# Patient Record
Sex: FEMALE | Race: WHITE | NOT HISPANIC OR LATINO | Employment: OTHER | URBAN - METROPOLITAN AREA
[De-identification: names, ages, dates, MRNs, and addresses within clinical notes are randomized per-mention and may not be internally consistent; named-entity substitution may affect disease eponyms.]

---

## 2017-03-22 ENCOUNTER — TELEPHONE (OUTPATIENT)
Dept: CARDIOLOGY | Facility: CLINIC | Age: 57
End: 2017-03-22

## 2017-03-22 DIAGNOSIS — E78.00 HYPERCHOLESTEREMIA: Primary | ICD-10-CM

## 2017-03-22 NOTE — TELEPHONE ENCOUNTER
PT would like to start Repatha or Praluent- she can not tolerate Crestor-   Left a msg for her to call me back- she will need lipid panel before we can proceed- order placed in EPIC

## 2017-03-29 ENCOUNTER — LAB (OUTPATIENT)
Dept: LAB | Facility: HOSPITAL | Age: 57
End: 2017-03-29

## 2017-03-29 DIAGNOSIS — E78.00 HYPERCHOLESTEREMIA: ICD-10-CM

## 2017-03-29 LAB
ALBUMIN SERPL-MCNC: 4.5 G/DL (ref 3.2–4.8)
ALP SERPL-CCNC: 62 U/L (ref 25–100)
ALT SERPL W P-5'-P-CCNC: 12 U/L (ref 7–40)
ARTICHOKE IGE QN: 211 MG/DL (ref 0–130)
AST SERPL-CCNC: 18 U/L (ref 0–33)
BILIRUB CONJ SERPL-MCNC: 0.1 MG/DL (ref 0–0.2)
BILIRUB INDIRECT SERPL-MCNC: 0.4 MG/DL (ref 0.1–1.1)
BILIRUB SERPL-MCNC: 0.5 MG/DL (ref 0.3–1.2)
CHOLEST SERPL-MCNC: 310 MG/DL (ref 0–200)
HDLC SERPL-MCNC: 74 MG/DL (ref 40–60)
PROT SERPL-MCNC: 7.6 G/DL (ref 5.7–8.2)
TRIGL SERPL-MCNC: 121 MG/DL (ref 0–150)

## 2017-03-29 PROCEDURE — 80061 LIPID PANEL: CPT | Performed by: INTERNAL MEDICINE

## 2017-03-29 PROCEDURE — 80076 HEPATIC FUNCTION PANEL: CPT | Performed by: INTERNAL MEDICINE

## 2017-06-16 ENCOUNTER — TRANSCRIBE ORDERS (OUTPATIENT)
Dept: MAMMOGRAPHY | Facility: HOSPITAL | Age: 57
End: 2017-06-16

## 2017-06-16 DIAGNOSIS — Z12.31 VISIT FOR SCREENING MAMMOGRAM: Primary | ICD-10-CM

## 2017-07-10 ENCOUNTER — HOSPITAL ENCOUNTER (OUTPATIENT)
Dept: MAMMOGRAPHY | Facility: HOSPITAL | Age: 57
Discharge: HOME | End: 2017-07-10
Attending: FAMILY MEDICINE | Admitting: FAMILY MEDICINE

## 2017-07-10 DIAGNOSIS — Z12.31 VISIT FOR SCREENING MAMMOGRAM: ICD-10-CM

## 2017-07-10 PROCEDURE — 77063 BREAST TOMOSYNTHESIS BI: CPT

## 2017-07-10 PROCEDURE — G0202 SCR MAMMO BI INCL CAD: HCPCS

## 2017-07-10 PROCEDURE — 77067 SCR MAMMO BI INCL CAD: CPT | Performed by: RADIOLOGY

## 2017-07-10 PROCEDURE — 77063 BREAST TOMOSYNTHESIS BI: CPT | Performed by: RADIOLOGY

## 2017-08-21 ENCOUNTER — TRANSCRIBE ORDERS (OUTPATIENT)
Dept: LAB | Facility: HOSPITAL | Age: 57
End: 2017-08-21

## 2017-08-21 ENCOUNTER — LAB (OUTPATIENT)
Dept: LAB | Facility: HOSPITAL | Age: 57
End: 2017-08-21

## 2017-08-21 DIAGNOSIS — G56.03 CARPAL TUNNEL SYNDROME, BILATERAL: ICD-10-CM

## 2017-08-21 DIAGNOSIS — R29.898 OTHER SYMPTOMS REFERABLE TO UPPER ARM JOINT: ICD-10-CM

## 2017-08-21 DIAGNOSIS — G56.03 CARPAL TUNNEL SYNDROME, BILATERAL: Primary | ICD-10-CM

## 2017-08-21 LAB
CK SERPL-CCNC: 76 U/L (ref 26–174)
CRP SERPL-MCNC: 0.77 MG/DL (ref 0–0.5)
D-LACTATE SERPL-SCNC: 0.9 MMOL/L (ref 0.5–2)
ERYTHROCYTE [SEDIMENTATION RATE] IN BLOOD: 17 MM/HR (ref 0–30)
LDH SERPL-CCNC: 165 U/L (ref 120–246)

## 2017-08-21 PROCEDURE — 86235 NUCLEAR ANTIGEN ANTIBODY: CPT | Performed by: PSYCHIATRY & NEUROLOGY

## 2017-08-21 PROCEDURE — 83615 LACTATE (LD) (LDH) ENZYME: CPT | Performed by: PSYCHIATRY & NEUROLOGY

## 2017-08-21 PROCEDURE — 83605 ASSAY OF LACTIC ACID: CPT | Performed by: PSYCHIATRY & NEUROLOGY

## 2017-08-21 PROCEDURE — 85652 RBC SED RATE AUTOMATED: CPT | Performed by: PSYCHIATRY & NEUROLOGY

## 2017-08-21 PROCEDURE — 86141 C-REACTIVE PROTEIN HS: CPT | Performed by: PSYCHIATRY & NEUROLOGY

## 2017-08-21 PROCEDURE — 86431 RHEUMATOID FACTOR QUANT: CPT | Performed by: PSYCHIATRY & NEUROLOGY

## 2017-08-21 PROCEDURE — 36415 COLL VENOUS BLD VENIPUNCTURE: CPT

## 2017-08-21 PROCEDURE — 82550 ASSAY OF CK (CPK): CPT | Performed by: PSYCHIATRY & NEUROLOGY

## 2017-08-21 PROCEDURE — 86225 DNA ANTIBODY NATIVE: CPT | Performed by: PSYCHIATRY & NEUROLOGY

## 2017-08-21 PROCEDURE — 82085 ASSAY OF ALDOLASE: CPT | Performed by: PSYCHIATRY & NEUROLOGY

## 2017-08-22 LAB
CENTROMERE B AB SER-ACNC: >8 AI (ref 0–0.9)
CHROMATIN AB SERPL-ACNC: <0.2 AI (ref 0–0.9)
DSDNA AB SER-ACNC: <1 IU/ML (ref 0–9)
ENA JO1 AB SER-ACNC: <0.2 AI (ref 0–0.9)
ENA RNP AB SER-ACNC: <0.2 AI (ref 0–0.9)
ENA SCL70 AB SER-ACNC: <0.2 AI (ref 0–0.9)
ENA SM AB SER-ACNC: <0.2 AI (ref 0–0.9)
ENA SS-A AB SER-ACNC: <0.2 AI (ref 0–0.9)
ENA SS-B AB SER-ACNC: <0.2 AI (ref 0–0.9)
Lab: ABNORMAL
RHEUMATOID FACT SERPL-ACNC: NEGATIVE [IU]/ML

## 2017-08-23 LAB — ALDOLASE SERPL-CCNC: 1.9 U/L (ref 3.3–10.3)

## 2017-10-18 ENCOUNTER — LAB (OUTPATIENT)
Dept: LAB | Facility: HOSPITAL | Age: 57
End: 2017-10-18

## 2017-10-18 DIAGNOSIS — R76.8 FALSE POSITIVE SEROLOGICAL TEST FOR SYPHILIS: ICD-10-CM

## 2017-10-18 DIAGNOSIS — G62.9 ACQUIRED POLYNEUROPATHY: Primary | ICD-10-CM

## 2017-10-18 LAB
ALBUMIN SERPL-MCNC: 4.3 G/DL (ref 3.2–4.8)
ALBUMIN/GLOB SERPL: 1.6 G/DL (ref 1.5–2.5)
ALP SERPL-CCNC: 83 U/L (ref 25–100)
ALT SERPL W P-5'-P-CCNC: 13 U/L (ref 7–40)
ANION GAP SERPL CALCULATED.3IONS-SCNC: 2 MMOL/L (ref 3–11)
AST SERPL-CCNC: 22 U/L (ref 0–33)
BASOPHILS # BLD MANUAL: 0 10*3/MM3 (ref 0–0.2)
BASOPHILS NFR BLD AUTO: 0 % (ref 0–1)
BILIRUB SERPL-MCNC: 0.4 MG/DL (ref 0.3–1.2)
BUN BLD-MCNC: 13 MG/DL (ref 9–23)
BUN/CREAT SERPL: 18.6 (ref 7–25)
CALCIUM SPEC-SCNC: 9.6 MG/DL (ref 8.7–10.4)
CHLORIDE SERPL-SCNC: 99 MMOL/L (ref 99–109)
CO2 SERPL-SCNC: 31 MMOL/L (ref 20–31)
CREAT BLD-MCNC: 0.7 MG/DL (ref 0.6–1.3)
CRP SERPL-MCNC: 0.3 MG/DL (ref 0–1)
DEPRECATED RDW RBC AUTO: 46.9 FL (ref 37–54)
EOSINOPHIL # BLD MANUAL: 0.2 10*3/MM3 (ref 0.1–0.3)
EOSINOPHIL NFR BLD MANUAL: 5 % (ref 0–3)
ERYTHROCYTE [DISTWIDTH] IN BLOOD BY AUTOMATED COUNT: 13.7 % (ref 11.3–14.5)
ERYTHROCYTE [SEDIMENTATION RATE] IN BLOOD: 19 MM/HR (ref 0–30)
GFR SERPL CREATININE-BSD FRML MDRD: 86 ML/MIN/1.73
GLOBULIN UR ELPH-MCNC: 2.7 GM/DL
GLUCOSE BLD-MCNC: 96 MG/DL (ref 70–100)
HBA1C MFR BLD: 5.7 % (ref 4.8–5.6)
HCT VFR BLD AUTO: 37.8 % (ref 34.5–44)
HGB BLD-MCNC: 12.4 G/DL (ref 11.5–15.5)
LYMPHOCYTES # BLD MANUAL: 1.1 10*3/MM3 (ref 0.6–4.8)
LYMPHOCYTES NFR BLD MANUAL: 27 % (ref 24–44)
LYMPHOCYTES NFR BLD MANUAL: 4 % (ref 0–12)
MCH RBC QN AUTO: 30.6 PG (ref 27–31)
MCHC RBC AUTO-ENTMCNC: 32.8 G/DL (ref 32–36)
MCV RBC AUTO: 93.3 FL (ref 80–99)
MONOCYTES # BLD AUTO: 0.16 10*3/MM3 (ref 0–1)
NEUTROPHILS # BLD AUTO: 2.6 10*3/MM3 (ref 1.5–8.3)
NEUTROPHILS NFR BLD MANUAL: 63 % (ref 41–71)
NEUTS BAND NFR BLD MANUAL: 1 % (ref 0–5)
PLAT MORPH BLD: NORMAL
PLATELET # BLD AUTO: 276 10*3/MM3 (ref 150–450)
PMV BLD AUTO: 10.6 FL (ref 6–12)
POTASSIUM BLD-SCNC: 4.1 MMOL/L (ref 3.5–5.5)
PROT SERPL-MCNC: 7 G/DL (ref 5.7–8.2)
RBC # BLD AUTO: 4.05 10*6/MM3 (ref 3.89–5.14)
RBC MORPH BLD: NORMAL
SODIUM BLD-SCNC: 132 MMOL/L (ref 132–146)
WBC MORPH BLD: NORMAL
WBC NRBC COR # BLD: 4.07 10*3/MM3 (ref 3.5–10.8)

## 2017-10-18 PROCEDURE — 36415 COLL VENOUS BLD VENIPUNCTURE: CPT

## 2017-10-18 PROCEDURE — 86140 C-REACTIVE PROTEIN: CPT | Performed by: INTERNAL MEDICINE

## 2017-10-18 PROCEDURE — 83036 HEMOGLOBIN GLYCOSYLATED A1C: CPT | Performed by: INTERNAL MEDICINE

## 2017-10-18 PROCEDURE — 80053 COMPREHEN METABOLIC PANEL: CPT | Performed by: INTERNAL MEDICINE

## 2017-10-18 PROCEDURE — 85007 BL SMEAR W/DIFF WBC COUNT: CPT | Performed by: INTERNAL MEDICINE

## 2017-10-18 PROCEDURE — 85027 COMPLETE CBC AUTOMATED: CPT | Performed by: INTERNAL MEDICINE

## 2017-10-18 PROCEDURE — 86038 ANTINUCLEAR ANTIBODIES: CPT | Performed by: INTERNAL MEDICINE

## 2017-10-19 LAB
ANA SER QL IA: POSITIVE
CENTROMERE AB TITR SER IF: ABNORMAL {TITER}
Lab: ABNORMAL

## 2017-10-31 RX ORDER — ESTRADIOL 0.5 MG/1
TABLET ORAL
Qty: 90 TABLET | Refills: 0 | Status: SHIPPED | OUTPATIENT
Start: 2017-10-31 | End: 2017-11-16 | Stop reason: SDUPTHER

## 2017-11-16 ENCOUNTER — OFFICE VISIT (OUTPATIENT)
Dept: OBSTETRICS AND GYNECOLOGY | Facility: CLINIC | Age: 57
End: 2017-11-16

## 2017-11-16 VITALS
SYSTOLIC BLOOD PRESSURE: 128 MMHG | HEIGHT: 63 IN | BODY MASS INDEX: 28.63 KG/M2 | WEIGHT: 161.6 LBS | DIASTOLIC BLOOD PRESSURE: 90 MMHG

## 2017-11-16 DIAGNOSIS — N60.12 FIBROCYSTIC BREAST CHANGES, BILATERAL: ICD-10-CM

## 2017-11-16 DIAGNOSIS — Z79.890 POST-MENOPAUSE ON HRT (HORMONE REPLACEMENT THERAPY): Primary | ICD-10-CM

## 2017-11-16 DIAGNOSIS — N60.11 FIBROCYSTIC BREAST CHANGES, BILATERAL: ICD-10-CM

## 2017-11-16 PROCEDURE — 99396 PREV VISIT EST AGE 40-64: CPT | Performed by: OBSTETRICS & GYNECOLOGY

## 2017-11-16 RX ORDER — ESTRADIOL 0.5 MG/1
0.5 TABLET ORAL DAILY
Qty: 90 TABLET | Refills: 3 | Status: SHIPPED | OUTPATIENT
Start: 2017-11-16 | End: 2018-11-16

## 2017-11-16 RX ORDER — HYDROCHLOROTHIAZIDE 25 MG/1
1 TABLET ORAL DAILY
COMMUNITY
Start: 2017-08-14 | End: 2019-06-13 | Stop reason: SDUPTHER

## 2017-11-16 RX ORDER — LEVOFLOXACIN 500 MG/1
1 TABLET, FILM COATED ORAL DAILY
COMMUNITY
Start: 2017-11-07 | End: 2018-07-20

## 2017-11-16 RX ORDER — ALBUTEROL SULFATE 90 UG/1
1 POWDER, METERED RESPIRATORY (INHALATION) 4 TIMES DAILY
COMMUNITY
Start: 2017-11-07 | End: 2018-07-20

## 2017-11-16 NOTE — PROGRESS NOTES
Chief Complaint   Patient presents with   • Gynecologic Exam     patient thinks she may be allergic to estradiol.  having itching/rash under arms and inside thighs X 6 weeks.       Rupa Finn is a 57 y.o. year old  presenting to be seen for her annual exam.This patient has previously had an LAVH/BSO/VCS and a laparoscopic Lee procedure.  She denies urinary incontinence.  She denies bowel symptoms.  She uses estradiol, 0.5 mg daily and has no menopausal symptoms.  She traveled to [facility] weeks ago and now has an erythematous, pruritic rash under her arms and in the groin area.  She questions whether the estradiol is causing that.  She has a cough with production of purulent sputum and is currently being treated with Levaquin, 500 mg daily.    SCREENING TESTS    Year 2012 2033   Age                         PAP                         HPV high risk                         Mammogram     benign benign                   TONE score                         Breast MRI                         Lipids                         Vitamin D                         Colonoscopy                         DEXA  Frax (hip/any)     normal                    Ovarian Screen                             She exercises regularly: yes.  She wears her seat belt: yes.  She has concerns about domestic violence: no.  She has noticed changes in height: no    GYN screening history:  · Last mammogram: was done on approximately 7/10/2017 and the result was: Birads I (Normal).  · Last DEXA: was done on approximately 2016 and the results were: normal.    No Additional Complaints Reported    The following portions of the patient's history were reviewed and updated as appropriate:vital signs and   She  does not have any pertinent problems on file.  She  has a past surgical history that includes Tonsillectomy; Cholecystectomy; Other  surgical history; laparoscopic assisted vaginal hysterectomy salpingo oophorectomy (Bilateral); Laparoscopic Lee procedure; and Oophorectomy.  Her family history includes Asthma in her brother; Glaucoma in her father; Other in her father and mother.  She  reports that she has never smoked. She has never used smokeless tobacco. She reports that she does not drink alcohol or use illicit drugs.  Current Outpatient Prescriptions   Medication Sig Dispense Refill   • Alirocumab 75 MG/ML solution pen-injector Inject 75 mg under the skin Every 14 (Fourteen) Days. 2 pen 11   • calcium carbonate (TUMS) 500 MG chewable tablet Chew 1 tablet As Needed for indigestion or heartburn.     • econazole nitrate (SPECTAZOLE) 1 % cream      • estradiol (ESTRACE) 0.5 MG tablet Take 1 tablet by mouth Daily. 90 tablet 3   • FIBER COMPLETE PO Take  by mouth Daily.     • hydrochlorothiazide (MICROZIDE) 12.5 MG capsule Take 2 capsules by mouth Daily.     • levoFLOXacin (LEVAQUIN) 500 MG tablet Take 1 tablet by mouth Daily.     • levothyroxine (SYNTHROID, LEVOTHROID) 88 MCG tablet Take 88 mcg by mouth Daily.     • methazolAMIDE (NEPTAZANE) 50 MG tablet Take 25 mg by mouth 2 (Two) Times a Day.     • PROAIR RESPICLICK 108 (90 Base) MCG/ACT inhaler Inhale 1 dose 4 (Four) Times a Day.     • timolol (TIMOPTIC) 0.5 % ophthalmic solution Administer 1 drop to both eyes 2 (Two) Times a Day.     • travoprost, BAK free, (TRAVATAN) 0.004 % solution ophthalmic solution 1 drop Every Evening. in affected eye(s)       No current facility-administered medications for this visit.      She is allergic to aleve [naproxen sodium]; alphagan [brimonidine]; atenolol; ceclor [cefaclor]; e-mycin [erythromycin]; and statins..    Review of Systems  A comprehensive review of systems was taken.  Constitutional: negative for fever, chills, activity change, appetite change, fatigue and unexpected weight change.  Respiratory: positive for cough, wheezing and purulent  "production  Cardiovascular: negative  Gastrointestinal: negative  Genitourinary:negative  Musculoskeletal:negative  Behavioral/Psych: negative       /90  Ht 63\" (160 cm)  Wt 161 lb 9.6 oz (73.3 kg)  LMP  (LMP Unknown)  BMI 28.63 kg/m2    Physical Exam    General:  alert; cooperative; well developed; well nourished   Skin:  erythema in axilla and in groin areas   Thyroid: normal to inspection and palpation   Lungs:  Raspy breath sounds and inspiratory wheezing in both lungs   Heart:  regular rate and rhythm, S1, S2 normal, no murmur, click, rub or gallop   Breasts:  Examined in supine position  Symmetric without masses or skin dimpling  Nipples normal without inversion, lesions or discharge  There are no palpable axillary nodes  Fibrocystic changes are present both breasts without a discrete mass   Abdomen: soft, non-tender; no masses  no umbilical or inginual hernias are present  no hepato-splenomegaly   Pelvis: Clinical staff was present for exam  External genitalia:  normal appearance of the external genitalia including Bartholin's and Paxtonia's glands.  Vaginal:  normal pink mucosa without prolapse or lesions. the urethro-vesical angle is good  Cervix:  absent.  Uterus:  absent.  Adnexa:  absent, bilateral.  Rectal:  anus visually normal appearing. recto-vaginal exam unremarkable and confirms findings;     Lab Review   No data reviewed    Imaging  Mammogram results         ASSESSMENT  Problems Addressed this Visit        Genitourinary    Post-menopause on HRT (hormone replacement therapy) - Primary    Relevant Medications    estradiol (ESTRACE) 0.5 MG tablet       Other    Fibrocystic breast changes, bilateral          PLAN    Medications prescribed this encounter:    New Medications Ordered This Visit   Medications   • PROAIR RESPICLICK 108 (90 Base) MCG/ACT inhaler     Sig: Inhale 1 dose 4 (Four) Times a Day.   • econazole nitrate (SPECTAZOLE) 1 % cream   • hydrochlorothiazide (MICROZIDE) 12.5 MG " capsule     Sig: Take 2 capsules by mouth Daily.   • levoFLOXacin (LEVAQUIN) 500 MG tablet     Sig: Take 1 tablet by mouth Daily.   • estradiol (ESTRACE) 0.5 MG tablet     Sig: Take 1 tablet by mouth Daily.     Dispense:  90 tablet     Refill:  3   · I have encouraged the patient to use Lotrimin cream in the axillary areas and groin areas twice a day.  If her rash does not clear she may need to discontinue estradiol.  · I have counseled her in monthly self breast assessment and annual breast imaging  · Calcium, 600 mg/ Vit. D, 400 IU daily; regular weight-bearing exercise  · Follow up: 12 month(s)  *Please note that portions of this documentation may have been completed with a voice recognition program.  Efforts were made to edit this dictation, but occasional words may have been mistranscribed.       This note was electronically signed.    YASEMIN Armenta MD  November 16, 2017  4:19 PM

## 2018-06-04 ENCOUNTER — TRANSCRIBE ORDERS (OUTPATIENT)
Dept: ADMINISTRATIVE | Facility: HOSPITAL | Age: 58
End: 2018-06-04

## 2018-06-04 DIAGNOSIS — Z12.31 VISIT FOR SCREENING MAMMOGRAM: Primary | ICD-10-CM

## 2018-07-11 ENCOUNTER — HOSPITAL ENCOUNTER (OUTPATIENT)
Dept: MAMMOGRAPHY | Facility: HOSPITAL | Age: 58
Discharge: HOME | End: 2018-07-11
Attending: OBSTETRICS & GYNECOLOGY | Admitting: OBSTETRICS & GYNECOLOGY

## 2018-07-11 DIAGNOSIS — Z12.31 VISIT FOR SCREENING MAMMOGRAM: ICD-10-CM

## 2018-07-11 PROCEDURE — 77067 SCR MAMMO BI INCL CAD: CPT | Performed by: RADIOLOGY

## 2018-07-11 PROCEDURE — 77063 BREAST TOMOSYNTHESIS BI: CPT | Performed by: RADIOLOGY

## 2018-07-11 PROCEDURE — 77063 BREAST TOMOSYNTHESIS BI: CPT

## 2018-07-11 PROCEDURE — 77067 SCR MAMMO BI INCL CAD: CPT

## 2018-07-20 ENCOUNTER — OFFICE VISIT (OUTPATIENT)
Dept: CARDIOLOGY | Facility: CLINIC | Age: 58
End: 2018-07-20

## 2018-07-20 VITALS
BODY MASS INDEX: 26.9 KG/M2 | DIASTOLIC BLOOD PRESSURE: 70 MMHG | SYSTOLIC BLOOD PRESSURE: 110 MMHG | WEIGHT: 151.8 LBS | HEART RATE: 72 BPM | HEIGHT: 63 IN

## 2018-07-20 DIAGNOSIS — E78.5 HYPERLIPIDEMIA, UNSPECIFIED HYPERLIPIDEMIA TYPE: ICD-10-CM

## 2018-07-20 DIAGNOSIS — R06.09 DOE (DYSPNEA ON EXERTION): Primary | ICD-10-CM

## 2018-07-20 DIAGNOSIS — R07.9 CHEST PAIN OF UNCERTAIN ETIOLOGY: ICD-10-CM

## 2018-07-20 DIAGNOSIS — I10 ESSENTIAL HYPERTENSION: ICD-10-CM

## 2018-07-20 PROCEDURE — 93000 ELECTROCARDIOGRAM COMPLETE: CPT | Performed by: INTERNAL MEDICINE

## 2018-07-20 PROCEDURE — 99214 OFFICE O/P EST MOD 30 MIN: CPT | Performed by: INTERNAL MEDICINE

## 2018-07-20 RX ORDER — NITROGLYCERIN 0.4 MG/1
TABLET SUBLINGUAL
Qty: 25 TABLET | Refills: 6 | Status: SHIPPED | OUTPATIENT
Start: 2018-07-20 | End: 2019-12-04

## 2018-07-20 NOTE — PROGRESS NOTES
Subjective:     Encounter Date:07/20/2018    Patient ID: Rupa Finn is a 58 y.o.  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    SELF-REFERRED  PHYSICIAN: Gena Tse MD  PREVIOUS CARDIOLOGIST: Glenna Melendez MD, [facility]    Chief Complaint:   Chief Complaint   Patient presents with   • Palpitations     Problem List:  1. Palpitations:  a. Remote cardiac stress testing, performed by [facility], Dr. Hartley; data deficit, date deficit; negative for ischemia.   b. Echocardiogram,  03/10/2014:  Normal, EF 60%, Dr. Hartley.  c. Holter monitor, 05/23/2014: Sinus katelyn to sinus rhythm with rates of 40 to 94, occasional PVCs, Dr. Hartley.   d. Cardiac event monitor, 11/10/16-12/10/2016: sinus rhythm, sinus tach, sinus arrhythmia with rare PACs and PVCs.   2. Dyspnea on exertion and chest pain syndrome, CCS class I-II chest discomfort/NYHA class II TERRAZAS, with normal EKG (July 2018).  3. Hypertension.   4. Hyperlipidemia; ASCVD 10 year risk is 4.5%, 1.9% if treated.   5. Lower extremity edema.   6. Glaucoma.  7. Autoimmune diseases:  a. Patient reports positive markers for scleroderma.   b. Rheumatoid arthritis, positive YONY.  8. Hypothyroidism.  9. Constipation.  10. Gestational diabetes.  11. GERD, not currently taking medication.  12. Surgical history:   a. Hysterectomy.  b. Tonsillectomy.  c. Cholecystectomy.  d. Lap for endometriosis.    Allergies   Allergen Reactions   • Aleve [Naproxen Sodium]      tongue swelling   • Alphagan [Brimonidine]    • Atenolol      fatigue   • Ceclor [Cefaclor]    • E-Mycin [Erythromycin]    • Statins Myalgia     Current Outpatient Prescriptions:   •  Alirocumab 75 MG/ML solution pen-injector, Inject 75 mg under the skin Every 14 (Fourteen) Days., Disp: 2 pen, Rfl: 11  •  calcium carbonate (TUMS) 500 MG chewable tablet, Chew 1 tablet As Needed for indigestion or heartburn., Disp: , Rfl:   •  estradiol (ESTRACE) 0.5 MG tablet,  Take 1 tablet by mouth Daily., Disp: 90 tablet, Rfl: 3  •  FIBER COMPLETE PO, Take  by mouth Daily., Disp: , Rfl:   •  hydrochlorothiazide (MICROZIDE) 12.5 MG capsule, Take 2 capsules by mouth Daily., Disp: , Rfl:   •  levothyroxine (SYNTHROID, LEVOTHROID) 88 MCG tablet, Take 88 mcg by mouth Daily., Disp: , Rfl:   •  methazolAMIDE (NEPTAZANE) 50 MG tablet, Take 25 mg by mouth 2 (Two) Times a Day., Disp: , Rfl:   •  timolol (TIMOPTIC) 0.5 % ophthalmic solution, Administer 1 drop to both eyes 2 (Two) Times a Day., Disp: , Rfl:   •  travoprost, KATLYN free, (TRAVATAN) 0.004 % solution ophthalmic solution, 1 drop Every Evening. in affected eye(s), Disp: , Rfl:     HISTORY OF PRESENT ILLNESS:  Patient is here after a 19 month hiatus, accompanied by her  to the office today. At that time, there was discussion about starting Praluent or Repatha, but the patient was going to be rechallenged with Crestor 10 mg, 3 times weekly. She is no longer taking any type of statin medication due to intolerances with all of them. She did now show up for her appointment with Dr. Melendez in January 2018. She reports experiencing having intermittent dull chest pain that occasionally radiates to her left shoulder blade and into her left arm, with associated shortness of breath. However, she denies any nausea, dizziness, palpitations, presyncope, or syncope. She mentions that she has noticed being unable to walk up a flight of stairs without becoming winded, or when she is singing.  She denies any edema, fever, chills, or sputum production.  She had recent laboratory studies and brings this to the office today and are reviewed with the patient and her ; see below.  She does not have nitroglycerin sublingual available at home currently. Patient otherwise denies prolonged chest pain, severe shortness of breath, PND, edema, palpitations, syncope or presyncope at this time.    Review of Systems   Cardiovascular: Positive for  "dyspnea on exertion.   Respiratory: Positive for shortness of breath. Sleep disturbances due to breathing: climbing or singing.    Neurological: Positive for dizziness (occasional).      Obtained and otherwise negative except as outlined in problem list and HPI.      ECG 12 Lead  Date/Time: 7/20/2018 3:49 PM  Performed by: PONCHO MAZARIEGOS  Authorized by: PONCHO MAZARIEGOS   Rhythm comments: Sinus bradycardia, otherwise normal ECG, 58 bpm,  ms, QRS 94 ms,  ms            Objective:     Vitals:    07/20/18 1509 07/20/18 1511   BP: 122/76 110/70   BP Location: Left arm Left arm   Patient Position: Sitting Standing   Pulse: 68 72   Weight: 68.9 kg (151 lb 12.8 oz)    Height: 160 cm (63\")      Body mass index is 26.89 kg/m².  Last weight December 2016 was 162 pounds.     Physical Exam   Constitutional: She is oriented to person, place, and time. She appears well-developed and well-nourished.   Neck: No JVD present. Carotid bruit is not present. No thyromegaly present.   Cardiovascular: Regular rhythm, S1 normal, S2 normal and normal heart sounds.  Exam reveals no gallop, no S3 and no friction rub.    No murmur heard.  Pulses:       Dorsalis pedis pulses are 2+ on the right side, and 2+ on the left side.        Posterior tibial pulses are 2+ on the right side, and 2+ on the left side.   Pulmonary/Chest: Effort normal and breath sounds normal. She has no wheezes. She has no rhonchi. She has no rales.   Abdominal: Soft. She exhibits no mass. There is no hepatosplenomegaly. There is no tenderness. There is no guarding.   Bowel sounds audible x4   Musculoskeletal: Normal range of motion. She exhibits no edema.   Lymphadenopathy:     She has no cervical adenopathy.   Neurological: She is alert and oriented to person, place, and time.   Skin: Skin is warm, dry and intact. No rash noted.   Vitals reviewed.    Lab Review:  Lab Results   Component Value Date    GLUCOSE 96 10/18/2017    BUN 13 10/18/2017    CREATININE " 0.70 10/18/2017    EGFRIFNONA 86 10/18/2017    BCR 18.6 10/18/2017    CO2 31.0 10/18/2017    CALCIUM 9.6 10/18/2017    ALBUMIN 4.30 10/18/2017    AST 22 10/18/2017    ALT 13 10/18/2017     Lab Results   Component Value Date    WBC 4.07 10/18/2017    HGB 12.4 10/18/2017    HCT 37.8 10/18/2017    MCV 93.3 10/18/2017     10/18/2017     Lab Results   Component Value Date    HGBA1C 5.70 (H) 10/18/2017     Lab Results   Component Value Date    CHOL 310 (H) 03/29/2017    CHOL 306 (H) 11/10/2016     Lab Results   Component Value Date    TRIG 121 03/29/2017    TRIG 156 (H) 11/10/2016     Lab Results   Component Value Date    HDL 74 (H) 03/29/2017    HDL 73 (H) 11/10/2016     Lab Results   Component Value Date     (H) 03/29/2017     (H) 11/10/2016     7/10/18:  · CMP: Sodium 132, potassium 3.8, chloride 96, carbon dioxide 26, glucose 97, BUN 14, creatinine 0.64, calcium 9.9, protein 7.2, albumin 4.6, globulin 2.6, alkaline phosphatase 46, ALT 8, AST 18, bilirubin 0.5, GFR greater than 60  · CBC: WBC 3.3, RBC 4.14, hemoglobin 13.2, hematocrit 39, MCV 94, MCH 31.9, MCHC 33.8, RDW 14.5, platelets 231, MPV 11.5 neutrophils 65.6, lymphocytes 23.5, mixed 10.9  · Lipid panel: Cholesterol 320, triglycerides 114, HDL 63, .6  · TSH - 0.21  · Free T4 - 1.68    Assessment:   Overall somewhat suboptimal course course with notable interim cardiopulmonary complaints with reduced functional status. We will order a stress echocardiogram as well as a CT cardiac calcium score to assess her shortness of breath and chest discomfort.  We'll provide her with nitroglycerin sublingual when necessary.  [facility] Criteria indicated possible familial hypercholesterolemia. We will begin Livalo 2 mg daily and consider up-titrating to 4 mg daily if the medication is tolerated +/- adding ezetimibe. The assessment and plans were discussed in detail with both the patient and  and all questions were answered.       Diagnosis Plan   1. TERRAZAS (dyspnea on exertion)  CT Cardiac Calcium Score Without Dye    Adult Stress Echo W/ Cont or Stress Agent if Necessary Per Protocol   2. Chest pain of uncertain etiology  CT Cardiac Calcium Score Without Dye    Adult Stress Echo W/ Cont or Stress Agent if Necessary Per Protocol   3. Hyperlipidemia, unspecified hyperlipidemia type  CT Cardiac Calcium Score Without Dye   4. Essential hypertension  CT Cardiac Calcium Score Without Dye    Adult Stress Echo W/ Cont or Stress Agent if Necessary Per Protocol     Plan:     1. Patient to continue current medications and close follow up with the above providers.  2. Tentative cardiology follow up in 4 weeks or patient may return sooner PRN.  3. Livalo 2 mg daily.   4. Stress echocardiographic study with continuous oximetry.   5. NTG SL PRN.   6. CT cardiac calcium score.   7. 1(203) card issued.     Scribed for Wing Olson MD by Aspen Plata, HOWARD. 7/20/2018  4:26 PM     IWing MD, [facility], personally performed the services described in this documentation as scribed by the above named individual in my presence, and it is both accurate and complete. At 4:29 PM on 07/20/2018

## 2018-08-13 ENCOUNTER — HOSPITAL ENCOUNTER (OUTPATIENT)
Dept: CT IMAGING | Facility: HOSPITAL | Age: 58
Discharge: HOME | End: 2018-08-13
Attending: INTERNAL MEDICINE | Admitting: INTERNAL MEDICINE

## 2018-08-13 ENCOUNTER — HOSPITAL ENCOUNTER (OUTPATIENT)
Dept: CARDIOLOGY | Facility: HOSPITAL | Age: 58
Discharge: HOME | End: 2018-08-13
Attending: INTERNAL MEDICINE

## 2018-08-13 DIAGNOSIS — I10 ESSENTIAL HYPERTENSION: ICD-10-CM

## 2018-08-13 DIAGNOSIS — R07.9 CHEST PAIN OF UNCERTAIN ETIOLOGY: ICD-10-CM

## 2018-08-13 DIAGNOSIS — R06.09 DOE (DYSPNEA ON EXERTION): ICD-10-CM

## 2018-08-13 DIAGNOSIS — E78.5 HYPERLIPIDEMIA, UNSPECIFIED HYPERLIPIDEMIA TYPE: ICD-10-CM

## 2018-08-13 LAB
BH CV ECHO MEAS - AO ROOT AREA (BSA CORRECTED): 1.4
BH CV ECHO MEAS - AO ROOT AREA: 4.5 CM^2
BH CV ECHO MEAS - AO ROOT DIAM: 2.4 CM
BH CV ECHO MEAS - BSA(HAYCOCK): 1.8 M^2
BH CV ECHO MEAS - BSA: 1.7 M^2
BH CV ECHO MEAS - BZI_BMI: 26.7 KILOGRAMS/M^2
BH CV ECHO MEAS - BZI_METRIC_HEIGHT: 160 CM
BH CV ECHO MEAS - BZI_METRIC_WEIGHT: 68.5 KG
BH CV ECHO MEAS - CONTRAST EF (2CH): 64.3 ML/M^2
BH CV ECHO MEAS - CONTRAST EF 4CH: 68.3 ML/M^2
BH CV ECHO MEAS - EDV(CUBED): 69 ML
BH CV ECHO MEAS - EDV(MOD-SP2): 56 ML
BH CV ECHO MEAS - EDV(MOD-SP4): 63 ML
BH CV ECHO MEAS - EDV(TEICH): 74.3 ML
BH CV ECHO MEAS - EF(CUBED): 68.2 %
BH CV ECHO MEAS - EF(MOD-BP): 68 %
BH CV ECHO MEAS - EF(MOD-SP2): 64.3 %
BH CV ECHO MEAS - EF(MOD-SP4): 68.3 %
BH CV ECHO MEAS - EF(TEICH): 60.3 %
BH CV ECHO MEAS - ESV(CUBED): 21.9 ML
BH CV ECHO MEAS - ESV(MOD-SP2): 20 ML
BH CV ECHO MEAS - ESV(MOD-SP4): 20 ML
BH CV ECHO MEAS - ESV(TEICH): 29.5 ML
BH CV ECHO MEAS - FS: 31.8 %
BH CV ECHO MEAS - IVS/LVPW: 1
BH CV ECHO MEAS - IVSD: 0.8 CM
BH CV ECHO MEAS - LA DIMENSION: 3.2 CM
BH CV ECHO MEAS - LA/AO: 1.3
BH CV ECHO MEAS - LV DIASTOLIC VOL/BSA (35-75): 36.7 ML/M^2
BH CV ECHO MEAS - LV MASS(C)D: 101.7 GRAMS
BH CV ECHO MEAS - LV MASS(C)DI: 59.2 GRAMS/M^2
BH CV ECHO MEAS - LV SYSTOLIC VOL/BSA (12-30): 11.7 ML/M^2
BH CV ECHO MEAS - LVIDD: 4.2 CM
BH CV ECHO MEAS - LVIDS: 2.8 CM
BH CV ECHO MEAS - LVLD AP2: 6.6 CM
BH CV ECHO MEAS - LVLD AP4: 6.8 CM
BH CV ECHO MEAS - LVLS AP2: 5.2 CM
BH CV ECHO MEAS - LVLS AP4: 6.2 CM
BH CV ECHO MEAS - LVOT AREA (M): 3.1 CM^2
BH CV ECHO MEAS - LVOT AREA: 3 CM^2
BH CV ECHO MEAS - LVOT DIAM: 2 CM
BH CV ECHO MEAS - LVPWD: 0.8 CM
BH CV ECHO MEAS - RAP SYSTOLE: 3 MMHG
BH CV ECHO MEAS - RVSP: 30 MMHG
BH CV ECHO MEAS - SI(CUBED): 27.4 ML/M^2
BH CV ECHO MEAS - SI(MOD-SP2): 21 ML/M^2
BH CV ECHO MEAS - SI(MOD-SP4): 25.1 ML/M^2
BH CV ECHO MEAS - SI(TEICH): 26.1 ML/M^2
BH CV ECHO MEAS - SV(CUBED): 47.1 ML
BH CV ECHO MEAS - SV(MOD-SP2): 36 ML
BH CV ECHO MEAS - SV(MOD-SP4): 43 ML
BH CV ECHO MEAS - SV(TEICH): 44.8 ML
BH CV ECHO MEAS - TR MAX PG: 27
BH CV ECHO MEAS - TR MAX VEL: 260 CM/SEC
BH CV STRESS BP STAGE 1: NORMAL
BH CV STRESS BP STAGE 2: NORMAL
BH CV STRESS BP STAGE 3: NORMAL
BH CV STRESS DURATION MIN STAGE 1: 3
BH CV STRESS DURATION MIN STAGE 2: 3
BH CV STRESS DURATION MIN STAGE 3: 3
BH CV STRESS DURATION SEC STAGE 1: 0
BH CV STRESS DURATION SEC STAGE 2: 0
BH CV STRESS DURATION SEC STAGE 3: 1
BH CV STRESS ECHO POST STRESS EJECTION FRACTION EF: 75 %
BH CV STRESS GRADE STAGE 1: 10
BH CV STRESS GRADE STAGE 2: 12
BH CV STRESS GRADE STAGE 3: 14
BH CV STRESS HR STAGE 1: 118
BH CV STRESS HR STAGE 2: 126
BH CV STRESS HR STAGE 3: 142
BH CV STRESS METS STAGE 1: 5
BH CV STRESS METS STAGE 2: 7.5
BH CV STRESS METS STAGE 3: 10
BH CV STRESS O2 STAGE 1: 98
BH CV STRESS O2 STAGE 2: 99
BH CV STRESS O2 STAGE 3: 98
BH CV STRESS PROTOCOL 1: NORMAL
BH CV STRESS RECOVERY BP: NORMAL MMHG
BH CV STRESS RECOVERY HR: 65 BPM
BH CV STRESS RECOVERY O2: 100 %
BH CV STRESS SPEED STAGE 1: 1.7
BH CV STRESS SPEED STAGE 2: 2.5
BH CV STRESS SPEED STAGE 3: 3.4
BH CV STRESS STAGE 1: 1
BH CV STRESS STAGE 2: 2
BH CV STRESS STAGE 3: 3
BH CV VAS BP LEFT ARM: NORMAL MMHG
LV EF 2D ECHO EST: 65 %
MAXIMAL PREDICTED HEART RATE: 162 BPM
PERCENT MAX PREDICTED HR: 90.12 %
STRESS BASELINE BP: NORMAL MMHG
STRESS BASELINE HR: 67 BPM
STRESS O2 SAT REST: 98 %
STRESS PERCENT HR: 106 %
STRESS POST ESTIMATED WORKLOAD: 10.1 METS
STRESS POST EXERCISE DUR MIN: 9 MIN
STRESS POST EXERCISE DUR SEC: 1 SEC
STRESS POST O2 SAT PEAK: 98 %
STRESS POST PEAK BP: NORMAL MMHG
STRESS POST PEAK HR: 146 BPM
STRESS TARGET HR: 138 BPM

## 2018-08-13 PROCEDURE — 25010000002 SULFUR HEXAFLUORIDE MICROSPH 60.7-25 MG RECONSTITUTED SUSPENSION: Performed by: INTERNAL MEDICINE

## 2018-08-13 PROCEDURE — 93350 STRESS TTE ONLY: CPT | Performed by: INTERNAL MEDICINE

## 2018-08-13 PROCEDURE — 75571 CT HRT W/O DYE W/CA TEST: CPT

## 2018-08-13 PROCEDURE — 93350 STRESS TTE ONLY: CPT

## 2018-08-13 PROCEDURE — 93018 CV STRESS TEST I&R ONLY: CPT | Performed by: INTERNAL MEDICINE

## 2018-08-13 PROCEDURE — 93320 DOPPLER ECHO COMPLETE: CPT

## 2018-08-13 PROCEDURE — 93325 DOPPLER ECHO COLOR FLOW MAPG: CPT

## 2018-08-13 PROCEDURE — 93017 CV STRESS TEST TRACING ONLY: CPT

## 2018-08-13 PROCEDURE — 93352 ADMIN ECG CONTRAST AGENT: CPT | Performed by: INTERNAL MEDICINE

## 2018-08-13 RX ADMIN — SULFUR HEXAFLUORIDE 5 ML: KIT at 08:40

## 2018-08-27 ENCOUNTER — OFFICE VISIT (OUTPATIENT)
Dept: CARDIOLOGY | Facility: CLINIC | Age: 58
End: 2018-08-27

## 2018-08-27 VITALS
SYSTOLIC BLOOD PRESSURE: 110 MMHG | BODY MASS INDEX: 25.51 KG/M2 | DIASTOLIC BLOOD PRESSURE: 62 MMHG | HEART RATE: 70 BPM | WEIGHT: 144 LBS

## 2018-08-27 DIAGNOSIS — E78.5 HYPERLIPIDEMIA, UNSPECIFIED HYPERLIPIDEMIA TYPE: ICD-10-CM

## 2018-08-27 DIAGNOSIS — I10 ESSENTIAL HYPERTENSION: ICD-10-CM

## 2018-08-27 DIAGNOSIS — R06.09 DOE (DYSPNEA ON EXERTION): ICD-10-CM

## 2018-08-27 DIAGNOSIS — R00.2 PALPITATIONS: Primary | ICD-10-CM

## 2018-08-27 PROCEDURE — 99214 OFFICE O/P EST MOD 30 MIN: CPT | Performed by: INTERNAL MEDICINE

## 2018-08-27 NOTE — PROGRESS NOTES
Subjective:     Encounter Date:08/27/2018    Patient ID: Rupa Finn is a 58 y.o.  white female, LCA , from [place], [place].     SELF-REFERRED  PHYSICIAN: Gena Tse MD  PREVIOUS CARDIOLOGIST: Glenna Melendez MD, [facility]    Chief Complaint:   Chief Complaint   Patient presents with   • Palpitations     Problem List:  1. Palpitations:  a. Remote cardiac stress testing, performed by [facility], Dr. Hartley; data deficit, date deficit; negative for ischemia.   b. Echocardiogram,  03/10/2014:  Normal, EF 60%, Dr. Hartley.  c. Holter monitor, 05/23/2014: Sinus katelyn to sinus rhythm with rates of 40 to 94, occasional PVCs, Dr. Hartley.   d. Cardiac event monitor, 11/10/16-12/10/2016: sinus rhythm, sinus tach, sinus arrhythmia with rare PACs and PVCs.   2. 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 CCS class I-II chest discomfort/NYHA class II TERRAZAS, with normal EKG (July 2018).  b. CT cardiac calcium score 5.6 August 2018  c. Stress echocardiogram 8/13/18: RVSP 30 mmHg, mild TR, no chest pain at baseline and during exercise with baseline ECG normal, acceptable negative echocardiographic exercise stress test suggestive of low probability for significant focal obstructive coronary artery disease with preserved LV function following exercise to 122% predicted exercise capacity and 90% predicted maximum heart rate  d. Residual class I symptoms  3. Hypertension.   4. Hyperlipidemia; ASCVD 10 year risk is 4.5%, 1.9% if treated, started on Livalo but she was intolerant July 2018, has had previous intolerances to statins as well.   5. Lower extremity edema.   6. Glaucoma.  7. Autoimmune diseases:  a. Patient reports positive markers for scleroderma.   b. Rheumatoid arthritis, positive YONY.  8. Hypothyroidism.  9. Constipation.  10. Gestational diabetes.  11. GERD, not currently taking  medication.  12. Surgical history:   a. Hysterectomy.  b. Tonsillectomy.  c. Cholecystectomy.  d. Lap for endometriosis.    Allergies   Allergen Reactions   • Aleve [Naproxen Sodium]      tongue swelling   • Alphagan [Brimonidine]    • Atenolol      fatigue   • Ceclor [Cefaclor]    • E-Mycin [Erythromycin]    • Statins Myalgia         Current Outpatient Prescriptions:   •  calcium carbonate (TUMS) 500 MG chewable tablet, Chew 1 tablet As Needed for indigestion or heartburn., Disp: , Rfl:   •  estradiol (ESTRACE) 0.5 MG tablet, Take 1 tablet by mouth Daily., Disp: 90 tablet, Rfl: 3  •  FIBER COMPLETE PO, Take  by mouth Daily., Disp: , Rfl:   •  hydrochlorothiazide (HYDRODIURIL) 25 MG tablet, Take 1 capsule by mouth Daily., Disp: , Rfl:   •  levothyroxine (SYNTHROID, LEVOTHROID) 88 MCG tablet, Take 88 mcg by mouth Daily., Disp: , Rfl:   •  methazolAMIDE (NEPTAZANE) 25 MG tablet, Take 25 mg by mouth 2 (Two) Times a Day., Disp: , Rfl:   •  nitroglycerin (NITROSTAT) 0.4 MG SL tablet, 1 under the tongue as needed for angina, may repeat q5mins for up three doses, Disp: 25 tablet, Rfl: 6  •  timolol (TIMOPTIC) 0.5 % ophthalmic solution, Administer 1 drop to both eyes 2 (Two) Times a Day., Disp: , Rfl:   •  travoprost, BAK free, (TRAVATAN) 0.004 % solution ophthalmic solution, 1 drop Every Evening. in affected eye(s), Disp: , Rfl:   •  Alirocumab 75 MG/ML solution pen-injector, Inject 75 mg under the skin Every 14 (Fourteen) Days., Disp: 2 pen, Rfl: 11    HISTORY OF PRESENT ILLNESS:  Patient is here for a 4 week follow-up. She had a negative acceptable stress echocardiogram and a CT cardiac calcium score of 5.6 in August 2018. She has familial hypercholesterolemia and was started on Livalo in July 2018 but was intolerant to it due to cramping in her legs at night, causing an inability to sleep. She also had myalgias.  She believes that she has tried some non-statin medications in the past, but she cannot remember their  names.  She denies any chest pain, shortness of breath, or presyncope.  She does have  occasional palpitations.  She would like to start Praluent or Repatha and has had high cholesterol for over 30 years.  Patient otherwise denies chest pain, shortness of breath, PND, edema, palpitations, syncope or presyncope at this time on current activity schedule.      Review of Systems   Cardiovascular: Positive for palpitations.   Endocrine: Positive for cold intolerance and polydipsia.   Musculoskeletal: Positive for back pain.   Gastrointestinal: Positive for heartburn.   Genitourinary: Positive for frequency.   Neurological: Positive for numbness.      Obtained and otherwise negative except as outlined in problem list and HPI.    Procedures       Objective:       Vitals:    08/27/18 1017 08/27/18 1025   BP: 104/58 110/62   BP Location: Left arm Left arm   Patient Position: Sitting Standing   Pulse: 65 70   Weight: 65.3 kg (144 lb)      Body mass index is 25.51 kg/m².  Last weight July 2018 was 151 pounds    Physical Exam   Constitutional: She is oriented to person, place, and time. She appears well-developed and well-nourished.   Neck: No JVD present. Carotid bruit is not present. No thyromegaly present.   Cardiovascular: Regular rhythm, S1 normal and S2 normal.  Exam reveals no gallop, no S3 and no friction rub.    Murmur heard.   Medium-pitched early systolic murmur is present with a grade of 1/6  at the lower left sternal border  Pulses:       Dorsalis pedis pulses are 2+ on the right side, and 2+ on the left side.        Posterior tibial pulses are 2+ on the right side, and 2+ on the left side.   Pulmonary/Chest: Effort normal and breath sounds normal. She has no wheezes. She has no rhonchi. She has no rales.   Abdominal: Soft. She exhibits no mass. There is no hepatosplenomegaly. There is no tenderness. There is no guarding.   Bowel sounds audible x4   Musculoskeletal: Normal range of motion. She exhibits no edema.    Lymphadenopathy:     She has no cervical adenopathy.   Neurological: She is alert and oriented to person, place, and time.   Skin: Skin is warm, dry and intact. No rash noted.   Vitals reviewed.        Lab Review:   Lab Results   Component Value Date    GLUCOSE 96 10/18/2017    BUN 13 10/18/2017    CREATININE 0.70 10/18/2017    EGFRIFNONA 86 10/18/2017    BCR 18.6 10/18/2017    CO2 31.0 10/18/2017    CALCIUM 9.6 10/18/2017    ALBUMIN 4.30 10/18/2017    AST 22 10/18/2017    ALT 13 10/18/2017       Lab Results   Component Value Date    WBC 4.07 10/18/2017    HGB 12.4 10/18/2017    HCT 37.8 10/18/2017    MCV 93.3 10/18/2017     10/18/2017       Lab Results   Component Value Date    HGBA1C 5.70 (H) 10/18/2017       Lab Results   Component Value Date    CHOL 310 (H) 03/29/2017    CHOL 306 (H) 11/10/2016     Lab Results   Component Value Date    TRIG 121 03/29/2017    TRIG 156 (H) 11/10/2016     Lab Results   Component Value Date    HDL 74 (H) 03/29/2017    HDL 73 (H) 11/10/2016     Lab Results   Component Value Date     (H) 03/29/2017     (H) 11/10/2016     7/10/18:  · CMP: Sodium 132, potassium 3.8, chloride 96, carbon dioxide 26, glucose 97, BUN 14, creatinine 0.64, calcium 9.9, protein 7.2, albumin 4.6, globulin 2.6, alkaline phosphatase 46, ALT 8, AST 18, bilirubin 0.5, GFR greater than 60  · CBC: WBC 3.3, RBC 4.14, hemoglobin 13.2, hematocrit 39, MCV 94, MCH 31.9, MCHC 33.8, RDW 14.5, platelets 231, MPV 11.5 neutrophils 65.6, lymphocytes 23.5, mixed 10.9  · Lipid panel: Cholesterol 320, triglycerides 114, HDL 63, .6  · TSH - 0.21  · Free T4 - 1.68    · CT cardiac calcium score 8/13/18:    IMPRESSION:  CT cardiac calcium score 5.6 with patient placed in minimal  identifiable calcification category. There is no significant interval  increase in major coronary event within the next 12 months based upon  this calcium scoring.    · Stress echocardiogram, 8/13/18: RVSP 30 mmHg, mild TR, no  chest pain at baseline and during exercise with baseline ECG normal, acceptable negative echocardiographic exercise stress test suggestive of low probability for significant focal obstructive coronary artery disease with preserved LV function following exercise to 122% predicted exercise capacity and 90% predicted maximum heart rate    Assessment:       Overall continued acceptable course with no interim cardiopulmonary complaints with acceptable functional status. We will defer additional diagnostic or therapeutic intervention from a cardiac perspective at this time. Ankur Mayers [place] Criteria indicates possible familial hypercholesterolemia, and she was started on Livalo 2 mg daily but was intolerant of it. She has had prior intolerances to statins.  She has a low CT cardiac calcium score of 5.6. Her stress echo was negative and acceptable in August 2018.  I do feel long term she needs risk modification and reduced LDL cholesterol levels and would recommend a trial of PCSK9 inhibitor drug therapy.     Diagnosis Plan   1. Palpitations  Stable   2. Essential hypertension  Controlled   3. Hyperlipidemia, unspecified hyperlipidemia type  PA for Repatha and Praluent   4. TERRAZAS (dyspnea on exertion)  Acceptable stress echo and CT cardiac calcium score          Plan:         1. Patient to continue current medications and close follow up with the above providers.  2. Tentative cardiology follow up in February/March 2019, or patient may return sooner PRN.  3. PA for Repatha or Praluent    Scribed for Wing Olson MD by Aspen Plata, HOWARD. 8/27/2018  11:35 AM    I, Wing Olson MD, [facility], personally performed the services described in this documentation as scribed by the above named individual in my presence, and it is both accurate and complete. At 11:14 AM on 08/27/2018

## 2018-11-26 ENCOUNTER — OFFICE VISIT (OUTPATIENT)
Dept: OBSTETRICS AND GYNECOLOGY | Facility: CLINIC | Age: 58
End: 2018-11-26

## 2018-11-26 VITALS
BODY MASS INDEX: 26.51 KG/M2 | HEIGHT: 63 IN | WEIGHT: 149.6 LBS | DIASTOLIC BLOOD PRESSURE: 76 MMHG | SYSTOLIC BLOOD PRESSURE: 122 MMHG

## 2018-11-26 DIAGNOSIS — Z01.419 ENCOUNTER FOR GYNECOLOGICAL EXAMINATION WITHOUT ABNORMAL FINDING: ICD-10-CM

## 2018-11-26 DIAGNOSIS — Z79.890 POST-MENOPAUSE ON HRT (HORMONE REPLACEMENT THERAPY): Primary | ICD-10-CM

## 2018-11-26 PROCEDURE — 99396 PREV VISIT EST AGE 40-64: CPT | Performed by: OBSTETRICS & GYNECOLOGY

## 2018-11-26 RX ORDER — ESTRADIOL 1 MG/1
1 TABLET ORAL DAILY
Qty: 30 TABLET | Refills: 1 | Status: SHIPPED | OUTPATIENT
Start: 2018-11-26 | End: 2019-01-17 | Stop reason: SDUPTHER

## 2018-11-26 RX ORDER — PANTOPRAZOLE SODIUM 40 MG/1
1 TABLET, DELAYED RELEASE ORAL DAILY
COMMUNITY
Start: 2018-11-13 | End: 2022-08-05

## 2018-11-26 RX ORDER — ESTRADIOL 0.5 MG/1
0.5 TABLET ORAL DAILY
COMMUNITY
End: 2018-11-26 | Stop reason: DRUGHIGH

## 2018-11-26 NOTE — PROGRESS NOTES
Chief Complaint   Patient presents with   • Gynecologic Exam   • Med Refill       Rupa Finn is a 58 y.o. year old  presenting to be seen for her annual exam.  This patient has previously had an LAVH/BSO/VCS and a laparoscopic Lee procedure for incontinence.  She denies urinary symptoms.  She has had both constipation and indigestion recently.  She is followed by Dr. Null.  She recently had esophageal and gastric biopsies as well as esophageal dilation.    SCREENING TESTS    Year 2012   Age                         PAP                         HPV high risk                         Mammogram      benign benign                  TONE score                         Breast MRI                         Lipids                         Vitamin D                         Colonoscopy                         DEXA  Frax (hip/any)     normal                    Ovarian Screen                             She exercises regularly: yes.  She wears her seat belt: yes.  She has concerns about domestic violence: no.  She has noticed changes in height: no    GYN screening history:  · Last mammogram: was done on approximately 2018 and the result was: Birads I (Normal).  · Last DEXA: was done on approximately 2016 and the results were: normal.    No Additional Complaints Reported    The following portions of the patient's history were reviewed and updated as appropriate:vital signs and   She  has a past medical history of Autoimmune disease (CMS/HCC), Fibrocystic breast changes, bilateral, GERD (gastroesophageal reflux disease), Gestational diabetes, Glaucoma, Hyperlipidemia, Hypertension, Hypothyroidism, Lower extremity edema, OAB (overactive bladder), Palpitations, and Post-menopause on HRT (hormone replacement therapy).  She does not have any pertinent problems on file.  She  has a past surgical history that  includes Tonsillectomy; Cholecystectomy; Other surgical history; laparoscopic assisted vaginal hysterectomy salpingo oophorectomy (Bilateral); Laparoscopic Lee procedure; Oophorectomy; and Esophagoscopy / EGD.  Her family history includes Asthma in her brother; Glaucoma in her father; Other in her father and mother.  She  reports that  has never smoked. she has never used smokeless tobacco. She reports that she does not drink alcohol or use drugs.  Current Outpatient Medications   Medication Sig Dispense Refill   • estradiol (ESTRACE) 1 MG tablet Take 1 tablet by mouth Daily for 60 days. 30 tablet 1   • FIBER COMPLETE PO Take  by mouth Daily.     • hydrochlorothiazide (HYDRODIURIL) 25 MG tablet Take 1 capsule by mouth Daily.     • levothyroxine (SYNTHROID, LEVOTHROID) 88 MCG tablet Take 88 mcg by mouth Daily.     • methazolAMIDE (NEPTAZANE) 25 MG tablet Take 12.5 mg by mouth 2 (Two) Times a Day.     • nitroglycerin (NITROSTAT) 0.4 MG SL tablet 1 under the tongue as needed for angina, may repeat q5mins for up three doses 25 tablet 6   • pantoprazole (PROTONIX) 40 MG EC tablet Take 1 tablet by mouth Daily.     • timolol (TIMOPTIC) 0.5 % ophthalmic solution Administer 1 drop to both eyes 2 (Two) Times a Day.     • travoprost, BAK free, (TRAVATAN) 0.004 % solution ophthalmic solution 1 drop Every Evening. in affected eye(s)       No current facility-administered medications for this visit.      She is allergic to aleve [naproxen sodium]; alphagan [brimonidine]; atenolol; ceclor [cefaclor]; e-mycin [erythromycin]; and statins..    Review of Systems  A comprehensive review of systems was taken.  Constitutional: negative for fever, chills, activity change, appetite change, fatigue and unexpected weight change.  Respiratory: negative  Cardiovascular: negative  Gastrointestinal: positive for constipation, diarrhea and dyspepsia  Genitourinary:negative  Musculoskeletal:negative  Behavioral/Psych: negative       /76    "Ht 160 cm (63\")   Wt 67.9 kg (149 lb 9.6 oz)   LMP  (LMP Unknown)   BMI 26.50 kg/m²     Physical Exam    General:  alert; cooperative; well developed; well nourished   Skin:  No suspicious lesions seen   Thyroid: normal to inspection and palpation   Lungs:  clear to auscultation bilaterally   Heart:  regular rate and rhythm, S1, S2 normal, no murmur, click, rub or gallop   Breasts:  Examined in supine position  Symmetric without masses or skin dimpling  Nipples normal without inversion, lesions or discharge  There are no palpable axillary nodes   Abdomen: soft, non-tender; no masses  no umbilical or inginual hernias are present  no hepato-splenomegaly   Pelvis: Clinical staff was present for exam  External genitalia:  normal appearance of the external genitalia including Bartholin's and [place]'s glands.  Vaginal:  normal pink mucosa without prolapse or lesions. the urethro-vesical angle is good  Cervix:  absent.  Uterus:  absent.  Adnexa:  absent, bilateral.  Rectal:  anus visually normal appearing. recto-vaginal exam unremarkable and confirms findings;     Lab Review   No data reviewed    Imaging  Mammogram results         ASSESSMENT  Problems Addressed this Visit        Genitourinary    Post-menopause on HRT (hormone replacement therapy) - Primary    Relevant Medications    estradiol (ESTRACE) 1 MG tablet      Other Visit Diagnoses     Encounter for gynecological examination without abnormal finding              PLAN    Medications prescribed this encounter:    New Medications Ordered This Visit   Medications   • estradiol (ESTRACE) 1 MG tablet     Sig: Take 1 tablet by mouth Daily for 60 days.     Dispense:  30 tablet     Refill:  1   · Monthly self breast assessment and annual breast imaging  · She desires to try a 1 mg dose of estradiol to see if it will help with insomnia.  · I have advised her to follow-up with gastroenterology.  · Calcium, 600 mg/ Vit. D, 400 IU daily; regular weight-bearing " exercise  · Follow up: 12 month(s)  *Please note that portions of this documentation may have been completed with a voice recognition program.  Efforts were made to edit this dictation, but occasional words may have been mistranscribed.       This note was electronically signed.    YASEMIN Armenta MD  November 26, 2018  5:23 PM

## 2019-01-17 DIAGNOSIS — Z79.890 POST-MENOPAUSE ON HRT (HORMONE REPLACEMENT THERAPY): ICD-10-CM

## 2019-01-17 RX ORDER — ESTRADIOL 1 MG/1
TABLET ORAL
Qty: 30 TABLET | Refills: 0 | Status: SHIPPED | OUTPATIENT
Start: 2019-01-17 | End: 2019-12-04 | Stop reason: SDUPTHER

## 2019-01-18 NOTE — TELEPHONE ENCOUNTER
I called the pharmacy to [place] 11 refills.  Patient's next appointment with Dr. Armenta is in December 2019.

## 2019-05-19 DIAGNOSIS — Z79.890 POST-MENOPAUSE ON HRT (HORMONE REPLACEMENT THERAPY): ICD-10-CM

## 2019-05-20 NOTE — TELEPHONE ENCOUNTER
I called Jeannette to clarify desired strength of estradiol.  She was given 1 mg daily in January 2019, and this request is for 0.5 mg.  No answer, but I left a message asking Jeannette to return my call.

## 2019-05-28 RX ORDER — ESTRADIOL 0.5 MG/1
TABLET ORAL
Qty: 90 TABLET | Refills: 2 | OUTPATIENT
Start: 2019-05-28

## 2019-05-28 NOTE — TELEPHONE ENCOUNTER
Patient's dose was increased to 1 mg daily in January 2019.  I attempted to contact patient regarding this refill request.  I left a voicemail asking Jeannette to return my call for clarification.  She never called back, so the prescription refill will be denied at this time.

## 2019-06-04 NOTE — PROGRESS NOTES
Subjective:     Encounter Date:06/13/2019      Patient ID: Rupa Finn is a 59 y.o.  white female, LCA , from [place], [place].     SELF-REFERRED  PHYSICIAN: Gena Tse MD  PREVIOUS CARDIOLOGIST: Glenna Melendez MD, [facility] .    Chief Complaint:   Chief Complaint   Patient presents with   • Palpitations   • TERRAZAS     Problem List:  1. Palpitations:  a. Remote cardiac stress testing, performed by [facility], Dr. Hartley; data deficit, date deficit; negative for ischemia.   b. Echocardiogram,  03/10/2014:  Normal, EF 60%, Dr. Hartley.  c. Holter monitor, 05/23/2014: Sinus katelyn to sinus rhythm with rates of 40 to 94, occasional PVCs, Dr. Hartley.   d. Cardiac event monitor, 11/10/16-12/10/2016: sinus rhythm, sinus tach, sinus arrhythmia with rare PACs and PVCs.   2. 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 CCS class I-II chest discomfort/NYHA class II TERRAZAS, with normal EKG (July 2018).  b. CT cardiac calcium score 5.6 August 2018  c. Stress echocardiogram 8/13/18: RVSP 30 mmHg, mild TR, no chest pain at baseline and during exercise with baseline ECG normal, acceptable negative echocardiographic exercise stress test suggestive of low probability for significant focal obstructive coronary artery disease with preserved LV function following exercise to 122% predicted exercise capacity and 90% predicted maximum heart rate  d. Residual class I symptoms  3. Hypertension.   4. Hyperlipidemia; ASCVD 10 year risk is 4.5%, 1.9% if treated, started on Livalo but she was intolerant July 2018, has had previous intolerances to statins as well.   5. Lower extremity edema.   6. Glaucoma.  7. Autoimmune diseases:  a. Patient reports positive markers for scleroderma.   b. Rheumatoid arthritis, positive YONY.  c. Reynaud's   8. Hypothyroidism.  9. Constipation.  10. Gestational  diabetes.  11. GERD, not currently taking medication.  12. Surgical history:   a. Hysterectomy.  b. Tonsillectomy.  c. Cholecystectomy.  d. Lap for endometriosis  Allergies   Allergen Reactions   • Aleve [Naproxen Sodium]      tongue swelling   • Alphagan [Brimonidine]    • Atenolol      fatigue   • Ceclor [Cefaclor]    • E-Mycin [Erythromycin]    • Livalo [Pitavastatin] Myalgia   • Statins Myalgia         Current Outpatient Medications:   •  estradiol (ESTRACE) 1 MG tablet, TAKE ONE TABLET BY MOUTH DAILY, Disp: 30 tablet, Rfl: 0  •  FIBER COMPLETE PO, Take  by mouth Daily., Disp: , Rfl:   •  hydrochlorothiazide (HYDRODIURIL) 25 MG tablet, Take 1 capsule by mouth Daily., Disp: , Rfl:   •  levothyroxine (SYNTHROID, LEVOTHROID) 88 MCG tablet, Take 88 mcg by mouth Daily., Disp: , Rfl:   •  methazolAMIDE (NEPTAZANE) 25 MG tablet, Take 12.5 mg by mouth 2 (Two) Times a Day., Disp: , Rfl:   •  pantoprazole (PROTONIX) 40 MG EC tablet, Take 1 tablet by mouth Daily., Disp: , Rfl:   •  timolol (TIMOPTIC) 0.5 % ophthalmic solution, Administer 1 drop to both eyes 2 (Two) Times a Day., Disp: , Rfl:   •  travoprost, BAK free, (TRAVATAN) 0.004 % solution ophthalmic solution, 1 drop Every Evening. in affected eye(s), Disp: , Rfl:   •  nitroglycerin (NITROSTAT) 0.4 MG SL tablet, 1 under the tongue as needed for angina, may repeat q5mins for up three doses, Disp: 25 tablet, Rfl: 6    HISTORY OF PRESENT ILLNESS:  Patient is here after a 10-month hiatus.  At her last appointment we recommended that she begin either Repatha or Praluent but she has not heard anything and wondered the status on getting the prior authorization to start this medication.  She had an acceptable stress echo and CT cardiac calcium score in August 2018.  She denies any chest pain, and occasionally she will have palpitations but these are very infrequent.  She denies any dizziness, presyncope, syncope, or edema.  She will have some shortness of breath if she is  "going up an incline or more than 1 flight of stairs.  She has had a pulmonary function test, a stress test, and CT cardiac calcium score and everything has come back acceptable.  She attributes this to her autoimmune diseases and was recently also diagnosed with Reynaud's.  She has not had any recent laboratory testing and has some fatigue.  She does some walking without much difficulty.  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  She has returned for follow-up lessons and not experienced this again.  She denied any chest pain, palpitations, or presyncope with this episode.        Review of Systems   Endocrine: Positive for cold intolerance.   Skin: Positive for dry skin and itching.   Gastrointestinal: Positive for abdominal pain and change in bowel habit.      All other systems reviewed and otherwise negative.    Procedures       Objective:       Vitals:    06/13/19 0855 06/13/19 0857   BP: 113/74 107/66   BP Location: Left arm Left arm   Patient Position: Sitting Standing   Pulse: 54 59   Weight: 68.1 kg (150 lb 3.2 oz)    Height: 154.9 cm (61\")      Body mass index is 28.38 kg/m².  Last weight August 2018 was 144 pounds  Physical Exam   Constitutional: She is oriented to person, place, and time. She appears well-developed and well-nourished.   Neck: No JVD present. Carotid bruit is not present. No thyromegaly present.   Cardiovascular: Regular rhythm, S1 normal and S2 normal. Exam reveals no gallop, no S3 and no friction rub.   Murmur heard.   Medium-pitched harsh early systolic murmur is present with a grade of 1/6 at the lower left sternal border.  Pulses:       Dorsalis pedis pulses are 2+ on the right side, and 2+ on the left side.        Posterior tibial pulses are 2+ on the right side, and 2+ on the left side.   Pulmonary/Chest: Effort normal and breath sounds normal. She has no wheezes. She has no rhonchi. She has " no rales.   Abdominal: Soft. She exhibits no mass. There is no hepatosplenomegaly. There is no tenderness. There is no guarding.   Bowel sounds audible x4   Musculoskeletal: Normal range of motion. She exhibits no edema.   Lymphadenopathy:     She has no cervical adenopathy.   Neurological: She is alert and oriented to person, place, and time.   Skin: Skin is warm, dry and intact. No rash noted.   Vitals reviewed.        Lab Review:   Lab Results   Component Value Date    GLUCOSE 96 10/18/2017    BUN 13 10/18/2017    CREATININE 0.70 10/18/2017    EGFRIFNONA 86 10/18/2017    BCR 18.6 10/18/2017    CO2 31.0 10/18/2017    CALCIUM 9.6 10/18/2017    ALBUMIN 4.30 10/18/2017    AST 22 10/18/2017    ALT 13 10/18/2017       Lab Results   Component Value Date    WBC 4.07 10/18/2017    HGB 12.4 10/18/2017    HCT 37.8 10/18/2017    MCV 93.3 10/18/2017     10/18/2017       Lab Results   Component Value Date    HGBA1C 5.70 (H) 10/18/2017       Lab Results   Component Value Date    CHOL 310 (H) 03/29/2017    CHOL 306 (H) 11/10/2016     Lab Results   Component Value Date    TRIG 121 03/29/2017    TRIG 156 (H) 11/10/2016     Lab Results   Component Value Date    HDL 74 (H) 03/29/2017    HDL 73 (H) 11/10/2016     Lab Results   Component Value Date     (H) 03/29/2017     (H) 11/10/2016   7/10/18:  · CMP: Sodium 132, potassium 3.8, chloride 96, carbon dioxide 26, glucose 97, BUN 14, creatinine 0.64, calcium 9.9, protein 7.2, albumin 4.6, globulin 2.6, alkaline phosphatase 46, ALT 8, AST 18, bilirubin 0.5, GFR greater than 60  · CBC: WBC 3.3, RBC 4.14, hemoglobin 13.2, hematocrit 39, MCV 94, MCH 31.9, MCHC 33.8, RDW 14.5, platelets 231, MPV 11.5 neutrophils 65.6, lymphocytes 23.5, mixed 10.9  · Lipid panel: Cholesterol 320, triglycerides 114, HDL 63, .6  · TSH - 0.21  · Free T4 - 1.68        Assessment:       Overall continued acceptable course with no interim cardiopulmonary complaints with acceptable  functional status.  She had a CT cardiac calcium score and stress test in August 2018 which were both acceptable.  She has not had any recent laboratory testing and has had some fatigue so I will order a CBC, CMP, FLP, TSH, hemoglobin A1c, and vitamin D level to rule out anemia, worsening hypothyroidism, or vitamin D deficiency.  This will also assess kidney function and her familial hypercholesterolemia.  I will have our nurse obtain a prior authorization for her to start Repatha or Praluent because she has been intolerant to statins, including Livalo.       Diagnosis Plan   1. Palpitations   Infrequent palpitations, stable, continue hydrochlorothiazide   2. Familial hypercholesterolemia   Prior authorization for Repatha or Praluent, FLP, patient has been intolerant to statins in the past, including Livalo   3. Essential hypertension   Controlled, continue hydrochlorothiazide   4. History of gestational diabetes   Hemoglobin A1c   5. Acquired hypothyroidism   TSH   6.  Chronic fatigue: CBC, CMP, TSH, vitamin D       Plan:         1. Patient to continue current medications and close follow up with the above providers.  2. Tentative cardiology follow up in 6 months or patient may return sooner PRN.  3. CBC, CMP, FLP, TSH, vitamin D, hemoglobin A1c  4. PA for Repatha or Praluent      Electronically signed by HOWARD Vasquez, 06/13/19, 9:39 AM.

## 2019-06-13 ENCOUNTER — LAB (OUTPATIENT)
Dept: LAB | Facility: HOSPITAL | Age: 59
End: 2019-06-13

## 2019-06-13 ENCOUNTER — TELEPHONE (OUTPATIENT)
Dept: CARDIOLOGY | Facility: CLINIC | Age: 59
End: 2019-06-13

## 2019-06-13 ENCOUNTER — OFFICE VISIT (OUTPATIENT)
Dept: CARDIOLOGY | Facility: CLINIC | Age: 59
End: 2019-06-13

## 2019-06-13 VITALS
HEART RATE: 59 BPM | SYSTOLIC BLOOD PRESSURE: 107 MMHG | DIASTOLIC BLOOD PRESSURE: 66 MMHG | HEIGHT: 61 IN | BODY MASS INDEX: 28.36 KG/M2 | WEIGHT: 150.2 LBS

## 2019-06-13 DIAGNOSIS — E03.9 ACQUIRED HYPOTHYROIDISM: ICD-10-CM

## 2019-06-13 DIAGNOSIS — R53.82 CHRONIC FATIGUE: ICD-10-CM

## 2019-06-13 DIAGNOSIS — Z86.32 HISTORY OF GESTATIONAL DIABETES: ICD-10-CM

## 2019-06-13 DIAGNOSIS — E78.01 FAMILIAL HYPERCHOLESTEROLEMIA: ICD-10-CM

## 2019-06-13 DIAGNOSIS — R53.83 FATIGUE, UNSPECIFIED TYPE: Primary | ICD-10-CM

## 2019-06-13 DIAGNOSIS — I10 ESSENTIAL HYPERTENSION: ICD-10-CM

## 2019-06-13 DIAGNOSIS — R53.83 FATIGUE, UNSPECIFIED TYPE: ICD-10-CM

## 2019-06-13 DIAGNOSIS — R00.2 PALPITATIONS: Primary | ICD-10-CM

## 2019-06-13 LAB
ALBUMIN SERPL-MCNC: 4.8 G/DL (ref 3.5–5.2)
ALBUMIN/GLOB SERPL: 1.4 G/DL
ALP SERPL-CCNC: 60 U/L (ref 39–117)
ALT SERPL W P-5'-P-CCNC: 16 U/L (ref 1–33)
ANION GAP SERPL CALCULATED.3IONS-SCNC: 14.2 MMOL/L
AST SERPL-CCNC: 26 U/L (ref 1–32)
BILIRUB SERPL-MCNC: 0.6 MG/DL (ref 0.2–1.2)
BUN BLD-MCNC: 14 MG/DL (ref 6–20)
BUN/CREAT SERPL: 20.6 (ref 7–25)
CALCIUM SPEC-SCNC: 10.2 MG/DL (ref 8.6–10.5)
CHLORIDE SERPL-SCNC: 93 MMOL/L (ref 98–107)
CHOLEST SERPL-MCNC: 352 MG/DL (ref 0–200)
CO2 SERPL-SCNC: 26.8 MMOL/L (ref 22–29)
CREAT BLD-MCNC: 0.68 MG/DL (ref 0.57–1)
DEPRECATED RDW RBC AUTO: 46.9 FL (ref 37–54)
ERYTHROCYTE [DISTWIDTH] IN BLOOD BY AUTOMATED COUNT: 13.8 % (ref 12.3–15.4)
GFR SERPL CREATININE-BSD FRML MDRD: 89 ML/MIN/1.73
GLOBULIN UR ELPH-MCNC: 3.5 GM/DL
GLUCOSE BLD-MCNC: 102 MG/DL (ref 65–99)
HCT VFR BLD AUTO: 39.1 % (ref 34–46.6)
HDLC SERPL-MCNC: 77 MG/DL (ref 40–60)
HGB BLD-MCNC: 12.9 G/DL (ref 12–15.9)
LDLC SERPL CALC-MCNC: 248 MG/DL (ref 0–100)
LDLC/HDLC SERPL: 3.22 {RATIO}
MCH RBC QN AUTO: 30.4 PG (ref 26.6–33)
MCHC RBC AUTO-ENTMCNC: 33 G/DL (ref 31.5–35.7)
MCV RBC AUTO: 92.2 FL (ref 79–97)
PLATELET # BLD AUTO: 288 10*3/MM3 (ref 140–450)
PMV BLD AUTO: 11.4 FL (ref 6–12)
POTASSIUM BLD-SCNC: 3.7 MMOL/L (ref 3.5–5.2)
PROT SERPL-MCNC: 8.3 G/DL (ref 6–8.5)
RBC # BLD AUTO: 4.24 10*6/MM3 (ref 3.77–5.28)
SODIUM BLD-SCNC: 134 MMOL/L (ref 136–145)
TRIGL SERPL-MCNC: 136 MG/DL (ref 0–150)
TSH SERPL DL<=0.05 MIU/L-ACNC: 0.23 MIU/ML (ref 0.27–4.2)
VLDLC SERPL-MCNC: 27.2 MG/DL (ref 5–40)
WBC NRBC COR # BLD: 3.65 10*3/MM3 (ref 3.4–10.8)

## 2019-06-13 PROCEDURE — 84443 ASSAY THYROID STIM HORMONE: CPT

## 2019-06-13 PROCEDURE — 80061 LIPID PANEL: CPT

## 2019-06-13 PROCEDURE — 82652 VIT D 1 25-DIHYDROXY: CPT

## 2019-06-13 PROCEDURE — 36415 COLL VENOUS BLD VENIPUNCTURE: CPT

## 2019-06-13 PROCEDURE — 99214 OFFICE O/P EST MOD 30 MIN: CPT | Performed by: NURSE PRACTITIONER

## 2019-06-13 PROCEDURE — 85027 COMPLETE CBC AUTOMATED: CPT

## 2019-06-13 PROCEDURE — 80053 COMPREHEN METABOLIC PANEL: CPT

## 2019-06-13 RX ORDER — HYDROCHLOROTHIAZIDE 25 MG/1
25 TABLET ORAL DAILY
Qty: 30 TABLET | Refills: 11 | Status: SHIPPED | OUTPATIENT
Start: 2019-06-13 | End: 2020-06-08

## 2019-06-14 ENCOUNTER — DOCUMENTATION (OUTPATIENT)
Dept: CARDIOLOGY | Facility: CLINIC | Age: 59
End: 2019-06-14

## 2019-06-14 RX ORDER — LEVOTHYROXINE SODIUM 0.07 MG/1
75 TABLET ORAL DAILY
Qty: 30 TABLET | Refills: 11 | Status: SHIPPED | OUTPATIENT
Start: 2019-06-14 | End: 2020-06-08

## 2019-06-14 NOTE — PROGRESS NOTES
Patient was called and I left a message on her cell phone regarding her laboratory results that were drawn yesterday.  Her lipid panel is vastly abnormal and our nurse (Aimee Renee RN) is getting a prior authorization for Praluent.  The patient was told that her CMP and CBC were acceptable.  Her TSH level was abnormal and I recommended for her to change her levothyroxine to 75 mcg daily and that she needed to have a repeat TSH in 8 weeks with her primary care physician.  The patient was encouraged to call for any further questions.    Electronically signed by HOWARD Vasquez, 06/14/19, 12:56 PM.

## 2019-06-17 LAB — 1,25(OH)2D3 SERPL-MCNC: 35 PG/ML (ref 19.9–79.3)

## 2019-06-18 ENCOUNTER — DOCUMENTATION (OUTPATIENT)
Dept: CARDIOLOGY | Facility: CLINIC | Age: 59
End: 2019-06-18

## 2019-06-18 NOTE — PROGRESS NOTES
Patient was called and informed that her vitamin D level was within normal limits.  My nurse is still trying to work on a prior authorization for Praluent or Repatha.  The patient verbalizes understanding that she will need to have a follow-up TSH level with her primary care physician in about 7 weeks for her recent dosage change on her levothyroxine.    Electronically signed by HOWARD Vasquez, 06/18/19, 2:48 PM.

## 2019-07-25 ENCOUNTER — TELEPHONE (OUTPATIENT)
Dept: CARDIOLOGY | Facility: CLINIC | Age: 59
End: 2019-07-25

## 2019-07-25 NOTE — TELEPHONE ENCOUNTER
Called [facility] to check on Praluent PA status. Everything has been sent to the insurance company, waiting to hear back. [facility] will call insurance tomorrow, 7/26/19 if they don't hear anything.

## 2019-08-01 ENCOUNTER — TELEPHONE (OUTPATIENT)
Dept: CARDIOLOGY | Facility: CLINIC | Age: 59
End: 2019-08-01

## 2019-08-01 NOTE — TELEPHONE ENCOUNTER
Pt called and left VM stating that she had received notification that Praluent was approved and wanted to know if she could start the medication or anything needed to be done.    Called pt back and left VM stating that she could start the medication, no labs or anything needed to be drawn first. Advised pt to call back if she had any further questions.

## 2019-08-05 ENCOUNTER — LAB (OUTPATIENT)
Dept: LAB | Facility: HOSPITAL | Age: 59
End: 2019-08-05

## 2019-08-05 DIAGNOSIS — R53.83 FATIGUE, UNSPECIFIED TYPE: ICD-10-CM

## 2019-08-05 PROCEDURE — 83036 HEMOGLOBIN GLYCOSYLATED A1C: CPT

## 2019-08-05 PROCEDURE — 36415 COLL VENOUS BLD VENIPUNCTURE: CPT

## 2019-08-06 ENCOUNTER — DOCUMENTATION (OUTPATIENT)
Dept: CARDIOLOGY | Facility: CLINIC | Age: 59
End: 2019-08-06

## 2019-08-06 LAB — HBA1C MFR BLD: 5.3 % (ref 4.8–5.6)

## 2019-08-06 NOTE — PROGRESS NOTES
I called the patient's cell phone and left message about her HgbA1C 5.3%. Also I told the patient I was glad that she was approved for Praluent and that she should get a lipid panel in 3 months after starting Praluent so we can see how much her numbers have improved.     Electronically signed by HOWARD Vasquez, 08/06/19, 12:34 PM.

## 2019-08-15 ENCOUNTER — TELEPHONE (OUTPATIENT)
Dept: CARDIOLOGY | Facility: CLINIC | Age: 59
End: 2019-08-15

## 2019-08-15 ENCOUNTER — TRANSCRIBE ORDERS (OUTPATIENT)
Dept: ADMINISTRATIVE | Facility: HOSPITAL | Age: 59
End: 2019-08-15

## 2019-08-15 DIAGNOSIS — Z12.31 VISIT FOR SCREENING MAMMOGRAM: Primary | ICD-10-CM

## 2019-08-15 NOTE — TELEPHONE ENCOUNTER
Pt called and left VM stating that she hasn't received Praluent yet.    Called [facility] to see what was going on. [facility] states that they have not sent out the medication, because they have been unable to reach the pt to set up new pt enrollment.      Called pt, gave her number to call to set up new pt enrollment with [facility] and told her to meliza them and update all information to start shipments off RX.    Pt verbalizes understanding and agreeable to plan.    Advised pt to call me back if there were any issues or they needed anything from me.   
Oriented - self; Oriented - place; Oriented - time

## 2019-08-22 ENCOUNTER — TELEPHONE (OUTPATIENT)
Dept: CARDIOLOGY | Facility: CLINIC | Age: 59
End: 2019-08-22

## 2019-08-22 NOTE — TELEPHONE ENCOUNTER
Pt called and stated that she has had palpitations in the past, but they have increased within the past 3 weeks. They are more frequent and start in early afternoon, lasting until she goes to bed. They have made her feel SOB a couple times, but did not last long.    Pt's dose of Synthroid was changed from 88 mcg to 75 mcg at LOV 6/13/19. That has been the only medication change, outside of starting Praluent 8/17/19, but palpitations started before then.    Pt taking all medications listed in Epic as prescribed.    Advised pt to come into the office tomorrow, when her palpitations start and have a walk-in EKG.    Pt verbalizes understanding and agreeable to plan.

## 2019-08-23 ENCOUNTER — CLINICAL SUPPORT (OUTPATIENT)
Dept: CARDIOLOGY | Facility: CLINIC | Age: 59
End: 2019-08-23

## 2019-08-23 DIAGNOSIS — R00.2 PALPITATIONS: Primary | ICD-10-CM

## 2019-08-23 PROCEDURE — 93000 ELECTROCARDIOGRAM COMPLETE: CPT | Performed by: INTERNAL MEDICINE

## 2019-08-23 NOTE — PROGRESS NOTES
Pt came into office today for walk-in EKG. For the past two weeks, pt reports that she has been having constant palpitations that start in the early afternoon (1-3p) and last until she goes to bed.  This has been consistent, every day for two weeks.  They are bothersome for patient. Patient agreeable to wearing E-patch.

## 2019-08-27 ENCOUNTER — OFFICE VISIT (OUTPATIENT)
Dept: OBSTETRICS AND GYNECOLOGY | Facility: CLINIC | Age: 59
End: 2019-08-27

## 2019-08-27 VITALS
WEIGHT: 158.8 LBS | DIASTOLIC BLOOD PRESSURE: 74 MMHG | BODY MASS INDEX: 29.98 KG/M2 | SYSTOLIC BLOOD PRESSURE: 128 MMHG | HEIGHT: 61 IN

## 2019-08-27 DIAGNOSIS — Z79.890 POST-MENOPAUSE ON HRT (HORMONE REPLACEMENT THERAPY): Primary | ICD-10-CM

## 2019-08-27 DIAGNOSIS — N60.11 FIBROCYSTIC BREAST CHANGES, BILATERAL: ICD-10-CM

## 2019-08-27 DIAGNOSIS — N64.4 MASTODYNIA: ICD-10-CM

## 2019-08-27 DIAGNOSIS — N60.12 FIBROCYSTIC BREAST CHANGES, BILATERAL: ICD-10-CM

## 2019-08-27 PROCEDURE — 99213 OFFICE O/P EST LOW 20 MIN: CPT | Performed by: OBSTETRICS & GYNECOLOGY

## 2019-08-27 RX ORDER — FAMOTIDINE 40 MG/1
1 TABLET, FILM COATED ORAL AS NEEDED
COMMUNITY
Start: 2019-07-11 | End: 2020-12-09

## 2019-08-27 RX ORDER — PANTOPRAZOLE SODIUM 20 MG/1
1 TABLET, DELAYED RELEASE ORAL DAILY
COMMUNITY
Start: 2019-06-09 | End: 2019-12-04

## 2019-08-27 NOTE — PROGRESS NOTES
Chief Complaint   Patient presents with   • Breast Problem     breast tenderness, persistent       Rupa L Aakash is a 59 y.o. year old  presenting to be seen for a breast evaluation.  This patient has previously had a LAVH/BSO/VCS and a laparoscopic Lee procedure.  She currently takes estradiol, 1 mg by mouth daily for relief of menopausal symptoms.  When she tried a 0.5 mg dose she had difficulty sleeping.  She has no side effects on estrogen replacement therapy.  In the past 6 weeks she has developed increased discomfort in her breast right greater than left.  She uses caffeine in form of drinking tea every day.  She does eat some chocolate.  She has no discharge from the nipple.  She has had no color change of the breast.        Last mammogram: was done on approximately 2018 and the result was: Birads I (Normal).    A comprehensive review of systems was done.  Constitutional: negative for fever, chills, activity change, appetite change, fatigue and unexpected weight change.  Respiratory: negative  Cardiovascular: negative  Gastrointestinal: negative  Genitourinary:negative  Musculoskeletal:negative  Behavioral/Psych: negative      symmetric fibrous changes in both upper outer quadrants, bilateral tenderness, greater on the right       No nipple retraction or dimpling, No nipple discharge or bleeding, No axillary or supraclavicular adenopathy, Taught monthly breast self examination       Repeat exam with physician in 3 months.  Repeat mammogram in  2 months.    IMPRESSION: 1) Surgical menopause on estrogen replacement therapy, 2) Fibrocystic changes of the breasts bilateral, 3) Mastodynia bilateral    PLAN  1. Follow up: 3 month(s)    *Please note that portions of this documentation may have been completed with a voice recognition program.  Efforts were made to edit this dictation, but occasional words may have been mistransribed.    This note was electronically signed.    YASEMIN Armenta  MD  August 27, 2019  4:03 PM

## 2019-09-09 ENCOUNTER — TELEPHONE (OUTPATIENT)
Dept: OBSTETRICS AND GYNECOLOGY | Facility: CLINIC | Age: 59
End: 2019-09-09

## 2019-09-09 NOTE — TELEPHONE ENCOUNTER
Patient has persistent breast tenderness and burning despite taking Vit E and decreasing caffeine.  She has scheduled a screening mammogram on Thursday, and since at the time of her recent breast exam there were no palpable masses, she was advised to keep that appointment.  She was told that if there was any sort of suspicious finding on the screening mammogram she would be called back for additional views or whatever the radiologist feels is needed.  She will keep her appointment on Thursday.  She was also assured that if her mammogram is normal but she continues to have breast tenderness and burning, she may call and come in any time for a breast exam.

## 2019-09-09 NOTE — TELEPHONE ENCOUNTER
PT CALLED TO SEE YOU ABOUT BREAST PAIN AND WAS TOLD TO CALL BACK IN 3 WEEKS IF NOT BETTER SHE IS STILL HAVING BURNING AND SORENESS - SHE CALLED HERSELF AND MAD A MAMMOGRAM SCREENING APT AND WANTS TO KNOW IF THIS IS OK OR SHOULD SHE HAVE A DIAGNOSTIC ONE ?

## 2019-09-12 ENCOUNTER — HOSPITAL ENCOUNTER (OUTPATIENT)
Dept: MAMMOGRAPHY | Facility: HOSPITAL | Age: 59
Discharge: HOME | End: 2019-09-12
Admitting: OBSTETRICS & GYNECOLOGY

## 2019-09-12 DIAGNOSIS — Z12.31 VISIT FOR SCREENING MAMMOGRAM: ICD-10-CM

## 2019-09-12 PROCEDURE — 77063 BREAST TOMOSYNTHESIS BI: CPT

## 2019-09-12 PROCEDURE — 77067 SCR MAMMO BI INCL CAD: CPT

## 2019-09-12 PROCEDURE — 77067 SCR MAMMO BI INCL CAD: CPT | Performed by: RADIOLOGY

## 2019-09-12 PROCEDURE — 77063 BREAST TOMOSYNTHESIS BI: CPT | Performed by: RADIOLOGY

## 2019-09-13 ENCOUNTER — TELEPHONE (OUTPATIENT)
Dept: CARDIOLOGY | Facility: CLINIC | Age: 59
End: 2019-09-13

## 2019-09-17 ENCOUNTER — APPOINTMENT (OUTPATIENT)
Dept: CT IMAGING | Facility: HOSPITAL | Age: 59
End: 2019-09-17

## 2019-09-17 ENCOUNTER — HOSPITAL ENCOUNTER (EMERGENCY)
Facility: HOSPITAL | Age: 59
Discharge: HOME | End: 2019-09-17
Attending: EMERGENCY MEDICINE | Admitting: EMERGENCY MEDICINE

## 2019-09-17 VITALS
RESPIRATION RATE: 18 BRPM | SYSTOLIC BLOOD PRESSURE: 138 MMHG | OXYGEN SATURATION: 98 % | TEMPERATURE: 98.6 F | HEIGHT: 62 IN | WEIGHT: 156 LBS | DIASTOLIC BLOOD PRESSURE: 68 MMHG | HEART RATE: 86 BPM | BODY MASS INDEX: 28.71 KG/M2

## 2019-09-17 DIAGNOSIS — S13.9XXA NECK SPRAIN, INITIAL ENCOUNTER: ICD-10-CM
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
## 2019-09-17 DIAGNOSIS — S09.90XA CLOSED HEAD INJURY, INITIAL ENCOUNTER: Primary | ICD-10-CM

## 2019-09-17 DIAGNOSIS — S06.0X0A CONCUSSION WITHOUT LOSS OF CONSCIOUSNESS, INITIAL ENCOUNTER: ICD-10-CM

## 2019-09-17 DIAGNOSIS — T07.XXXA MULTIPLE CONTUSIONS: ICD-10-CM

## 2019-09-17 PROCEDURE — 90471 IMMUNIZATION ADMIN: CPT | Performed by: EMERGENCY MEDICINE

## 2019-09-17 PROCEDURE — 25010000002 ONDANSETRON PER 1 MG: Performed by: EMERGENCY MEDICINE

## 2019-09-17 PROCEDURE — 99284 EMERGENCY DEPT VISIT MOD MDM: CPT

## 2019-09-17 PROCEDURE — 72125 CT NECK SPINE W/O DYE: CPT

## 2019-09-17 PROCEDURE — 25010000002 TDAP 5-2.5-18.5 LF-MCG/0.5 SUSPENSION: Performed by: EMERGENCY MEDICINE

## 2019-09-17 PROCEDURE — 90715 TDAP VACCINE 7 YRS/> IM: CPT | Performed by: EMERGENCY MEDICINE

## 2019-09-17 PROCEDURE — 96374 THER/PROPH/DIAG INJ IV PUSH: CPT

## 2019-09-17 PROCEDURE — 70450 CT HEAD/BRAIN W/O DYE: CPT

## 2019-09-17 RX ORDER — ACETAMINOPHEN 500 MG
1000 TABLET ORAL ONCE
Status: COMPLETED | OUTPATIENT
Start: 2019-09-17 | End: 2019-09-17

## 2019-09-17 RX ORDER — CYCLOBENZAPRINE HCL 10 MG
10 TABLET ORAL ONCE
Status: COMPLETED | OUTPATIENT
Start: 2019-09-17 | End: 2019-09-17

## 2019-09-17 RX ORDER — HYDROCODONE BITARTRATE AND ACETAMINOPHEN 5; 325 MG/1; MG/1
1 TABLET ORAL EVERY 4 HOURS PRN
Qty: 10 TABLET | Refills: 0 | Status: SHIPPED | OUTPATIENT
Start: 2019-09-17 | End: 2019-12-04

## 2019-09-17 RX ORDER — ONDANSETRON 2 MG/ML
4 INJECTION INTRAMUSCULAR; INTRAVENOUS ONCE
Status: COMPLETED | OUTPATIENT
Start: 2019-09-17 | End: 2019-09-17

## 2019-09-17 RX ORDER — HYDROMORPHONE HYDROCHLORIDE 1 MG/ML
0.5 INJECTION, SOLUTION INTRAMUSCULAR; INTRAVENOUS; SUBCUTANEOUS ONCE
Status: DISCONTINUED | OUTPATIENT
Start: 2019-09-17 | End: 2019-09-18 | Stop reason: HOSPADM

## 2019-09-17 RX ORDER — BACITRACIN, NEOMYCIN, POLYMYXIN B 400; 3.5; 5 [USP'U]/G; MG/G; [USP'U]/G
1 OINTMENT TOPICAL ONCE
Status: COMPLETED | OUTPATIENT
Start: 2019-09-17 | End: 2019-09-17

## 2019-09-17 RX ORDER — CYCLOBENZAPRINE HCL 10 MG
10 TABLET ORAL 3 TIMES DAILY PRN
Qty: 20 TABLET | Refills: 0 | Status: SHIPPED | OUTPATIENT
Start: 2019-09-17 | End: 2019-12-04

## 2019-09-17 RX ADMIN — ACETAMINOPHEN 1000 MG: 500 TABLET, FILM COATED ORAL at 20:01

## 2019-09-17 RX ADMIN — SODIUM CHLORIDE 1000 ML: 9 INJECTION, SOLUTION INTRAVENOUS at 20:50

## 2019-09-17 RX ADMIN — LIDOCAINE HYDROCHLORIDE 10 ML: 10; .005 INJECTION, SOLUTION EPIDURAL; INFILTRATION; INTRACAUDAL; PERINEURAL at 20:50

## 2019-09-17 RX ADMIN — TETANUS TOXOID, REDUCED DIPHTHERIA TOXOID AND ACELLULAR PERTUSSIS VACCINE, ADSORBED 0.5 ML: 5; 2.5; 8; 8; 2.5 SUSPENSION INTRAMUSCULAR at 19:19

## 2019-09-17 RX ADMIN — BACITRACIN, NEOMYCIN, POLYMYXIN B 1 APPLICATION: 400; 3.5; 5 OINTMENT TOPICAL at 22:29

## 2019-09-17 RX ADMIN — ONDANSETRON 4 MG: 2 INJECTION INTRAMUSCULAR; INTRAVENOUS at 19:19

## 2019-09-17 RX ADMIN — CYCLOBENZAPRINE HYDROCHLORIDE 10 MG: 10 TABLET, FILM COATED ORAL at 20:01

## 2019-09-17 NOTE — ED PROVIDER NOTES
Subjective   Rupa Finn is a 59 y.o female who presents to the ED with complaints of a fall. She reports she experienced a fall prior to arrival after tripping off a curb. She fell against her car and then fell backwards hitting the back of her head against the curb. She did not lose consciousness. She is complaining of headache, right elbow pain, left knee pain, and upper back pain. She denies blurred vision, speech difficulty, confusion, dizziness, light-headedness, and numbness in her arms and legs. She has a past medical history of gestational diabetes, hyperlipidemia, hypothyroidism, and Raynaud's disease. There are no other acute symptoms at this time.        History provided by:  Patient  Fall   Mechanism of injury: fall    Injury location:  Head/neck and face  Head/neck injury location:  Head  Incident location:  Outdoors  Arrived directly from scene: yes    Fall:     Fall occurred:  Tripped    Impact surface:  [place]    Point of impact:  Head    Entrapped after fall: no    Protective equipment: none    Tetanus status:  Unknown  Prior to arrival data:     Patient ambulatory at scene: yes      Responsiveness at scene:  Alert    Loss of consciousness: no      Amnesic to event: no      Immobilization:  C-collar  Associated symptoms: back pain and headaches    Associated symptoms: no loss of consciousness    Headaches:     Severity:  Moderate (6/10)    Onset quality:  Sudden    Timing:  Constant    Progression:  Unchanged    Chronicity:  New      Review of Systems   Eyes: Negative for visual disturbance.   Musculoskeletal: Positive for back pain.        Right elbow pain, left knee pain   Neurological: Positive for headaches. Negative for dizziness, loss of consciousness, speech difficulty, light-headedness and numbness.   Psychiatric/Behavioral: Negative for confusion.   All other systems reviewed and are negative.      Past Medical History:   Diagnosis Date   • Autoimmune disease (CMS/[facility])    •  Fibrocystic breast changes, bilateral    • GERD (gastroesophageal reflux disease)     not currently taking medication   • Gestational diabetes    • Glaucoma    • Hyperlipidemia    • Hypertension    • Hypothyroidism    • Lower extremity edema    • OAB (overactive bladder)    • Palpitations    • Post-menopause on HRT (hormone replacement therapy)    • Raynaud's disease        Allergies   Allergen Reactions   • Aleve [Naproxen Sodium]      tongue swelling   • Alphagan [Brimonidine]    • Atenolol      fatigue   • Ceclor [Cefaclor]    • E-Mycin [Erythromycin]    • Livalo [Pitavastatin] Myalgia   • Statins Myalgia       Past Surgical History:   Procedure Laterality Date   • CHOLECYSTECTOMY     • ESOPHAGOSCOPY / EGD      esophageal stretching/dilation and gastric polypectomy, esophageal biopsies   • LAPAROSCOPIC ASSISTED VAGINAL HYSTERECTOMY SALPINGO OOPHORECTOMY Bilateral    • LAPAROSCOPIC DIAZ PROCEDURE     • OOPHORECTOMY     • OTHER SURGICAL HISTORY      Lap for endometriosis   • TONSILLECTOMY         Family History   Problem Relation Age of Onset   • Other Mother         has a pacemaker and is followed by Dr. Brooks   • Glaucoma Father    • Other Father          likely a cancer-related death;   • Asthma Brother    • Breast cancer Neg Hx    • Ovarian cancer Neg Hx        Social History     Socioeconomic History   • Marital status:      Spouse name: Not on file   • Number of children: Not on file   • Years of education: Not on file   • Highest education level: Not on file   Tobacco Use   • Smoking status: Never Smoker   • Smokeless tobacco: Never Used   Substance and Sexual Activity   • Alcohol use: No   • Drug use: No   • Sexual activity: Defer         Objective   Physical Exam   Constitutional: She is oriented to person, place, and time. She appears well-developed and well-nourished. No distress.   HENT:   Head: Normocephalic and atraumatic.   Nose: Nose normal.   Mouth/Throat: Oropharynx is clear and moist.    Eyes: Conjunctivae and EOM are normal. Pupils are equal, round, and reactive to light.   Neck: Normal range of motion. Neck supple. No JVD present. No thyroid mass present.   Cardiovascular: Normal rate, regular rhythm and normal heart sounds. Exam reveals no gallop and no friction rub.   No murmur heard.  Pulmonary/Chest: Effort normal and breath sounds normal. No respiratory distress. She exhibits tenderness (mild).   Abdominal: Soft. Bowel sounds are normal. There is no tenderness.   Musculoskeletal: Normal range of motion. She exhibits no edema.   No stigmata. Pelvis stable with full range of motion. Swelling to inferior posterior patella. No discrete bony tenderness. No erythema, streaking, or rash.   Lymphadenopathy:     She has no cervical adenopathy.   Neurological: She is alert and oriented to person, place, and time. No cranial nerve deficit or sensory deficit.   Skin: Skin is warm and dry. No rash noted.   Psychiatric: She has a normal mood and affect. Her behavior is normal.   Nursing note and vitals reviewed.      Laceration Repair  Date/Time: 9/17/2019 9:28 PM  Performed by: Joseph Georges MD  Authorized by: Joseph Georges MD     Consent:     Consent obtained:  Verbal and written    Consent given by:  Patient    Risks discussed:  Pain  Anesthesia (see MAR for exact dosages):     Anesthesia method:  Local infiltration    Local anesthetic:  Lidocaine 1% WITH epi  Laceration details:     Location:  Scalp    Scalp location:  Occipital    Length (cm):  2.5  Repair type:     Repair type:  Simple  Pre-procedure details:     Preparation:  Patient was prepped and draped in usual sterile fashion  Treatment:     Area cleansed with:  Betadine    Amount of cleaning:  Extensive    Irrigation method:  Syringe  Skin repair:     Repair method:  Staples    Number of staples:  6  Approximation:     Approximation:  Close    [place] border: well-aligned    Post-procedure details:     Patient tolerance of procedure:   Tolerated well, no immediate complications                   ED Course  ED Course as of Sep 17 2252   Tue Sep 17, 2019   2021 Patient is serially reevaluated throughout the ED course with last reevaluation now.  She had some mild midline bony tenderness on examination prompting CT examination cervical spine but a normal G CS and no loss of consciousness on initial evaluation.The patient however now currently has persistent disequilibrium meeting criteria for CT scanning.  Will treated with IV hydration as well.  She has a 2.5 cm C-shaped vertical laceration at the occiput of her scalp.  We will plan on wound closure as well  []   2025 Patient reports she is tolerated lidocaine without difficulty or allergic reaction in the past  []   2216 Patient serially reevaluated throughout the ED course with last reevaluation now.  Of discussed the findings at length with patient.  Wound closures with staples.  CT of the head is unremarkable done again for persistent disequilibrium with head position change.I discussed head injury precautions at length with patient and spouse.  I discussed cautious use of Flexeril and hydrocodone in view of sedating effects with no driving or dangerous activity and no workDiscussed indication for immediate return.  All agree.  []   2218 [facility] request number [id number]. [facility] query complete. Treatment plan to include limited course of prescribed controlled substance. Risks including addiction, benefits, and alternatives presented to patient.    [PK]      ED Course User Index  [] Joseph Georges MD  [PK] Ronald Oliver     No results found for this or any previous visit (from the past 24 hour(s)).  Note: In addition to lab results from this visit, the labs listed above may include labs taken at another facility or during a different encounter within the last 24 hours. Please correlate lab times with ED admission and discharge times for further clarification of the services performed  during this visit.    CT Head Without Contrast   Final Result   Normal, negative unenhanced head CT.               Signer Name: Igor Rosado MD    Signed: 9/17/2019 9:28 PM    Workstation Name: [facility]     [facility]      CT Cervical Spine Without Contrast   Final Result   Generally mild degenerative changes in the discs and facets as described above. No evidence of fracture.      Signer Name: Igor Rosado MD    Signed: 9/17/2019 8:03 PM    Workstation Name: [facility]     [facility]        Vitals:    09/17/19 1815 09/17/19 1830 09/17/19 1900 09/17/19 2230   BP:  154/87 141/85 138/68   BP Location:    Right arm   Patient Position:    Lying   Pulse:    86   Resp:    18   Temp:    98.6 °F (37 °C)   TempSrc:    Oral   SpO2: 97% 98% 98% 98%   Weight:       Height:         Medications   HYDROmorphone (DILAUDID) injection 0.5 mg (0.5 mg Intravenous Not Given 9/17/19 1919)   ondansetron (ZOFRAN) injection 4 mg (4 mg Intravenous Given 9/17/19 1919)   Tdap (BOOSTRIX) injection 0.5 mL (0.5 mL Intramuscular Given 9/17/19 1919)   acetaminophen (TYLENOL) tablet 1,000 mg (1,000 mg Oral Given 9/17/19 2001)   cyclobenzaprine (FLEXERIL) tablet 10 mg (10 mg Oral Given 9/17/19 2001)   sodium chloride 0.9 % bolus 1,000 mL (0 mL Intravenous Stopped 9/17/19 2125)   lidocaine-EPINEPHrine (XYLOCAINE W/EPI) 1 %-1:[id number] injection 10 mL (10 mL Injection Given 9/17/19 2050)   neomycin-bacitracin-polymyxin (NEOSPORIN) ointment 1 application (1 application Topical Given 9/17/19 2229)     ECG/EMG Results (last 24 hours)     ** No results found for the last 24 hours. **        No orders to display                     MDM    Final diagnoses:   Closed head injury, initial encounter   Concussion without loss of consciousness, initial encounter   Neck sprain, initial encounter   Fall, initial encounter   Multiple contusions       Documentation assistance provided by selena Oliver.   Information recorded by the scribe was done at my direction and has been verified and validated by me.     Ronald Oliver  09/17/19 1940       Ronald Oliver  09/17/19 2226       Joseph Georges MD  09/17/19 [id number]

## 2019-09-18 NOTE — DISCHARGE INSTRUCTIONS
Return to the emergency department in 7 to 10 days to have staples removed.     Head precautions as discussed. Do not scrub scalp. Do not submerge head underwater. Do not directly spray anything onto stapled area. Utilize scalp precaution sheet.    Immediately return to the emergency department for any new or worsening symptoms including nausea, vomiting, or cognitive decline.  Return if you have any other acute, urgent, emergent, or progressive symptoms as discussed    Reduce physical activity for one month.     No driving work or dangerous activity while taking the medication within 12 hours of taking hydrocodone or Flexeril    Off work for the next 2 days    Follow-up with Dr. Tse in two weeks for follow-up care, headache and concussion evaluation.

## 2019-09-27 ENCOUNTER — HOSPITAL ENCOUNTER (EMERGENCY)
Facility: HOSPITAL | Age: 59
Discharge: HOME | End: 2019-09-27

## 2019-09-27 VITALS
DIASTOLIC BLOOD PRESSURE: 74 MMHG | BODY MASS INDEX: 28.71 KG/M2 | TEMPERATURE: 98.6 F | HEART RATE: 66 BPM | OXYGEN SATURATION: 99 % | SYSTOLIC BLOOD PRESSURE: 132 MMHG | WEIGHT: 156 LBS | RESPIRATION RATE: 16 BRPM | HEIGHT: 62 IN

## 2019-09-27 PROCEDURE — 99201: CPT

## 2019-10-10 ENCOUNTER — HOSPITAL ENCOUNTER (OUTPATIENT)
Dept: ULTRASOUND IMAGING | Facility: HOSPITAL | Age: 59
Discharge: HOME | End: 2019-10-10

## 2019-10-10 ENCOUNTER — HOSPITAL ENCOUNTER (OUTPATIENT)
Dept: MAMMOGRAPHY | Facility: HOSPITAL | Age: 59
Discharge: HOME | End: 2019-10-10
Admitting: RADIOLOGY

## 2019-10-10 DIAGNOSIS — R92.8 ABNORMAL MAMMOGRAM: ICD-10-CM

## 2019-10-10 PROCEDURE — 77061 BREAST TOMOSYNTHESIS UNI: CPT | Performed by: RADIOLOGY

## 2019-10-10 PROCEDURE — 77065 DX MAMMO INCL CAD UNI: CPT | Performed by: RADIOLOGY

## 2019-10-10 PROCEDURE — 77065 DX MAMMO INCL CAD UNI: CPT

## 2019-10-10 PROCEDURE — 76642 ULTRASOUND BREAST LIMITED: CPT | Performed by: RADIOLOGY

## 2019-10-10 PROCEDURE — G0279 TOMOSYNTHESIS, MAMMO: HCPCS

## 2019-10-10 PROCEDURE — 76642 ULTRASOUND BREAST LIMITED: CPT

## 2019-10-14 ENCOUNTER — APPOINTMENT (OUTPATIENT)
Dept: MAMMOGRAPHY | Facility: HOSPITAL | Age: 59
End: 2019-10-14

## 2019-11-25 NOTE — TELEPHONE ENCOUNTER
Pt called and requested that RX be sent to [facility] in [facility] due to [facility] no longer carrying medication.

## 2019-12-04 ENCOUNTER — OFFICE VISIT (OUTPATIENT)
Dept: OBSTETRICS AND GYNECOLOGY | Facility: CLINIC | Age: 59
End: 2019-12-04

## 2019-12-04 VITALS
DIASTOLIC BLOOD PRESSURE: 68 MMHG | BODY MASS INDEX: 30.11 KG/M2 | HEIGHT: 62 IN | WEIGHT: 163.6 LBS | SYSTOLIC BLOOD PRESSURE: 110 MMHG

## 2019-12-04 DIAGNOSIS — N60.11 FIBROCYSTIC BREAST CHANGES, BILATERAL: ICD-10-CM

## 2019-12-04 DIAGNOSIS — N64.4 MASTODYNIA: ICD-10-CM

## 2019-12-04 DIAGNOSIS — Z79.890 POST-MENOPAUSE ON HRT (HORMONE REPLACEMENT THERAPY): Primary | ICD-10-CM

## 2019-12-04 DIAGNOSIS — N60.12 FIBROCYSTIC BREAST CHANGES, BILATERAL: ICD-10-CM

## 2019-12-04 DIAGNOSIS — Z01.419 ENCOUNTER FOR GYNECOLOGICAL EXAMINATION WITHOUT ABNORMAL FINDING: ICD-10-CM

## 2019-12-04 PROCEDURE — 99396 PREV VISIT EST AGE 40-64: CPT | Performed by: OBSTETRICS & GYNECOLOGY

## 2019-12-04 RX ORDER — ESTRADIOL 1 MG/1
1 TABLET ORAL DAILY
Qty: 90 TABLET | Refills: 3 | Status: SHIPPED | OUTPATIENT
Start: 2019-12-04 | End: 2020-12-07

## 2019-12-04 RX ORDER — HEPATITIS A VACCINE, INACTIVATED 50 [IU]/ML
INJECTION, SUSPENSION INTRAMUSCULAR
COMMUNITY
Start: 2019-10-18 | End: 2021-03-19

## 2019-12-04 RX ORDER — POLYETHYLENE GLYCOL 3350 17 G/17G
POWDER, FOR SOLUTION ORAL AS NEEDED
COMMUNITY
Start: 2019-12-01

## 2019-12-04 NOTE — PROGRESS NOTES
Chief Complaint   Patient presents with   • Gynecologic Exam   • Med Refill   • Breast Problem     persistent breast discomfort and burning       Rupa Finn is a 59 y.o. year old  presenting to be seen for her annual exam.  This patient has previously had a LAVH/BSO/VCS and a laparoscopic Lee procedure.  She has had a cholecystectomy.  She is treated for gastroesophageal reflux disease and hypothyroidism.  She presented to the office in 2019 with pain in the breast particularly in the nipple areas worse on the right than the left.  Her exam was normal.  She underwent breast imaging and this was benign.  She had an ultrasound of the breast that did not confirm any masses.  She continues to have tenderness.  She did not respond to oral vitamin D therapy.  She has not responded to discontinuation of caffeine and chocolate.  She currently takes estradiol, 1 mg daily.  When I attempted to decrease her dose to 0.5 mg daily she had severe menopausal symptoms.  She does not desire to discontinue estrogen at present.  She denies bowel or urinary symptoms.    SCREENING TESTS    Year 2012 2016 2017   Age                         PAP                         HPV high risk                         Mammogram       benign benign                 TONE score                         Breast MRI                         Lipids                         Vitamin D                         Colonoscopy                         DEXA  Frax (hip/any)     normal                    Ovarian Screen                             She exercises regularly: yes.  She wears her seat belt: yes.  She has concerns about domestic violence: no.  She has noticed changes in height: no    GYN screening history:  · Last mammogram: was done on approximately 10/10/2019 and the result was: Birads I (Normal).  · Last DEXA: was done on approximately  12/19/2016 and the results were: normal.    No Additional Complaints Reported    The following portions of the patient's history were reviewed and updated as appropriate:vital signs and   She  has a past medical history of Autoimmune disease (CMS/HCC), Fibrocystic breast changes, bilateral, GERD (gastroesophageal reflux disease), Gestational diabetes, Glaucoma, Hyperlipidemia, Hypertension, Hypothyroidism, Lower extremity edema, OAB (overactive bladder), Palpitations, Post-menopause on HRT (hormone replacement therapy), and Raynaud's disease.  She does not have any pertinent problems on file.  She  has a past surgical history that includes Tonsillectomy; Cholecystectomy; Other surgical history; laparoscopic assisted vaginal hysterectomy salpingo oophorectomy (Bilateral); Laparoscopic Lee procedure; Oophorectomy; and Esophagoscopy / EGD.  Her family history includes Asthma in her brother; Glaucoma in her father; Other in her father and mother.  She  reports that she has never smoked. She has never used smokeless tobacco. She reports that she does not drink alcohol or use drugs.  Current Outpatient Medications   Medication Sig Dispense Refill   • Alirocumab 75 MG/ML solution auto-injector Inject 75 mg under the skin into the appropriate area as directed Every 14 (Fourteen) Days. 2 mL 11   • estradiol (ESTRACE) 1 MG tablet Take 1 tablet by mouth Daily. 90 tablet 3   • famotidine (PEPCID) 40 MG tablet Take 1 tablet by mouth As Needed.     • FIBER COMPLETE PO Take  by mouth Daily.     • hydrochlorothiazide (HYDRODIURIL) 25 MG tablet Take 1 tablet by mouth Daily. 30 tablet 11   • levothyroxine (SYNTHROID) 75 MCG tablet Take 1 tablet by mouth Daily. 30 tablet 11   • methazolAMIDE (NEPTAZANE) 25 MG tablet Take 12.5 mg by mouth 2 (Two) Times a Day.     • pantoprazole (PROTONIX) 40 MG EC tablet Take 1 tablet by mouth Daily.     • polyethylene glycol (MIRALAX) powder      • timolol (TIMOPTIC) 0.5 % ophthalmic solution  "Administer 1 drop to both eyes 2 (Two) Times a Day.     • travoprost, KATLYN free, (TRAVATAN) 0.004 % solution ophthalmic solution 1 drop Every Evening. in affected eye(s)     • VAQTA 50 UNIT/ML injection        No current facility-administered medications for this visit.      She is allergic to ceclor [cefaclor]; aleve [naproxen sodium]; alphagan [brimonidine]; atenolol; e-mycin [erythromycin]; livalo [pitavastatin]; and statins..    Review of Systems  A comprehensive review of systems was taken.  Constitutional: negative for fever, chills, activity change, appetite change, fatigue and unexpected weight change.  Respiratory: negative  Cardiovascular: negative  Gastrointestinal: positive for reflux symptoms  Genitourinary:negative  Musculoskeletal:negative  Behavioral/Psych: negative       /68   Ht 157.5 cm (62\")   Wt 74.2 kg (163 lb 9.6 oz)   LMP  (LMP Unknown)   Breastfeeding? No   BMI 29.92 kg/m²     Physical Exam    General:  alert; cooperative; well developed; well nourished   Skin:  No suspicious lesions seen   Thyroid: normal to inspection and palpation   Lungs:  clear to auscultation bilaterally   Heart:  regular rate and rhythm, S1, S2 normal, no murmur, click, rub or gallop   Breasts:  Examined in supine position  Symmetric without masses or skin dimpling  Nipples normal without inversion, lesions or discharge  There are no palpable axillary nodes  Fibrocystic changes are present both breasts without a discrete mass  No masses and no periaerolar or nipple erythema or discharge   Abdomen: soft, non-tender; no masses  no umbilical or inguinal hernias are present  no hepato-splenomegaly   Pelvis: Clinical staff was present for exam  External genitalia:  normal appearance of the external genitalia including Bartholin's and [place]'s glands.  Vaginal:  normal pink mucosa without prolapse or lesions.  Cervix:  absent.  Uterus:  absent.  Adnexa:  absent, bilateral.  Rectal:  anus visually normal appearing. " recto-vaginal exam unremarkable and confirms findings;     Lab Review   CBC results, CMP results and TSH results    Imaging  Mammogram results- Birads I         ASSESSMENT  Problems Addressed this Visit        Genitourinary    Post-menopause on HRT (hormone replacement therapy) - Primary    Relevant Medications    estradiol (ESTRACE) 1 MG tablet       Other    Fibrocystic breast changes, bilateral      Other Visit Diagnoses     Encounter for gynecological examination without abnormal finding        Relevant Orders    Liquid-based Pap Smear, Screening    Mastodynia                  Substance History:   reports that she has never smoked. She has never used smokeless tobacco.   reports that she does not drink alcohol.   reports that she does not use drugs.    Substance use counseling is not indicated based on patient history.      PLAN    Medications prescribed this encounter:    New Medications Ordered This Visit   Medications   • estradiol (ESTRACE) 1 MG tablet     Sig: Take 1 tablet by mouth Daily.     Dispense:  90 tablet     Refill:  3   · Monthly self breast assessment and continued annual breast imaging  · I have advised the patient to use vitamin E lotion on the periareolar areas to attempt to decrease tenderness  · I have reminded her to refrain from caffeine and chocolate intake.  · General surgery evaluation for a second opinion.  · Calcium, 600 mg/ Vit. D, 400 IU daily; regular weight-bearing exercise  · Follow up: 12 month(s)  *Please note that portions of this documentation may have been completed with a voice recognition program.  Efforts were made to edit this dictation, but occasional words may have been mistranscribed.       This note was electronically signed.    YASEMIN Armenta MD  December 4, 2019  4:23 PM

## 2020-01-02 ENCOUNTER — HOSPITAL ENCOUNTER (OUTPATIENT)
Dept: MRI IMAGING | Facility: HOSPITAL | Age: 60
Discharge: HOME | End: 2020-01-02

## 2020-01-02 DIAGNOSIS — N64.4 NIPPLE PAIN: ICD-10-CM

## 2020-01-02 LAB — CREAT BLDA-MCNC: 0.7 MG/DL (ref 0.6–1.3)

## 2020-01-02 PROCEDURE — 82565 ASSAY OF CREATININE: CPT

## 2020-01-03 ENCOUNTER — OFFICE VISIT (OUTPATIENT)
Dept: CARDIOLOGY | Facility: CLINIC | Age: 60
End: 2020-01-03

## 2020-01-03 VITALS
HEART RATE: 63 BPM | HEIGHT: 62 IN | BODY MASS INDEX: 30.84 KG/M2 | DIASTOLIC BLOOD PRESSURE: 84 MMHG | WEIGHT: 167.6 LBS | SYSTOLIC BLOOD PRESSURE: 140 MMHG

## 2020-01-03 DIAGNOSIS — E78.5 HYPERLIPIDEMIA, UNSPECIFIED HYPERLIPIDEMIA TYPE: Primary | ICD-10-CM

## 2020-01-03 DIAGNOSIS — I73.00 RAYNAUD'S DISEASE WITHOUT GANGRENE: ICD-10-CM

## 2020-01-03 DIAGNOSIS — R06.09 DOE (DYSPNEA ON EXERTION): ICD-10-CM

## 2020-01-03 DIAGNOSIS — R00.2 PALPITATIONS: ICD-10-CM

## 2020-01-03 DIAGNOSIS — I10 ESSENTIAL HYPERTENSION: ICD-10-CM

## 2020-01-03 PROCEDURE — 99214 OFFICE O/P EST MOD 30 MIN: CPT | Performed by: INTERNAL MEDICINE

## 2020-01-03 NOTE — PROGRESS NOTES
Subjective:     Encounter Date:01/03/2020    Patient ID: Rupa Finn is a 59 y.o.  white female, part-time [employer] , from [place], [place].     SELF-REFERRED  PHYSICIAN: Gena Tse MD  PREVIOUS CARDIOLOGIST: Glenna Melendez MD, [facility]  RHEUMATOLOGIST:  Kim Douglas MD    Chief Complaint:   Chief Complaint   Patient presents with   • Palpitations     Problem List:  1. Palpitations:  a. Remote cardiac stress testing, performed by [facility], Dr. Hartley; data deficit, date deficit; negative for ischemia.   b. Echocardiogram,  03/10/2014:  Normal, EF 60%, Dr. Hartley.  c. Holter monitor, 05/23/2014: Sinus katelyn to sinus rhythm with rates of 40 to 94, occasional PVCs, Dr. Hartley.   d. Cardiac event monitor, 11/10/2016-12/10/2016: sinus rhythm, sinus tach, sinus arrhythmia with rare PACs and PVCs.   2. 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 CCS class I-II chest discomfort/NYHA class II TERRAZAS, with normal EKG (July 2018).  b. CT cardiac calcium score 5.6 August 2018  c. Stress echocardiogram, 8/13/2018: RVSP 30 mmHg, mild TR, no chest pain at baseline and during exercise with baseline ECG normal, acceptable negative echocardiographic exercise stress test suggestive of low probability for significant focal obstructive coronary artery disease with preserved LV function following exercise to 122% predicted exercise capacity and 90% predicted maximum heart rate  d. Residual class I symptoms  3. Hypertension.   4. Hyperlipidemia; ASCVD 10-year risk is 4.5%, 1.9% if treated, started on Livalo but she was intolerant July 2018, has had previous intolerances to statins as well.   5. Lower extremity edema.   6. Glaucoma.  7. Autoimmune diseases:  a. Patient reports positive markers for scleroderma.   b. Rheumatoid arthritis, positive YONY.  c. Raynaud's   8. Hypothyroidism,  treated.  9. Constipation.  10. Gestational diabetes.  11. GERD, not currently taking medication.  12. Surgical history:   a. Hysterectomy.  b. Tonsillectomy.  c. Cholecystectomy.  d. Lap for endometriosis    Allergies   Allergen Reactions   • Ceclor [Cefaclor] Shortness Of Breath     hives   • Aleve [Naproxen Sodium] Other (See Comments)     tongue swelling   • Alphagan [Brimonidine] Other (See Comments)     Eyes itching, red, swollen   • Atenolol Other (See Comments)     fatigue   • E-Mycin [Erythromycin] Nausea And Vomiting   • Livalo [Pitavastatin] Myalgia   • Statins Myalgia         Current Outpatient Medications:   •  Alirocumab 75 MG/ML solution auto-injector, Inject 75 mg under the skin into the appropriate area as directed Every 14 (Fourteen) Days., Disp: 2 mL, Rfl: 11  •  estradiol (ESTRACE) 1 MG tablet, Take 1 tablet by mouth Daily., Disp: 90 tablet, Rfl: 3  •  FIBER COMPLETE PO, Take  by mouth Daily., Disp: , Rfl:   •  hydrochlorothiazide (HYDRODIURIL) 25 MG tablet, Take 1 tablet by mouth Daily., Disp: 30 tablet, Rfl: 11  •  levothyroxine (SYNTHROID) 75 MCG tablet, Take 1 tablet by mouth Daily., Disp: 30 tablet, Rfl: 11  •  methazolAMIDE (NEPTAZANE) 25 MG tablet, Take 12.5 mg by mouth 2 (Two) Times a Day., Disp: , Rfl:   •  pantoprazole (PROTONIX) 40 MG EC tablet, Take 1 tablet by mouth Daily., Disp: , Rfl:   •  polyethylene glycol (MIRALAX) powder, , Disp: , Rfl:   •  timolol (TIMOPTIC) 0.5 % ophthalmic solution, Administer 1 drop to both eyes 2 (Two) Times a Day., Disp: , Rfl:   •  travoprost, BAK free, (TRAVATAN) 0.004 % solution ophthalmic solution, 1 drop Every Evening. in affected eye(s), Disp: , Rfl:   •  famotidine (PEPCID) 40 MG tablet, Take 1 tablet by mouth As Needed., Disp: , Rfl:   •  VAQTA 50 UNIT/ML injection, , Disp: , Rfl:     HISTORY OF PRESENT ILLNESS: Patient returns for scheduled 6-1/2 month followup; she was last seen by HOWARD Malone on 06/13/2019. She was seen in the [facility] ED  "on 09/17/2019 after a fall with trip over a curb, with repair of a scalp laceration.  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  She still gets short of breath with stairs, which started approximately 5 years ago.  She sees a rheumatologist for Raynaud's phenomenon every 6 months and at one point showed positive for rheumatoid arthritis.  After she got her last Praluent shot, the next morning, she woke up with uvula swelling and a sore throat, but there is a virus going around their school.  She realizes her weight is up, and she knows that this is because she has not been careful about what she eats.  Patient otherwise denies chest pain, severe shortness of breath, PND, edema, palpitations, syncope or presyncope at this time.      Review of Systems   Constitution: Positive for malaise/fatigue and weight gain.   HENT: Positive for sore throat.    Endocrine: Positive for cold intolerance.   Skin: Positive for dry skin.   Musculoskeletal: Positive for back pain.   Gastrointestinal: Positive for heartburn.   Allergic/Immunologic:        Drug allergies      All other systems reviewed and otherwise negative.    Procedures       Objective:       Vitals:    01/03/20 1555 01/03/20 1556 01/03/20 1607   BP: 148/89 141/79 140/84   BP Location: Right arm Right arm Right arm   Patient Position: Sitting Standing Sitting   Pulse: 65 63    Weight: 76 kg (167 lb 9.6 oz)     Height: 157.5 cm (62\")       Body mass index is 30.65 kg/m².   Last weight:  150 lbs.    Physical Exam   Constitutional: She is oriented to person, place, and time. She appears well-developed and well-nourished.   Neck: No JVD present. Carotid bruit is not present. No thyromegaly present.   Cardiovascular: Regular rhythm, S1 normal and S2 normal. Exam reveals no gallop, no S3 and no friction rub.   Murmur heard.   Medium-pitched early systolic murmur is present with a grade of 1/6 at the lower " left sternal border.  Pulses:       Dorsalis pedis pulses are 2+ on the right side, and 2+ on the left side.        Posterior tibial pulses are 2+ on the right side, and 2+ on the left side.   Pulmonary/Chest: Effort normal. She has decreased breath sounds. She has no wheezes. She has no rhonchi. She has no rales.   Abdominal: Soft. She exhibits no mass. There is no hepatosplenomegaly. There is no tenderness. There is no guarding.   Bowel sounds audible x4   Musculoskeletal: Normal range of motion. She exhibits no edema.   Lymphadenopathy:     She has no cervical adenopathy.   Neurological: She is alert and oriented to person, place, and time.   Skin: Skin is warm, dry and intact. No rash noted.   Vitals reviewed.        Lab Review:   Lab Results   Component Value Date    GLUCOSE 102 (H) 06/13/2019    BUN 14 06/13/2019    CREATININE 0.70 01/02/2020    EGFRIFNONA 89 06/13/2019    BCR 20.6 06/13/2019    CO2 26.8 06/13/2019    CALCIUM 10.2 06/13/2019    ALBUMIN 4.80 06/13/2019    AST 26 06/13/2019    ALT 16 06/13/2019       Lab Results   Component Value Date    WBC 3.65 06/13/2019    HGB 12.9 06/13/2019    HCT 39.1 06/13/2019    MCV 92.2 06/13/2019     06/13/2019       Lab Results   Component Value Date    HGBA1C 5.30 08/05/2019       Lab Results   Component Value Date    TSH 0.230 (L) 06/13/2019       Lab Results   Component Value Date    CHOL 352 (H) 06/13/2019    CHOL 310 (H) 03/29/2017     Lab Results   Component Value Date    TRIG 136 06/13/2019    TRIG 121 03/29/2017     Lab Results   Component Value Date    HDL 77 (H) 06/13/2019    HDL 74 (H) 03/29/2017     Lab Results   Component Value Date     (H) 06/13/2019     (H) 03/29/2017 09/17/2019:  · Head CT:  FINDINGS:   No intracranial hemorrhage, mass, or infarct. No hydrocephalus or extra-axial fluid collection. Brain parenchymal density is normal. The skull base, calvarium, and extracranial soft tissues are normal.     IMPRESSION: Normal,  negative unenhanced head CT.  · Cervical spine CT:  FINDINGS:  Alignment is satisfactory. Disc space heights are preserved. Mild degenerative changes are seen in the mid cervical facets. There is no evidence of facet injury. There is no evidence of fracture. The spinal canal and foramina are patent at all levels. Mild foraminal narrowing is seen on the right at C3-4 from an uncovertebral joint osteophyte.     IMPRESSION: Generally mild degenerative changes in the discs and facets as described above. No evidence of fracture.    10/10/2019, right breast ultrasound:  IMPRESSION: Area of questioned asymmetry is attributed to normal overlapping fibroglandular tissue.        Assessment:   Overall continued acceptable course with no new interim cardiopulmonary complaints with acceptable functional status. We will defer additional diagnostic or therapeutic intervention from a cardiac perspective at this time.  Hopefully, she can have an FLP drawn the next time she has labs drawn in Dr. Douglas's office, and we can be provided all of her laboratory results for our review.       Diagnosis Plan   1. Hyperlipidemia, unspecified hyperlipidemia type  Lipid Panel   2. Palpitations  No current symptoms; Continue current treatment.    3. Essential hypertension  Labile readings in part related to somewhat inactive lifestyle and weight gain; caloric restriction and activity strongly encouraged   4. Raynaud's disease without gangrene  No current severe restrictive symptoms   5. TERRAZAS (dyspnea on exertion)  Defer additional cardiac workup at this time including cardiac MRI          Plan:         1. Patient to continue current medications and close follow up with the above providers.  2. Tentative cardiology follow up in July 2020, or patient may return sooner PRN.    Transcribed by Tia Chirinos for Dr. Wing Olson at 4:05 PM on 01/03/2020    IWing MD, [facility], personally performed the services described in this  documentation as scribed by the above named individual in my presence, and it is both accurate and complete. At 4:09 PM on 01/03/2020

## 2020-01-06 ENCOUNTER — HOSPITAL ENCOUNTER (OUTPATIENT)
Dept: MRI IMAGING | Facility: HOSPITAL | Age: 60
Discharge: HOME | End: 2020-01-06
Admitting: SURGERY

## 2020-01-06 PROCEDURE — C8937 CAD BREAST MRI: HCPCS

## 2020-01-06 PROCEDURE — 0 GADOBENATE DIMEGLUMINE 529 MG/ML SOLUTION: Performed by: SURGERY

## 2020-01-06 PROCEDURE — 77049 MRI BREAST C-+ W/CAD BI: CPT | Performed by: RADIOLOGY

## 2020-01-06 PROCEDURE — C8908 MRI W/O FOL W/CONT, BREAST,: HCPCS

## 2020-01-06 PROCEDURE — 77049 MRI BREAST C-+ W/CAD BI: CPT

## 2020-01-06 PROCEDURE — A9577 INJ MULTIHANCE: HCPCS | Performed by: SURGERY

## 2020-01-06 RX ADMIN — GADOBENATE DIMEGLUMINE 15 ML: 529 INJECTION, SOLUTION INTRAVENOUS at 09:25

## 2020-01-09 ENCOUNTER — TELEPHONE (OUTPATIENT)
Dept: MRI IMAGING | Facility: HOSPITAL | Age: 60
End: 2020-01-09

## 2020-01-09 NOTE — TELEPHONE ENCOUNTER
Called pt with Breast MRI results. Pt expressed understanding and was encouraged to call with any further questions or concerns.

## 2020-01-28 LAB
AMBIG ABBREV LP DEFAULT: NORMAL
CHOLEST SERPL-MCNC: 249 MG/DL (ref 100–199)
HDLC SERPL-MCNC: 77 MG/DL
LDLC SERPL CALC-MCNC: 146 MG/DL (ref 0–99)
TRIGL SERPL-MCNC: 132 MG/DL (ref 0–149)
VLDLC SERPL CALC-MCNC: 26 MG/DL (ref 5–40)

## 2020-01-29 DIAGNOSIS — E78.5 HYPERLIPIDEMIA, UNSPECIFIED HYPERLIPIDEMIA TYPE: Primary | ICD-10-CM

## 2020-01-29 RX ORDER — EZETIMIBE 10 MG/1
10 TABLET ORAL DAILY
Qty: 90 TABLET | Refills: 3 | Status: SHIPPED | OUTPATIENT
Start: 2020-01-29 | End: 2020-07-15

## 2020-06-08 RX ORDER — LEVOTHYROXINE SODIUM 0.07 MG/1
75 TABLET ORAL DAILY
Qty: 30 TABLET | Refills: 2 | Status: SHIPPED | OUTPATIENT
Start: 2020-06-08 | End: 2020-08-10

## 2020-06-08 RX ORDER — HYDROCHLOROTHIAZIDE 25 MG/1
25 TABLET ORAL DAILY
Qty: 30 TABLET | Refills: 2 | Status: SHIPPED | OUTPATIENT
Start: 2020-06-08 | End: 2020-07-17

## 2020-06-11 ENCOUNTER — TELEPHONE (OUTPATIENT)
Dept: CARDIOLOGY | Facility: CLINIC | Age: 60
End: 2020-06-11

## 2020-07-15 ENCOUNTER — LAB (OUTPATIENT)
Dept: LAB | Facility: HOSPITAL | Age: 60
End: 2020-07-15

## 2020-07-15 ENCOUNTER — OFFICE VISIT (OUTPATIENT)
Dept: CARDIOLOGY | Facility: CLINIC | Age: 60
End: 2020-07-15

## 2020-07-15 VITALS
HEIGHT: 62 IN | WEIGHT: 148.6 LBS | SYSTOLIC BLOOD PRESSURE: 121 MMHG | BODY MASS INDEX: 27.34 KG/M2 | HEART RATE: 56 BPM | DIASTOLIC BLOOD PRESSURE: 75 MMHG

## 2020-07-15 DIAGNOSIS — I10 ESSENTIAL HYPERTENSION: ICD-10-CM

## 2020-07-15 DIAGNOSIS — E78.5 HYPERLIPIDEMIA, UNSPECIFIED HYPERLIPIDEMIA TYPE: ICD-10-CM

## 2020-07-15 DIAGNOSIS — E78.5 DYSLIPIDEMIA: ICD-10-CM

## 2020-07-15 DIAGNOSIS — E78.5 HYPERLIPIDEMIA, UNSPECIFIED HYPERLIPIDEMIA TYPE: Primary | ICD-10-CM

## 2020-07-15 DIAGNOSIS — E03.9 ACQUIRED HYPOTHYROIDISM: ICD-10-CM

## 2020-07-15 DIAGNOSIS — R00.2 PALPITATIONS: ICD-10-CM

## 2020-07-15 LAB
ALBUMIN SERPL-MCNC: 4.2 G/DL (ref 3.5–5.2)
ALBUMIN/GLOB SERPL: 1.4 G/DL
ALP SERPL-CCNC: 60 U/L (ref 39–117)
ALT SERPL W P-5'-P-CCNC: 10 U/L (ref 1–33)
ANION GAP SERPL CALCULATED.3IONS-SCNC: 10.2 MMOL/L (ref 5–15)
AST SERPL-CCNC: 26 U/L (ref 1–32)
BILIRUB SERPL-MCNC: 0.4 MG/DL (ref 0–1.2)
BUN SERPL-MCNC: 9 MG/DL (ref 8–23)
BUN/CREAT SERPL: 11.7 (ref 7–25)
CALCIUM SPEC-SCNC: 8.9 MG/DL (ref 8.6–10.5)
CHLORIDE SERPL-SCNC: 91 MMOL/L (ref 98–107)
CHOLEST SERPL-MCNC: 217 MG/DL (ref 0–200)
CO2 SERPL-SCNC: 24.8 MMOL/L (ref 22–29)
CREAT SERPL-MCNC: 0.77 MG/DL (ref 0.57–1)
GFR SERPL CREATININE-BSD FRML MDRD: 76 ML/MIN/1.73
GLOBULIN UR ELPH-MCNC: 3 GM/DL
GLUCOSE SERPL-MCNC: 95 MG/DL (ref 65–99)
HDLC SERPL-MCNC: 79 MG/DL (ref 40–60)
LDLC SERPL CALC-MCNC: 120 MG/DL (ref 0–100)
LDLC/HDLC SERPL: 1.52 {RATIO}
POTASSIUM SERPL-SCNC: 4.2 MMOL/L (ref 3.5–5.2)
PROT SERPL-MCNC: 7.2 G/DL (ref 6–8.5)
SODIUM SERPL-SCNC: 126 MMOL/L (ref 136–145)
TRIGL SERPL-MCNC: 88 MG/DL (ref 0–150)
TSH SERPL DL<=0.05 MIU/L-ACNC: 0.5 UIU/ML (ref 0.27–4.2)
VLDLC SERPL-MCNC: 17.6 MG/DL (ref 5–40)

## 2020-07-15 PROCEDURE — 84443 ASSAY THYROID STIM HORMONE: CPT

## 2020-07-15 PROCEDURE — 99214 OFFICE O/P EST MOD 30 MIN: CPT | Performed by: INTERNAL MEDICINE

## 2020-07-15 PROCEDURE — 80061 LIPID PANEL: CPT

## 2020-07-15 PROCEDURE — 80053 COMPREHEN METABOLIC PANEL: CPT

## 2020-07-15 PROCEDURE — 36415 COLL VENOUS BLD VENIPUNCTURE: CPT

## 2020-07-15 NOTE — PROGRESS NOTES
Subjective:     Encounter Date:07/15/2020    Patient ID: Rupa Finn is a 60 y.o.  white female, retired [employer] , from [place], [place].     SELF-REFERRED  PHYSICIAN: Gena Tse MD  PREVIOUS CARDIOLOGIST: Glenna Melendez MD, [facility]  RHEUMATOLOGIST:  Kim Douglas MD    Chief Complaint:   Chief Complaint   Patient presents with   • Hyperlipidemia     F/U       Problem List:  1. Palpitations:  a. Remote cardiac stress testing, performed by [facility], Dr. Hartley; data deficit, date deficit; negative for ischemia.   b. Echocardiogram,  03/10/2014:  Normal, EF 60%, Dr. Hartley.  c. Holter monitor, 05/23/2014: Sinus katelyn to sinus rhythm with rates of 40 to 94, occasional PVCs, Dr. Hartley.   d. Cardiac event monitor, 11/10/2016-12/10/2016: sinus rhythm, sinus tach, sinus arrhythmia with rare PACs and PVCs.   2. 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 CCS class I-II chest discomfort/NYHA class II TERRAZAS, with normal EKG (July 2018).  b. CT cardiac calcium score 5.6 August 2018  c. Stress echocardiogram, 8/13/2018: RVSP 30 mmHg, mild TR, no chest pain at baseline and during exercise with baseline ECG normal, acceptable negative echocardiographic exercise stress test suggestive of low probability for significant focal obstructive coronary artery disease with preserved LV function following exercise to 122% predicted exercise capacity and 90% predicted maximum heart rate  d. Residual class I symptoms  3. Hypertension.   4. Hyperlipidemia; ASCVD 10-year risk is 4.5%, 1.9% if treated, started on Livalo but she was intolerant July 2018, has had previous intolerances to statins as well.   5. Lower extremity edema.   6. Glaucoma.  7. Autoimmune diseases:  a. Patient reports positive markers for scleroderma.   b. Rheumatoid arthritis, positive YONY.  c. Raynaud's   8. Hypothyroidism,  treated.  9. Constipation.  10. Gestational diabetes.  11. GERD, not currently taking medication.  12. Surgical history:   a. Hysterectomy.  b. Tonsillectomy.  c. Cholecystectomy.  d. Lap for endometriosis    Allergies   Allergen Reactions   • Ceclor [Cefaclor] Shortness Of Breath     hives   • Aleve [Naproxen Sodium] Other (See Comments)     tongue swelling   • Alphagan [Brimonidine] Other (See Comments)     Eyes itching, red, swollen   • Atenolol Other (See Comments)     fatigue   • E-Mycin [Erythromycin] Nausea And Vomiting   • Livalo [Pitavastatin] Myalgia   • Statins Myalgia       Current Outpatient Medications:   •  Alirocumab 75 MG/ML solution auto-injector, Inject 75 mg under the skin into the appropriate area as directed Every 14 (Fourteen) Days., Disp: 2 mL, Rfl: 11  •  estradiol (ESTRACE) 1 MG tablet, Take 1 tablet by mouth Daily., Disp: 90 tablet, Rfl: 3  •  famotidine (PEPCID) 40 MG tablet, Take 1 tablet by mouth As Needed., Disp: , Rfl:   •  FIBER COMPLETE PO, Take  by mouth Daily., Disp: , Rfl:   •  hydroCHLOROthiazide (HYDRODIURIL) 25 MG tablet, Take 1 tablet by mouth Daily. NEED LABS FOR FURTHER REFILLS, Disp: 30 tablet, Rfl: 2  •  levothyroxine (Synthroid) 75 MCG tablet, Take 1 tablet by mouth Daily. NEED LABS FOR FURTHER REFILLS, Disp: 30 tablet, Rfl: 2  •  methazolAMIDE (NEPTAZANE) 25 MG tablet, Take 12.5 mg by mouth 2 (Two) Times a Day., Disp: , Rfl:   •  pantoprazole (PROTONIX) 40 MG EC tablet, Take 1 tablet by mouth Daily., Disp: , Rfl:   •  polyethylene glycol (MIRALAX) powder, , Disp: , Rfl:   •  timolol (TIMOPTIC) 0.5 % ophthalmic solution, Administer 1 drop to both eyes 2 (Two) Times a Day., Disp: , Rfl:   •  travoprost, BAK free, (TRAVATAN) 0.004 % solution ophthalmic solution, 1 drop Every Evening. in affected eye(s), Disp: , Rfl:   •  VAQTA 50 UNIT/ML injection, , Disp: , Rfl:     History of Present Illness: Patient returns for scheduled 6-month follow up. Since last visit, patient has  "been doing well. 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 She recently traveled to [place], [place] but continued to practice social distancing and wore a mask while on the flights. She relates her weight loss to an increase in exercise and intermittent fasting/ carb cycling. She states she was down to 140 lbs before the vacation to [place]. She has had no interim ER visits, hospitalizations, serious illnesses, or surgeries. She reports she does not take ezetimibe as she read this can be harmful for her thyroid. She is encouraged this is okay to take. She notes she only sees Dr. Tse as needed and has no labs planned for the near future. She is advised we will order laboratory studies that can be drawn after her appointment today. Patient otherwise denies chest pain, shortness of breath, PND, edema, palpitations, syncope, or presyncope at this time.        ROS   Obtained and negative except as outlined in problem list and HPI.    Procedures       Objective:       Vitals:    07/15/20 1047 07/15/20 1049   BP: 121/75 121/75   BP Location: Left arm Left arm   Patient Position: Sitting Standing   Pulse: 52 56   Weight: 67.4 kg (148 lb 9.6 oz)    Height: 157.5 cm (62\")      Body mass index is 27.18 kg/m².  Last weight: 167 lbs    Physical Exam   Constitutional: She is oriented to person, place, and time. She appears well-developed and well-nourished.   Neck: No JVD present. Carotid bruit is not present. No thyromegaly present.   Cardiovascular: Regular rhythm, S1 normal, S2 normal and normal heart sounds. Exam reveals no gallop, no S3 and no friction rub.   No murmur heard.  Pulses:       Dorsalis pedis pulses are 2+ on the right side, and 2+ on the left side.        Posterior tibial pulses are 2+ on the right side, and 2+ on the left side.   Pulmonary/Chest: Effort normal and breath sounds normal. She has no wheezes. She has no rhonchi. She has no rales.   Abdominal: Soft. She exhibits " no mass. There is no hepatosplenomegaly. There is no tenderness. There is no guarding.   Musculoskeletal: Normal range of motion. She exhibits no edema.   Lymphadenopathy:     She has no cervical adenopathy.   Neurological: She is alert and oriented to person, place, and time.   Skin: Skin is warm, dry and intact. No rash noted.   Vitals reviewed.        Lab Review:     01/28/2020 (Reviewed with patient by letter):  · Total cholesterol 249, triglycerides 132, HDL-C 77, LDL-C 146  · No changes recommended at that time    Lab Results   Component Value Date    GLUCOSE 102 (H) 06/13/2019    BUN 14 06/13/2019    CREATININE 0.70 01/02/2020    EGFRIFNONA 89 06/13/2019    BCR 20.6 06/13/2019    CO2 26.8 06/13/2019    CALCIUM 10.2 06/13/2019    ALBUMIN 4.80 06/13/2019    AST 26 06/13/2019    ALT 16 06/13/2019       Lab Results   Component Value Date    WBC 3.65 06/13/2019    HGB 12.9 06/13/2019    HCT 39.1 06/13/2019    MCV 92.2 06/13/2019     06/13/2019       Lab Results   Component Value Date    HGBA1C 5.30 08/05/2019       Lab Results   Component Value Date    TSH 0.230 (L) 06/13/2019       Lab Results   Component Value Date    CHOL 352 (H) 06/13/2019    CHOL 310 (H) 03/29/2017    CHOL 306 (H) 11/10/2016     Lab Results   Component Value Date    TRIG 132 01/27/2020    TRIG 136 06/13/2019    TRIG 121 03/29/2017     Lab Results   Component Value Date    HDL 77 01/27/2020    HDL 77 (H) 06/13/2019    HDL 74 (H) 03/29/2017     Lab Results   Component Value Date     (H) 01/27/2020     (H) 06/13/2019     (H) 03/29/2017     MRI Breast Bilateral, 01/06/2020:  IMPRESSION:  Negative bilateral breast MRI performed for further  evaluation of right nipple pain.        Assessment:       Overall continued acceptable course with no new interim cardiopulmonary complaints with good functional status. We will defer additional diagnostic or therapeutic intervention from a cardiac perspective at this time.      Diagnosis Plan   1. Hyperlipidemia, unspecified hyperlipidemia type  Lipid Panel    Comprehensive Metabolic Panel   2. Acquired hypothyroidism  TSH   3. Palpitations  No documented recurrence; Continue current treatment.    4. Dyslipidemia  Heart healthy diet and reassess FLP   5. Essential hypertension  Controlled; Continue current treatment.           Plan:         1. Patient to continue current medications and close follow up with the above providers with the following labs ordered  A. TSH  B. CMP  C. FLP  2. Tentative cardiology follow up in March 2021, or patient may return sooner PRN.    Scribed for Wing Olson MD by Francine Law. 7/15/2020  11:42    I, Wing Olson MD, [facility], personally performed the services described in this documentation as scribed by the above named individual in my presence, and it is both accurate and complete. At 11:13 AM on 07/15/2020

## 2020-07-17 DIAGNOSIS — I10 ESSENTIAL HYPERTENSION: Primary | ICD-10-CM

## 2020-07-17 RX ORDER — TELMISARTAN 40 MG/1
40 TABLET ORAL DAILY
Qty: 90 TABLET | Refills: 3 | Status: SHIPPED | OUTPATIENT
Start: 2020-07-17 | End: 2020-12-09

## 2020-07-17 RX ORDER — EZETIMIBE 10 MG/1
10 TABLET ORAL DAILY
Qty: 90 TABLET | Refills: 3 | Status: SHIPPED | OUTPATIENT
Start: 2020-07-17 | End: 2020-12-09

## 2020-07-30 ENCOUNTER — TELEPHONE (OUTPATIENT)
Dept: CARDIOLOGY | Facility: CLINIC | Age: 60
End: 2020-07-30

## 2020-07-30 NOTE — TELEPHONE ENCOUNTER
Noted; would continue current dose of telmisartan and restart HCTZ 12.5 mg daily and update us on symptoms, blood pressure, and heart rate as well as weight in 1 week.  Sooner PRN.    Thanks!

## 2020-07-30 NOTE — TELEPHONE ENCOUNTER
Patient called to report BP readings after recent change from HCTZ 25mg to Telmisartan 40mg daily.  She has been on Telmisartan for 6 days now.  BP today was 161/92.  This was taken right before her medication.  She reports readings of 146/84 on two other occasions after medications.      She has no HR readings to report.    Since making the change the patient states she is voiding more frequently and has a feeling of urgency.  This did not happen on the HCTZ.  She also has mild swelling in her hands after walking that resloves with time, and an approx 3-4lb weight gain since changing the medications.  No noticeable edema anywhere else.    Denies CP,SOA.    I advised her to continue monitoring BP/HR around two hours after Telmisartan and I would talk to Dr. Olson for any other recs.  Please advise.

## 2020-07-31 RX ORDER — HYDROCHLOROTHIAZIDE 25 MG/1
12.5 TABLET ORAL DAILY
Qty: 15 TABLET | Refills: 3 | Status: SHIPPED | OUTPATIENT
Start: 2020-07-31 | End: 2020-10-25

## 2020-08-10 RX ORDER — LEVOTHYROXINE SODIUM 0.07 MG/1
75 TABLET ORAL DAILY
Qty: 90 TABLET | Refills: 1 | Status: SHIPPED | OUTPATIENT
Start: 2020-08-10 | End: 2022-08-19 | Stop reason: SDUPTHER

## 2020-10-21 ENCOUNTER — TRANSCRIBE ORDERS (OUTPATIENT)
Dept: OBSTETRICS AND GYNECOLOGY | Facility: CLINIC | Age: 60
End: 2020-10-21

## 2020-10-21 DIAGNOSIS — Z12.31 ENCOUNTER FOR SCREENING MAMMOGRAM FOR MALIGNANT NEOPLASM OF BREAST: Primary | ICD-10-CM

## 2020-10-25 RX ORDER — HYDROCHLOROTHIAZIDE 25 MG/1
TABLET ORAL
Qty: 30 TABLET | Refills: 1 | Status: SHIPPED | OUTPATIENT
Start: 2020-10-25 | End: 2020-12-09

## 2020-10-27 ENCOUNTER — APPOINTMENT (OUTPATIENT)
Dept: MAMMOGRAPHY | Facility: HOSPITAL | Age: 60
End: 2020-10-27

## 2020-10-28 PROCEDURE — U0003 INFECTIOUS AGENT DETECTION BY NUCLEIC ACID (DNA OR RNA); SEVERE ACUTE RESPIRATORY SYNDROME CORONAVIRUS 2 (SARS-COV-2) (CORONAVIRUS DISEASE [COVID-19]), AMPLIFIED PROBE TECHNIQUE, MAKING USE OF HIGH THROUGHPUT TECHNOLOGIES AS DESCRIBED BY CMS-2020-01-R: HCPCS | Performed by: PHYSICIAN ASSISTANT

## 2020-11-16 ENCOUNTER — TELEPHONE (OUTPATIENT)
Dept: CARDIOLOGY | Facility: CLINIC | Age: 60
End: 2020-11-16

## 2020-12-07 DIAGNOSIS — Z79.890 POST-MENOPAUSE ON HRT (HORMONE REPLACEMENT THERAPY): ICD-10-CM

## 2020-12-07 RX ORDER — ALIROCUMAB 75 MG/ML
INJECTION, SOLUTION SUBCUTANEOUS
Qty: 2 PEN | Refills: 11 | Status: SHIPPED | OUTPATIENT
Start: 2020-12-07 | End: 2021-11-22 | Stop reason: SDUPTHER

## 2020-12-07 RX ORDER — ESTRADIOL 1 MG/1
TABLET ORAL
Qty: 30 TABLET | Refills: 0 | Status: SHIPPED | OUTPATIENT
Start: 2020-12-07 | End: 2020-12-09 | Stop reason: SDUPTHER

## 2020-12-09 ENCOUNTER — OFFICE VISIT (OUTPATIENT)
Dept: OBSTETRICS AND GYNECOLOGY | Facility: CLINIC | Age: 60
End: 2020-12-09

## 2020-12-09 VITALS
DIASTOLIC BLOOD PRESSURE: 80 MMHG | SYSTOLIC BLOOD PRESSURE: 124 MMHG | BODY MASS INDEX: 27.8 KG/M2 | WEIGHT: 152 LBS | RESPIRATION RATE: 16 BRPM

## 2020-12-09 DIAGNOSIS — Z79.890 POST-MENOPAUSE ON HRT (HORMONE REPLACEMENT THERAPY): Primary | ICD-10-CM

## 2020-12-09 DIAGNOSIS — N60.12 FIBROCYSTIC BREAST CHANGES, BILATERAL: ICD-10-CM

## 2020-12-09 DIAGNOSIS — N60.11 FIBROCYSTIC BREAST CHANGES, BILATERAL: ICD-10-CM

## 2020-12-09 DIAGNOSIS — Z01.419 ENCOUNTER FOR GYNECOLOGICAL EXAMINATION WITHOUT ABNORMAL FINDING: ICD-10-CM

## 2020-12-09 PROCEDURE — 99396 PREV VISIT EST AGE 40-64: CPT | Performed by: OBSTETRICS & GYNECOLOGY

## 2020-12-09 RX ORDER — METHAZOLAMIDE 50 MG/1
TABLET ORAL
COMMUNITY
Start: 2020-11-22 | End: 2022-03-03

## 2020-12-09 RX ORDER — ESTRADIOL 1 MG/1
1 TABLET ORAL DAILY
Qty: 30 TABLET | Refills: 11 | Status: SHIPPED | OUTPATIENT
Start: 2020-12-09 | End: 2021-12-17 | Stop reason: SDUPTHER

## 2020-12-09 RX ORDER — TELMISARTAN 80 MG/1
80 TABLET ORAL DAILY
COMMUNITY
Start: 2020-12-07 | End: 2022-11-29 | Stop reason: SDUPTHER

## 2020-12-09 NOTE — PROGRESS NOTES
Chief Complaint   Patient presents with   • Annual Exam       Rupa Finn is a 60 y.o. year old  presenting to be seen for her annual exam.  I have previously done a laparoscopically assisted vaginal hysterectomy/bilateral salpingo-oophorectomy/vaginal vault suspension to uterosacral ligaments/laparoscopic Lee procedure on this patient for stress urinary incontinence.  She has no urinary leakage.  She denies bowel symptoms.  She currently takes estradiol, 1 mg by mouth daily and has relief of menopausal symptoms.  When she is attempted to lower her dose she developed symptoms.  She has no side effects on estrogen replacement therapy.  She was Covid-19+ in October and is tested negative since that time.  SCREENING TESTS    Year 2012   Age                         PAP        Neg.                 HPV high risk                         Mammogram        benign                 TONE score                         Breast MRI                         Lipids                         Vitamin D                         Colonoscopy                         DEXA  Frax (hip/any)                         Ovarian Screen                             She exercises regularly: yes.  She wears her seat belt: yes.  She has concerns about domestic violence: no.  She has noticed changes in height: no    GYN screening history:  · Last pap: was done on approximately 2019 and the result was: normal PAP.  · Last mammogram: was done on approximately 10/10/2019 and the result was: Birads II (Benign findings)..    No Additional Complaints Reported    The following portions of the patient's history were reviewed and updated as appropriate:vital signs and   She  has a past medical history of Autoimmune disease (CMS/HCC), Fibrocystic breast changes, bilateral, GERD (gastroesophageal reflux disease), Gestational diabetes, Glaucoma,  Hyperlipidemia, Hypertension, Hypothyroidism, Lower extremity edema, OAB (overactive bladder), Palpitations, Post-menopause on HRT (hormone replacement therapy), and Raynaud's disease.  She does not have any pertinent problems on file.  She  has a past surgical history that includes Tonsillectomy; Cholecystectomy; Other surgical history; laparoscopic assisted vaginal hysterectomy salpingo oophorectomy (Bilateral); Laparoscopic Lee procedure; Oophorectomy; and Esophagoscopy / EGD.  Her family history includes Asthma in her brother; Glaucoma in her father; Other in her father and mother.  She  reports that she has never smoked. She has never used smokeless tobacco. She reports that she does not drink alcohol or use drugs.  Current Outpatient Medications   Medication Sig Dispense Refill   • estradiol (ESTRACE) 1 MG tablet Take 1 tablet by mouth Daily. 30 tablet 11   • FIBER COMPLETE PO Take  by mouth Daily.     • levothyroxine (Synthroid) 75 MCG tablet Take 1 tablet by mouth Daily. 90 tablet 1   • methazolAMIDE (NEPTAZANE) 50 MG tablet      • pantoprazole (PROTONIX) 40 MG EC tablet Take 1 tablet by mouth Daily.     • polyethylene glycol (MIRALAX) powder      • Praluent 75 MG/ML solution auto-injector INJECT THE CONTENTS OF ONE PEN SUBCUTANEOUSLY INTO THE APPROPRIATE AREA AS DIRECTED EVERY 14 DAYS 2 pen 11   • telmisartan (MICARDIS) 80 MG tablet      • timolol (TIMOPTIC) 0.5 % ophthalmic solution Administer 1 drop to both eyes 2 (Two) Times a Day.     • travoprost, KATLYN free, (TRAVATAN) 0.004 % solution ophthalmic solution 1 drop Every Evening. in affected eye(s)     • VAQTA 50 UNIT/ML injection        No current facility-administered medications for this visit.      She is allergic to aleve [naproxen sodium]; ceclor [cefaclor]; alphagan [brimonidine]; atenolol; livalo [pitavastatin]; statins; and e-mycin [erythromycin]..    Review of Systems  A review of systems was taken.  She denies fever, chills, shortness of  breath, and loss of her sense of taste or smell.  She still has a mild cough  Constitutional: negative for fever, chills, activity change, appetite change, fatigue and unexpected weight change.  Respiratory: negative  Cardiovascular: negative  Gastrointestinal: negative  Genitourinary:negative  Musculoskeletal:negative  Behavioral/Psych: negative   Counseling/Anticipatory Guidance Discussed: nutrition, physical activity, healthy weight, injury prevention, screenings and self-breast exam    /80   Resp 16   Wt 68.9 kg (152 lb)   LMP  (LMP Unknown)   Breastfeeding No   BMI 27.80 kg/m²     MEDICALLY INDICATED   Physical Exam    General:  alert; cooperative; well developed; well nourished   Skin:  No suspicious lesions seen   Thyroid: normal to inspection and palpation   Lungs:  breathing is unlabored  clear to auscultation bilaterally   Heart:  regular rate and rhythm, S1, S2 normal, no murmur, click, rub or gallop  normal apical impulse   Breasts:  Examined in supine position  Symmetric without masses or skin dimpling  Nipples normal without inversion, lesions or discharge  There are no palpable axillary nodes  Fibrocystic changes are present both breasts without a discrete mass   Abdomen: soft, non-tender; no masses  no umbilical or inguinal hernias are present  no hepato-splenomegaly   Pelvis: Clinical staff was present for exam  External genitalia:  normal appearance of the external genitalia including Bartholin's and Princess Anne's glands.  Vaginal:  normal pink mucosa without prolapse or lesions. the urethro-vesical angle is good  Cervix:  absent.  Uterus:  absent.  Adnexa:  absent, bilateral.  Rectal:  anus visually normal appearing. recto-vaginal exam unremarkable and confirms findings;     Lab Review   No data reviewed    Imaging  Mammogram results        Advance directives- YES      ASSESSMENT  Problems Addressed this Visit        Genitourinary    Post-menopause on HRT (hormone replacement therapy) -  Primary    Relevant Medications    estradiol (ESTRACE) 1 MG tablet       Other    Fibrocystic breast changes, bilateral      Other Visit Diagnoses     Encounter for gynecological examination without abnormal finding          Diagnoses       Codes Comments    Post-menopause on HRT (hormone replacement therapy)    -  Primary ICD-10-CM: Z79.890  ICD-9-CM: V07.4     Fibrocystic breast changes, bilateral     ICD-10-CM: N60.11, N60.12  ICD-9-CM: 610.1     Encounter for gynecological examination without abnormal finding     ICD-10-CM: Z01.419  ICD-9-CM: V72.31               Substance History:   reports that she has never smoked. She has never used smokeless tobacco.   reports no history of alcohol use.   reports no history of drug use.    Substance use counseling is not indicated based on patient history.      PLAN    Medications prescribed this encounter:    New Medications Ordered This Visit   Medications   • estradiol (ESTRACE) 1 MG tablet     Sig: Take 1 tablet by mouth Daily.     Dispense:  30 tablet     Refill:  11   · Monthly self breast assessment and annual breast imaging  · The patient has been made aware of the potential side effects of estrogen replacement therapy  · Calcium, 600 mg/ Vit. D, 400 IU daily; regular weight-bearing exercise  · Follow up: 12 month(s)  *Please note that portions of this documentation may have been completed with a voice recognition program.  Efforts were made to edit this dictation, but occasional words may have been mistranscribed.       This note was electronically signed.    YASEMIN Armenta MD  December 9, 2020  16:28 EST

## 2020-12-14 ENCOUNTER — HOSPITAL ENCOUNTER (OUTPATIENT)
Dept: MAMMOGRAPHY | Facility: HOSPITAL | Age: 60
Discharge: HOME | End: 2020-12-14
Admitting: OBSTETRICS & GYNECOLOGY

## 2020-12-14 DIAGNOSIS — Z12.31 ENCOUNTER FOR SCREENING MAMMOGRAM FOR MALIGNANT NEOPLASM OF BREAST: ICD-10-CM

## 2020-12-14 PROCEDURE — 77063 BREAST TOMOSYNTHESIS BI: CPT

## 2020-12-14 PROCEDURE — 77067 SCR MAMMO BI INCL CAD: CPT

## 2020-12-14 PROCEDURE — 77067 SCR MAMMO BI INCL CAD: CPT | Performed by: RADIOLOGY

## 2020-12-14 PROCEDURE — 77063 BREAST TOMOSYNTHESIS BI: CPT | Performed by: RADIOLOGY

## 2021-03-18 NOTE — PROGRESS NOTES
Subjective:     Encounter Date:03/19/2021    Patient ID: Rupa Finn is a 60 y.o.  white female, retired [employer] , from [place], [place].     SELF-REFERRED  FORMER PHYSICIAN: Gena Tse MD  CURRENT PHYSICIAN: Frantz Lin MD  PREVIOUS CARDIOLOGIST: Glenna Melendez MD, [facility]  RHEUMATOLOGIST:  Kim Douglas MD  GYNECOLOGIST: Fabio Armenta MD    Chief Complaint:   Chief Complaint   Patient presents with   • Hyperlipidemia     f/u       Problem List:  1. Palpitations:  a. Remote cardiac stress testing, performed by [facility], Dr. Hartley; data deficit, date deficit; negative for ischemia.   b. Echocardiogram,  03/10/2014:  Normal, EF 60%, Dr. Hartley.  c. Holter monitor, 05/23/2014: Sinus katelyn to sinus rhythm with rates of 40 to 94, occasional PVCs, Dr. Hartley.   d. Cardiac event monitor, 11/10/2016-12/10/2016: sinus rhythm, sinus tach, sinus arrhythmia with rare PACs and PVCs.   2. 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 CCS class I-II chest discomfort/NYHA class II TERRAZAS, with normal EKG (July 2018).  b. CT cardiac calcium score 5.6 August 2018  c. Stress echocardiogram, 8/13/2018: RVSP 30 mmHg, mild TR, no chest pain at baseline and during exercise with baseline ECG normal, acceptable negative echocardiographic exercise stress test suggestive of low probability for significant focal obstructive coronary artery disease with preserved LV function following exercise to 122% predicted exercise capacity and 90% predicted maximum heart rate  d. CCS class I chest discomfort/NYHA class II dyspnea on exertion symptoms  3. Hypertension.   4. Hyperlipidemia; ASCVD 10-year risk is 4.5%, 1.9% if treated, started on Livalo but she was intolerant July 2018, has had previous intolerances to statins as well, now on Repatha.   5. Lower extremity edema.    6. Glaucoma.  7. Autoimmune diseases:  a. Patient reports positive markers for scleroderma.   b. Rheumatoid arthritis, positive YONY.  c. Raynaud's   8. Hypothyroidism, treated.  9. Constipation.  10. Gestational diabetes.  11. GERD, not currently taking medication.  12. COVID positive, October 2020, with nausea, myalgias, sore throat, and nasal congestion with residual shortness of breath and fatigue March 2021.  13. Surgical history:    a. Hysterectomy.  b. Tonsillectomy.  c. Cholecystectomy.  d. Lap for endometriosis    Allergies   Allergen Reactions   • Aleve [Naproxen Sodium] Other (See Comments)     tongue swelling   • Ceclor [Cefaclor] Shortness Of Breath     hives   • Alphagan [Brimonidine] Other (See Comments)     Eyes itching, red, swollen   • Atenolol Other (See Comments)     fatigue   • Livalo [Pitavastatin] Myalgia   • Statins Myalgia   • E-Mycin [Erythromycin] Nausea And Vomiting       Current Outpatient Medications:   •  estradiol (ESTRACE) 1 MG tablet, Take 1 tablet by mouth Daily., Disp: 30 tablet, Rfl: 11  •  FIBER COMPLETE PO, Take  by mouth Daily., Disp: , Rfl:   •  levothyroxine (Synthroid) 75 MCG tablet, Take 1 tablet by mouth Daily., Disp: 90 tablet, Rfl: 1  •  methazolAMIDE (NEPTAZANE) 50 MG tablet, , Disp: , Rfl:   •  pantoprazole (PROTONIX) 40 MG EC tablet, Take 1 tablet by mouth Daily., Disp: , Rfl:   •  polyethylene glycol (MIRALAX) powder, , Disp: , Rfl:   •  Praluent 75 MG/ML solution auto-injector, INJECT THE CONTENTS OF ONE PEN SUBCUTANEOUSLY INTO THE APPROPRIATE AREA AS DIRECTED EVERY 14 DAYS, Disp: 2 pen, Rfl: 11  •  telmisartan (MICARDIS) 80 MG tablet, , Disp: , Rfl:   •  timolol (TIMOPTIC) 0.5 % ophthalmic solution, Administer 1 drop to both eyes 2 (Two) Times a Day., Disp: , Rfl:   •  travoprost, KATLYN free, (TRAVATAN) 0.004 % solution ophthalmic solution, 1 drop Every Evening. in affected eye(s), Disp: , Rfl:     History of Present Illness: Patient returns for scheduled 8-month  "follow up. In October 2020 she presented to [facility] [place] [facility] because her  had tested positive for COVID. She was experiencing nausea, diarrhea, fever, loss of taste and smell, sinus issues, sore throat, and nasal congestion. Her COVID swab was positive at that time.  The patient had severe fatigue and myalgias when she had Covid.  She feels that ever since she has had this, that any shortness of breath that she had before has worsened.  Because of this, she has not been doing as much activity and can walk half a block and becomes short of breath.  She has not noticed any excessive lower extremity edema.  The patient brought her blood pressure log in with her today for us to review and her blood pressures have been in the 120s to 130s over 60s-70s with heart rates in the 60s-70s.  Her palpitations are still intermittent and she has not had any presyncope or syncope.  She had questions regarding whether she had pulmonary hypertension because her rheumatologist had mentioned this to her when she had blood work last week.  Apparently the patient's laboratory studies were acceptable at that time-data deficit.  She has had some nasal congestion, sputum production, and feeling of fullness in her ears lately.  She is going to talk to her neurologist regarding getting Covid vaccinations because when she had her influenza vaccination she had severe neurologic symptoms and had to be hospitalized and diagnoses like MS and Romulus Barré were ruled out.      Review of Systems   All other systems reviewed and are negative.     Obtained and negative except as outlined in problem list and HPI.    Procedures       Objective:       Vitals:    03/19/21 1057 03/19/21 1102   BP: 163/92 131/84   BP Location: Left arm Left arm   Patient Position: Sitting Standing   Pulse: 68 69   Weight: 71.8 kg (158 lb 6.4 oz)    Height: 157.5 cm (62\")    Recheck blood pressure left arm sitting was 130/78  Body mass index is 28.97 kg/m².  Last weight: " 148 lbs    Vitals reviewed.   Constitutional:       Appearance: Well-developed.   HENT:      Right Ear: There is impacted cerumen.      Left Ear: There is impacted cerumen.   Neck:      Thyroid: No thyromegaly.      Vascular: No carotid bruit or JVD.      Lymphadenopathy: No cervical adenopathy.   Pulmonary:      Effort: Pulmonary effort is normal.      Breath sounds: Normal breath sounds. No wheezing. No rhonchi. No rales.   Cardiovascular:      Regular rhythm.      No gallop. No S3 gallop.   Pulses:     Dorsalis pedis: 2+ bilaterally.     Posterior tibial: 2+ bilaterally.  Edema:     Peripheral edema absent.   Abdominal:      Palpations: Abdomen is soft. There is no abdominal mass.      Tenderness: There is no abdominal tenderness. There is no guarding.   Musculoskeletal: Normal range of motion. Skin:     General: Skin is warm and dry.      Findings: No rash.   Neurological:      Mental Status: Alert and oriented to person, place, and time.           Lab Review:     Lab Results   Component Value Date    TSH 0.502 07/15/2020       Lab Results   Component Value Date    CHOL 217 (H) 07/15/2020    CHOL 352 (H) 06/13/2019    CHOL 310 (H) 03/29/2017     Lab Results   Component Value Date    TRIG 88 07/15/2020    TRIG 132 01/27/2020    TRIG 136 06/13/2019     Lab Results   Component Value Date    HDL 79 (H) 07/15/2020    HDL 77 01/27/2020    HDL 77 (H) 06/13/2019     Lab Results   Component Value Date     (H) 07/15/2020     (H) 01/27/2020     (H) 06/13/2019     · Mammogram, 12/14/2020: ACR BI-RADS CATEGORY: 1, NEGATIVE        Assessment:       Overall continued acceptable course with no new interim cardiopulmonary complaints with fair functional status on limited activity.  She has noticed shortness of breath on exertion and fatigue since having COVID-19 in October 2020.  We will order an echocardiogram to assess heart structure/function and also proBNP, D-dimer, and CBC to rule out heart failure,  clot formation, or anemia.  We will also order chest x-ray to rule out pneumonia.  We will defer additional diagnostic or therapeutic intervention from a cardiac perspective at this time.     Diagnosis Plan   1. Palpitations  No persistent palpitations   2. Essential hypertension  Controlled, continue current cardiac medications   3. Dyslipidemia  No new labs to review, continue Praluent   4. Acquired hypothyroidism  No new labs reviewed   5. Dyspnea on exertion Echocardiogram, proBNP, D-dimer, CBC, chest x-ray          Plan:         1. Patient to continue current medications and close follow up with the above providers.  2. The following studies are ordered:  A. proBNP  B. D-dimer  C. CBC  D. Echocardiogram  E. Chest x-ray  3. Tentative cardiology follow up in September 2021 or patient may return sooner PRN.    Scribed for Wing Olson MD by Aspen Plata, HOWARD. 3/19/2021  11:30 EDT    I, Wing Olson MD, [facility], personally performed the services described in this documentation as scribed by the above named individual in my presence, and it is both accurate and complete. At 12:18 EDT on 03/19/2021

## 2021-03-19 ENCOUNTER — HOSPITAL ENCOUNTER (OUTPATIENT)
Dept: GENERAL RADIOLOGY | Facility: HOSPITAL | Age: 61
Discharge: HOME | End: 2021-03-19

## 2021-03-19 ENCOUNTER — OFFICE VISIT (OUTPATIENT)
Dept: CARDIOLOGY | Facility: CLINIC | Age: 61
End: 2021-03-19

## 2021-03-19 ENCOUNTER — LAB (OUTPATIENT)
Dept: LAB | Facility: HOSPITAL | Age: 61
End: 2021-03-19

## 2021-03-19 VITALS
HEIGHT: 62 IN | BODY MASS INDEX: 29.15 KG/M2 | HEART RATE: 69 BPM | SYSTOLIC BLOOD PRESSURE: 131 MMHG | DIASTOLIC BLOOD PRESSURE: 84 MMHG | WEIGHT: 158.4 LBS

## 2021-03-19 DIAGNOSIS — R00.2 PALPITATIONS: Primary | ICD-10-CM

## 2021-03-19 DIAGNOSIS — R06.09 DOE (DYSPNEA ON EXERTION): ICD-10-CM

## 2021-03-19 DIAGNOSIS — I10 ESSENTIAL HYPERTENSION: ICD-10-CM

## 2021-03-19 DIAGNOSIS — E03.9 ACQUIRED HYPOTHYROIDISM: ICD-10-CM

## 2021-03-19 DIAGNOSIS — E78.5 DYSLIPIDEMIA: ICD-10-CM

## 2021-03-19 LAB
ANION GAP SERPL CALCULATED.3IONS-SCNC: 10.8 MMOL/L (ref 5–15)
BASOPHILS # BLD AUTO: 0.06 10*3/MM3 (ref 0–0.2)
BASOPHILS NFR BLD AUTO: 1.3 % (ref 0–1.5)
BUN SERPL-MCNC: 11 MG/DL (ref 8–23)
BUN/CREAT SERPL: 13.8 (ref 7–25)
CALCIUM SPEC-SCNC: 9.1 MG/DL (ref 8.6–10.5)
CHLORIDE SERPL-SCNC: 101 MMOL/L (ref 98–107)
CO2 SERPL-SCNC: 23.2 MMOL/L (ref 22–29)
CREAT SERPL-MCNC: 0.8 MG/DL (ref 0.57–1)
D DIMER PPP FEU-MCNC: 0.31 MCGFEU/ML (ref 0–0.56)
DEPRECATED RDW RBC AUTO: 41.6 FL (ref 37–54)
EOSINOPHIL # BLD AUTO: 0.1 10*3/MM3 (ref 0–0.4)
EOSINOPHIL NFR BLD AUTO: 2.2 % (ref 0.3–6.2)
ERYTHROCYTE [DISTWIDTH] IN BLOOD BY AUTOMATED COUNT: 12.3 % (ref 12.3–15.4)
GFR SERPL CREATININE-BSD FRML MDRD: 73 ML/MIN/1.73
GLUCOSE SERPL-MCNC: 85 MG/DL (ref 65–99)
HCT VFR BLD AUTO: 36.6 % (ref 34–46.6)
HGB BLD-MCNC: 12.3 G/DL (ref 12–15.9)
IMM GRANULOCYTES # BLD AUTO: 0.01 10*3/MM3 (ref 0–0.05)
IMM GRANULOCYTES NFR BLD AUTO: 0.2 % (ref 0–0.5)
LYMPHOCYTES # BLD AUTO: 0.96 10*3/MM3 (ref 0.7–3.1)
LYMPHOCYTES NFR BLD AUTO: 21.4 % (ref 19.6–45.3)
MCH RBC QN AUTO: 31.3 PG (ref 26.6–33)
MCHC RBC AUTO-ENTMCNC: 33.6 G/DL (ref 31.5–35.7)
MCV RBC AUTO: 93.1 FL (ref 79–97)
MONOCYTES # BLD AUTO: 0.59 10*3/MM3 (ref 0.1–0.9)
MONOCYTES NFR BLD AUTO: 13.1 % (ref 5–12)
NEUTROPHILS NFR BLD AUTO: 2.77 10*3/MM3 (ref 1.7–7)
NEUTROPHILS NFR BLD AUTO: 61.8 % (ref 42.7–76)
NRBC BLD AUTO-RTO: 0 /100 WBC (ref 0–0.2)
NT-PROBNP SERPL-MCNC: 91.4 PG/ML (ref 0–900)
PLATELET # BLD AUTO: 243 10*3/MM3 (ref 140–450)
PMV BLD AUTO: 11.9 FL (ref 6–12)
POTASSIUM SERPL-SCNC: 4.4 MMOL/L (ref 3.5–5.2)
RBC # BLD AUTO: 3.93 10*6/MM3 (ref 3.77–5.28)
SODIUM SERPL-SCNC: 135 MMOL/L (ref 136–145)
WBC # BLD AUTO: 4.49 10*3/MM3 (ref 3.4–10.8)

## 2021-03-19 PROCEDURE — 36415 COLL VENOUS BLD VENIPUNCTURE: CPT

## 2021-03-19 PROCEDURE — 99214 OFFICE O/P EST MOD 30 MIN: CPT | Performed by: INTERNAL MEDICINE

## 2021-03-19 PROCEDURE — 80048 BASIC METABOLIC PNL TOTAL CA: CPT

## 2021-03-19 PROCEDURE — 83880 ASSAY OF NATRIURETIC PEPTIDE: CPT

## 2021-03-19 PROCEDURE — 85025 COMPLETE CBC W/AUTO DIFF WBC: CPT

## 2021-03-19 PROCEDURE — 85379 FIBRIN DEGRADATION QUANT: CPT

## 2021-03-19 PROCEDURE — 71046 X-RAY EXAM CHEST 2 VIEWS: CPT

## 2021-03-23 ENCOUNTER — DOCUMENTATION (OUTPATIENT)
Dept: CARDIOLOGY | Facility: CLINIC | Age: 61
End: 2021-03-23

## 2021-03-23 NOTE — PROGRESS NOTES
I called and left a message on the patient's personal cell phone regarding her recent laboratory studies and chest x-ray results which were all acceptable with no signs of anemia, clot formation, heart failure, or pneumonia.      03/19/2021:  · CBC: WBC 4.49, RBC 3.93, hemoglobin 12.3, hematocrit 36.6, MCV 93.1, MCH 31.3, MCHC 33.6, RDW 12.3, MPV 11.9, platelets 243, neutrophils 61.8, lymphocytes 21.4, monocytes 13.1, eos 2.2, basos 1.3  · D-dimer 0.31  · proBNP 91.4  · BMP: Glucose 85, BUN 11, creatinine 0.8, sodium 135, potassium 4.4, chloride 101, carbon dioxide 23.2, calcium 9.1, GFR 73  · Chest x-ray 3/19/2021: No acute cardiopulmonary disease    Electronically signed by HOWARD Vasquez, 03/23/21, 12:00 PM EDT.

## 2021-04-21 ENCOUNTER — HOSPITAL ENCOUNTER (OUTPATIENT)
Dept: CARDIOLOGY | Facility: HOSPITAL | Age: 61
Discharge: HOME | End: 2021-04-21
Admitting: NURSE PRACTITIONER

## 2021-04-21 VITALS
HEIGHT: 62 IN | SYSTOLIC BLOOD PRESSURE: 152 MMHG | DIASTOLIC BLOOD PRESSURE: 85 MMHG | BODY MASS INDEX: 29.08 KG/M2 | WEIGHT: 158 LBS

## 2021-04-21 LAB
ASCENDING AORTA: 2.9 CM
BH CV ECHO MEAS - AI DEC SLOPE: 99.3 CM/SEC^2
BH CV ECHO MEAS - AI MAX PG: 45.3 MMHG
BH CV ECHO MEAS - AI MAX VEL: 334.5 CM/SEC
BH CV ECHO MEAS - AI P1/2T: 987.1 MSEC
BH CV ECHO MEAS - AO MAX PG (FULL): 4.6 MMHG
BH CV ECHO MEAS - AO MAX PG: 7.6 MMHG
BH CV ECHO MEAS - AO MEAN PG (FULL): 3 MMHG
BH CV ECHO MEAS - AO MEAN PG: 4 MMHG
BH CV ECHO MEAS - AO ROOT AREA (BSA CORRECTED): 1.8
BH CV ECHO MEAS - AO ROOT AREA: 7.5 CM^2
BH CV ECHO MEAS - AO ROOT DIAM: 3.1 CM
BH CV ECHO MEAS - AO V2 MAX: 138 CM/SEC
BH CV ECHO MEAS - AO V2 MEAN: 92.6 CM/SEC
BH CV ECHO MEAS - AO V2 VTI: 37.4 CM
BH CV ECHO MEAS - ASC AORTA: 2.9 CM
BH CV ECHO MEAS - AVA(I,A): 1.8 CM^2
BH CV ECHO MEAS - AVA(I,D): 1.8 CM^2
BH CV ECHO MEAS - AVA(V,A): 2 CM^2
BH CV ECHO MEAS - AVA(V,D): 2 CM^2
BH CV ECHO MEAS - BSA(HAYCOCK): 1.8 M^2
BH CV ECHO MEAS - BSA: 1.7 M^2
BH CV ECHO MEAS - BZI_BMI: 28.9 KILOGRAMS/M^2
BH CV ECHO MEAS - BZI_METRIC_HEIGHT: 157.5 CM
BH CV ECHO MEAS - BZI_METRIC_WEIGHT: 71.7 KG
BH CV ECHO MEAS - EDV(CUBED): 59.8 ML
BH CV ECHO MEAS - EDV(MOD-SP2): 91 ML
BH CV ECHO MEAS - EDV(MOD-SP4): 88 ML
BH CV ECHO MEAS - EDV(TEICH): 66.3 ML
BH CV ECHO MEAS - EF(CUBED): 65.2 %
BH CV ECHO MEAS - EF(MOD-BP): 56 %
BH CV ECHO MEAS - EF(MOD-SP2): 53.8 %
BH CV ECHO MEAS - EF(MOD-SP4): 55.7 %
BH CV ECHO MEAS - EF(TEICH): 57.4 %
BH CV ECHO MEAS - ESV(CUBED): 20.8 ML
BH CV ECHO MEAS - ESV(MOD-SP2): 42 ML
BH CV ECHO MEAS - ESV(MOD-SP4): 39 ML
BH CV ECHO MEAS - ESV(TEICH): 28.3 ML
BH CV ECHO MEAS - FS: 29.7 %
BH CV ECHO MEAS - IVS/LVPW: 0.9
BH CV ECHO MEAS - IVSD: 0.9 CM
BH CV ECHO MEAS - LA DIMENSION: 3.4 CM
BH CV ECHO MEAS - LA/AO: 1.1
BH CV ECHO MEAS - LAD MAJOR: 5.3 CM
BH CV ECHO MEAS - LAT PEAK E' VEL: 11 CM/SEC
BH CV ECHO MEAS - LATERAL E/E' RATIO: 7.5
BH CV ECHO MEAS - LV DIASTOLIC VOL/BSA (35-75): 50.9 ML/M^2
BH CV ECHO MEAS - LV IVRT: 0.09 SEC
BH CV ECHO MEAS - LV MASS(C)D: 118.5 GRAMS
BH CV ECHO MEAS - LV MASS(C)DI: 68.6 GRAMS/M^2
BH CV ECHO MEAS - LV MAX PG: 3 MMHG
BH CV ECHO MEAS - LV MEAN PG: 1 MMHG
BH CV ECHO MEAS - LV SYSTOLIC VOL/BSA (12-30): 22.6 ML/M^2
BH CV ECHO MEAS - LV V1 MAX: 87.1 CM/SEC
BH CV ECHO MEAS - LV V1 MEAN: 55.4 CM/SEC
BH CV ECHO MEAS - LV V1 VTI: 21.4 CM
BH CV ECHO MEAS - LVIDD: 3.9 CM
BH CV ECHO MEAS - LVIDS: 2.8 CM
BH CV ECHO MEAS - LVLD AP2: 8.1 CM
BH CV ECHO MEAS - LVLD AP4: 7.6 CM
BH CV ECHO MEAS - LVLS AP2: 6.6 CM
BH CV ECHO MEAS - LVLS AP4: 6.5 CM
BH CV ECHO MEAS - LVOT AREA (M): 3.1 CM^2
BH CV ECHO MEAS - LVOT AREA: 3.1 CM^2
BH CV ECHO MEAS - LVOT DIAM: 2 CM
BH CV ECHO MEAS - LVPWD: 1 CM
BH CV ECHO MEAS - MED PEAK E' VEL: 7.6 CM/SEC
BH CV ECHO MEAS - MEDIAL E/E' RATIO: 10.8
BH CV ECHO MEAS - MV A MAX VEL: 31.2 CM/SEC
BH CV ECHO MEAS - MV DEC SLOPE: 349 CM/SEC^2
BH CV ECHO MEAS - MV DEC TIME: 0.18 SEC
BH CV ECHO MEAS - MV E MAX VEL: 82.3 CM/SEC
BH CV ECHO MEAS - MV E/A: 2.6
BH CV ECHO MEAS - MV MAX PG: 4.2 MMHG
BH CV ECHO MEAS - MV MEAN PG: 1 MMHG
BH CV ECHO MEAS - MV P1/2T MAX VEL: 98.3 CM/SEC
BH CV ECHO MEAS - MV P1/2T: 82.5 MSEC
BH CV ECHO MEAS - MV V2 MAX: 102 CM/SEC
BH CV ECHO MEAS - MV V2 MEAN: 46.7 CM/SEC
BH CV ECHO MEAS - MV V2 VTI: 33.5 CM
BH CV ECHO MEAS - MVA P1/2T LCG: 2.2 CM^2
BH CV ECHO MEAS - MVA(P1/2T): 2.7 CM^2
BH CV ECHO MEAS - MVA(VTI): 2 CM^2
BH CV ECHO MEAS - PA ACC SLOPE: 249 CM/SEC^2
BH CV ECHO MEAS - PA ACC TIME: 0.22 SEC
BH CV ECHO MEAS - PA MAX PG: 2.3 MMHG
BH CV ECHO MEAS - PA PR(ACCEL): -17.8 MMHG
BH CV ECHO MEAS - PA V2 MAX: 76 CM/SEC
BH CV ECHO MEAS - PI END-D VEL: 92.3 CM/SEC
BH CV ECHO MEAS - RAP SYSTOLE: 8 MMHG
BH CV ECHO MEAS - RVSP: 36 MMHG
BH CV ECHO MEAS - SI(AO): 163.2 ML/M^2
BH CV ECHO MEAS - SI(CUBED): 22.5 ML/M^2
BH CV ECHO MEAS - SI(LVOT): 38.9 ML/M^2
BH CV ECHO MEAS - SI(MOD-SP2): 28.3 ML/M^2
BH CV ECHO MEAS - SI(MOD-SP4): 28.3 ML/M^2
BH CV ECHO MEAS - SI(TEICH): 22 ML/M^2
BH CV ECHO MEAS - SV(AO): 282.3 ML
BH CV ECHO MEAS - SV(CUBED): 39 ML
BH CV ECHO MEAS - SV(LVOT): 67.2 ML
BH CV ECHO MEAS - SV(MOD-SP2): 49 ML
BH CV ECHO MEAS - SV(MOD-SP4): 49 ML
BH CV ECHO MEAS - SV(TEICH): 38 ML
BH CV ECHO MEAS - TAPSE (>1.6): 2.3 CM
BH CV ECHO MEAS - TR MAX PG: 28 MMHG
BH CV ECHO MEAS - TR MAX VEL: 264 CM/SEC
BH CV ECHO MEASUREMENTS AVERAGE E/E' RATIO: 8.85
BH CV VAS BP LEFT ARM: NORMAL MMHG
BH CV XLRA - RV BASE: 3.8 CM
BH CV XLRA - RV LENGTH: 5.7 CM
BH CV XLRA - RV MID: 2.3 CM
BH CV XLRA - TDI S': 9.02 CM/SEC
LEFT ATRIUM VOLUME INDEX: 29.5 ML/M^2
LEFT ATRIUM VOLUME: 51 ML
LV EF 2D ECHO EST: 60 %
MAXIMAL PREDICTED HEART RATE: 160 BPM
STRESS TARGET HR: 136 BPM

## 2021-04-21 PROCEDURE — 93306 TTE W/DOPPLER COMPLETE: CPT | Performed by: INTERNAL MEDICINE

## 2021-04-21 PROCEDURE — 93306 TTE W/DOPPLER COMPLETE: CPT

## 2021-09-03 ENCOUNTER — OFFICE VISIT (OUTPATIENT)
Dept: CARDIOLOGY | Facility: CLINIC | Age: 61
End: 2021-09-03

## 2021-09-03 VITALS
SYSTOLIC BLOOD PRESSURE: 134 MMHG | WEIGHT: 159 LBS | BODY MASS INDEX: 29.26 KG/M2 | HEART RATE: 69 BPM | OXYGEN SATURATION: 94 % | HEIGHT: 62 IN | DIASTOLIC BLOOD PRESSURE: 80 MMHG

## 2021-09-03 DIAGNOSIS — E78.5 DYSLIPIDEMIA: ICD-10-CM

## 2021-09-03 DIAGNOSIS — E03.9 ACQUIRED HYPOTHYROIDISM: ICD-10-CM

## 2021-09-03 DIAGNOSIS — R06.09 DOE (DYSPNEA ON EXERTION): ICD-10-CM

## 2021-09-03 DIAGNOSIS — I10 ESSENTIAL HYPERTENSION: ICD-10-CM

## 2021-09-03 DIAGNOSIS — R00.2 PALPITATIONS: Primary | ICD-10-CM

## 2021-09-03 PROCEDURE — 99214 OFFICE O/P EST MOD 30 MIN: CPT | Performed by: INTERNAL MEDICINE

## 2021-09-03 PROCEDURE — 93000 ELECTROCARDIOGRAM COMPLETE: CPT | Performed by: INTERNAL MEDICINE

## 2021-09-03 RX ORDER — ACETAZOLAMIDE 250 MG/1
125 TABLET ORAL 2 TIMES DAILY
COMMUNITY
Start: 2021-07-27

## 2021-09-03 RX ORDER — CHOLECALCIFEROL (VITAMIN D3) 125 MCG
CAPSULE ORAL DAILY
COMMUNITY

## 2021-09-03 NOTE — PROGRESS NOTES
Subjective:     Encounter Date:09/03/2021    Patient ID: Rupa Finn is a 61 y.o.  white female, retired [employer] , from [place], [place].     SELF-REFERRED  FORMER PHYSICIAN: Gena Tse MD  CURRENT PHYSICIAN: Frantz Lin MD  PREVIOUS CARDIOLOGIST: Glenna Melendez MD, [facility]  RHEUMATOLOGIST:  Kim Douglas MD  GYNECOLOGIST: Fabio Armenta MD  NEUROLOGIST: Naga Jarquin MD    Chief Complaint:   Chief Complaint   Patient presents with   • Palpitations       Problem List:  1. Palpitations:  a. Remote cardiac stress testing, performed by [facility], Dr. Hartley; data deficit, date deficit; negative for ischemia.   b. Echocardiogram,  03/10/2014:  Normal, EF 60%, Dr. Hartley.  c. Holter monitor, 05/23/2014: Sinus katelyn to sinus rhythm with rates of 40 to 94, occasional PVCs, Dr. Hartley.   d. Cardiac event monitor, 11/10/2016-12/10/2016: sinus rhythm, sinus tach, sinus arrhythmia with rare PACs and PVCs.   2. 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 CCS class I-II chest discomfort/NYHA class II TERRAZAS, with normal EKG (July 2018).  b. CT cardiac calcium score 5.6 August 2018  c. Stress echocardiogram, 8/13/2018: RVSP 30 mmHg, mild TR, no chest pain at baseline and during exercise with baseline ECG normal, acceptable negative echocardiographic exercise stress test suggestive of low probability for significant focal obstructive coronary artery disease with preserved LV function following exercise to 122% predicted exercise capacity and 90% predicted maximum heart rate  d. CCS class I chest discomfort/NYHA class II dyspnea on exertion symptoms  e. Echocardiogram, April 2021: LVEF 0.60. Borderline pulmonary arterial hypertension. Mild aortic valve sclerosis. RVSP 36 mmHg.  f. Intermittent atypical nonexertional chest pain syndrome with normal electrocardiogram, September  2021.  3. Hypertension.   4. Hyperlipidemia; ASCVD 10-year risk is 4.5%, 1.9% if treated, started on Livalo but she was intolerant July 2018, has had previous intolerances to statins as well, now on Repatha.   5. Lower extremity edema.   6. Glaucoma.  7. Autoimmune diseases:  a. Patient reports positive markers for scleroderma.   b. Rheumatoid arthritis, positive YONY.  c. Raynaud's   8. Hypothyroidism, treated.  9. Constipation.  10. Gestational diabetes.  11. GERD, not currently taking medication.  12. COVID positive, October 2020, with nausea, myalgias, sore throat, and nasal congestion with residual shortness of breath and fatigue March 2021.  13. Surgical history:    a. Hysterectomy.  b. Tonsillectomy.  c. Cholecystectomy.  d. Lap for endometriosis    Allergies   Allergen Reactions   • Aleve [Naproxen Sodium] Other (See Comments)     tongue swelling   • Ceclor [Cefaclor] Shortness Of Breath     hives   • Alphagan [Brimonidine] Other (See Comments)     Eyes itching, red, swollen   • Atenolol Other (See Comments)     fatigue   • Livalo [Pitavastatin] Myalgia   • Statins Myalgia   • E-Mycin [Erythromycin] Nausea And Vomiting       Current Outpatient Medications:   •  acetaZOLAMIDE (DIAMOX) 250 MG tablet, Take 125 mg by mouth 2 (two) times a day., Disp: , Rfl:   •  Cholecalciferol (Vitamin D3) 50 MCG (2000 UT) tablet, Take  by mouth Daily., Disp: , Rfl:   •  estradiol (ESTRACE) 1 MG tablet, Take 1 tablet by mouth Daily., Disp: 30 tablet, Rfl: 11  •  FIBER COMPLETE PO, Take  by mouth Daily., Disp: , Rfl:   •  levothyroxine (Synthroid) 75 MCG tablet, Take 1 tablet by mouth Daily., Disp: 90 tablet, Rfl: 1  •  pantoprazole (PROTONIX) 40 MG EC tablet, Take 1 tablet by mouth Daily., Disp: , Rfl:   •  polyethylene glycol (MIRALAX) powder, , Disp: , Rfl:   •  Praluent 75 MG/ML solution auto-injector, INJECT THE CONTENTS OF ONE PEN SUBCUTANEOUSLY INTO THE APPROPRIATE AREA AS DIRECTED EVERY 14 DAYS, Disp: 2 pen, Rfl: 11  •   telmisartan (MICARDIS) 80 MG tablet, Take 80 mg by mouth Daily., Disp: , Rfl:   •  timolol (TIMOPTIC) 0.5 % ophthalmic solution, Administer 1 drop to both eyes 2 (Two) Times a Day., Disp: , Rfl:   •  travoprost, KATLYN free, (TRAVATAN) 0.004 % solution ophthalmic solution, 1 drop Every Evening. in affected eye(s), Disp: , Rfl:   •  methazolAMIDE (NEPTAZANE) 50 MG tablet, , Disp: , Rfl:     History of Present Illness: Patient returns for scheduled 6-month follow up. She has continued to experience lower extremity weakness but has been walking up to 2 miles daily. She has been seen by Dr. Jarquin, neurology, and was referred to [place] to see a neuromuscular specialist. She has otherwise felt well overall from a cardiovascular standpoint. She reports 4 days ago she developed left chest pain which radiated to her left upper back. She describes this as a tightness and episodes last from 1-2 minutes. These episodes are not always initiated while she is active. Patient denies shortness of breath, palpitations, edema, dizziness, and syncope. She reports she has been choking on food more frequently and she has been taking small bites and chewing them slowly. She states lettuce and melted cheese bother her most. She has had no additional interim ER visits, hospitalizations, serious illnesses, or surgeries. She has not received COVID immunization due to concern with a previous severe reaction to influenza immunization. She has been instructed not to take COVID immunization by majority of her physicians.      ROS   Obtained and negative except as outlined in problem list and HPI.      ECG 12 Lead    Date/Time: 9/3/2021 3:09 PM  Performed by: Wing Olson MD  Authorized by: Wing Olson MD   Comparison: compared with previous ECG from 8/23/2019  Similar to previous ECG  Rhythm: sinus bradycardia  Other findings: non-specific ST-T wave changes  Clinical impression comment: Borderline ECG  Comments: QRS; 82 ms  QTc: 389  "ms  MS: 146 ms               Objective:       Vitals:    09/03/21 1440 09/03/21 1442 09/03/21 1454   BP: 154/79 (!) 149/105 134/80   BP Location: Left arm Left arm Left arm   Patient Position: Sitting Standing Sitting   Cuff Size: Adult Adult    Pulse: 64 69    SpO2: 94%     Weight: 72.1 kg (159 lb)     Height: 157.5 cm (62\")       Body mass index is 29.08 kg/m².  Last weight: 158 lbs    Vitals reviewed.   Constitutional:       Appearance: Well-developed.   Neck:      Thyroid: No thyromegaly.      Vascular: No carotid bruit or JVD.      Lymphadenopathy: No cervical adenopathy.   Pulmonary:      Effort: Pulmonary effort is normal.      Breath sounds: Normal breath sounds. No wheezing. No rhonchi. No rales.   Cardiovascular:      Regular rhythm.      No gallop. No S3 gallop.   Pulses:     Dorsalis pedis: 2+ bilaterally.     Posterior tibial: 2+ bilaterally.  Edema:     Peripheral edema absent.   Abdominal:      Palpations: Abdomen is soft. There is no abdominal mass.      Tenderness: There is no abdominal tenderness.   Musculoskeletal: Normal range of motion. Skin:     General: Skin is warm and dry.      Findings: No rash.   Neurological:      Mental Status: Alert and oriented to person, place, and time.           Lab Review:   Lab Results   Component Value Date    GLUCOSE 85 03/19/2021    BUN 11 03/19/2021    CREATININE 0.80 03/19/2021    EGFRIFNONA 73 03/19/2021    BCR 13.8 03/19/2021     (L) 03/19/2021    K 4.4 03/19/2021     03/19/2021    CO2 23.2 03/19/2021    CALCIUM 9.1 03/19/2021    ALBUMIN 4.20 07/15/2020    AST 26 07/15/2020    ALT 10 07/15/2020       Lab Results   Component Value Date    WBC 4.49 03/19/2021    HGB 12.3 03/19/2021    HCT 36.6 03/19/2021    MCV 93.1 03/19/2021     03/19/2021     Echocardiogram, 4/21/2021 (reviewed with patient by letter - continue current treatment and exercise):  · Estimated right ventricular systolic pressure from tricuspid regurgitation is mildly " elevated (35-45 mmHg). Calculated right ventricular systolic pressure from tricuspid regurgitation is 36 mmHg.  · Estimated left ventricular EF = 60% Estimated left ventricular EF was in agreement with the calculated left ventricular EF. Left ventricular ejection fraction appears to be 56 - 60%. Left ventricular systolic function is normal.  · Left ventricular diastolic function was normal.  · Borderline pulmonary arterial hypertension is present.  · Normal right ventricular cavity size, wall thickness, systolic function and septal motion noted.  · The aortic valve exhibits mild sclerosis.  · There is no evidence of pericardial effusion.        Assessment:       Overall continued acceptable course with no new interim cardiopulmonary complaints with acceptable functional status. We will defer additional diagnostic or therapeutic intervention from a cardiac perspective at this time. Her chest pain does not suggest angina pectoris at this time and would defer MPS/LHC at this time unless progressive symptoms develop. She is to update us if recurrent progressive symptoms develop. Hopefully we will be able to obtain her recent laboratory studies for review.     Diagnosis Plan   1. Palpitations  Stable and largely asymptomatic. Sinus bradycardia Continue current treatment.   2. Essential hypertension  Well controlled. Continue current treatment.   3. Dyslipidemia  No data to review. Continue Praluent.   4. Acquired hypothyroidism  Stable.   5. TERRAZAS (dyspnea on exertion)  Stable.           Plan:         1. Patient to continue current medications and close follow up with the above providers.  2. Tentative cardiology follow up in March 2022 or patient may return sooner PRN.    IuSshil, attest that this documentation has been prepared under the direction and in the presence of Wing Olson MD 09/03/2021    IWing MD, [facility], personally performed the services described in this documentation as scribed by  the above named individual in my presence, and it is both accurate and complete. At 15:16 EDT on 09/03/2021

## 2021-10-05 ENCOUNTER — HOSPITAL ENCOUNTER (OUTPATIENT)
Dept: GENERAL RADIOLOGY | Facility: HOSPITAL | Age: 61
Discharge: HOME | End: 2021-10-05
Admitting: FAMILY MEDICINE

## 2021-10-05 ENCOUNTER — TRANSCRIBE ORDERS (OUTPATIENT)
Dept: ADMINISTRATIVE | Facility: HOSPITAL | Age: 61
End: 2021-10-05

## 2021-10-05 DIAGNOSIS — M25.562 LEFT KNEE PAIN, UNSPECIFIED CHRONICITY: Primary | ICD-10-CM

## 2021-10-05 PROCEDURE — 73564 X-RAY EXAM KNEE 4 OR MORE: CPT

## 2021-11-22 RX ORDER — ALIROCUMAB 75 MG/ML
75 INJECTION, SOLUTION SUBCUTANEOUS
Qty: 6 PEN | Refills: 3 | Status: SHIPPED | OUTPATIENT
Start: 2021-11-22 | End: 2022-11-16 | Stop reason: SDUPTHER

## 2021-12-17 ENCOUNTER — TRANSCRIBE ORDERS (OUTPATIENT)
Dept: ADMINISTRATIVE | Facility: HOSPITAL | Age: 61
End: 2021-12-17

## 2021-12-17 ENCOUNTER — TELEPHONE (OUTPATIENT)
Dept: OBSTETRICS AND GYNECOLOGY | Facility: CLINIC | Age: 61
End: 2021-12-17

## 2021-12-17 DIAGNOSIS — Z12.31 VISIT FOR SCREENING MAMMOGRAM: Primary | ICD-10-CM

## 2021-12-17 RX ORDER — ESTRADIOL 1 MG/1
1 TABLET ORAL DAILY
Qty: 30 TABLET | Refills: 1 | Status: SHIPPED | OUTPATIENT
Start: 2021-12-17 | End: 2022-03-03 | Stop reason: SDUPTHER

## 2021-12-17 NOTE — TELEPHONE ENCOUNTER
Rx Refill Note  Estradiol 1 mg.  Appointment with HOWARD Rich, 1/20/2022.  Requested Prescriptions      No prescriptions requested or ordered in this encounter      Last office visit with prescribing clinician: Visit date not found      Next office visit with prescribing clinician: 1/20/2022            Harleen Fiore MA  12/17/21, 10:43 EST

## 2021-12-20 ENCOUNTER — APPOINTMENT (OUTPATIENT)
Dept: MAMMOGRAPHY | Facility: HOSPITAL | Age: 61
End: 2021-12-20

## 2022-01-21 ENCOUNTER — TRANSCRIBE ORDERS (OUTPATIENT)
Dept: ADMINISTRATIVE | Facility: HOSPITAL | Age: 62
End: 2022-01-21

## 2022-01-21 DIAGNOSIS — G72.9 MYOPATHY: ICD-10-CM

## 2022-01-21 DIAGNOSIS — R13.10 DYSPHAGIA, UNSPECIFIED TYPE: Primary | ICD-10-CM

## 2022-01-21 DIAGNOSIS — R06.02 SHORTNESS OF BREATH: ICD-10-CM

## 2022-02-13 ENCOUNTER — APPOINTMENT (OUTPATIENT)
Dept: PREADMISSION TESTING | Facility: HOSPITAL | Age: 62
End: 2022-02-13

## 2022-02-13 LAB — SARS-COV-2 RNA PNL SPEC NAA+PROBE: NOT DETECTED

## 2022-02-13 PROCEDURE — U0004 COV-19 TEST NON-CDC HGH THRU: HCPCS | Performed by: INTERNAL MEDICINE

## 2022-02-15 ENCOUNTER — HOSPITAL ENCOUNTER (OUTPATIENT)
Dept: GENERAL RADIOLOGY | Facility: HOSPITAL | Age: 62
Discharge: HOME | End: 2022-02-15

## 2022-02-15 ENCOUNTER — HOSPITAL ENCOUNTER (OUTPATIENT)
Dept: PULMONOLOGY | Facility: HOSPITAL | Age: 62
Discharge: HOME | End: 2022-02-15

## 2022-02-15 DIAGNOSIS — R13.10 DYSPHAGIA, UNSPECIFIED TYPE: ICD-10-CM

## 2022-02-15 DIAGNOSIS — R06.02 SHORTNESS OF BREATH: ICD-10-CM

## 2022-02-15 DIAGNOSIS — G72.9 MYOPATHY: ICD-10-CM

## 2022-02-15 PROCEDURE — 74230 X-RAY XM SWLNG FUNCJ C+: CPT

## 2022-02-15 PROCEDURE — 94727 GAS DIL/WSHOT DETER LNG VOL: CPT

## 2022-02-15 PROCEDURE — 94729 DIFFUSING CAPACITY: CPT

## 2022-02-15 PROCEDURE — 94010 BREATHING CAPACITY TEST: CPT | Performed by: INTERNAL MEDICINE

## 2022-02-15 PROCEDURE — 92611 MOTION FLUOROSCOPY/SWALLOW: CPT

## 2022-02-15 PROCEDURE — 94010 BREATHING CAPACITY TEST: CPT

## 2022-02-15 PROCEDURE — 94729 DIFFUSING CAPACITY: CPT | Performed by: INTERNAL MEDICINE

## 2022-02-15 PROCEDURE — 94727 GAS DIL/WSHOT DETER LNG VOL: CPT | Performed by: INTERNAL MEDICINE

## 2022-02-15 RX ADMIN — BARIUM SULFATE 50 ML: 0.81 POWDER, FOR SUSPENSION ORAL at 10:57

## 2022-02-15 RX ADMIN — BARIUM SULFATE 20 ML: 400 PASTE ORAL at 10:57

## 2022-02-15 NOTE — MBS/VFSS/FEES
Outpatient Speech Language Pathology   Adult Swallow Initial Evaluation   [place]   Modified Barium Swallow Study (MBS)     Patient Name: Rupa Finn  : 1960  MRN: [id number]  Today's Date: 2/15/2022         Visit Date: 02/15/2022   Patient Active Problem List   Diagnosis   • Palpitations   • Hypertension   • Lower extremity edema   • Glaucoma   • Autoimmune disease (HCC)   • Hypothyroidism   • GERD (gastroesophageal reflux disease)   • Post-menopause on HRT (hormone replacement therapy)   • OAB (overactive bladder)   • History of gestational diabetes   • Fibrocystic breast changes, bilateral   • Chest pain of uncertain etiology   • TERRAZAS (dyspnea on exertion)   • Chronic fatigue   • Raynaud's disease   • Hyperlipidemia   • Gestational diabetes   • Dyslipidemia        Past Medical History:   Diagnosis Date   • Autoimmune disease (CMS/HCC)    • COVID-19    • Fibrocystic breast changes, bilateral    • GERD (gastroesophageal reflux disease)     not currently taking medication   • Gestational diabetes    • Glaucoma    • Hyperlipidemia    • Hypertension    • Hypothyroidism    • Lower extremity edema    • OAB (overactive bladder)    • Palpitations    • Post-menopause on HRT (hormone replacement therapy)    • Raynaud's disease         Past Surgical History:   Procedure Laterality Date   • CHOLECYSTECTOMY     • ESOPHAGOSCOPY / EGD      esophageal stretching/dilation and gastric polypectomy, esophageal biopsies   • LAPAROSCOPIC ASSISTED VAGINAL HYSTERECTOMY SALPINGO OOPHORECTOMY Bilateral    • LAPAROSCOPIC DIAZ PROCEDURE     • OOPHORECTOMY     • OTHER SURGICAL HISTORY      Lap for endometriosis   • TONSILLECTOMY           Visit Dx:     ICD-10-CM ICD-9-CM   1. Dysphagia, unspecified type  R13.10 787.20   2. Myopathy  G72.9 359.9            OP SLP Assessment/Plan - 02/15/22 1121        SLP Assessment    Functional Problems Swallowing  -MP    Clinical Impression: Swallowing WNL  -MP          User Key  (r) =  Recorded By, (t) = Taken By, (c) = Cosigned By    Initials Name Provider Type    MP Kiran Obrien MS CCC-SLP Speech and Language Pathologist                 SLP Adult Swallow Evaluation     Row Name 02/15/22 1010       Rehab Evaluation    Document Type evaluation  -MP    Subjective Information no complaints  -MP    Patient Observations alert; cooperative  -MP    Patient/Family/Caregiver Comments/Observations No family present  -MP    Patient Effort good  -MP       General Information    Patient Profile Reviewed yes  -MP    Pertinent History Of Current Problem 62 y/o F who presents w/ reports of intermittent difficulty swallowing. Pt reports when takes pills, feels like the water won't go down, but does not happen all of the time. Also reports occasionally feeling things sticking in throat. Hx HTN, HLD, RA, Raynaud's, GERD, COVID in Oct 2020, hypothyroid, neurological reaction to flu shot.  -MP    Current Method of Nutrition regular textures; thin liquids  -MP    Precautions/Limitations, Vision WFL  -MP    Precautions/Limitations, Hearing WFL  -MP    Prior Level of Function-Communication WFL  -MP    Prior Level of Function-Swallowing no diet consistency restrictions  -MP    Plans/Goals Discussed with patient; agreed upon  -MP    Barriers to Rehab none identified  -MP    Patient's Goals for Discharge patient did not state  -MP       Pain    Additional Documentation Pain Scale: FACES Pre/Post-Treatment (Group)  -MP       Pain Scale: FACES Pre/Post-Treatment    Pain: FACES Scale, Pretreatment 0-->no hurt  -MP    Posttreatment Pain Rating 0-->no hurt  -MP       Oral Motor Structure and Function    Dentition Assessment natural, present and adequate  -MP    Secretion Management WNL/WFL  -MP    Mucosal Quality moist, healthy  -MP       Oral Musculature and Cranial Nerve Assessment    Oral Motor General Assessment WFL  -MP       General Eating/Swallowing Observations    Respiratory Support Currently in Use room air   -MP    Eating/Swallowing Skills self-fed; appropriate self-feeding skills observed  -MP    Positioning During Eating upright in chair  -MP       MBS/VFSS    Utensils Used spoon; cup; straw  -MP    Consistencies Trialed thin liquids; pudding thick; regular textures  -MP       MBS/VFSS Interpretation    VFSS Summary Functional oropharyngeal swallow. Oral phase WNL. No penetration/aspiration or significant pharyngeal residue w/ any consistency tested. From SLP/pharyngeal standpoint, pt safe to continue regular diet, thin liquids. Standard aspiration precautions. Given complaints, pt may benefit from GI w/u.  -MP       SLP Evaluation Clinical Impression    SLP Swallowing Diagnosis functional oral phase; functional pharyngeal phase  -MP    Swallow Criteria for Skilled Therapeutic Interventions Met no problems identified which require skilled intervention  -MP       Recommendations    Therapy Frequency (Swallow) evaluation only  -MP    SLP Diet Recommendation regular textures; thin liquids  -MP    Recommended Precautions and Strategies upright posture during/after eating; general aspiration precautions  -MP    Oral Care Recommendations Oral Care BID/PRN  -MP    SLP Rec. for Method of Medication Administration meds whole; with thin liquids; as tolerated  -MP    Anticipated Discharge Disposition (SLP) home  -MP    Demonstrates Need for Referral to Another Service gastroenterology; dedicated esophageal assessment  -MP          User Key  (r) = Recorded By, (t) = Taken By, (c) = Cosigned By    Initials Name Provider Type    MP Kiran Obrien MS CCC-SLP Speech and Language Pathologist                               OP SLP Education     Row Name 02/15/22 1121       Education    Barriers to Learning No barriers identified  -MP    Education Provided Described results of evaluation; Patient expressed understanding of evaluation  -MP    Learning Method Explanation  -MP    Teaching Response Verbalized understanding  -MP           User Key  (r) = Recorded By, (t) = Taken By, (c) = Cosigned By    Initials Name Effective Dates    MP Kiran Obrien MS CCC-SLP 12/28/21 -                          Time Calculation:   SLP Start Time: 1010  Untimed Charges  68977-SR Motion Fluoro Eval Swallow Minutes: 60  Total Minutes  Untimed Charges Total Minutes: 60   Total Minutes: 60    Therapy Charges for Today     Code Description Service Date Service Provider Modifiers Qty    35311008647 HC ST MOTION FLUORO EVAL SWALLOW 4 2/15/2022 Kiran Obrien MS CCC-SLP GN 1             Patient was not wearing a face mask and did not exhibit coughing during this therapy encounter.  Procedure performed was aerosolizing, involved close contact (within 6 feet for at least 15 minutes or longer), and did not involve contact with infectious secretions or specimens.  Therapist used appropriate personal protective equipment including gloves, standard procedure mask and eye protection.  Appropriate PPE was worn during the entire therapy session.  Hand hygiene was completed before and after therapy session.       Kiran Martinez MS CCC-SLP  2/15/2022

## 2022-02-24 ENCOUNTER — HOSPITAL ENCOUNTER (OUTPATIENT)
Dept: MAMMOGRAPHY | Facility: HOSPITAL | Age: 62
Discharge: HOME | End: 2022-02-24
Admitting: FAMILY MEDICINE

## 2022-02-24 DIAGNOSIS — Z12.31 VISIT FOR SCREENING MAMMOGRAM: ICD-10-CM

## 2022-02-24 PROCEDURE — 77063 BREAST TOMOSYNTHESIS BI: CPT

## 2022-02-24 PROCEDURE — 77067 SCR MAMMO BI INCL CAD: CPT

## 2022-02-24 PROCEDURE — 77067 SCR MAMMO BI INCL CAD: CPT | Performed by: RADIOLOGY

## 2022-02-24 PROCEDURE — 77063 BREAST TOMOSYNTHESIS BI: CPT | Performed by: RADIOLOGY

## 2022-03-03 ENCOUNTER — OFFICE VISIT (OUTPATIENT)
Dept: OBSTETRICS AND GYNECOLOGY | Facility: CLINIC | Age: 62
End: 2022-03-03

## 2022-03-03 VITALS
SYSTOLIC BLOOD PRESSURE: 140 MMHG | DIASTOLIC BLOOD PRESSURE: 82 MMHG | WEIGHT: 152.8 LBS | BODY MASS INDEX: 28.12 KG/M2 | HEIGHT: 62 IN

## 2022-03-03 DIAGNOSIS — Z01.411 ENCOUNTER FOR GYNECOLOGICAL EXAMINATION WITH ABNORMAL FINDING: Primary | ICD-10-CM

## 2022-03-03 DIAGNOSIS — Z79.890 HORMONE REPLACEMENT THERAPY: ICD-10-CM

## 2022-03-03 DIAGNOSIS — D89.89 AUTOIMMUNE DISORDER: ICD-10-CM

## 2022-03-03 PROCEDURE — 99396 PREV VISIT EST AGE 40-64: CPT | Performed by: NURSE PRACTITIONER

## 2022-03-03 RX ORDER — ESTRADIOL 1 MG/1
1 TABLET ORAL DAILY
Qty: 30 TABLET | Refills: 1 | Status: SHIPPED | OUTPATIENT
Start: 2022-03-03 | End: 2023-03-06 | Stop reason: DRUGHIGH

## 2022-03-03 RX ORDER — ZINC GLUCONATE 50 MG
50 TABLET ORAL DAILY
COMMUNITY

## 2022-03-03 NOTE — PROGRESS NOTES
"Chief Complaint  Rupa Finn is a 61 y.o.  female presenting for Annual Exam (recently diagnosed with Sjogren's disease and SINE scleroderma.) and Med Refill (estradiol 1 mg (#90 with refills) [facility] pharmacy.)    History of Present Illness  Pleasant 60yo woman, here for annual gyn exam.  See CC re: new dx of Sjogren's, and she c/o dryness \"everywhere\".  Doing well with the current dose of ERT, and she would like to keep the dosage the same until after Spring vacation.  We had discussion re: risks of ERT (not limited to, but including blood clots/ DVTs, heart attacks, pulmonary emboli, strokes, sudden death, and maybe an increased risk for breast cancer).  She verbalized understanding of the risks.  Will call after vacation, and will then cut dose to 0.5 (half of what she is taking now).  If she does well, will try plan to switch to transdermal by next year.  Preventive health procedures are up to date.  (I can't find colonoscopy report, but states last colonoscopy was 2020.  And that 5 year follow up was recommended.)      The following portions of the patient's history were reviewed and updated as appropriate: allergies, current medications, past family history, past medical history, past social history, past surgical history and problem list.    Allergies   Allergen Reactions   • Aleve [Naproxen Sodium] Other (See Comments)     tongue swelling   • Ceclor [Cefaclor] Shortness Of Breath     hives   • Alphagan [Brimonidine] Other (See Comments)     Eyes itching, red, swollen   • Atenolol Other (See Comments)     fatigue   • Livalo [Pitavastatin] Myalgia   • Statins Myalgia   • Ibuprofen Swelling   • Adhesive Tape Rash     \"burns, eats through my skin\"   • E-Mycin [Erythromycin] Nausea And Vomiting         Current Outpatient Medications:   •  acetaZOLAMIDE (DIAMOX) 250 MG tablet, Take 125 mg by mouth 2 (two) times a day., Disp: , Rfl:   •  Alirocumab (Praluent) 75 MG/ML solution auto-injector, " Inject 75 mg under the skin into the appropriate area as directed Every 14 (Fourteen) Days., Disp: 6 pen, Rfl: 3  •  Cholecalciferol (Vitamin D3) 50 MCG (2000 UT) tablet, Take  by mouth Daily., Disp: , Rfl:   •  estradiol (Estrace) 1 MG tablet, Take 1 tablet by mouth Daily., Disp: 30 tablet, Rfl: 1  •  FIBER COMPLETE PO, Take  by mouth Daily., Disp: , Rfl:   •  levothyroxine (Synthroid) 75 MCG tablet, Take 1 tablet by mouth Daily., Disp: 90 tablet, Rfl: 1  •  pantoprazole (PROTONIX) 40 MG EC tablet, Take 1 tablet by mouth Daily., Disp: , Rfl:   •  polyethylene glycol (MIRALAX) powder, , Disp: , Rfl:   •  telmisartan (MICARDIS) 80 MG tablet, Take 80 mg by mouth Daily., Disp: , Rfl:   •  timolol (TIMOPTIC) 0.5 % ophthalmic solution, Administer 1 drop to both eyes 2 (Two) Times a Day., Disp: , Rfl:   •  travoprost, KATLYN free, (TRAVATAN) 0.004 % solution ophthalmic solution, 1 drop Every Evening. in affected eye(s), Disp: , Rfl:   •  Zinc 50 MG tablet, Take 50 mcg by mouth Daily., Disp: , Rfl:     Past Medical History:   Diagnosis Date   • Autoimmune disease (HCC)    • COVID-19    • Fibrocystic breast changes, bilateral    • GERD (gastroesophageal reflux disease)     not currently taking medication   • Gestational diabetes    • Glaucoma    • Hyperlipidemia    • Hypertension    • Hypothyroidism    • Lower extremity edema    • OAB (overactive bladder)    • Osteoarthritis    • Palpitations    • Post-menopause on HRT (hormone replacement therapy)    • Raynaud's disease    • Scleroderma (HCC)     SINE scleroderma   • Sjogren's disease (HCC)         Past Surgical History:   Procedure Laterality Date   • CHOLECYSTECTOMY     • ESOPHAGOSCOPY / EGD      esophageal stretching/dilation and gastric polypectomy, esophageal biopsies   • LAPAROSCOPIC ASSISTED VAGINAL HYSTERECTOMY SALPINGO OOPHORECTOMY Bilateral    • LAPAROSCOPIC DIAZ PROCEDURE     • OOPHORECTOMY     • OTHER SURGICAL HISTORY      Lap for endometriosis   • TONSILLECTOMY   "       Objective  /82   Ht 157.5 cm (62\")   Wt 69.3 kg (152 lb 12.8 oz)   LMP  (LMP Unknown)   Breastfeeding No   BMI 27.95 kg/m²     Physical Exam  Exam conducted with a chaperone present.   Constitutional:       Appearance: Normal appearance.   HENT:      Head: Normocephalic.   Neck:      Thyroid: No thyroid mass or thyromegaly.   Cardiovascular:      Rate and Rhythm: Normal rate and regular rhythm.      Heart sounds: Normal heart sounds.   Pulmonary:      Effort: Pulmonary effort is normal.      Breath sounds: Normal breath sounds.   Chest:   Breasts:      Right: No inverted nipple, mass, nipple discharge, axillary adenopathy or supraclavicular adenopathy.      Left: No inverted nipple, mass, nipple discharge, axillary adenopathy or supraclavicular adenopathy.       Abdominal:      Palpations: Abdomen is soft. There is no mass.      Tenderness: There is no abdominal tenderness.   Genitourinary:     General: Normal vulva.      Labia:         Right: No lesion.         Left: No lesion.       Vagina: No erythema.      Uterus: Absent.       Adnexa:         Right: No mass or tenderness.          Left: No mass or tenderness.        Comments: Anus appears wnl.  No rectal exam performed.  Lymphadenopathy:      Upper Body:      Right upper body: No supraclavicular or axillary adenopathy.      Left upper body: No supraclavicular or axillary adenopathy.   Neurological:      Mental Status: She is alert.   Psychiatric:         Mood and Affect: Mood normal.         Behavior: Behavior normal.         Assessment/Plan   Diagnoses and all orders for this visit:    1. Encounter for gynecological examination with abnormal finding (Primary)    2. Hormone replacement therapy    3. Autoimmune disorder (HCC)    Other orders  -     estradiol (Estrace) 1 MG tablet; Take 1 tablet by mouth Daily.  Dispense: 30 tablet; Refill: 1        Procedures    40 to 64: Counseling/Anticipatory Guidance Discussed: screenings and self-breast " exam        Return in about 1 year (around 3/3/2023) for Annual physical.    Mariana Martinez, APRN  03/03/2022

## 2022-04-06 ENCOUNTER — OFFICE VISIT (OUTPATIENT)
Dept: CARDIOLOGY | Facility: CLINIC | Age: 62
End: 2022-04-06

## 2022-04-06 VITALS
WEIGHT: 154 LBS | SYSTOLIC BLOOD PRESSURE: 154 MMHG | DIASTOLIC BLOOD PRESSURE: 84 MMHG | HEIGHT: 62 IN | OXYGEN SATURATION: 96 % | BODY MASS INDEX: 28.34 KG/M2 | HEART RATE: 57 BPM

## 2022-04-06 DIAGNOSIS — E78.5 DYSLIPIDEMIA: ICD-10-CM

## 2022-04-06 DIAGNOSIS — K21.9 GASTROESOPHAGEAL REFLUX DISEASE, UNSPECIFIED WHETHER ESOPHAGITIS PRESENT: ICD-10-CM

## 2022-04-06 DIAGNOSIS — R00.2 PALPITATIONS: Primary | ICD-10-CM

## 2022-04-06 DIAGNOSIS — E03.9 ACQUIRED HYPOTHYROIDISM: ICD-10-CM

## 2022-04-06 DIAGNOSIS — T14.8XXA BRUISING: ICD-10-CM

## 2022-04-06 DIAGNOSIS — R06.09 DOE (DYSPNEA ON EXERTION): ICD-10-CM

## 2022-04-06 DIAGNOSIS — I10 ESSENTIAL HYPERTENSION: ICD-10-CM

## 2022-04-06 PROCEDURE — 99214 OFFICE O/P EST MOD 30 MIN: CPT | Performed by: INTERNAL MEDICINE

## 2022-04-06 RX ORDER — AMLODIPINE BESYLATE 2.5 MG/1
2.5 TABLET ORAL DAILY
Qty: 90 TABLET | Refills: 3 | Status: SHIPPED | OUTPATIENT
Start: 2022-04-06 | End: 2022-08-05

## 2022-04-06 NOTE — PROGRESS NOTES
Subjective:     Encounter Date:04/06/2022    Patient ID: Rupa Finn is a 61 y.o.  white female, retired [employer] , from [place], [place].     SELF-REFERRED  FORMER PHYSICIAN: Gena Tse MD  CURRENT PHYSICIAN: Frantz Lin MD  PREVIOUS CARDIOLOGIST: Glenna Melendez MD, [facility]  RHEUMATOLOGISTS:  Kim Douglas MD, Meghana Orta MD (), Marco Jasso MD ()  GYNECOLOGIST: Fabio Armenta MD  NEUROLOGIST: Naga Jarquin MD    Chief Complaint:   Chief Complaint   Patient presents with   • Palpitations       Problem List:  1. Palpitations:  a. Remote cardiac stress testing, performed by [facility], Dr. Hartley; data deficit, date deficit; negative for ischemia.   b. Echocardiogram,  03/10/2014:  Normal, EF 60%, Dr. Hartley.  c. Holter monitor, 05/23/2014: Sinus katelyn to sinus rhythm with rates of 40 to 94, occasional PVCs, Dr. Hartley.   d. Cardiac event monitor, 11/10/2016-12/10/2016: sinus rhythm, sinus tach, sinus arrhythmia with rare PACs and PVCs.   e. Echocardiogram 2D Complete, February 2022: Left ventricular cavity and wall thickness normal. LVEF 55%-65%. Wall motion normal, with normal LV diastolic function. Mild AR and MR. Right ventricular systolic function normal by visual assessment, TAPSE: 1.8 cm  2. 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 CCS class I-II chest discomfort/NYHA class II TERRAZAS, with normal EKG (July 2018).  b. CT cardiac calcium score 5.6 August 2018  c. Stress echocardiogram, 8/13/2018: RVSP 30 mmHg, mild TR, no chest pain at baseline and during exercise with baseline ECG normal, acceptable negative echocardiographic exercise stress test suggestive of low probability for significant focal obstructive coronary artery disease with preserved LV function following exercise to 122% predicted exercise capacity and 90% predicted maximum  "heart rate  d. CCS class I chest discomfort/NYHA class II dyspnea on exertion symptoms  e. Echocardiogram, April 2021: LVEF 0.60. Borderline pulmonary arterial hypertension. Mild aortic valve sclerosis. RVSP 36 mmHg.  f. Intermittent atypical nonexertional chest pain syndrome with normal electrocardiogram, September 2021.  g. Acceptable CT Chest without contrast, 3/3/2022: A few nonspecific sub-4 mm pulmonary nodules, likely benign were present.  h. Echocardiogram 2D Complete, February 2022: Left ventricular cavity and wall thickness normal. LVEF 55%-65%. Wall motion normal, with normal LV diastolic function. Mild AR and MR. Right ventricular systolic function normal by visual assessment, TAPSE: 1.8 cm  3. Hypertension.   4. Hyperlipidemia; ASCVD 10-year risk is 4.5%, 1.9% if treated, started on Livalo but she was intolerant July 2018, has had previous intolerances to statins as well, now on Repatha.   5. Lower extremity edema.   6. Glaucoma.  7. Autoimmune diseases:  a. Patient reports positive genetic markers for scleroderma.   b. Rheumatoid arthritis, positive YONY, RF factor.  c. Raynaud's   d. Sjogren's  8. Hypothyroidism, treated.  9. Constipation.  10. Gestational diabetes.  11. GERD, not currently taking medication.  12. COVID positive, October 2020, with nausea, myalgias, sore throat, and nasal congestion with residual shortness of breath and fatigue March 2021.  13. Pulmonary nodules on CT chest March 2022  14. Surgical history:    a. Hysterectomy.  b. Tonsillectomy.  c. Cholecystectomy.  d. Lap for endometriosis    Allergies   Allergen Reactions   • Aleve [Naproxen Sodium] Other (See Comments)     tongue swelling   • Ceclor [Cefaclor] Shortness Of Breath     hives   • Alphagan [Brimonidine] Other (See Comments)     Eyes itching, red, swollen   • Atenolol Other (See Comments)     fatigue   • Livalo [Pitavastatin] Myalgia   • Statins Myalgia   • Ibuprofen Swelling   • Adhesive Tape Rash     \"burns, eats " "through my skin\"   • E-Mycin [Erythromycin] Nausea And Vomiting       Current Outpatient Medications:   •  acetaZOLAMIDE (DIAMOX) 250 MG tablet, Take 125 mg by mouth 2 (two) times a day., Disp: , Rfl:   •  Alirocumab (Praluent) 75 MG/ML solution auto-injector, Inject 75 mg under the skin into the appropriate area as directed Every 14 (Fourteen) Days., Disp: 6 pen, Rfl: 3  •  Cholecalciferol (Vitamin D3) 50 MCG (2000 UT) tablet, Take  by mouth Daily., Disp: , Rfl:   •  estradiol (Estrace) 1 MG tablet, Take 1 tablet by mouth Daily., Disp: 30 tablet, Rfl: 1  •  FIBER COMPLETE PO, Take  by mouth Daily., Disp: , Rfl:   •  levothyroxine (Synthroid) 75 MCG tablet, Take 1 tablet by mouth Daily., Disp: 90 tablet, Rfl: 1  •  pantoprazole (PROTONIX) 40 MG EC tablet, Take 1 tablet by mouth Daily., Disp: , Rfl:   •  polyethylene glycol (MIRALAX) powder, As Needed., Disp: , Rfl:   •  telmisartan (MICARDIS) 80 MG tablet, Take 80 mg by mouth Daily., Disp: , Rfl:   •  timolol (TIMOPTIC) 0.5 % ophthalmic solution, Administer 1 drop to both eyes 2 (Two) Times a Day., Disp: , Rfl:   •  travoprost, BAK free, (TRAVATAN) 0.004 % solution ophthalmic solution, 1 drop Every Evening. in affected eye(s), Disp: , Rfl:   •  Zinc 50 MG tablet, Take 50 mcg by mouth Daily., Disp: , Rfl:     History of Present Illness: Patient returns for scheduled 6-month follow up. She had an acceptable CT Chest without contrast, 3/3/2022: A few nonspecific sub-4 mm pulmonary nodules, likely benign were present. Her echocardiogram February 2022 demonstrated left ventricular cavity and wall thickness normal. LVEF 55%-65%. Wall motion normal, with normal LV diastolic function. Mild AR and MR. Right ventricular systolic function normal by visual assessment, TAPSE: 1.8 cm.  She was seen by her rheumatologist for Sjogren's 4/5/2022 with RF factor 59.3. Her physician was concerned about scleroderma and CREST.  Occasionally she will have some dull chest pressure but it " "only lasts for a minute or 2 and then it resolves.  It is not associated with any other symptoms.  She has GERD and has an order for her to have a referral to gastroenterology.  She also has an order for a hematologist in view of her recent diagnosis of Sjogren's.  She has not noticed many palpitations.  Her physician wanted to start her on nifedipine for her Raynaud's.  She denies any dizziness, presyncope, or syncope.  She does have complaints of chronic fatigue.  She fell off her exercise bike about 3 weeks ago and had some bruising on her ankle.  She still feels that she has some nodules underneath her skin.           ROS   Obtained and negative except as outlined in problem list and HPI.    Procedures        Objective:       Vitals:    04/06/22 1513 04/06/22 1514   BP: 161/81 154/84   BP Location: Right arm Right arm   Patient Position: Sitting Standing   Cuff Size: Adult Adult   Pulse: 56 57   SpO2: 96%    Weight: 69.9 kg (154 lb)    Height: 157.5 cm (62\")    Recheck blood pressure right arm sitting was 146/78  Body mass index is 28.17 kg/m².  Last weight: 159 lbs    Vitals reviewed.   Constitutional:       Appearance: Well-developed.   Neck:      Thyroid: No thyromegaly.      Vascular: No carotid bruit or JVD.      Lymphadenopathy: No cervical adenopathy.   Pulmonary:      Effort: Pulmonary effort is normal.      Breath sounds: Normal breath sounds. No wheezing. No rhonchi. No rales.   Cardiovascular:      Regular rhythm.      No gallop. No S3 gallop.   Pulses:     Dorsalis pedis: 2+ bilaterally.     Posterior tibial: 2+ bilaterally.  Abdominal:      Palpations: Abdomen is soft. There is no abdominal mass.      Tenderness: There is no abdominal tenderness.   Musculoskeletal: Normal range of motion. Skin:     General: Skin is warm and dry.      Findings: No rash.   Neurological:      Mental Status: Alert and oriented to person, place, and time.           Lab Review:     No recent laboratory studies available " for review today.    CT Chest without contrast, 3/3/2022:  · No CT evidence for interstitial lung disease  · A few nonspecific sub-4 mm pulmonary nodules, likely benign. If the patient is considered high-risk, a follow up CT chest in 12 months is considered optional. Otherwise no imaging follow up is required per current guidelines.    Echocardiogram 2D Complete, 2/25/2022:  · Left ventricular cavity and wall thickness normal. LVEF 55%-65%. Wall motion normal, with normal LV diastolic function.  · Mild AR and MR.  · Right ventricular systolic function normal by visual assessment, TAPSE: 1.8 cm        Assessment:       Overall continued acceptable course with no new interim cardiopulmonary complaints with fair functional status. We will defer additional diagnostic or therapeutic intervention from a cardiac perspective at this time.She had an acceptable echocardiogram February 2022 with no evidence of pulmonary hypertension.  The patient was found to have pulmonary nodules on CT of chest March 2022.  The patient will start amlodipine 2.5 mg daily and start monitoring her blood pressure at home.     Diagnosis Plan   1. Palpitations  Stable, no persistent palpitations   2. Essential hypertension  Uncontrolled, continue current cardiac medications, add amlodipine 2.5 mg daily.  Patient will start monitoring her blood pressure at home   3. Dyslipidemia  No new labs to review, continue heart healthy diet, Praluent   4. Acquired hypothyroidism  No new labs to review, continue levothyroxine   5. TERRAZAS (dyspnea on exertion)  Acceptable echocardiogram February 2022 with no evidence of pulmonary hypertension, normal EF          Plan:         1. Patient to continue current medications and close follow up with the above providers.  2. Tentative cardiology follow up in August 2022 or patient may return sooner PRN.  3. Patient is encouraged to implement a regular aerobic exercise routine, at least 30 minutes daily, 4-5 days per  week.  4. Amlodipine 2.5 mg daily  5. Referral to gastroenterology  6. Referral to hematology    Scribed for Wing Olson MD by HOWARD Vasquez. 4/6/2022  15:16 EDT    I, Wing Olson MD, [facility], personally performed the services described in this documentation as scribed by the above named individual in my presence, and it is both accurate and complete. At 16:13 EDT on 04/06/2022

## 2022-05-12 PROCEDURE — U0004 COV-19 TEST NON-CDC HGH THRU: HCPCS | Performed by: NURSE PRACTITIONER

## 2022-05-31 ENCOUNTER — OFFICE VISIT (OUTPATIENT)
Dept: GASTROENTEROLOGY | Facility: CLINIC | Age: 62
End: 2022-05-31

## 2022-05-31 VITALS
HEIGHT: 62 IN | DIASTOLIC BLOOD PRESSURE: 82 MMHG | HEART RATE: 64 BPM | OXYGEN SATURATION: 100 % | WEIGHT: 150.6 LBS | SYSTOLIC BLOOD PRESSURE: 156 MMHG | BODY MASS INDEX: 27.71 KG/M2 | TEMPERATURE: 98.4 F

## 2022-05-31 DIAGNOSIS — K21.9 GASTROESOPHAGEAL REFLUX DISEASE, UNSPECIFIED WHETHER ESOPHAGITIS PRESENT: ICD-10-CM

## 2022-05-31 DIAGNOSIS — R13.19 ESOPHAGEAL DYSPHAGIA: ICD-10-CM

## 2022-05-31 DIAGNOSIS — K92.1 BLOOD IN STOOL: Primary | ICD-10-CM

## 2022-05-31 DIAGNOSIS — R19.7 DIARRHEA, UNSPECIFIED TYPE: ICD-10-CM

## 2022-05-31 PROCEDURE — 99205 OFFICE O/P NEW HI 60 MIN: CPT | Performed by: PHYSICIAN ASSISTANT

## 2022-05-31 RX ORDER — FAMOTIDINE 40 MG/1
40 TABLET, FILM COATED ORAL NIGHTLY PRN
Qty: 30 TABLET | Refills: 5 | Status: SHIPPED | OUTPATIENT
Start: 2022-05-31 | End: 2022-08-05 | Stop reason: SDUPTHER

## 2022-05-31 NOTE — PROGRESS NOTES
"     New Patient Consultation     Patient Name: Rupa Finn  : 1960   MRN: [id number]     Chief Complaint:  No chief complaint on file.      History of Present Illness: Rupa Finn is a 62 y.o. female, PMH includes thyroid disease, Sjogren's syndrome, Raynaud's, HL, HTN, myopathy, who is here today for a Gastroenterology Consultation for GERD.    Pt reports long h/o GERD, on protonix 40mg daily. She notes increased episodes of esophageal reflux, aspiration at night when lying down, esophageal dysphagia with bolus sensation, increasingly more frequent x 2 years. She notes difficulty transferring food to oropharynx and that \"it happens before I am ready to swallow\". She notes associated mid esophageal bolus sensation with cold liquids and pills. Episodes happen a few days per month. She spaces out evening meal and bedtime by a few hours and sleeps on an incline.     Pt follows with neurology and rheumatology at [facility] and is currently being worked up for ?CREST syndrome. Per last neurology office note, \"She continues to have difficulty swallowing and had a scary moment with choking while eating a cracker. She has noted more trouble swallowing pills. She has to think more while eating and she always has to have a drink by her due to her dry mouth. She had a swallow study completed which was normal.\"    She also notes concern with color of stool, stating it has had a \"reddish haze\" since COVID-19 infection 2020. She recently had COVID-19 again (early May 2022) with GI sx including diarrhea with yesi blood. She states that blood in stool has resolved but loose stools have persisted. She generally has 1-2 large, sticky stools per day. She uses fiber gummies daily and miralax PRN intermittent constipation.     Swallow study : functional oral and pharyngeal phase, no problems identified requiring skilled intervention    Patient denies associated fever, chills, abdominal " pain, vomiting, constipation, hematemesis, melena, bloating, weight loss or gain, dysuria, jaundice or bruising.    Patient denies personal or FHx of PUD, H Pylori, pancreatitis, colitis, Celiac disease, UC, Crohn's disease, IBS, colon or gastric cancers. Pt denies EtOH, tobacco, illicit substance or NSAID use. Tea daily, rare soda.     EGD 8/2020 with Dr. Gonzalez. Normal esophagus. Hiatal hernia. Erosive gastropathy in body of stomach to antrum. Normal duodenum. Bx negative for H pylori and celiac disease.     CSY 7/2020 with Dr. Grove. Excellent bowel prep conditions. Diverticulosis of mid transverse colon to sigmoid colon. Normal terminal ileum. Bx negative for acute colitis.     Subjective      Review of Systems:   Review of Systems   Constitutional: Negative for appetite change, chills, diaphoresis, fatigue, fever, unexpected weight gain and unexpected weight loss.   HENT: Negative for drooling, facial swelling, mouth sores, rhinorrhea, sore throat, tinnitus, trouble swallowing and voice change.    Eyes: Negative.    Respiratory: Negative for cough, chest tightness and shortness of breath.    Cardiovascular: Negative for chest pain.   Gastrointestinal: Positive for blood in stool, diarrhea, nausea, GERD and indigestion. Negative for abdominal pain, constipation and vomiting.   Skin: Negative for color change, pallor and rash.   Psychiatric/Behavioral: Negative for hallucinations and sleep disturbance. The patient is not nervous/anxious.    All other systems reviewed and are negative.      Past Medical History:   Past Medical History:   Diagnosis Date   • Autoimmune disease (HCC)    • COVID-19    • Fibrocystic breast changes, bilateral    • GERD (gastroesophageal reflux disease)     not currently taking medication   • Gestational diabetes    • Glaucoma    • Hyperlipidemia    • Hypertension    • Hypothyroidism    • Lower extremity edema    • OAB (overactive bladder)    • Osteoarthritis    • Palpitations    •  Post-menopause on HRT (hormone replacement therapy)    • Raynaud's disease    • Scleroderma (HCC)     SINE scleroderma   • Sjogren's disease (HCC)        Past Surgical History:   Past Surgical History:   Procedure Laterality Date   • CHOLECYSTECTOMY     • ESOPHAGOSCOPY / EGD      esophageal stretching/dilation and gastric polypectomy, esophageal biopsies   • LAPAROSCOPIC ASSISTED VAGINAL HYSTERECTOMY SALPINGO OOPHORECTOMY Bilateral    • LAPAROSCOPIC DIAZ PROCEDURE     • OOPHORECTOMY     • OTHER SURGICAL HISTORY      Lap for endometriosis   • TONSILLECTOMY         Family History:   Family History   Problem Relation Age of Onset   • Other Mother         has a pacemaker and is followed by Dr. Brooks   • Heart disease Mother    • Heart failure Mother    • Hypertension Mother    • Glaucoma Father    • Other Father          likely a cancer-related death;   • Heart disease Father    • Hypertension Father    • Asthma Brother    • Hypertension Brother    • Heart attack Maternal Grandfather    • Heart disease Maternal Grandfather    • Heart failure Maternal Grandfather    • Heart disease Maternal Grandmother    • Hypertension Maternal Grandmother    • Hypertension Paternal Grandmother    • Breast cancer Neg Hx    • Ovarian cancer Neg Hx        Social History:   Social History     Socioeconomic History   • Marital status:    Tobacco Use   • Smoking status: Never Smoker   • Smokeless tobacco: Never Used   Substance and Sexual Activity   • Alcohol use: No   • Drug use: No   • Sexual activity: Yes     Partners: Male     Birth control/protection: None       Alcohol/Tobacco History:   Social History     Substance and Sexual Activity   Alcohol Use No     Social History     Tobacco Use   Smoking Status Never Smoker   Smokeless Tobacco Never Used       Medications:     Current Outpatient Medications:   •  acetaZOLAMIDE (DIAMOX) 250 MG tablet, Take 125 mg by mouth 2 (two) times a day., Disp: , Rfl:   •  Alirocumab (Praluent)  "75 MG/ML solution auto-injector, Inject 75 mg under the skin into the appropriate area as directed Every 14 (Fourteen) Days., Disp: 6 pen, Rfl: 3  •  amLODIPine (NORVASC) 2.5 MG tablet, Take 1 tablet by mouth Daily., Disp: 90 tablet, Rfl: 3  •  Cholecalciferol (Vitamin D3) 50 MCG (2000 UT) tablet, Take  by mouth Daily., Disp: , Rfl:   •  estradiol (Estrace) 1 MG tablet, Take 1 tablet by mouth Daily., Disp: 30 tablet, Rfl: 1  •  FIBER COMPLETE PO, Take  by mouth Daily., Disp: , Rfl:   •  levothyroxine (Synthroid) 75 MCG tablet, Take 1 tablet by mouth Daily., Disp: 90 tablet, Rfl: 1  •  pantoprazole (PROTONIX) 40 MG EC tablet, Take 1 tablet by mouth Daily., Disp: , Rfl:   •  polyethylene glycol (MIRALAX) powder, As Needed., Disp: , Rfl:   •  telmisartan (MICARDIS) 80 MG tablet, Take 80 mg by mouth Daily., Disp: , Rfl:   •  timolol (TIMOPTIC) 0.5 % ophthalmic solution, Administer 1 drop to both eyes 2 (Two) Times a Day., Disp: , Rfl:   •  travoprost, BAK free, (TRAVATAN) 0.004 % solution ophthalmic solution, 1 drop Every Evening. in affected eye(s), Disp: , Rfl:   •  Zinc 50 MG tablet, Take 50 mcg by mouth Daily., Disp: , Rfl:     Allergies:   Allergies   Allergen Reactions   • Aleve [Naproxen Sodium] Other (See Comments)     tongue swelling   • Ceclor [Cefaclor] Shortness Of Breath     hives   • Alphagan [Brimonidine] Other (See Comments)     Eyes itching, red, swollen   • Atenolol Other (See Comments)     fatigue   • Livalo [Pitavastatin] Myalgia   • Statins Myalgia   • Ibuprofen Swelling   • Adhesive Tape Rash     \"burns, eats through my skin\"   • E-Mycin [Erythromycin] Nausea And Vomiting       Objective     Physical Exam:  Vital Signs: There were no vitals filed for this visit.  There is no height or weight on file to calculate BMI.     Physical Exam  Vitals and nursing note reviewed.   Constitutional:       Appearance: Normal appearance. She is normal weight. She is not ill-appearing or diaphoretic.   HENT:      " Head: Normocephalic and atraumatic.      Right Ear: External ear normal.      Left Ear: External ear normal.      Nose: Nose normal. No rhinorrhea.      Mouth/Throat:      Mouth: Mucous membranes are moist.      Pharynx: Oropharynx is clear. No posterior oropharyngeal erythema.   Eyes:      General:         Right eye: No discharge.         Left eye: No discharge.      Conjunctiva/sclera: Conjunctivae normal.      Pupils: Pupils are equal, round, and reactive to light.   Neck:      Thyroid: No thyromegaly.   Cardiovascular:      Rate and Rhythm: Normal rate and regular rhythm.      Pulses: Normal pulses.      Heart sounds: Normal heart sounds. No murmur heard.  Pulmonary:      Effort: Pulmonary effort is normal.      Breath sounds: Normal breath sounds. No wheezing.   Abdominal:      General: Abdomen is flat. Bowel sounds are normal. There is no distension.      Tenderness: There is no abdominal tenderness. There is no guarding or rebound.   Musculoskeletal:         General: No tenderness. Normal range of motion.      Cervical back: Normal range of motion and neck supple.      Right lower leg: No edema.      Left lower leg: No edema.   Skin:     General: Skin is warm and dry.      Capillary Refill: Capillary refill takes less than 2 seconds.      Coloration: Skin is not jaundiced.      Findings: No bruising.   Neurological:      General: No focal deficit present.      Mental Status: She is oriented to person, place, and time.      Cranial Nerves: No cranial nerve deficit.   Psychiatric:         Mood and Affect: Mood normal.         Thought Content: Thought content normal.         Judgment: Judgment normal.         Assessment / Plan      Assessment/Plan:   There are no diagnoses linked to this encounter.     Esophageal dysphagia  GERD  Diarrhea  Blood in stool   - continue pantoprazole 40mg daily   - rx for pepcid 40mg QHS sent to pharmacy   - pt given GERD diet instructions, advised to avoid GI irritants such as  caffeine, carbonation, EtOH, tobacco, chocolate, peppermint, acid-based foods   - obtain stool panel, c diff, hemoccult   - previous swallow study results reviewed   - schedule for EGD with Dr. Magallanes   - schedule for esophageal manometry   - follow up in clinic after completion of above studies   - call clinic at any time for questions or new / worsened sx    Follow Up:   Return in about 6 weeks (around 7/12/2022) for with Dr. Magallanes, Next scheduled follow up.    Plan of care reviewed with the patient at the conclusion of today's visit.  Education was provided regarding diagnosis, management, and any prescribed or recommended OTC medications.  Patient verbalized understanding of and agreement with management plan.     Time Statement:   Discussed plan of care in detail with patient today. Patient verbally understands and agrees. I have spent 60 minutes reviewing available diagnostics, obtaining history, examining the patient, developing a treatment plan, and educating the patient on disease process and plan of care.    Missy Rothman PA-C   [facility]

## 2022-06-01 ENCOUNTER — LAB (OUTPATIENT)
Dept: LAB | Facility: HOSPITAL | Age: 62
End: 2022-06-01

## 2022-06-01 DIAGNOSIS — K92.1 BLOOD IN STOOL: ICD-10-CM

## 2022-06-01 DIAGNOSIS — R19.7 DIARRHEA, UNSPECIFIED TYPE: ICD-10-CM

## 2022-06-01 LAB
ADV 40+41 DNA STL QL NAA+NON-PROBE: NOT DETECTED
ASTRO TYP 1-8 RNA STL QL NAA+NON-PROBE: NOT DETECTED
C CAYETANENSIS DNA STL QL NAA+NON-PROBE: NOT DETECTED
C COLI+JEJ+UPSA DNA STL QL NAA+NON-PROBE: NOT DETECTED
C DIFF TOX GENS STL QL NAA+PROBE: NOT DETECTED
CRYPTOSP DNA STL QL NAA+NON-PROBE: NOT DETECTED
E HISTOLYT DNA STL QL NAA+NON-PROBE: NOT DETECTED
EAEC PAA PLAS AGGR+AATA ST NAA+NON-PRB: NOT DETECTED
EC STX1+STX2 GENES STL QL NAA+NON-PROBE: NOT DETECTED
EPEC EAE GENE STL QL NAA+NON-PROBE: NOT DETECTED
ETEC LTA+ST1A+ST1B TOX ST NAA+NON-PROBE: NOT DETECTED
G LAMBLIA DNA STL QL NAA+NON-PROBE: NOT DETECTED
HEMOCCULT STL QL: NEGATIVE
NOROVIRUS GI+II RNA STL QL NAA+NON-PROBE: NOT DETECTED
P SHIGELLOIDES DNA STL QL NAA+NON-PROBE: NOT DETECTED
RVA RNA STL QL NAA+NON-PROBE: NOT DETECTED
S ENT+BONG DNA STL QL NAA+NON-PROBE: NOT DETECTED
SAPO I+II+IV+V RNA STL QL NAA+NON-PROBE: NOT DETECTED
SHIGELLA SP+EIEC IPAH ST NAA+NON-PROBE: NOT DETECTED
V CHOL+PARA+VUL DNA STL QL NAA+NON-PROBE: NOT DETECTED
V CHOLERAE DNA STL QL NAA+NON-PROBE: NOT DETECTED
Y ENTEROCOL DNA STL QL NAA+NON-PROBE: NOT DETECTED

## 2022-06-01 PROCEDURE — 87493 C DIFF AMPLIFIED PROBE: CPT

## 2022-06-01 PROCEDURE — 87507 IADNA-DNA/RNA PROBE TQ 12-25: CPT

## 2022-06-01 PROCEDURE — 82272 OCCULT BLD FECES 1-3 TESTS: CPT

## 2022-06-02 ENCOUNTER — TELEPHONE (OUTPATIENT)
Dept: GASTROENTEROLOGY | Facility: CLINIC | Age: 62
End: 2022-06-02

## 2022-06-02 NOTE — TELEPHONE ENCOUNTER
----- Message from Missy Rothman PA-C sent at 6/2/2022  9:11 AM EDT -----  Please let Jeannette know that her stool panel, c diff and hemoccult were all negative. Thanks!

## 2022-07-08 DIAGNOSIS — Z12.11 ENCOUNTER FOR SCREENING COLONOSCOPY: Primary | ICD-10-CM

## 2022-07-19 DIAGNOSIS — R11.0 NAUSEA: Primary | ICD-10-CM

## 2022-07-19 RX ORDER — ONDANSETRON 4 MG/1
4 TABLET, FILM COATED ORAL EVERY 8 HOURS PRN
Qty: 6 TABLET | Refills: 0 | Status: SHIPPED | OUTPATIENT
Start: 2022-07-19

## 2022-07-22 ENCOUNTER — OUTSIDE FACILITY SERVICE (OUTPATIENT)
Dept: GASTROENTEROLOGY | Facility: CLINIC | Age: 62
End: 2022-07-22

## 2022-07-22 PROCEDURE — 43248 EGD GUIDE WIRE INSERTION: CPT | Performed by: INTERNAL MEDICINE

## 2022-07-22 PROCEDURE — 45385 COLONOSCOPY W/LESION REMOVAL: CPT | Performed by: INTERNAL MEDICINE

## 2022-07-22 PROCEDURE — 88305 TISSUE EXAM BY PATHOLOGIST: CPT | Performed by: INTERNAL MEDICINE

## 2022-07-22 PROCEDURE — 45380 COLONOSCOPY AND BIOPSY: CPT | Performed by: INTERNAL MEDICINE

## 2022-07-22 PROCEDURE — 43239 EGD BIOPSY SINGLE/MULTIPLE: CPT | Performed by: INTERNAL MEDICINE

## 2022-07-25 ENCOUNTER — LAB REQUISITION (OUTPATIENT)
Dept: LAB | Facility: HOSPITAL | Age: 62
End: 2022-07-25

## 2022-07-25 DIAGNOSIS — K21.9 GASTRO-ESOPHAGEAL REFLUX DISEASE WITHOUT ESOPHAGITIS: ICD-10-CM

## 2022-07-25 DIAGNOSIS — K31.89 OTHER DISEASES OF STOMACH AND DUODENUM: ICD-10-CM

## 2022-07-25 DIAGNOSIS — D12.5 BENIGN NEOPLASM OF SIGMOID COLON: ICD-10-CM

## 2022-07-25 DIAGNOSIS — K57.30 DIVERTICULOSIS OF LARGE INTESTINE WITHOUT PERFORATION OR ABSCESS WITHOUT BLEEDING: ICD-10-CM

## 2022-07-25 DIAGNOSIS — K64.8 OTHER HEMORRHOIDS: ICD-10-CM

## 2022-07-25 DIAGNOSIS — K22.4 DYSKINESIA OF ESOPHAGUS: ICD-10-CM

## 2022-07-25 DIAGNOSIS — K92.1 MELENA: ICD-10-CM

## 2022-07-25 DIAGNOSIS — K44.9 DIAPHRAGMATIC HERNIA WITHOUT OBSTRUCTION OR GANGRENE: ICD-10-CM

## 2022-07-26 LAB
CYTO UR: NORMAL
LAB AP CASE REPORT: NORMAL
LAB AP CLINICAL INFORMATION: NORMAL
PATH REPORT.FINAL DX SPEC: NORMAL
PATH REPORT.GROSS SPEC: NORMAL

## 2022-07-27 DIAGNOSIS — M34.1 ESOPHAGEAL SCLERODERMA: Primary | ICD-10-CM

## 2022-08-05 ENCOUNTER — OFFICE VISIT (OUTPATIENT)
Dept: GASTROENTEROLOGY | Facility: CLINIC | Age: 62
End: 2022-08-05

## 2022-08-05 VITALS
HEIGHT: 62 IN | WEIGHT: 149 LBS | HEART RATE: 62 BPM | BODY MASS INDEX: 27.42 KG/M2 | DIASTOLIC BLOOD PRESSURE: 80 MMHG | TEMPERATURE: 97.4 F | SYSTOLIC BLOOD PRESSURE: 126 MMHG

## 2022-08-05 DIAGNOSIS — K21.9 GASTROESOPHAGEAL REFLUX DISEASE, UNSPECIFIED WHETHER ESOPHAGITIS PRESENT: Primary | ICD-10-CM

## 2022-08-05 DIAGNOSIS — R13.12 OROPHARYNGEAL DYSPHAGIA: ICD-10-CM

## 2022-08-05 DIAGNOSIS — K22.4 INEFFECTIVE ESOPHAGEAL MOTILITY: ICD-10-CM

## 2022-08-05 DIAGNOSIS — K29.70 GASTRITIS WITHOUT BLEEDING, UNSPECIFIED CHRONICITY, UNSPECIFIED GASTRITIS TYPE: ICD-10-CM

## 2022-08-05 PROBLEM — M33.20 POLYMYOSITIS (HCC): Status: ACTIVE | Noted: 2022-07-27

## 2022-08-05 PROBLEM — M34.82: Status: ACTIVE | Noted: 2022-04-26

## 2022-08-05 PROCEDURE — 99214 OFFICE O/P EST MOD 30 MIN: CPT | Performed by: PHYSICIAN ASSISTANT

## 2022-08-05 RX ORDER — DEXLANSOPRAZOLE 30 MG/1
30 CAPSULE, DELAYED RELEASE ORAL DAILY
Qty: 30 CAPSULE | Refills: 0 | Status: SHIPPED | OUTPATIENT
Start: 2022-08-05 | End: 2022-08-05

## 2022-08-05 RX ORDER — DEXLANSOPRAZOLE 30 MG/1
30 CAPSULE, DELAYED RELEASE ORAL DAILY
Qty: 30 CAPSULE | Refills: 5 | Status: SHIPPED | OUTPATIENT
Start: 2022-08-05 | End: 2022-09-04

## 2022-08-05 RX ORDER — FAMOTIDINE 40 MG/1
40 TABLET, FILM COATED ORAL NIGHTLY PRN
Qty: 30 TABLET | Refills: 5 | Status: SHIPPED | OUTPATIENT
Start: 2022-08-05

## 2022-08-05 NOTE — PROGRESS NOTES
"     Follow Up      Patient Name: Rupa Finn  : 1960   MRN: [id number]     Chief Complaint:  No chief complaint on file.      History of Present Illness: Rupa Finn is a 62 y.o. female who is here today for post procedure follow up.    EGD / CSY  with Dr. Magallanes. Abnormal motility in lower third of esophagus. Tertiary peristaltic waves noted. Dilation performed with savary dilator to 54 Fr. Bx reveals intestinal metaplasia involving reactive squamocolumnar junctional mucosa, negative for EoE. Medium sized hiatal hernia. Diffuse mildly erythematous mucosa without bleeding throughout stomach. Bilious fluid in gastric body. Normal duodenum. Bx negative for H Pylori. Adequate bowel prep conditions. Internal hemorrhoids. Diverticulosis in descending colon. Terminal ileum appears normal. 5mm polyp (mucosal tag) in sigmoid colon, resected and retrieved.     Stool panel, c diff negative since last visit. Hemoccult negative. Pt states that her stools continue to be soft and brown with a marroon \"haze\".     She is taking pantoprazole 40mg daily, continues to experience daily nausea. Denies specific aggravating or alleviating foods or factors. She did not start pepcid since last visit. Pt has not completed esophageal manometry. She notes that her dysphagia is more in the oropharyngeal phase. She denies distal esophageal bolus sensation, difficulty handling secretions or requirement of rescue maneuvers. She is following with neuromuscular specialist and rheumatologist in [place] and initiation of IVIG has been discussed.     Patient denies associated fever, chills, abdominal pain, vomiting, diarrhea, constipation, hematemesis, hematochezia, melena, bloating, weight loss or gain, dysuria, jaundice or bruising.    She is also requesting recommendation for PCP in [place].     Subjective      Review of Systems:   Review of Systems   Constitutional: Negative for appetite change, chills, " diaphoresis, fatigue, fever, unexpected weight gain and unexpected weight loss.   HENT: Positive for trouble swallowing. Negative for drooling, facial swelling, mouth sores, rhinorrhea, sore throat, tinnitus and voice change.    Eyes: Negative.    Respiratory: Negative for cough, chest tightness and shortness of breath.    Cardiovascular: Negative for chest pain.   Gastrointestinal: Positive for nausea and GERD. Negative for abdominal pain, blood in stool, constipation, diarrhea, vomiting and indigestion.   Genitourinary: Negative for dysuria, flank pain, hematuria and pelvic pain.   Musculoskeletal: Negative for arthralgias and myalgias.   Skin: Negative for color change, pallor and rash.   Neurological: Negative for dizziness, tremors, syncope, weakness and numbness.   Psychiatric/Behavioral: Negative for hallucinations and sleep disturbance. The patient is not nervous/anxious.    All other systems reviewed and are negative.      Medications:     Current Outpatient Medications:   •  acetaZOLAMIDE (DIAMOX) 250 MG tablet, Take 125 mg by mouth 2 (two) times a day., Disp: , Rfl:   •  Alirocumab (Praluent) 75 MG/ML solution auto-injector, Inject 75 mg under the skin into the appropriate area as directed Every 14 (Fourteen) Days., Disp: 6 pen, Rfl: 3  •  Cholecalciferol (Vitamin D3) 50 MCG (2000 UT) tablet, Take  by mouth Daily., Disp: , Rfl:   •  estradiol (Estrace) 1 MG tablet, Take 1 tablet by mouth Daily., Disp: 30 tablet, Rfl: 1  •  famotidine (Pepcid) 40 MG tablet, Take 1 tablet by mouth At Night As Needed for Heartburn., Disp: 30 tablet, Rfl: 5  •  FIBER COMPLETE PO, Take  by mouth Daily., Disp: , Rfl:   •  levothyroxine (Synthroid) 75 MCG tablet, Take 1 tablet by mouth Daily., Disp: 90 tablet, Rfl: 1  •  ondansetron (Zofran) 4 MG tablet, Take 1 tablet by mouth Every 8 (Eight) Hours As Needed for Nausea or Vomiting., Disp: 6 tablet, Rfl: 0  •  pantoprazole (PROTONIX) 40 MG EC tablet, Take 1 tablet by mouth  "Daily., Disp: , Rfl:   •  polyethylene glycol (MIRALAX) powder, As Needed., Disp: , Rfl:   •  telmisartan (MICARDIS) 80 MG tablet, Take 80 mg by mouth Daily., Disp: , Rfl:   •  timolol (TIMOPTIC) 0.5 % ophthalmic solution, Administer 1 drop to both eyes 2 (Two) Times a Day., Disp: , Rfl:   •  travoprost, BAK free, (TRAVATAN) 0.004 % solution ophthalmic solution, 1 drop Every Evening. in affected eye(s), Disp: , Rfl:   •  Zinc 50 MG tablet, Take 50 mcg by mouth Daily., Disp: , Rfl:     Allergies:   Allergies   Allergen Reactions   • Aleve [Naproxen Sodium] Other (See Comments)     tongue swelling   • Ceclor [Cefaclor] Shortness Of Breath     hives   • Alphagan [Brimonidine] Other (See Comments)     Eyes itching, red, swollen   • Atenolol Other (See Comments)     fatigue   • Livalo [Pitavastatin] Myalgia   • Statins Myalgia   • Ibuprofen Swelling   • Adhesive Tape Rash     \"burns, eats through my skin\"   • E-Mycin [Erythromycin] Nausea And Vomiting       Social History:   Social History     Socioeconomic History   • Marital status:    Tobacco Use   • Smoking status: Never Smoker   • Smokeless tobacco: Never Used   Vaping Use   • Vaping Use: Never used   Substance and Sexual Activity   • Alcohol use: No   • Drug use: No   • Sexual activity: Yes     Partners: Male     Birth control/protection: None        Surgical History:   Past Surgical History:   Procedure Laterality Date   • CHOLECYSTECTOMY     • ESOPHAGOSCOPY / EGD      esophageal stretching/dilation and gastric polypectomy, esophageal biopsies   • LAPAROSCOPIC ASSISTED VAGINAL HYSTERECTOMY SALPINGO OOPHORECTOMY Bilateral    • LAPAROSCOPIC DIAZ PROCEDURE     • OOPHORECTOMY     • OTHER SURGICAL HISTORY      Lap for endometriosis   • TONSILLECTOMY          Medical History:   Past Medical History:   Diagnosis Date   • Autoimmune disease (HCC)    • COVID-19    • Fibrocystic breast changes, bilateral    • GERD (gastroesophageal reflux disease)     not currently " taking medication   • Gestational diabetes    • Glaucoma    • Hyperlipidemia    • Hypertension    • Hypothyroidism    • Lower extremity edema    • OAB (overactive bladder)    • Osteoarthritis    • Palpitations    • Post-menopause on HRT (hormone replacement therapy)    • Raynaud's disease    • Scleroderma (HCC)     SINE scleroderma   • Sjogren's disease (HCC)         Objective     Physical Exam:  Vital Signs: There were no vitals filed for this visit.  There is no height or weight on file to calculate BMI.     Physical Exam  Vitals and nursing note reviewed.   Constitutional:       Appearance: Normal appearance. She is normal weight. She is not ill-appearing or diaphoretic.      Comments: Pleasantly conversant   HENT:      Head: Normocephalic and atraumatic.      Right Ear: External ear normal.      Left Ear: External ear normal.      Nose: Nose normal. No rhinorrhea.      Mouth/Throat:      Mouth: Mucous membranes are moist.      Pharynx: Oropharynx is clear. No posterior oropharyngeal erythema.   Eyes:      General:         Right eye: No discharge.         Left eye: No discharge.      Conjunctiva/sclera: Conjunctivae normal.      Pupils: Pupils are equal, round, and reactive to light.   Neck:      Thyroid: No thyromegaly.   Cardiovascular:      Rate and Rhythm: Normal rate and regular rhythm.      Pulses: Normal pulses.      Heart sounds: Normal heart sounds. No murmur heard.  Pulmonary:      Effort: Pulmonary effort is normal.      Breath sounds: Normal breath sounds. No wheezing.   Abdominal:      General: Abdomen is flat. Bowel sounds are normal. There is no distension.      Tenderness: There is no abdominal tenderness.   Musculoskeletal:         General: No tenderness. Normal range of motion.      Cervical back: Normal range of motion and neck supple.      Right lower leg: No edema.      Left lower leg: No edema.   Skin:     General: Skin is warm and dry.      Capillary Refill: Capillary refill takes less than  2 seconds.      Coloration: Skin is not jaundiced.      Findings: No bruising.   Neurological:      Mental Status: She is oriented to person, place, and time.      Cranial Nerves: No cranial nerve deficit.   Psychiatric:         Mood and Affect: Mood normal.         Thought Content: Thought content normal.         Judgment: Judgment normal.         Assessment / Plan      Assessment/Plan:   There are no diagnoses linked to this encounter.     GERD  Gastritis  Oropharyngeal dysphagia  Ineffective esophageal motility   - discontinue pantoprazole, rx for dexilant 30mg daily sent to pharmacy   - pt encouraged to use pepcid 20mg PRN breakthrough indigestion / nausea   - pt given GERD diet instructions, advised to avoid GI irritants such as caffeine, carbonation, EtOH, tobacco, chocolate, peppermint, acid-based foods   - proceed with esophageal manometry if sx worsen or persist, pt declines at this time   - continue to follow up with neuromuscular / rheumatology specialists as scheduled   - previous endoscopy, pathology reports reviewed   - follow up in clinic in 3mo, or after completion of above studies   - call clinic at any time for questions or new / worsened sx    Follow Up:   Return in about 3 months (around 11/5/2022).    Plan of care reviewed with the patient at the conclusion of today's visit.  Education was provided regarding diagnosis, management, and any prescribed or recommended OTC medications.  Patient verbalized understanding of and agreement with management plan.     Time Statement:   Discussed plan of care in detail with patient today. Patient verbally understands and agrees. I have spent 30 minutes reviewing available diagnostics, obtaining history, examining the patient, developing a treatment plan, and educating the patient on disease process and plan of care.     Missy Rothman PA-C   [facility]

## 2022-08-08 ENCOUNTER — PRIOR AUTHORIZATION (OUTPATIENT)
Dept: GASTROENTEROLOGY | Facility: CLINIC | Age: 62
End: 2022-08-08

## 2022-08-08 ENCOUNTER — TELEPHONE (OUTPATIENT)
Dept: GASTROENTEROLOGY | Facility: CLINIC | Age: 62
End: 2022-08-08

## 2022-08-08 NOTE — TELEPHONE ENCOUNTER
Marielos,  Ms Finn called and left voice message stating that the Dexilant isn't covered on her insurance. Do Missy want her to stay on Pantoprazole?

## 2022-08-11 ENCOUNTER — LAB (OUTPATIENT)
Dept: LAB | Facility: HOSPITAL | Age: 62
End: 2022-08-11

## 2022-08-11 ENCOUNTER — OFFICE VISIT (OUTPATIENT)
Dept: CARDIOLOGY | Facility: CLINIC | Age: 62
End: 2022-08-11

## 2022-08-11 VITALS
HEIGHT: 62 IN | WEIGHT: 150 LBS | SYSTOLIC BLOOD PRESSURE: 121 MMHG | BODY MASS INDEX: 27.6 KG/M2 | HEART RATE: 55 BPM | DIASTOLIC BLOOD PRESSURE: 69 MMHG

## 2022-08-11 DIAGNOSIS — E78.5 DYSLIPIDEMIA: ICD-10-CM

## 2022-08-11 DIAGNOSIS — R06.09 DOE (DYSPNEA ON EXERTION): Primary | ICD-10-CM

## 2022-08-11 DIAGNOSIS — Z00.00 ANNUAL PHYSICAL EXAM: Primary | ICD-10-CM

## 2022-08-11 DIAGNOSIS — I10 PRIMARY HYPERTENSION: ICD-10-CM

## 2022-08-11 DIAGNOSIS — R00.2 PALPITATIONS: ICD-10-CM

## 2022-08-11 PROCEDURE — 36415 COLL VENOUS BLD VENIPUNCTURE: CPT

## 2022-08-11 PROCEDURE — 99214 OFFICE O/P EST MOD 30 MIN: CPT | Performed by: NURSE PRACTITIONER

## 2022-08-11 PROCEDURE — 80048 BASIC METABOLIC PNL TOTAL CA: CPT

## 2022-08-11 PROCEDURE — 84443 ASSAY THYROID STIM HORMONE: CPT

## 2022-08-11 PROCEDURE — 80061 LIPID PANEL: CPT

## 2022-08-12 ENCOUNTER — TELEPHONE (OUTPATIENT)
Dept: CARDIOLOGY | Facility: CLINIC | Age: 62
End: 2022-08-12

## 2022-08-12 LAB
ANION GAP SERPL CALCULATED.3IONS-SCNC: 11.9 MMOL/L (ref 5–15)
BUN SERPL-MCNC: 12 MG/DL (ref 8–23)
BUN/CREAT SERPL: 12.2 (ref 7–25)
CALCIUM SPEC-SCNC: 9.3 MG/DL (ref 8.6–10.5)
CHLORIDE SERPL-SCNC: 99 MMOL/L (ref 98–107)
CHOLEST SERPL-MCNC: 294 MG/DL (ref 0–200)
CO2 SERPL-SCNC: 22.1 MMOL/L (ref 22–29)
CREAT SERPL-MCNC: 0.98 MG/DL (ref 0.57–1)
EGFRCR SERPLBLD CKD-EPI 2021: 65.4 ML/MIN/1.73
GLUCOSE SERPL-MCNC: 90 MG/DL (ref 65–99)
HDLC SERPL-MCNC: 97 MG/DL (ref 40–60)
LDLC SERPL CALC-MCNC: 184 MG/DL (ref 0–100)
LDLC/HDLC SERPL: 1.87 {RATIO}
POTASSIUM SERPL-SCNC: 4.5 MMOL/L (ref 3.5–5.2)
SODIUM SERPL-SCNC: 133 MMOL/L (ref 136–145)
TRIGL SERPL-MCNC: 80 MG/DL (ref 0–150)
TSH SERPL DL<=0.05 MIU/L-ACNC: 5.57 UIU/ML (ref 0.27–4.2)
VLDLC SERPL-MCNC: 13 MG/DL (ref 5–40)

## 2022-08-12 NOTE — TELEPHONE ENCOUNTER
----- Message from Rupa Finn sent at 8/12/2022 10:33 AM EDT -----  Regarding: labs  Thank you for letting me know of the results of my blood work. I am due to have another shot today so I was at the end of the two weeks when my blood was taken.   I am willing to switch to another type of injection if that will make a difference and I can get the payment assistance I have with Praluent. I would want to make sure there are not elevated risks with the new drugs or that they would not impact my glaucoma negatively.   Please let me know what you think is best.  Thank you,  Jeannette

## 2022-08-19 ENCOUNTER — OFFICE VISIT (OUTPATIENT)
Dept: FAMILY MEDICINE CLINIC | Facility: CLINIC | Age: 62
End: 2022-08-19

## 2022-08-19 VITALS
SYSTOLIC BLOOD PRESSURE: 126 MMHG | BODY MASS INDEX: 27.82 KG/M2 | HEIGHT: 62 IN | WEIGHT: 151.2 LBS | DIASTOLIC BLOOD PRESSURE: 82 MMHG

## 2022-08-19 DIAGNOSIS — Z79.890 POST-MENOPAUSE ON HRT (HORMONE REPLACEMENT THERAPY): ICD-10-CM

## 2022-08-19 DIAGNOSIS — M35.03 SJOGREN SYNDROME WITH MYOPATHY: ICD-10-CM

## 2022-08-19 DIAGNOSIS — K21.9 GASTROESOPHAGEAL REFLUX DISEASE, UNSPECIFIED WHETHER ESOPHAGITIS PRESENT: ICD-10-CM

## 2022-08-19 DIAGNOSIS — I10 PRIMARY HYPERTENSION: ICD-10-CM

## 2022-08-19 DIAGNOSIS — H40.9 GLAUCOMA, UNSPECIFIED GLAUCOMA TYPE, UNSPECIFIED LATERALITY: ICD-10-CM

## 2022-08-19 DIAGNOSIS — E03.9 ACQUIRED HYPOTHYROIDISM: Primary | ICD-10-CM

## 2022-08-19 DIAGNOSIS — E78.5 HYPERLIPIDEMIA, UNSPECIFIED HYPERLIPIDEMIA TYPE: ICD-10-CM

## 2022-08-19 PROCEDURE — 99214 OFFICE O/P EST MOD 30 MIN: CPT | Performed by: STUDENT IN AN ORGANIZED HEALTH CARE EDUCATION/TRAINING PROGRAM

## 2022-08-19 RX ORDER — LEVOTHYROXINE SODIUM 88 UG/1
88 TABLET ORAL DAILY
Qty: 30 TABLET | Refills: 2 | Status: SHIPPED | OUTPATIENT
Start: 2022-08-19 | End: 2022-11-14

## 2022-08-19 NOTE — PROGRESS NOTES
New Patient Office Visit      Patient Name: Rupa Finn  : 1960   MRN: [id number]   Care Team: Patient Care Team:  Dinorah Aguilar DO as PCP - General (Internal Medicine)  Wing Olson MD as Consulting Physician (Cardiology)  Aspen Plata APRN as Nurse Practitioner (Cardiology)    Chief Complaint:    Chief Complaint   Patient presents with   • Hypertension   • Diabetes   • Hypothyroidism       History of Present Illness: Rupa Finn is a 62 y.o. female with hypothyroidism, HLD, HTN, post menopause on HRT, GERD, Sjogren syndrome with myopathy and Raynauds who is here today to establish care. Previously following with Dr. Jacinda Lin at [facility]. She is feeling well today, no acute complaints but would like to transition care and discuss chronic problems.    She is following with [facility]ology [facility] Neuromuscular Specialist for Sjogren syndrome with myopathy and Raynauds. She is undergoing workup for CREST syndrome. She is planning to start IVIG treatments in the near future. She has been having worsening muscle weakness most notably in bilateral arms.     She follows with GI for refractory GERD, dysphagia, impaired esophageal motility - recently changed from pantoprazole to dexilant and is noticing some slight improvement.    Following with Cardiology - HOWARD Malone for HTN, HLD, Raynauds. She is statin intolerant. Her most recent LDL is 184. She is working with Aspen and have submitted a PA for Leqvio. Currently taking Praluent. She reports strong family history of HLD and has struggled with this since age 20.    Hypothyroidism - has been taking synthroid 75mcg but most recent TSH was 5.57.  Reports feeling fatigued, constipated. Wonders if this could be her thyroid.     Following with [facility] for Gynecology for post menopausal HRT.     Glaucoma - following with Dr. Jones Soto. Taking acetazolamide, timolol, and travoprost      Subjective       Review of Systems:   Review of Systems - See HPI    Past Medical History:   Past Medical History:   Diagnosis Date   • Anemia 2021   • Autoimmune disease (HCC)    • Cholelithiasis Removed 2008   • Colon polyp 2022   • COVID-19    • Diverticulosis 2010?   • Fibrocystic breast changes, bilateral    • GERD (gastroesophageal reflux disease)     not currently taking medication   • Gestational diabetes    • Glaucoma    • Hyperlipidemia    • Hypertension    • Hypothyroidism    • Lower extremity edema    • Neuromuscular disorder (HCC) 2020   • OAB (overactive bladder)    • Osteoarthritis    • Palpitations    • Post-menopause on HRT (hormone replacement therapy)    • Raynaud's disease    • Scleroderma (HCC)     SINE scleroderma   • Sjogren's disease (HCC)        Past Surgical History:   Past Surgical History:   Procedure Laterality Date   • CHOLECYSTECTOMY     • COLONOSCOPY  2019 and 2022   • ESOPHAGOSCOPY / EGD      esophageal stretching/dilation and gastric polypectomy, esophageal biopsies   • LAPAROSCOPIC ASSISTED VAGINAL HYSTERECTOMY SALPINGO OOPHORECTOMY Bilateral    • LAPAROSCOPIC DIAZ PROCEDURE     • OOPHORECTOMY     • OTHER SURGICAL HISTORY      Lap for endometriosis   • TONSILLECTOMY     • UPPER GASTROINTESTINAL ENDOSCOPY  ?       Family History:   Family History   Problem Relation Age of Onset   • Other Mother         has a pacemaker and is followed by Dr. Brooks   • Heart disease Mother    • Heart failure Mother    • Hypertension Mother    • Diabetes Mother    • Vision loss Mother         Glaucoma/macular degeneration   • Glaucoma Father    • Other Father          likely a cancer-related death;   • Heart disease Father    • Hypertension Father    • Cancer Father         Prostate   • Vision loss Father         Glaucoma/macular degeneration   • Asthma Brother    • Hypertension Brother    • Heart attack Maternal Grandfather    • Heart disease Maternal Grandfather    • Heart failure Maternal Grandfather    •  Heart disease Maternal Grandmother    • Hypertension Maternal Grandmother    • Hypertension Paternal Grandmother    • Colon cancer Paternal Grandfather    • Cancer Paternal Grandfather         Prostate   • Breast cancer Neg Hx    • Ovarian cancer Neg Hx        Social History:   Social History     Socioeconomic History   • Marital status:    Tobacco Use   • Smoking status: Never Smoker   • Smokeless tobacco: Never Used   Vaping Use   • Vaping Use: Never used   Substance and Sexual Activity   • Alcohol use: No   • Drug use: No   • Sexual activity: Yes     Partners: Male     Birth control/protection: None       Tobacco History:   Social History     Tobacco Use   Smoking Status Never Smoker   Smokeless Tobacco Never Used       Medications:     Current Outpatient Medications:   •  acetaZOLAMIDE (DIAMOX) 250 MG tablet, Take 125 mg by mouth 2 (two) times a day., Disp: , Rfl:   •  Alirocumab (Praluent) 75 MG/ML solution auto-injector, Inject 75 mg under the skin into the appropriate area as directed Every 14 (Fourteen) Days., Disp: 6 pen, Rfl: 3  •  Cholecalciferol (Vitamin D3) 50 MCG (2000 UT) tablet, Take  by mouth Daily., Disp: , Rfl:   •  dexlansoprazole (Dexilant) 30 MG capsule, Take 1 capsule by mouth Daily for 30 days., Disp: 30 capsule, Rfl: 5  •  estradiol (Estrace) 1 MG tablet, Take 1 tablet by mouth Daily., Disp: 30 tablet, Rfl: 1  •  famotidine (Pepcid) 40 MG tablet, Take 1 tablet by mouth At Night As Needed for Heartburn., Disp: 30 tablet, Rfl: 5  •  FIBER COMPLETE PO, Take  by mouth Daily., Disp: , Rfl:   •  levothyroxine (Synthroid) 88 MCG tablet, Take 1 tablet by mouth Daily., Disp: 30 tablet, Rfl: 2  •  ondansetron (Zofran) 4 MG tablet, Take 1 tablet by mouth Every 8 (Eight) Hours As Needed for Nausea or Vomiting., Disp: 6 tablet, Rfl: 0  •  polyethylene glycol (MIRALAX) powder, As Needed., Disp: , Rfl:   •  telmisartan (MICARDIS) 80 MG tablet, Take 80 mg by mouth Daily., Disp: , Rfl:   •  timolol  "(TIMOPTIC) 0.5 % ophthalmic solution, Administer 1 drop to both eyes 2 (Two) Times a Day., Disp: , Rfl:   •  travoprost, KATLYN free, (TRAVATAN) 0.004 % solution ophthalmic solution, 1 drop Every Evening. in affected eye(s), Disp: , Rfl:   •  Zinc 50 MG tablet, Take 50 mcg by mouth Daily., Disp: , Rfl:     Allergies:   Allergies   Allergen Reactions   • Aleve [Naproxen Sodium] Other (See Comments)     tongue swelling   • Ceclor [Cefaclor] Shortness Of Breath     hives   • Alphagan [Brimonidine] Other (See Comments)     Eyes itching, red, swollen   • Atenolol Other (See Comments)     fatigue   • Livalo [Pitavastatin] Myalgia   • Statins Myalgia   • Ibuprofen Swelling   • Adhesive Tape Rash     \"burns, eats through my skin\"   • E-Mycin [Erythromycin] Nausea And Vomiting       Objective     Physical Exam:  Vital Signs:   Vitals:    08/19/22 1011   BP: 126/82   Weight: 68.6 kg (151 lb 3.2 oz)   Height: 157.5 cm (62.01\")     Body mass index is 27.65 kg/m².     Physical Exam  Vitals reviewed.   Constitutional:       Appearance: Normal appearance.   Cardiovascular:      Rate and Rhythm: Normal rate.   Pulmonary:      Effort: Pulmonary effort is normal. No respiratory distress.   Skin:     General: Skin is warm and dry.   Neurological:      Mental Status: She is alert.   Psychiatric:         Mood and Affect: Mood normal.         Behavior: Behavior normal.         Judgment: Judgment normal.         Assessment / Plan      Assessment/Plan:   Problems Addressed This Visit  Diagnoses and all orders for this visit:    1. Acquired hypothyroidism (Primary)  -     levothyroxine (Synthroid) 88 MCG tablet; Take 1 tablet by mouth Daily.  Dispense: 30 tablet; Refill: 2  TSH slightly elevated and she is symptomatic (fatigue, constipation) will increase synthroid to 88mcg daily and plan to repeat in 6 weeks.     2. Gastroesophageal reflux disease, unspecified whether esophagitis present  Following with GI - continue dexilant. Has noticed " some improvement.     3. Post-menopause on HRT (hormone replacement therapy)  [facility] for [facility]ne[facility] for post menopausal HRT. She has been advised to work towards weaning this off and I agree as she has been on HRT for >20 years.     4. Sjogren syndrome with myopathy (HCC)  Progressively worsening. Following with Dr. Orta at [facility]rom[facility]ist. Has been trying to get IVIG approved through insurance for several months.     5. Hyperlipidemia, unspecified hyperlipidemia type  Following with Cardiology - HOWARD Malone for HTN, HLD. Cannot tolerate statins. LDL remains elevated with Praluent and PA has been requested for Leqvio    6. Primary hypertension  Continue telmisartan 80mg daily. Well controlled with this, at goal <130/80    7. Glaucoma, unspecified glaucoma type, unspecified laterality  Following with Dr. Jones Soto. Taking acetazolamide, timolol, and travoprost        Plan of care reviewed with patient at the conclusion of today's visit. Education was provided regarding diagnosis and management.  Patient verbalizes understanding of and agreement with management plan.      Follow Up:   Return in about 6 months (around 2/19/2023) for Recheck.          DO ILIANA Reyes RD  [facility]  [address]  [facility] [address]  Fax [phone]  Phone [phone]

## 2022-09-26 ENCOUNTER — TELEPHONE (OUTPATIENT)
Dept: FAMILY MEDICINE CLINIC | Facility: CLINIC | Age: 62
End: 2022-09-26

## 2022-09-26 ENCOUNTER — LAB (OUTPATIENT)
Dept: LAB | Facility: HOSPITAL | Age: 62
End: 2022-09-26

## 2022-09-26 DIAGNOSIS — E03.9 ACQUIRED HYPOTHYROIDISM: ICD-10-CM

## 2022-09-26 PROCEDURE — 84443 ASSAY THYROID STIM HORMONE: CPT

## 2022-09-26 RX ORDER — LEVOTHYROXINE SODIUM 88 UG/1
88 TABLET ORAL DAILY
Qty: 30 TABLET | Refills: 2 | Status: CANCELLED | OUTPATIENT
Start: 2022-09-26

## 2022-09-26 NOTE — TELEPHONE ENCOUNTER
Contacted patient to notify. Verbalized understanding- she reports that was experiencing worsening symptoms and insomnia while taking 88 mcg and had changed her dosage at home. She will report to [facility] lab to have labs checked further.

## 2022-09-26 NOTE — TELEPHONE ENCOUNTER
Caller: Rupa Finn    Relationship: Self    Best call back number: [phone]    What medications are you currently taking:   Current Outpatient Medications on File Prior to Visit   Medication Sig Dispense Refill   • acetaZOLAMIDE (DIAMOX) 250 MG tablet Take 125 mg by mouth 2 (two) times a day.     • Alirocumab (Praluent) 75 MG/ML solution auto-injector Inject 75 mg under the skin into the appropriate area as directed Every 14 (Fourteen) Days. 6 pen 3   • Cholecalciferol (Vitamin D3) 50 MCG (2000 UT) tablet Take  by mouth Daily.     • estradiol (Estrace) 1 MG tablet Take 1 tablet by mouth Daily. 30 tablet 1   • famotidine (Pepcid) 40 MG tablet Take 1 tablet by mouth At Night As Needed for Heartburn. 30 tablet 5   • FIBER COMPLETE PO Take  by mouth Daily.     • levothyroxine (Synthroid) 88 MCG tablet Take 1 tablet by mouth Daily. 30 tablet 2   • ondansetron (Zofran) 4 MG tablet Take 1 tablet by mouth Every 8 (Eight) Hours As Needed for Nausea or Vomiting. 6 tablet 0   • polyethylene glycol (MIRALAX) powder As Needed.     • telmisartan (MICARDIS) 80 MG tablet Take 80 mg by mouth Daily.     • timolol (TIMOPTIC) 0.5 % ophthalmic solution Administer 1 drop to both eyes 2 (Two) Times a Day.     • travoprost, BAK free, (TRAVATAN) 0.004 % solution ophthalmic solution 1 drop Every Evening. in affected eye(s)     • Zinc 50 MG tablet Take 50 mcg by mouth Daily.       No current facility-administered medications on file prior to visit.          When did you start taking these medications: 9.7.22    Which medication are you concerned about: SYNTHROID    Who prescribed you this medication: [facility]    What are your concerns: PATIENT HAS STARTED ALTERNATING THE OLD 75MCG DOSAGE AND THE CURRENT 88MCG DOSAGE, WOULD LIKE TO KNOW IF DOING THIS REQUIRES A FOLLOW UP.    IF THIS IS TO CONTINUE THE PATIENT WILL NEED THE 75MCG REFILLED.    How long have you had these concerns: SINCE 9.7.22

## 2022-09-27 DIAGNOSIS — E03.9 ACQUIRED HYPOTHYROIDISM: Primary | ICD-10-CM

## 2022-09-27 LAB — TSH SERPL DL<=0.05 MIU/L-ACNC: 2 UIU/ML (ref 0.27–4.2)

## 2022-09-27 RX ORDER — LEVOTHYROXINE SODIUM 0.07 MG/1
75 TABLET ORAL EVERY OTHER DAY
Qty: 45 TABLET | Refills: 1 | Status: SHIPPED | OUTPATIENT
Start: 2022-09-27 | End: 2023-04-05 | Stop reason: SDUPTHER

## 2022-11-04 ENCOUNTER — OFFICE VISIT (OUTPATIENT)
Dept: GASTROENTEROLOGY | Facility: CLINIC | Age: 62
End: 2022-11-04

## 2022-11-04 VITALS
TEMPERATURE: 97.8 F | HEART RATE: 68 BPM | DIASTOLIC BLOOD PRESSURE: 72 MMHG | WEIGHT: 154.6 LBS | HEIGHT: 62 IN | SYSTOLIC BLOOD PRESSURE: 118 MMHG | BODY MASS INDEX: 28.45 KG/M2

## 2022-11-04 DIAGNOSIS — R13.12 OROPHARYNGEAL DYSPHAGIA: Primary | ICD-10-CM

## 2022-11-04 DIAGNOSIS — K21.9 GASTROESOPHAGEAL REFLUX DISEASE, UNSPECIFIED WHETHER ESOPHAGITIS PRESENT: ICD-10-CM

## 2022-11-04 DIAGNOSIS — K22.4 INEFFECTIVE ESOPHAGEAL MOTILITY: ICD-10-CM

## 2022-11-04 PROBLEM — M25.569 ARTHRALGIA OF KNEE: Status: ACTIVE | Noted: 2022-04-26

## 2022-11-04 PROBLEM — M35.1 CONNECTIVE TISSUE DISEASE OVERLAP SYNDROME: Status: ACTIVE | Noted: 2022-10-26

## 2022-11-04 PROCEDURE — 99214 OFFICE O/P EST MOD 30 MIN: CPT | Performed by: PHYSICIAN ASSISTANT

## 2022-11-04 RX ORDER — DEXLANSOPRAZOLE 30 MG/1
30 CAPSULE, DELAYED RELEASE ORAL DAILY
COMMUNITY
End: 2023-01-30 | Stop reason: SDUPTHER

## 2022-11-04 RX ORDER — AMLODIPINE BESYLATE 2.5 MG/1
TABLET ORAL
COMMUNITY
Start: 2022-10-13

## 2022-11-04 NOTE — PROGRESS NOTES
"     Follow Up      Patient Name: Rupa Finn  : 1960   MRN: [id number]     Chief Complaint:  No chief complaint on file.      History of Present Illness: Rupa Finn is a 62 y.o. female who is here today for follow up on GERD, dysphagia.     Pt notes large improvement of GERD sx with addition of dexilant 30mg daily. She has only rare episodes of breakthrough indigestion with dietary indiscretion.    She continues to experience esophageal dysphagia / bolus sensation, particularly with pills. She describes oropharyngeal dysphagia, which she explains as \"my food goes to the back of my mouth to start the swallow mechanism before I am ready\". She notes that slimy / sticky foods (queso, etc) are particularly aggravating. She tries to actively concentrate on slowly chewing foods prior to swallowing them.     She is following with neurology / rheumatology in [place] and being worked up for limited scleroderma. She states that her specialists are trying to get a trial of IVIG approved for her.     Patient denies associated fever, chills, abdominal pain, nausea, vomiting, diarrhea, constipation, hematemesis, hematochezia, melena, bloating, weight loss or gain, dysuria, jaundice or bruising.    EGD / CSY  with Dr. Magallanes. Abnormal motility in lower third of esophagus. Tertiary peristaltic waves noted. Dilation performed with savary dilator to 54 Fr. Bx reveals intestinal metaplasia involving reactive squamocolumnar junctional mucosa, negative for EoE. Medium sized hiatal hernia. Diffuse mildly erythematous mucosa without bleeding throughout stomach. Bilious fluid in gastric body. Normal duodenum. Bx negative for H Pylori. Adequate bowel prep conditions. Internal hemorrhoids. Diverticulosis in descending colon. Terminal ileum appears normal. 5mm polyp (mucosal tag) in sigmoid colon, resected and retrieved.     Subjective      Review of Systems:   Review of Systems   Constitutional: Negative for " appetite change, chills, diaphoresis, fatigue, fever, unexpected weight gain and unexpected weight loss.   HENT: Positive for trouble swallowing. Negative for drooling, facial swelling, mouth sores, rhinorrhea, sore throat, tinnitus and voice change.    Eyes: Negative.    Respiratory: Negative for cough, chest tightness and shortness of breath.    Cardiovascular: Negative for chest pain.   Gastrointestinal: Positive for indigestion (occasional). Negative for abdominal pain, blood in stool, constipation, diarrhea, nausea, vomiting and GERD.   Genitourinary: Negative for dysuria, flank pain, hematuria and pelvic pain.   Musculoskeletal: Negative for arthralgias and myalgias.   Skin: Negative for color change, pallor and rash.   Neurological: Negative for dizziness, tremors, syncope, weakness and numbness.   Psychiatric/Behavioral: Negative for hallucinations and sleep disturbance. The patient is not nervous/anxious.    All other systems reviewed and are negative.      Medications:     Current Outpatient Medications:   •  acetaZOLAMIDE (DIAMOX) 250 MG tablet, Take 125 mg by mouth 2 (two) times a day., Disp: , Rfl:   •  Alirocumab (Praluent) 75 MG/ML solution auto-injector, Inject 75 mg under the skin into the appropriate area as directed Every 14 (Fourteen) Days., Disp: 6 pen, Rfl: 3  •  Cholecalciferol (Vitamin D3) 50 MCG (2000 UT) tablet, Take  by mouth Daily., Disp: , Rfl:   •  estradiol (Estrace) 1 MG tablet, Take 1 tablet by mouth Daily., Disp: 30 tablet, Rfl: 1  •  famotidine (Pepcid) 40 MG tablet, Take 1 tablet by mouth At Night As Needed for Heartburn., Disp: 30 tablet, Rfl: 5  •  FIBER COMPLETE PO, Take  by mouth Daily., Disp: , Rfl:   •  levothyroxine (Synthroid) 75 MCG tablet, Take 1 tablet by mouth Every Other Day., Disp: 45 tablet, Rfl: 1  •  levothyroxine (Synthroid) 88 MCG tablet, Take 1 tablet by mouth Daily., Disp: 30 tablet, Rfl: 2  •  ondansetron (Zofran) 4 MG tablet, Take 1 tablet by mouth Every 8  "(Eight) Hours As Needed for Nausea or Vomiting., Disp: 6 tablet, Rfl: 0  •  polyethylene glycol (MIRALAX) powder, As Needed., Disp: , Rfl:   •  telmisartan (MICARDIS) 80 MG tablet, Take 80 mg by mouth Daily., Disp: , Rfl:   •  timolol (TIMOPTIC) 0.5 % ophthalmic solution, Administer 1 drop to both eyes 2 (Two) Times a Day., Disp: , Rfl:   •  travoprost, BAK free, (TRAVATAN) 0.004 % solution ophthalmic solution, 1 drop Every Evening. in affected eye(s), Disp: , Rfl:   •  Zinc 50 MG tablet, Take 50 mcg by mouth Daily., Disp: , Rfl:     Allergies:   Allergies   Allergen Reactions   • Cefaclor Shortness Of Breath     hives   • Naproxen Sodium Other (See Comments)     tongue swelling   • Alphagan [Brimonidine] Other (See Comments)     Eyes itching, red, swollen   • Atenolol Other (See Comments)     fatigue   • Livalo [Pitavastatin] Myalgia   • Statins Myalgia   • Ibuprofen Swelling   • Macrolides And Ketolides Provider Review Needed   • Adhesive Tape Rash     \"burns, eats through my skin\"   • E-Mycin [Erythromycin] Nausea And Vomiting       Social History:   Social History     Socioeconomic History   • Marital status:    Tobacco Use   • Smoking status: Never   • Smokeless tobacco: Never   Vaping Use   • Vaping Use: Never used   Substance and Sexual Activity   • Alcohol use: No   • Drug use: No   • Sexual activity: Yes     Partners: Male     Birth control/protection: None        Surgical History:   Past Surgical History:   Procedure Laterality Date   • CHOLECYSTECTOMY     • COLONOSCOPY  2019 and 2022   • ESOPHAGOSCOPY / EGD      esophageal stretching/dilation and gastric polypectomy, esophageal biopsies   • LAPAROSCOPIC ASSISTED VAGINAL HYSTERECTOMY SALPINGO OOPHORECTOMY Bilateral    • LAPAROSCOPIC DIAZ PROCEDURE     • OOPHORECTOMY     • OTHER SURGICAL HISTORY      Lap for endometriosis   • TONSILLECTOMY     • UPPER GASTROINTESTINAL ENDOSCOPY  ?        Medical History:   Past Medical History:   Diagnosis Date   • " Anemia 2021   • Autoimmune disease (HCC)    • Cholelithiasis Removed 2008   • Colon polyp 2022   • COVID-19    • Diverticulosis 2010?   • Fibrocystic breast changes, bilateral    • GERD (gastroesophageal reflux disease)     not currently taking medication   • Gestational diabetes    • Glaucoma    • Hyperlipidemia    • Hypertension    • Hypothyroidism    • Lower extremity edema    • Neuromuscular disorder (HCC) 2020   • OAB (overactive bladder)    • Osteoarthritis    • Palpitations    • Post-menopause on HRT (hormone replacement therapy)    • Raynaud's disease    • Scleroderma (HCC)     SINE scleroderma   • Sjogren's disease (HCC)         Objective     Physical Exam:  Vital Signs: There were no vitals filed for this visit.  There is no height or weight on file to calculate BMI.     Physical Exam  Vitals and nursing note reviewed.   Constitutional:       Appearance: Normal appearance. She is normal weight. She is not ill-appearing or diaphoretic.      Comments: Pleasantly conversant   HENT:      Head: Normocephalic and atraumatic.      Right Ear: External ear normal.      Left Ear: External ear normal.      Nose: Nose normal. No rhinorrhea.      Mouth/Throat:      Mouth: Mucous membranes are moist.      Pharynx: Oropharynx is clear. No posterior oropharyngeal erythema.   Eyes:      General:         Right eye: No discharge.         Left eye: No discharge.      Conjunctiva/sclera: Conjunctivae normal.      Pupils: Pupils are equal, round, and reactive to light.   Neck:      Thyroid: No thyromegaly.   Cardiovascular:      Rate and Rhythm: Normal rate and regular rhythm.      Pulses: Normal pulses.      Heart sounds: Normal heart sounds. No murmur heard.  Pulmonary:      Effort: Pulmonary effort is normal.      Breath sounds: Normal breath sounds. No wheezing.   Abdominal:      General: Abdomen is flat. Bowel sounds are normal. There is no distension.      Tenderness: There is no abdominal tenderness.   Musculoskeletal:          General: No tenderness. Normal range of motion.      Cervical back: Normal range of motion and neck supple.      Right lower leg: No edema.      Left lower leg: No edema.   Skin:     General: Skin is warm and dry.      Capillary Refill: Capillary refill takes less than 2 seconds.      Coloration: Skin is not jaundiced.      Findings: No bruising.   Neurological:      General: No focal deficit present.      Mental Status: She is oriented to person, place, and time.      Cranial Nerves: No cranial nerve deficit.   Psychiatric:         Mood and Affect: Mood normal.         Thought Content: Thought content normal.         Judgment: Judgment normal.         Assessment / Plan      Assessment/Plan:   There are no diagnoses linked to this encounter.     Oropharyngeal dysphagia  Ineffective esophageal motility  GERD   - continue dexilant 30mg daily   - pt given GERD diet instructions, advised to avoid GI irritants such as caffeine, carbonation, EtOH, tobacco, chocolate, peppermint, acid-based foods   - all previous labs, imaging, endoscopy and pathology reports reviewed   - obtain FEES study with SLP evaluation   - schedule for EGD with dilation if sx persist   - follow up in clinic after completion of above studies   - call clinic at any time for questions or new / worsened sx\    Follow Up:   Return for Next scheduled follow up.    Plan of care reviewed with the patient at the conclusion of today's visit.  Education was provided regarding diagnosis, management, and any prescribed or recommended OTC medications.  Patient verbalized understanding of and agreement with management plan.     NOTE TO PATIENT: The 21st Century Cures Act makes medical notes like these available to patients in the interest of transparency. However, be advised this is a medical document. It is intended as peer to peer communication. It is written in medical language and may contain abbreviations or verbiage that are unfamiliar. It may appear  blunt or direct. Medical documents are intended to carry relevant information, facts as evident, and the clinical opinion of the practitioner.     Time Statement:   Discussed plan of care in detail with patient today. Patient verbally understands and agrees. I have spent 30 minutes reviewing available diagnostics, obtaining history, examining the patient, developing a treatment plan, and educating the patient on disease process and plan of care.     Missy Rothman PA-C   [facility]

## 2022-11-13 DIAGNOSIS — E03.9 ACQUIRED HYPOTHYROIDISM: ICD-10-CM

## 2022-11-14 RX ORDER — LEVOTHYROXINE SODIUM 88 MCG
TABLET ORAL
Qty: 90 TABLET | Refills: 0 | Status: SHIPPED | OUTPATIENT
Start: 2022-11-14 | End: 2023-02-13

## 2022-11-14 NOTE — TELEPHONE ENCOUNTER
Rx Refill Note  Requested Prescriptions     Pending Prescriptions Disp Refills   • Synthroid 88 MCG tablet [Pharmacy Med Name: SYNTHROID 88 MCG TABLET] 90 tablet      Sig: TAKE ONE TABLET BY MOUTH DAILY      Last office visit with prescribing clinician: 8/19/2022      Next office visit with prescribing clinician: 2/20/2023            Zunilda Reynoso MA  11/14/22, 14:35 EST

## 2022-11-16 RX ORDER — ALIROCUMAB 75 MG/ML
75 INJECTION, SOLUTION SUBCUTANEOUS
Qty: 6 ML | Refills: 3 | Status: SHIPPED | OUTPATIENT
Start: 2022-11-16

## 2022-11-29 RX ORDER — TELMISARTAN 80 MG/1
80 TABLET ORAL DAILY
Qty: 90 TABLET | Refills: 1 | Status: SHIPPED | OUTPATIENT
Start: 2022-11-29

## 2022-12-16 ENCOUNTER — OFFICE VISIT (OUTPATIENT)
Dept: FAMILY MEDICINE CLINIC | Facility: CLINIC | Age: 62
End: 2022-12-16

## 2022-12-16 VITALS
DIASTOLIC BLOOD PRESSURE: 82 MMHG | TEMPERATURE: 97.3 F | HEIGHT: 62 IN | HEART RATE: 66 BPM | BODY MASS INDEX: 28.71 KG/M2 | SYSTOLIC BLOOD PRESSURE: 138 MMHG | WEIGHT: 156 LBS

## 2022-12-16 DIAGNOSIS — K12.1 ORAL ULCERATION: ICD-10-CM

## 2022-12-16 DIAGNOSIS — J01.10 ACUTE FRONTAL SINUSITIS, RECURRENCE NOT SPECIFIED: Primary | ICD-10-CM

## 2022-12-16 PROCEDURE — 99214 OFFICE O/P EST MOD 30 MIN: CPT | Performed by: PHYSICIAN ASSISTANT

## 2022-12-16 RX ORDER — DOXYCYCLINE HYCLATE 100 MG/1
100 CAPSULE ORAL 2 TIMES DAILY
Qty: 20 CAPSULE | Refills: 0 | Status: SHIPPED | OUTPATIENT
Start: 2022-12-16 | End: 2022-12-26

## 2022-12-16 NOTE — PROGRESS NOTES
"    Chief Complaint   Patient presents with   • tongue issues     Sores on tongue. Pain when eating and drinking x 2 weeks.        HPI     Rupa Finn is a pleasant 62 y.o. female with a PMH of Sjogren's disease who presents for evaluation of \"chief complaint.\"     Patient complains of painful sores on her tongue for the past 2 weeks. They are not painful all the time like they were initially. She is using Biotene for dry mouth.  She denies a significant past history of oral ulcerations.  She does follow with a rheumatologist.      Also sneezing and sinus congestion for a couple of weeks. Rhinorrhea and sputum green-yellow. Denies fever.      Past Medical History:   Diagnosis Date   • Anemia 2021   • Autoimmune disease (HCC)    • Cholelithiasis Removed 2008   • Colon polyp 2022   • COVID-19    • Diverticulosis 2010?   • Fibrocystic breast changes, bilateral    • GERD (gastroesophageal reflux disease)     not currently taking medication   • Gestational diabetes    • Glaucoma    • Hyperlipidemia    • Hypertension    • Hypothyroidism    • Lower extremity edema    • Neuromuscular disorder (HCC) 2020   • OAB (overactive bladder)    • Osteoarthritis    • Palpitations    • Post-menopause on HRT (hormone replacement therapy)    • Raynaud's disease    • Scleroderma (HCC)     SINE scleroderma   • Sjogren's disease (HCC)        Past Surgical History:   Procedure Laterality Date   • CHOLECYSTECTOMY     • COLONOSCOPY  2019 and 2022   • ESOPHAGOSCOPY / EGD      esophageal stretching/dilation and gastric polypectomy, esophageal biopsies   • EYE SURGERY  2012    Cataracts removed - lens replacement   • LAPAROSCOPIC ASSISTED VAGINAL HYSTERECTOMY SALPINGO OOPHORECTOMY Bilateral    • LAPAROSCOPIC DIAZ PROCEDURE     • OOPHORECTOMY     • OTHER SURGICAL HISTORY      Lap for endometriosis   • TONSILLECTOMY     • UPPER GASTROINTESTINAL ENDOSCOPY  ?       Family History   Problem Relation Age of Onset   • Other Mother         has a " "pacemaker and is followed by Dr. Brooks   • Heart disease Mother    • Heart failure Mother    • Hypertension Mother    • Diabetes Mother    • Vision loss Mother         Glaucoma/macular degeneration   • Arthritis Mother    • Glaucoma Father    • Other Father          likely a cancer-related death;   • Heart disease Father    • Hypertension Father    • Cancer Father         Prostate   • Vision loss Father         Glaucoma/macular degeneration   • Asthma Brother    • Hypertension Brother    • Heart attack Maternal Grandfather    • Heart disease Maternal Grandfather    • Heart failure Maternal Grandfather    • Heart disease Maternal Grandmother    • Hypertension Maternal Grandmother    • Hypertension Paternal Grandmother    • Colon cancer Paternal Grandfather    • Cancer Paternal Grandfather         Prostate   • Breast cancer Neg Hx    • Ovarian cancer Neg Hx        Social History     Socioeconomic History   • Marital status:    Tobacco Use   • Smoking status: Never   • Smokeless tobacco: Never   Vaping Use   • Vaping Use: Never used   Substance and Sexual Activity   • Alcohol use: No   • Drug use: No   • Sexual activity: Yes     Partners: Male     Birth control/protection: None       Allergies   Allergen Reactions   • Cefaclor Shortness Of Breath     hives   • Naproxen Sodium Other (See Comments)     tongue swelling   • Alphagan [Brimonidine] Other (See Comments)     Eyes itching, red, swollen   • Atenolol Other (See Comments)     fatigue   • Livalo [Pitavastatin] Myalgia   • Statins Myalgia   • Ibuprofen Swelling   • Macrolides And Ketolides Provider Review Needed   • Adhesive Tape Rash     \"burns, eats through my skin\"   • E-Mycin [Erythromycin] Nausea And Vomiting       ROS    Review of Systems   Constitutional: Negative for chills and fever.   HENT: Positive for congestion, mouth sores, postnasal drip, rhinorrhea and sinus pressure.    Respiratory: Negative for cough and shortness of breath.  "   Cardiovascular: Negative for chest pain.       Vitals:    12/16/22 1050   BP: 138/82   Pulse: 66   Temp: 97.3 °F (36.3 °C)     Body mass index is 28.52 kg/m².      Current Outpatient Medications:   •  acetaZOLAMIDE (DIAMOX) 250 MG tablet, Take 125 mg by mouth 2 (two) times a day., Disp: , Rfl:   •  Alirocumab (Praluent) 75 MG/ML solution auto-injector, Inject 1 mL under the skin into the appropriate area as directed Every 14 (Fourteen) Days., Disp: 6 mL, Rfl: 3  •  amLODIPine (NORVASC) 2.5 MG tablet, Haven't started yet., Disp: , Rfl:   •  Cholecalciferol (Vitamin D3) 50 MCG (2000 UT) tablet, Take  by mouth Daily., Disp: , Rfl:   •  dexlansoprazole (DEXILANT) 30 MG capsule, Take 1 capsule by mouth Daily., Disp: , Rfl:   •  estradiol (Estrace) 1 MG tablet, Take 1 tablet by mouth Daily., Disp: 30 tablet, Rfl: 1  •  famotidine (Pepcid) 40 MG tablet, Take 1 tablet by mouth At Night As Needed for Heartburn., Disp: 30 tablet, Rfl: 5  •  FIBER COMPLETE PO, Take  by mouth Daily., Disp: , Rfl:   •  levothyroxine (Synthroid) 75 MCG tablet, Take 1 tablet by mouth Every Other Day., Disp: 45 tablet, Rfl: 1  •  ondansetron (Zofran) 4 MG tablet, Take 1 tablet by mouth Every 8 (Eight) Hours As Needed for Nausea or Vomiting., Disp: 6 tablet, Rfl: 0  •  polyethylene glycol (MIRALAX) powder, As Needed., Disp: , Rfl:   •  Synthroid 88 MCG tablet, TAKE ONE TABLET BY MOUTH DAILY, Disp: 90 tablet, Rfl: 0  •  telmisartan (MICARDIS) 80 MG tablet, Take 1 tablet by mouth Daily., Disp: 90 tablet, Rfl: 1  •  timolol (TIMOPTIC) 0.5 % ophthalmic solution, Administer 1 drop to both eyes 2 (Two) Times a Day., Disp: , Rfl:   •  travoprost, BAK free, (TRAVATAN) 0.004 % solution ophthalmic solution, 1 drop Every Evening. in affected eye(s), Disp: , Rfl:   •  Zinc 50 MG tablet, Take 50 mcg by mouth Daily., Disp: , Rfl:   •  doxycycline (VIBRAMYCIN) 100 MG capsule, Take 1 capsule by mouth 2 (Two) Times a Day for 10 days., Disp: 20 capsule, Rfl:  0    PE    Physical Exam  Vitals reviewed.   Constitutional:       General: She is not in acute distress.     Appearance: She is well-developed.   HENT:      Head: Normocephalic.      Right Ear: Tympanic membrane and ear canal normal. Tympanic membrane is not erythematous.      Left Ear: Tympanic membrane and ear canal normal. Tympanic membrane is not erythematous.      Nose:      Right Sinus: Frontal sinus tenderness present. No maxillary sinus tenderness.      Left Sinus: Frontal sinus tenderness present. No maxillary sinus tenderness.      Mouth/Throat:      Pharynx: Uvula midline. Posterior oropharyngeal erythema present.      Tonsils: No tonsillar exudate.        Comments: Very shallow white ulcerative lesions at the lateral aspects of the tongue as well as a small circumscribed ulcer inferior to the tongue as shown.     Mild posterior pharynx erythema with cobblestoning consistent with postnasal drip.  Eyes:      General:         Right eye: No discharge.         Left eye: No discharge.      Conjunctiva/sclera: Conjunctivae normal.   Cardiovascular:      Rate and Rhythm: Normal rate and regular rhythm.      Heart sounds: Normal heart sounds.   Pulmonary:      Effort: Pulmonary effort is normal. No respiratory distress.      Breath sounds: Normal breath sounds. No wheezing, rhonchi or rales.   Musculoskeletal:      Cervical back: Normal range of motion and neck supple.   Lymphadenopathy:      Cervical: No cervical adenopathy.      Upper Body:      Right upper body: No supraclavicular adenopathy.      Left upper body: No supraclavicular adenopathy.   Skin:     General: Skin is warm and dry.   Psychiatric:         Behavior: Behavior normal.          A/P    Problem List Items Addressed This Visit    None  Visit Diagnoses     Acute frontal sinusitis, recurrence not specified    -  Primary  -Symptoms persistent for more than 2 weeks.  -Multiple antibiotic intolerances. We will treat with doxycycline, mucinex, and  nasal saline irrigation.   -Counseled patient to return if symptoms worsen or persist.      Oral ulceration      -No prior history herpetic lesions but the patient is aware of.  -Possible aphthous ulcerations but the patient is intolerant to even topical steroids and would like to avoid them.  -I called in a prescription for Magic mouthwash to [facility] pharmacy for the patient to try for 1 to 2 weeks. If she is not improved at this time, I recommended notifying the office for possible referral to ENT for further evaluation.          Plan of care was reviewed with patient at the conclusion of today's visit. Education was provided regarding diagnoses, management, prescribed or recommended OTC products, and the importance of compliance with follow-up appointments. The patient was counseled regarding the risks, benefits, and possible side-effects of treatment. I advised the patient to keep me informed of any acute changes in their status including new, worsening, or persistent symptoms. Patient expresses understanding and agreement with the management plan.        ERIC Conley  Answers for HPI/ROS submitted by the patient on 12/15/2022  Please describe your symptoms.: Sores on tongue for 2 weeks, butns with eating or drinking certain foods, , Sinus discharge for 2 weeks  Have you had these symptoms before?: Yes  How long have you been having these symptoms?: Greater than 2 weeks  Please list any medications you are currently taking for this condition.: None  Please describe any probable cause for these symptoms. : Autoimmune disease?  What is the primary reason for your visit?: Other

## 2023-01-30 ENCOUNTER — TRANSCRIBE ORDERS (OUTPATIENT)
Dept: OBSTETRICS AND GYNECOLOGY | Facility: CLINIC | Age: 63
End: 2023-01-30
Payer: COMMERCIAL

## 2023-01-30 DIAGNOSIS — Z12.31 VISIT FOR SCREENING MAMMOGRAM: Primary | ICD-10-CM

## 2023-01-30 RX ORDER — DEXLANSOPRAZOLE 30 MG/1
30 CAPSULE, DELAYED RELEASE ORAL DAILY
Qty: 90 CAPSULE | Refills: 3 | Status: SHIPPED | OUTPATIENT
Start: 2023-01-30 | End: 2023-02-03

## 2023-02-01 ENCOUNTER — TELEPHONE (OUTPATIENT)
Dept: CARDIOLOGY | Facility: CLINIC | Age: 63
End: 2023-02-01
Payer: COMMERCIAL

## 2023-02-01 PROBLEM — M34.9 SCLERODERMA: Status: ACTIVE | Noted: 2023-02-01
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
## 2023-02-01 NOTE — TELEPHONE ENCOUNTER
Patient called for refills of Praluent. Patient was supposed to switch to Leqvio in 8/2022 however insurance would not cover at that time.     Patient is now taking Gammagard infusions every two weeks with [facility]. Patient is concerned about starting Leqvio with these infusions.     Please advise.

## 2023-02-02 NOTE — TELEPHONE ENCOUNTER
It should be fine for Leqvio with the gammagard infusions. I called pharmacy to make sure as well.

## 2023-02-02 NOTE — TELEPHONE ENCOUNTER
Called pt and gave KS recommendations above. Pt verbalizes understanding and agreeable to plan.    Pt agreeable to moving forward with Leqvio.

## 2023-02-03 ENCOUNTER — TELEPHONE (OUTPATIENT)
Dept: CARDIOLOGY | Facility: CLINIC | Age: 63
End: 2023-02-03
Payer: COMMERCIAL

## 2023-02-03 PROBLEM — E78.5 HYPERLIPEMIA: Status: ACTIVE | Noted: 2023-02-03

## 2023-02-03 RX ORDER — MEPERIDINE HYDROCHLORIDE 25 MG/ML
25 INJECTION INTRAMUSCULAR; INTRAVENOUS; SUBCUTANEOUS AS NEEDED
OUTPATIENT
Start: 2023-03-02

## 2023-02-03 RX ORDER — FAMOTIDINE 10 MG/ML
20 INJECTION, SOLUTION INTRAVENOUS AS NEEDED
OUTPATIENT
Start: 2023-03-02

## 2023-02-03 RX ORDER — DIPHENHYDRAMINE HYDROCHLORIDE 50 MG/ML
50 INJECTION INTRAMUSCULAR; INTRAVENOUS AS NEEDED
OUTPATIENT
Start: 2023-03-02

## 2023-02-03 RX ORDER — DEXLANSOPRAZOLE 30 MG/1
CAPSULE, DELAYED RELEASE ORAL
Qty: 90 CAPSULE | Refills: 3 | Status: SHIPPED | OUTPATIENT
Start: 2023-02-03

## 2023-02-09 ENCOUNTER — TELEPHONE (OUTPATIENT)
Dept: CARDIOLOGY | Facility: CLINIC | Age: 63
End: 2023-02-09
Payer: COMMERCIAL

## 2023-02-09 DIAGNOSIS — R06.09 DOE (DYSPNEA ON EXERTION): Primary | ICD-10-CM

## 2023-02-09 DIAGNOSIS — R00.2 PALPITATIONS: ICD-10-CM

## 2023-02-09 NOTE — TELEPHONE ENCOUNTER
Pt called and states that her BP has been running high, at least 2 weeks. Pt complains of headache and increased palpitations. Pt feels like she may have had some SOB during the past week, both with activity and without. Pt denies chest pain, dizziness, swelling. Pt started Gammagard infusions 2 weeks ago.    142/80  160/92  157/89  HR 51-57    Pt is taking telmisartan 80 mg daily.     Please advise.

## 2023-02-09 NOTE — TELEPHONE ENCOUNTER
Would add amlodipine 2.5mg nightly, and get an ECG, CBC, Mag, TSH, CMP, E patch, proBNP, d dimer, echo. If she could call back next week with bp readings after starting amlodipine and to let us know if symptoms have improved.

## 2023-02-10 DIAGNOSIS — E78.5 HYPERLIPIDEMIA, UNSPECIFIED HYPERLIPIDEMIA TYPE: Primary | ICD-10-CM

## 2023-02-12 DIAGNOSIS — E03.9 ACQUIRED HYPOTHYROIDISM: ICD-10-CM

## 2023-02-13 ENCOUNTER — LAB (OUTPATIENT)
Dept: LAB | Facility: HOSPITAL | Age: 63
End: 2023-02-13
Payer: COMMERCIAL

## 2023-02-13 DIAGNOSIS — R00.2 PALPITATIONS: ICD-10-CM

## 2023-02-13 DIAGNOSIS — R06.09 DOE (DYSPNEA ON EXERTION): ICD-10-CM

## 2023-02-13 DIAGNOSIS — E78.5 HYPERLIPIDEMIA, UNSPECIFIED HYPERLIPIDEMIA TYPE: ICD-10-CM

## 2023-02-13 LAB
CHOLEST SERPL-MCNC: 204 MG/DL (ref 0–200)
D DIMER PPP FEU-MCNC: 0.9 MCGFEU/ML (ref 0–0.62)
DEPRECATED RDW RBC AUTO: 42.3 FL (ref 37–54)
ERYTHROCYTE [DISTWIDTH] IN BLOOD BY AUTOMATED COUNT: 12.8 % (ref 12.3–15.4)
HCT VFR BLD AUTO: 34 % (ref 34–46.6)
HDLC SERPL-MCNC: 78 MG/DL (ref 40–60)
HGB BLD-MCNC: 11.3 G/DL (ref 12–15.9)
LDLC SERPL CALC-MCNC: 105 MG/DL (ref 0–100)
LDLC/HDLC SERPL: 1.3 {RATIO}
MAGNESIUM SERPL-MCNC: 2.1 MG/DL (ref 1.6–2.4)
MCH RBC QN AUTO: 30 PG (ref 26.6–33)
MCHC RBC AUTO-ENTMCNC: 33.2 G/DL (ref 31.5–35.7)
MCV RBC AUTO: 90.2 FL (ref 79–97)
PLATELET # BLD AUTO: 251 10*3/MM3 (ref 140–450)
PMV BLD AUTO: 11.9 FL (ref 6–12)
RBC # BLD AUTO: 3.77 10*6/MM3 (ref 3.77–5.28)
TRIGL SERPL-MCNC: 123 MG/DL (ref 0–150)
VLDLC SERPL-MCNC: 21 MG/DL (ref 5–40)
WBC NRBC COR # BLD: 3.23 10*3/MM3 (ref 3.4–10.8)

## 2023-02-13 PROCEDURE — 83880 ASSAY OF NATRIURETIC PEPTIDE: CPT

## 2023-02-13 PROCEDURE — 85027 COMPLETE CBC AUTOMATED: CPT

## 2023-02-13 PROCEDURE — 36415 COLL VENOUS BLD VENIPUNCTURE: CPT

## 2023-02-13 PROCEDURE — 85379 FIBRIN DEGRADATION QUANT: CPT

## 2023-02-13 PROCEDURE — 84443 ASSAY THYROID STIM HORMONE: CPT

## 2023-02-13 PROCEDURE — 80061 LIPID PANEL: CPT

## 2023-02-13 PROCEDURE — 80053 COMPREHEN METABOLIC PANEL: CPT

## 2023-02-13 PROCEDURE — 83735 ASSAY OF MAGNESIUM: CPT

## 2023-02-13 RX ORDER — LEVOTHYROXINE SODIUM 88 UG/1
TABLET ORAL
Qty: 90 TABLET | Refills: 0 | Status: SHIPPED | OUTPATIENT
Start: 2023-02-13 | End: 2023-04-05 | Stop reason: SDUPTHER

## 2023-02-13 NOTE — TELEPHONE ENCOUNTER
Rx Refill Note  Requested Prescriptions     Pending Prescriptions Disp Refills   • levothyroxine (SYNTHROID, LEVOTHROID) 88 MCG tablet [Pharmacy Med Name: LEVOTHYROXINE 88 MCG TABLET] 90 tablet 0     Sig: TAKE ONE TABLET BY MOUTH DAILY      Last office visit with prescribing clinician: 8/19/2022   Last telemedicine visit with prescribing clinician: 2/20/2023   Next office visit with prescribing clinician: 2/20/2023                         Would you like a call back once the refill request has been completed: [] Yes [] No    If the office needs to give you a call back, can they leave a voicemail: [] Yes [] No    Sarah Allison MA  02/13/23, 07:48 EST

## 2023-02-14 DIAGNOSIS — R06.09 DOE (DYSPNEA ON EXERTION): Primary | ICD-10-CM

## 2023-02-14 LAB
ALBUMIN SERPL-MCNC: 4 G/DL (ref 3.5–5.2)
ALBUMIN/GLOB SERPL: 0.8 G/DL
ALP SERPL-CCNC: 65 U/L (ref 39–117)
ALT SERPL W P-5'-P-CCNC: 20 U/L (ref 1–33)
ANION GAP SERPL CALCULATED.3IONS-SCNC: 8.7 MMOL/L (ref 5–15)
AST SERPL-CCNC: 30 U/L (ref 1–32)
BILIRUB SERPL-MCNC: 0.3 MG/DL (ref 0–1.2)
BUN SERPL-MCNC: 10 MG/DL (ref 8–23)
BUN/CREAT SERPL: 13.2 (ref 7–25)
CALCIUM SPEC-SCNC: 8.8 MG/DL (ref 8.6–10.5)
CHLORIDE SERPL-SCNC: 98 MMOL/L (ref 98–107)
CO2 SERPL-SCNC: 26.3 MMOL/L (ref 22–29)
CREAT SERPL-MCNC: 0.76 MG/DL (ref 0.57–1)
EGFRCR SERPLBLD CKD-EPI 2021: 88.7 ML/MIN/1.73
GLOBULIN UR ELPH-MCNC: 5.1 GM/DL
GLUCOSE SERPL-MCNC: 72 MG/DL (ref 65–99)
NT-PROBNP SERPL-MCNC: 169 PG/ML (ref 0–900)
POTASSIUM SERPL-SCNC: 4.5 MMOL/L (ref 3.5–5.2)
PROT SERPL-MCNC: 9.1 G/DL (ref 6–8.5)
SODIUM SERPL-SCNC: 133 MMOL/L (ref 136–145)
TSH SERPL DL<=0.05 MIU/L-ACNC: 4.55 UIU/ML (ref 0.27–4.2)

## 2023-02-20 ENCOUNTER — HOSPITAL ENCOUNTER (OUTPATIENT)
Dept: CARDIOLOGY | Facility: HOSPITAL | Age: 63
Discharge: HOME | End: 2023-02-20
Payer: COMMERCIAL

## 2023-02-20 ENCOUNTER — LAB (OUTPATIENT)
Dept: LAB | Facility: HOSPITAL | Age: 63
End: 2023-02-20
Payer: COMMERCIAL

## 2023-02-20 ENCOUNTER — OFFICE VISIT (OUTPATIENT)
Dept: FAMILY MEDICINE CLINIC | Facility: CLINIC | Age: 63
End: 2023-02-20
Payer: COMMERCIAL

## 2023-02-20 VITALS
HEIGHT: 62 IN | HEART RATE: 59 BPM | DIASTOLIC BLOOD PRESSURE: 82 MMHG | WEIGHT: 155.2 LBS | OXYGEN SATURATION: 99 % | BODY MASS INDEX: 28.56 KG/M2 | SYSTOLIC BLOOD PRESSURE: 124 MMHG

## 2023-02-20 VITALS — WEIGHT: 155.2 LBS | HEIGHT: 62 IN | BODY MASS INDEX: 28.56 KG/M2

## 2023-02-20 DIAGNOSIS — R00.2 PALPITATIONS: ICD-10-CM

## 2023-02-20 DIAGNOSIS — R06.09 DOE (DYSPNEA ON EXERTION): ICD-10-CM

## 2023-02-20 DIAGNOSIS — J30.9 ALLERGIC RHINITIS, UNSPECIFIED SEASONALITY, UNSPECIFIED TRIGGER: ICD-10-CM

## 2023-02-20 DIAGNOSIS — E03.9 ACQUIRED HYPOTHYROIDISM: ICD-10-CM

## 2023-02-20 DIAGNOSIS — E03.9 ACQUIRED HYPOTHYROIDISM: Primary | ICD-10-CM

## 2023-02-20 LAB
BH CV ECHO MEAS - AI P1/2T: 489 MSEC
BH CV ECHO MEAS - AO MAX PG: 6.4 MMHG
BH CV ECHO MEAS - AO MEAN PG: 3.6 MMHG
BH CV ECHO MEAS - AO ROOT DIAM: 2.7 CM
BH CV ECHO MEAS - AO V2 MAX: 126.4 CM/SEC
BH CV ECHO MEAS - AO V2 VTI: 28.5 CM
BH CV ECHO MEAS - AVA(I,D): 2.11 CM2
BH CV ECHO MEAS - EDV(CUBED): 72.4 ML
BH CV ECHO MEAS - EDV(MOD-SP2): 75 ML
BH CV ECHO MEAS - EDV(MOD-SP4): 79 ML
BH CV ECHO MEAS - EF(MOD-BP): 62 %
BH CV ECHO MEAS - EF(MOD-SP2): 60 %
BH CV ECHO MEAS - EF(MOD-SP4): 63.3 %
BH CV ECHO MEAS - ESV(CUBED): 21.3 ML
BH CV ECHO MEAS - ESV(MOD-SP2): 30 ML
BH CV ECHO MEAS - ESV(MOD-SP4): 29 ML
BH CV ECHO MEAS - FS: 33.5 %
BH CV ECHO MEAS - IVS/LVPW: 0.96 CM
BH CV ECHO MEAS - IVSD: 0.93 CM
BH CV ECHO MEAS - LA DIMENSION: 3.3 CM
BH CV ECHO MEAS - LAT PEAK E' VEL: 9.8 CM/SEC
BH CV ECHO MEAS - LV DIASTOLIC VOL/BSA (35-75): 46.1 CM2
BH CV ECHO MEAS - LV MASS(C)D: 125.2 GRAMS
BH CV ECHO MEAS - LV MAX PG: 4 MMHG
BH CV ECHO MEAS - LV MEAN PG: 2.01 MMHG
BH CV ECHO MEAS - LV SYSTOLIC VOL/BSA (12-30): 16.9 CM2
BH CV ECHO MEAS - LV V1 MAX: 100.2 CM/SEC
BH CV ECHO MEAS - LV V1 VTI: 21.8 CM
BH CV ECHO MEAS - LVIDD: 4.2 CM
BH CV ECHO MEAS - LVIDS: 2.8 CM
BH CV ECHO MEAS - LVOT AREA: 2.8 CM2
BH CV ECHO MEAS - LVOT DIAM: 1.88 CM
BH CV ECHO MEAS - LVPWD: 0.96 CM
BH CV ECHO MEAS - MED PEAK E' VEL: 6.14 CM/SEC
BH CV ECHO MEAS - MV A MAX VEL: 43.9 CM/SEC
BH CV ECHO MEAS - MV DEC SLOPE: 227 CM/SEC2
BH CV ECHO MEAS - MV DEC TIME: 0.21 MSEC
BH CV ECHO MEAS - MV E MAX VEL: 69.6 CM/SEC
BH CV ECHO MEAS - MV E/A: 1.58
BH CV ECHO MEAS - MV MAX PG: 2.6 MMHG
BH CV ECHO MEAS - MV MEAN PG: 1.21 MMHG
BH CV ECHO MEAS - MV P1/2T: 109.1 MSEC
BH CV ECHO MEAS - MV V2 VTI: 31.5 CM
BH CV ECHO MEAS - MVA(P1/2T): 2.02 CM2
BH CV ECHO MEAS - MVA(VTI): 1.91 CM2
BH CV ECHO MEAS - PA ACC SLOPE: 521.8 CM/SEC2
BH CV ECHO MEAS - PA ACC TIME: 0.13 SEC
BH CV ECHO MEAS - PA PR(ACCEL): 22 MMHG
BH CV ECHO MEAS - PI END-D VEL: 90.9 CM/SEC
BH CV ECHO MEAS - RAP SYSTOLE: 3 MMHG
BH CV ECHO MEAS - RVSP: 22 MMHG
BH CV ECHO MEAS - SI(MOD-SP2): 26.2 ML/M2
BH CV ECHO MEAS - SI(MOD-SP4): 29.1 ML/M2
BH CV ECHO MEAS - SV(LVOT): 60.2 ML
BH CV ECHO MEAS - SV(MOD-SP2): 45 ML
BH CV ECHO MEAS - SV(MOD-SP4): 50 ML
BH CV ECHO MEAS - TAPSE (>1.6): 2.4 CM
BH CV ECHO MEAS - TR MAX PG: 18.8 MMHG
BH CV ECHO MEAS - TR MAX VEL: 214.9 CM/SEC
BH CV ECHO MEASUREMENTS AVERAGE E/E' RATIO: 8.73
BH CV VAS BP RIGHT ARM: NORMAL MMHG
BH CV XLRA - RV BASE: 3.6 CM
BH CV XLRA - RV LENGTH: 7.1 CM
BH CV XLRA - RV MID: 2.6 CM
BH CV XLRA - TDI S': 8.77 CM/SEC
LEFT ATRIUM VOLUME INDEX: 27.9 ML/M2
LV EF 2D ECHO EST: 60 %
MAXIMAL PREDICTED HEART RATE: 158 BPM
STRESS TARGET HR: 134 BPM

## 2023-02-20 PROCEDURE — 99214 OFFICE O/P EST MOD 30 MIN: CPT | Performed by: STUDENT IN AN ORGANIZED HEALTH CARE EDUCATION/TRAINING PROGRAM

## 2023-02-20 PROCEDURE — 84439 ASSAY OF FREE THYROXINE: CPT

## 2023-02-20 PROCEDURE — 93306 TTE W/DOPPLER COMPLETE: CPT | Performed by: INTERNAL MEDICINE

## 2023-02-20 PROCEDURE — 93306 TTE W/DOPPLER COMPLETE: CPT

## 2023-02-20 RX ORDER — MONTELUKAST SODIUM 10 MG/1
10 TABLET ORAL NIGHTLY
Qty: 30 TABLET | Refills: 0 | Status: SHIPPED | OUTPATIENT
Start: 2023-02-20
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
## 2023-02-21 LAB — T4 FREE SERPL-MCNC: 1.3 NG/DL (ref 0.93–1.7)

## 2023-02-22 ENCOUNTER — HOSPITAL ENCOUNTER (OUTPATIENT)
Dept: NUCLEAR MEDICINE | Facility: HOSPITAL | Age: 63
Discharge: HOME | End: 2023-02-22
Payer: COMMERCIAL

## 2023-02-22 ENCOUNTER — OFFICE VISIT (OUTPATIENT)
Dept: CARDIOLOGY | Facility: CLINIC | Age: 63
End: 2023-02-22
Payer: COMMERCIAL

## 2023-02-22 ENCOUNTER — HOSPITAL ENCOUNTER (OUTPATIENT)
Dept: GENERAL RADIOLOGY | Facility: HOSPITAL | Age: 63
Discharge: HOME | End: 2023-02-22
Admitting: RADIOLOGY
Payer: COMMERCIAL

## 2023-02-22 VITALS
HEIGHT: 62 IN | WEIGHT: 156 LBS | BODY MASS INDEX: 28.71 KG/M2 | OXYGEN SATURATION: 97 % | DIASTOLIC BLOOD PRESSURE: 76 MMHG | SYSTOLIC BLOOD PRESSURE: 114 MMHG | HEART RATE: 80 BPM

## 2023-02-22 DIAGNOSIS — R06.09 DOE (DYSPNEA ON EXERTION): ICD-10-CM

## 2023-02-22 DIAGNOSIS — I10 PRIMARY HYPERTENSION: ICD-10-CM

## 2023-02-22 DIAGNOSIS — M34.9 SCLERODERMA: ICD-10-CM

## 2023-02-22 DIAGNOSIS — R00.2 PALPITATIONS: ICD-10-CM

## 2023-02-22 DIAGNOSIS — E78.2 MIXED HYPERLIPIDEMIA: ICD-10-CM

## 2023-02-22 DIAGNOSIS — R06.09 DOE (DYSPNEA ON EXERTION): Primary | ICD-10-CM

## 2023-02-22 PROCEDURE — 0 TECHNETIUM ALBUMIN AGGREGATED: Performed by: NURSE PRACTITIONER

## 2023-02-22 PROCEDURE — 99214 OFFICE O/P EST MOD 30 MIN: CPT | Performed by: NURSE PRACTITIONER

## 2023-02-22 PROCEDURE — 78580 LUNG PERFUSION IMAGING: CPT

## 2023-02-22 PROCEDURE — 71045 X-RAY EXAM CHEST 1 VIEW: CPT

## 2023-02-22 PROCEDURE — A9540 TC99M MAA: HCPCS | Performed by: NURSE PRACTITIONER

## 2023-02-22 RX ADMIN — KIT FOR THE PREPARATION OF TECHNETIUM TC 99M ALBUMIN AGGREGATED 1 DOSE: 2.5 INJECTION, POWDER, FOR SOLUTION INTRAVENOUS at 10:24

## 2023-02-24 DIAGNOSIS — R00.2 PALPITATIONS: Primary | ICD-10-CM

## 2023-03-06 ENCOUNTER — OFFICE VISIT (OUTPATIENT)
Dept: OBSTETRICS AND GYNECOLOGY | Facility: CLINIC | Age: 63
End: 2023-03-06
Payer: COMMERCIAL

## 2023-03-06 VITALS
BODY MASS INDEX: 27.82 KG/M2 | DIASTOLIC BLOOD PRESSURE: 88 MMHG | HEIGHT: 62 IN | SYSTOLIC BLOOD PRESSURE: 140 MMHG | WEIGHT: 151.2 LBS

## 2023-03-06 DIAGNOSIS — Z01.419 ENCOUNTER FOR GYNECOLOGICAL EXAMINATION WITHOUT ABNORMAL FINDING: Primary | ICD-10-CM

## 2023-03-06 DIAGNOSIS — Z79.890 POSTMENOPAUSAL HORMONE REPLACEMENT THERAPY: ICD-10-CM

## 2023-03-06 DIAGNOSIS — Z78.0 POSTMENOPAUSAL: ICD-10-CM

## 2023-03-06 DIAGNOSIS — Z13.820 SCREENING FOR OSTEOPOROSIS: ICD-10-CM

## 2023-03-06 PROCEDURE — 99396 PREV VISIT EST AGE 40-64: CPT | Performed by: NURSE PRACTITIONER

## 2023-03-06 RX ORDER — ESTRADIOL 0.5 MG/1
0.5 TABLET ORAL DAILY
Qty: 90 TABLET | Refills: 0 | Status: SHIPPED | OUTPATIENT
Start: 2023-03-06

## 2023-03-06 NOTE — PROGRESS NOTES
"Chief Complaint  Rupa Finn is a 62 y.o.  female presenting for Annual Exam and Med Refill (Patient will call for estradiol refills.)    History of Present Illness  Rupa is a very pleasant 63yo woman, , here for annual gyn exam.  She is a complex medical pt with several autoimmune dx / rheumatology issues.  She has Sjogren's, SINE scleroderma, connective tissue dz, and is using infusions (Gamma Guard) to help with muscle tone/ strength.  We discussed weaning down / getting off the ERT if possible.      She has PSHx LAVH/BSO and has been reluctant in the past to get off ERT.  She felt the benefits for her always outweighed the risks.  But, now she is concerned about the possibility of thromboembolic events.  She will decrease the current dose in half, then try soon to either wean off / or at least change to transdermal tx.      Needs to repeat DEXA scan.  Has appt in April for mammogram.  Colon ca screening is up to date (22 with recommendation to repeat in 5 years).      The following portions of the patient's history were reviewed and updated as appropriate: allergies, current medications, past family history, past medical history, past social history, past surgical history and problem list.    Allergies   Allergen Reactions   • Cefaclor Shortness Of Breath     hives   • Naproxen Sodium Other (See Comments)     tongue swelling   • Alphagan [Brimonidine] Other (See Comments)     Eyes itching, red, swollen   • Atenolol Other (See Comments)     fatigue   • Livalo [Pitavastatin] Myalgia   • Statins Myalgia   • Dorzolamide Hcl Other (See Comments)     Eyes burning, discharge.   • Doxycycline Unknown - High Severity   • Ibuprofen Swelling   • Macrolides And Ketolides Provider Review Needed   • Adhesive Tape Rash     \"burns, eats through my skin\"   • E-Mycin [Erythromycin] Nausea And Vomiting         Current Outpatient Medications:   •  acetaZOLAMIDE (DIAMOX) 250 MG tablet, Take 125 mg by mouth 2 " (two) times a day., Disp: , Rfl:   •  Alirocumab (Praluent) 75 MG/ML solution auto-injector, Inject 1 mL under the skin into the appropriate area as directed Every 14 (Fourteen) Days., Disp: 6 mL, Rfl: 3  •  amLODIPine (NORVASC) 2.5 MG tablet, Haven't started yet., Disp: , Rfl:   •  Cholecalciferol (Vitamin D3) 50 MCG (2000 UT) tablet, Take  by mouth Daily., Disp: , Rfl:   •  dexlansoprazole (DEXILANT) 30 MG capsule, TAKE ONE CAPSULE BY MOUTH DAILY, Disp: 90 capsule, Rfl: 3  •  estradiol (Estrace) 0.5 MG tablet, Take 1 tablet by mouth Daily. NOTE DOSE CHANGE, Disp: 90 tablet, Rfl: 0  •  famotidine (Pepcid) 40 MG tablet, Take 1 tablet by mouth At Night As Needed for Heartburn., Disp: 30 tablet, Rfl: 5  •  FIBER COMPLETE PO, Take  by mouth Daily., Disp: , Rfl:   •  levothyroxine (Synthroid) 75 MCG tablet, Take 1 tablet by mouth Every Other Day., Disp: 45 tablet, Rfl: 1  •  levothyroxine (SYNTHROID, LEVOTHROID) 88 MCG tablet, TAKE ONE TABLET BY MOUTH DAILY, Disp: 90 tablet, Rfl: 0  •  montelukast (Singulair) 10 MG tablet, Take 1 tablet by mouth Every Night., Disp: 30 tablet, Rfl: 0  •  ondansetron (Zofran) 4 MG tablet, Take 1 tablet by mouth Every 8 (Eight) Hours As Needed for Nausea or Vomiting., Disp: 6 tablet, Rfl: 0  •  polyethylene glycol (MIRALAX) powder, As Needed., Disp: , Rfl:   •  telmisartan (MICARDIS) 80 MG tablet, Take 1 tablet by mouth Daily., Disp: 90 tablet, Rfl: 1  •  timolol (TIMOPTIC) 0.5 % ophthalmic solution, Administer 1 drop to both eyes 2 (Two) Times a Day., Disp: , Rfl:   •  travoprost, BAK free, (TRAVATAN) 0.004 % solution ophthalmic solution, 1 drop Every Evening. in affected eye(s), Disp: , Rfl:   •  Zinc 50 MG tablet, Take 50 mcg by mouth Daily., Disp: , Rfl:     Past Medical History:   Diagnosis Date   • Anemia 2021   • Autoimmune disease (HCC)    • Cholelithiasis Removed 2008   • Colon polyp 2022   • COVID-19    • Diverticulosis 2010?   • Fibrocystic breast changes, bilateral    • GERD  "(gastroesophageal reflux disease)     not currently taking medication   • Gestational diabetes    • Glaucoma    • Hyperlipidemia    • Hypertension    • Hypothyroidism    • Lower extremity edema    • Neuromuscular disorder (HCC) 2020   • OAB (overactive bladder)    • Osteoarthritis    • Palpitations    • Post-menopause on HRT (hormone replacement therapy)    • Raynaud's disease    • Scleroderma (HCC)     SINE scleroderma   • Sjogren's disease (HCC)    • Urinary tract infection         Past Surgical History:   Procedure Laterality Date   • CHOLECYSTECTOMY     • COLONOSCOPY  2019 and 2022   • ESOPHAGOSCOPY / EGD      esophageal stretching/dilation and gastric polypectomy, esophageal biopsies   • EYE SURGERY  2012    Cataracts removed - lens replacement   • LAPAROSCOPIC ASSISTED VAGINAL HYSTERECTOMY SALPINGO OOPHORECTOMY Bilateral    • LAPAROSCOPIC DIAZ PROCEDURE     • OOPHORECTOMY     • OTHER SURGICAL HISTORY      Lap for endometriosis   • TONSILLECTOMY     • UPPER GASTROINTESTINAL ENDOSCOPY  ?       Objective  /88   Ht 157.5 cm (62\")   Wt 68.6 kg (151 lb 3.2 oz)   LMP  (LMP Unknown)   Breastfeeding No   BMI 27.65 kg/m²     Physical Exam  Vitals and nursing note reviewed. Exam conducted with a chaperone present.   Constitutional:       Appearance: Normal appearance.   HENT:      Head: Normocephalic.   Neck:      Thyroid: No thyroid mass or thyromegaly.   Cardiovascular:      Rate and Rhythm: Normal rate and regular rhythm.      Heart sounds: Normal heart sounds. No murmur heard.  Pulmonary:      Effort: Pulmonary effort is normal. No respiratory distress.      Breath sounds: Normal breath sounds.   Chest:   Breasts:     Right: No inverted nipple, mass or nipple discharge.      Left: No inverted nipple, mass or nipple discharge.   Abdominal:      Palpations: Abdomen is soft. There is no mass.      Tenderness: There is no abdominal tenderness.   Genitourinary:     General: Normal vulva.      Labia:         " Right: No lesion.         Left: No lesion.       Vagina: No erythema.      Uterus: Absent.       Adnexa:         Right: No mass or tenderness.          Left: No mass or tenderness.        Comments: Anus appears wnl.  No rectal exam performed.  Lymphadenopathy:      Upper Body:      Right upper body: No supraclavicular or axillary adenopathy.      Left upper body: No supraclavicular or axillary adenopathy.   Skin:     General: Skin is warm and dry.   Neurological:      Mental Status: She is alert and oriented to person, place, and time.   Psychiatric:         Mood and Affect: Mood normal.         Behavior: Behavior normal.         Assessment/Plan   Diagnoses and all orders for this visit:    1. Encounter for gynecological examination without abnormal finding (Primary)    2. Postmenopausal hormone replacement therapy    3. Postmenopausal  -     DEXA Bone Density Axial; Future    4. Screening for osteoporosis  -     DEXA Bone Density Axial; Future    Other orders  -     estradiol (Estrace) 0.5 MG tablet; Take 1 tablet by mouth Daily. NOTE DOSE CHANGE  Dispense: 90 tablet; Refill: 0        Procedures            Return in about 1 year (around 3/6/2024) for Annual physical.    Mariana Martinez, APRN  03/06/2023

## 2023-03-13 ENCOUNTER — HOSPITAL ENCOUNTER (OUTPATIENT)
Dept: CARDIOLOGY | Facility: HOSPITAL | Age: 63
Discharge: HOME | End: 2023-03-13
Admitting: NURSE PRACTITIONER
Payer: COMMERCIAL

## 2023-03-13 VITALS
HEIGHT: 62 IN | SYSTOLIC BLOOD PRESSURE: 163 MMHG | HEART RATE: 61 BPM | DIASTOLIC BLOOD PRESSURE: 95 MMHG | BODY MASS INDEX: 27.83 KG/M2 | WEIGHT: 151.24 LBS

## 2023-03-13 DIAGNOSIS — R00.2 PALPITATIONS: ICD-10-CM

## 2023-03-13 LAB
BH CV REST NUCLEAR ISOTOPE DOSE: 29.9 MCI
BH CV STRESS BP STAGE 1: NORMAL
BH CV STRESS BP STAGE 3: NORMAL
BH CV STRESS COMMENTS STAGE 1: NORMAL
BH CV STRESS DOSE REGADENOSON STAGE 1: 0.4
BH CV STRESS DURATION MIN STAGE 1: 1
BH CV STRESS DURATION MIN STAGE 2: 1
BH CV STRESS DURATION MIN STAGE 3: 1
BH CV STRESS DURATION MIN STAGE 4: 1
BH CV STRESS DURATION SEC STAGE 1: 0
BH CV STRESS DURATION SEC STAGE 2: 0
BH CV STRESS DURATION SEC STAGE 3: 0
BH CV STRESS DURATION SEC STAGE 4: 0
BH CV STRESS HR STAGE 1: 90
BH CV STRESS HR STAGE 2: 96
BH CV STRESS HR STAGE 3: 90
BH CV STRESS HR STAGE 4: 82
BH CV STRESS NUCLEAR ISOTOPE DOSE: 30 MCI
BH CV STRESS O2 STAGE 1: 99
BH CV STRESS O2 STAGE 2: 100
BH CV STRESS O2 STAGE 3: 100
BH CV STRESS O2 STAGE 4: 99
BH CV STRESS PROTOCOL 1: NORMAL
BH CV STRESS RECOVERY BP: NORMAL MMHG
BH CV STRESS RECOVERY HR: 7 BPM
BH CV STRESS RECOVERY O2: 99 %
BH CV STRESS STAGE 1: 1
BH CV STRESS STAGE 2: 2
BH CV STRESS STAGE 3: 3
BH CV STRESS STAGE 4: 4
MAXIMAL PREDICTED HEART RATE: 158 BPM
PERCENT MAX PREDICTED HR: 60.76 %
STRESS BASELINE BP: NORMAL MMHG
STRESS BASELINE HR: 62 BPM
STRESS O2 SAT REST: 100 %
STRESS PERCENT HR: 71 %
STRESS POST ESTIMATED WORKLOAD: 1 METS
STRESS POST EXERCISE DUR MIN: 4 MIN
STRESS POST EXERCISE DUR SEC: 0 SEC
STRESS POST O2 SAT PEAK: 100 %
STRESS POST PEAK BP: NORMAL MMHG
STRESS POST PEAK HR: 96 BPM
STRESS TARGET HR: 134 BPM

## 2023-03-13 PROCEDURE — 25010000002 REGADENOSON 0.4 MG/5ML SOLUTION: Performed by: NURSE PRACTITIONER

## 2023-03-13 PROCEDURE — 78431 MYOCRD IMG PET RST&STRS CT: CPT

## 2023-03-13 PROCEDURE — 78431 MYOCRD IMG PET RST&STRS CT: CPT | Performed by: INTERNAL MEDICINE

## 2023-03-13 PROCEDURE — 93017 CV STRESS TEST TRACING ONLY: CPT

## 2023-03-13 PROCEDURE — 0 RUBIDIUM CHLORIDE: Performed by: NURSE PRACTITIONER

## 2023-03-13 PROCEDURE — 93018 CV STRESS TEST I&R ONLY: CPT | Performed by: INTERNAL MEDICINE

## 2023-03-13 PROCEDURE — A9555 RB82 RUBIDIUM: HCPCS | Performed by: NURSE PRACTITIONER

## 2023-03-13 RX ADMIN — REGADENOSON 0.4 MG: 0.08 INJECTION, SOLUTION INTRAVENOUS at 12:25

## 2023-03-13 RX ADMIN — RUBIDIUM CHLORIDE RB-82 1 DOSE: 150 INJECTION, SOLUTION INTRAVENOUS at 12:15

## 2023-03-13 RX ADMIN — RUBIDIUM CHLORIDE RB-82 1 DOSE: 150 INJECTION, SOLUTION INTRAVENOUS at 12:26

## 2023-04-05 DIAGNOSIS — E03.9 ACQUIRED HYPOTHYROIDISM: ICD-10-CM

## 2023-04-05 RX ORDER — LEVOTHYROXINE SODIUM 0.07 MG/1
75 TABLET ORAL EVERY OTHER DAY
Qty: 45 TABLET | Refills: 1 | Status: SHIPPED | OUTPATIENT
Start: 2023-04-05

## 2023-04-05 RX ORDER — LEVOTHYROXINE SODIUM 88 UG/1
88 TABLET ORAL EVERY OTHER DAY
Qty: 45 TABLET | Refills: 1 | Status: SHIPPED | OUTPATIENT
Start: 2023-04-05

## 2023-04-05 NOTE — TELEPHONE ENCOUNTER
Rx Refill Note  Requested Prescriptions     Pending Prescriptions Disp Refills   • levothyroxine (Synthroid) 75 MCG tablet 45 tablet 1     Sig: Take 1 tablet by mouth Every Other Day.   • levothyroxine (SYNTHROID, LEVOTHROID) 88 MCG tablet 90 tablet 0     Sig: Take 1 tablet by mouth Daily.      Last office visit with prescribing clinician: 2/20/2023   Last telemedicine visit with prescribing clinician: 8/21/2023   Next office visit with prescribing clinician: 8/21/2023                         Would you like a call back once the refill request has been completed: [] Yes [] No    If the office needs to give you a call back, can they leave a voicemail: [] Yes [] No    Zunilda Reynoso MA  04/05/23, 14:49 EDT

## 2023-04-05 NOTE — TELEPHONE ENCOUNTER
"Caller: Rupa Finn \"Jeannette\"    Relationship: Self    Best call back number: [phone]    Requested Prescriptions:   Requested Prescriptions     Pending Prescriptions Disp Refills   • levothyroxine (Synthroid) 75 MCG tablet 45 tablet 1     Sig: Take 1 tablet by mouth Every Other Day.   • levothyroxine (SYNTHROID, LEVOTHROID) 88 MCG tablet 90 tablet 0     Sig: Take 1 tablet by mouth Daily.        Pharmacy where request should be sent: [facility] [address] [place] [address] - [phone] [facility] [phone]      Last office visit with prescribing clinician: 2/20/2023   Last telemedicine visit with prescribing clinician: 8/21/2023   Next office visit with prescribing clinician: 8/21/2023     Additional details provided by patient: PATIENT HAS A FEW DAYS LEFT OF HER MEDICATION     Does the patient have less than a 3 day supply:  [x] Yes  [] No    Would you like a call back once the refill request has been completed: [x] Yes [] No    If the office needs to give you a call back, can they leave a voicemail: [x] Yes [] No    Mihaela Tan Rep   04/05/23 11:19 EDT           "

## 2023-04-06 ENCOUNTER — HOSPITAL ENCOUNTER (OUTPATIENT)
Dept: MAMMOGRAPHY | Facility: HOSPITAL | Age: 63
Discharge: HOME | End: 2023-04-06
Admitting: NURSE PRACTITIONER
Payer: COMMERCIAL

## 2023-04-06 DIAGNOSIS — Z12.31 VISIT FOR SCREENING MAMMOGRAM: ICD-10-CM

## 2023-04-06 PROCEDURE — 77067 SCR MAMMO BI INCL CAD: CPT | Performed by: RADIOLOGY

## 2023-04-06 PROCEDURE — 77067 SCR MAMMO BI INCL CAD: CPT

## 2023-04-06 PROCEDURE — 77063 BREAST TOMOSYNTHESIS BI: CPT | Performed by: RADIOLOGY

## 2023-04-06 PROCEDURE — 77063 BREAST TOMOSYNTHESIS BI: CPT

## 2023-05-24 ENCOUNTER — TELEPHONE (OUTPATIENT)
Dept: OBSTETRICS AND GYNECOLOGY | Facility: CLINIC | Age: 63
End: 2023-05-24
Payer: COMMERCIAL

## 2023-05-24 RX ORDER — ESTRADIOL 0.5 MG/1
0.5 TABLET ORAL DAILY
Qty: 90 TABLET | Refills: 2 | Status: SHIPPED | OUTPATIENT
Start: 2023-05-24

## 2023-05-24 NOTE — TELEPHONE ENCOUNTER
We discussed ERT risks & benefits.  She is willing to decrease to 0.5 mg.  I will send in new Rx.

## 2023-05-24 NOTE — TELEPHONE ENCOUNTER
"Caller: Rupa Finn \"Jeannette\"    Relationship to patient: Self    Best call back number: [phone]    Patient is needing: PATIENT IS TAKING ESTRADIOL 1MG AND STATES WANTED TO LOWER DOSAGE TO 0.5MG HOWEVER SHE IS HAVING INFUSIONS WITH IV IG TREATMENTS AND STATES THAT HAS A RARE RISK OF BLOOD CLOTS - PATIENT HAS A 2 WEEK SUPPLY OF ESTRADIOL 0.5MG REMAINING WITH TAKING 2 OF THOSE A DAY - WOULD LIKE TO TALK TO SOMEONE BEFORE REFILL TO SEE IF SHE SHOULD GO DOWN TO TAKING 0.5MG A DAY OR 1MG A DAY - PLEASE ADVISE PATIENT  "

## 2023-05-30 RX ORDER — TELMISARTAN 80 MG/1
TABLET ORAL
Qty: 90 TABLET | Refills: 0 | Status: SHIPPED | OUTPATIENT
Start: 2023-05-30

## 2023-06-06 ENCOUNTER — TELEPHONE (OUTPATIENT)
Dept: GASTROENTEROLOGY | Facility: CLINIC | Age: 63
End: 2023-06-06
Payer: COMMERCIAL

## 2023-06-06 NOTE — TELEPHONE ENCOUNTER
Ms Missy Aakash called severe abdominal pain x 2 weeks, cramping, frequent bowel movements from diarrhea to soft stools, no blood, no fever. Patient gets IV Ig infusions every 2 weeks in [place]. Please advise. Thanks

## 2023-06-14 ENCOUNTER — LAB (OUTPATIENT)
Dept: LAB | Facility: HOSPITAL | Age: 63
End: 2023-06-14
Payer: COMMERCIAL

## 2023-06-15 RX ORDER — TELMISARTAN 80 MG/1
TABLET ORAL
Qty: 90 TABLET | Refills: 2 | Status: SHIPPED | OUTPATIENT
Start: 2023-06-15

## 2023-06-19 ENCOUNTER — OFFICE VISIT (OUTPATIENT)
Dept: FAMILY MEDICINE CLINIC | Facility: CLINIC | Age: 63
End: 2023-06-19
Payer: COMMERCIAL

## 2023-06-19 VITALS
BODY MASS INDEX: 25.21 KG/M2 | WEIGHT: 137 LBS | DIASTOLIC BLOOD PRESSURE: 88 MMHG | OXYGEN SATURATION: 95 % | SYSTOLIC BLOOD PRESSURE: 138 MMHG | HEIGHT: 62 IN | HEART RATE: 67 BPM

## 2023-06-19 DIAGNOSIS — M25.562 ACUTE PAIN OF LEFT KNEE: Primary | ICD-10-CM

## 2023-06-19 DIAGNOSIS — M79.605 ACUTE LEG PAIN, LEFT: ICD-10-CM

## 2023-06-19 PROCEDURE — 99214 OFFICE O/P EST MOD 30 MIN: CPT | Performed by: PHYSICIAN ASSISTANT

## 2023-06-20 NOTE — PROGRESS NOTES
Chief Complaint   Patient presents with   • left knee pain     X 1 week          Rupa Finn is a 63 y.o. female who presents for left knee pain (X 1 week )    Patient reports that she walks regularly and uses an electric bicycle for activity.  About 10 days ago she had acute onset of left knee pain.  No known trauma/injury.  No heat or erythema.  Mild swelling.  She has been managing conservatively with compression brace, ice, topical diclofenac.  She is unable to take oral steroids and NSAIDs.  Her brother is a Sports Medicine Physician and he happened to be in town this last week.  He examined her and recommended an MRI incase she has a stress fracture.    Past Medical History:   Diagnosis Date   • Anemia 2021   • Autoimmune disease    • Cholelithiasis Removed 2008   • Colon polyp 2022   • COVID-19    • Diverticulosis 2010?   • Fibrocystic breast changes, bilateral    • GERD (gastroesophageal reflux disease)     not currently taking medication   • Gestational diabetes    • Glaucoma    • Hyperlipidemia    • Hypertension    • Hypothyroidism    • Lower extremity edema    • Neuromuscular disorder 2020   • OAB (overactive bladder)    • Osteoarthritis    • Palpitations    • Post-menopause on HRT (hormone replacement therapy)    • Raynaud's disease    • Scleroderma     SINE scleroderma   • Sjogren's disease    • Urinary tract infection        Past Surgical History:   Procedure Laterality Date   • CHOLECYSTECTOMY     • COLONOSCOPY  2019 and 2022   • ESOPHAGOSCOPY / EGD      esophageal stretching/dilation and gastric polypectomy, esophageal biopsies   • EYE SURGERY  2012    Cataracts removed - lens replacement   • LAPAROSCOPIC ASSISTED VAGINAL HYSTERECTOMY SALPINGO OOPHORECTOMY Bilateral    • LAPAROSCOPIC DIAZ PROCEDURE     • OOPHORECTOMY     • OTHER SURGICAL HISTORY      Lap for endometriosis   • TONSILLECTOMY     • UPPER GASTROINTESTINAL ENDOSCOPY  ?       Family History   Problem Relation Age of Onset   • Other  "Mother         has a pacemaker and is followed by Dr. Brooks   • Heart disease Mother    • Heart failure Mother    • Hypertension Mother    • Diabetes Mother    • Vision loss Mother         Glaucoma/macular degeneration   • Arthritis Mother    • Glaucoma Father    • Other Father          likely a cancer-related death;   • Heart disease Father    • Hypertension Father    • Cancer Father         Prostate   • Vision loss Father         Glaucoma/macular degeneration   • Asthma Brother    • Hypertension Brother    • Heart attack Maternal Grandfather    • Heart disease Maternal Grandfather    • Heart failure Maternal Grandfather    • Heart disease Maternal Grandmother    • Hypertension Maternal Grandmother    • Hypertension Paternal Grandmother    • Colon cancer Paternal Grandfather    • Cancer Paternal Grandfather         Prostate   • Breast cancer Neg Hx    • Ovarian cancer Neg Hx        Social History     Socioeconomic History   • Marital status:    Tobacco Use   • Smoking status: Never   • Smokeless tobacco: Never   Vaping Use   • Vaping Use: Never used   Substance and Sexual Activity   • Alcohol use: No   • Drug use: No   • Sexual activity: Yes     Partners: Male     Birth control/protection: Hysterectomy, Post-menopausal       Allergies   Allergen Reactions   • Cefaclor Shortness Of Breath     hives   • Naproxen Sodium Other (See Comments)     tongue swelling   • Alphagan [Brimonidine] Other (See Comments)     Eyes itching, red, swollen   • Atenolol Other (See Comments)     fatigue   • Livalo [Pitavastatin] Myalgia   • Statins Myalgia   • Dorzolamide Hcl Other (See Comments)     Eyes burning, discharge.   • Doxycycline Unknown - High Severity   • Ibuprofen Swelling   • Macrolides And Ketolides Provider Review Needed   • Adhesive Tape Rash     \"burns, eats through my skin\"   • E-Mycin [Erythromycin] Nausea And Vomiting       ROS    Review of Systems   Constitutional: Negative for chills and fever. " "  Musculoskeletal: Positive for arthralgias and joint swelling. Negative for gait problem.   Skin: Negative for color change.   Neurological: Negative for weakness and numbness.       Vitals:    06/19/23 1342   BP: 138/88   Pulse: 67   SpO2: 95%   Weight: 62.1 kg (137 lb)   Height: 157.5 cm (62.01\")   PainSc:   9   PainLoc: Knee     Body mass index is 25.05 kg/m².    Current Outpatient Medications on File Prior to Visit   Medication Sig Dispense Refill   • acetaZOLAMIDE (DIAMOX) 250 MG tablet Take 125 mg by mouth 2 (two) times a day.     • Alirocumab (Praluent) 75 MG/ML solution auto-injector Inject 1 mL under the skin into the appropriate area as directed Every 14 (Fourteen) Days. 6 mL 3   • amLODIPine (NORVASC) 2.5 MG tablet Haven't started yet.     • Cholecalciferol (Vitamin D3) 50 MCG (2000 UT) tablet Take  by mouth Daily.     • dexlansoprazole (DEXILANT) 30 MG capsule TAKE ONE CAPSULE BY MOUTH DAILY 90 capsule 3   • estradiol (Estrace) 0.5 MG tablet Take 1 tablet by mouth Daily. 90 tablet 2   • famotidine (Pepcid) 40 MG tablet Take 1 tablet by mouth At Night As Needed for Heartburn. 30 tablet 5   • FIBER COMPLETE PO Take  by mouth Daily.     • levothyroxine (Synthroid) 75 MCG tablet Take 1 tablet by mouth Every Other Day. 45 tablet 1   • levothyroxine (SYNTHROID, LEVOTHROID) 88 MCG tablet Take 1 tablet by mouth Every Other Day. 45 tablet 1   • ondansetron (Zofran) 4 MG tablet Take 1 tablet by mouth Every 8 (Eight) Hours As Needed for Nausea or Vomiting. 6 tablet 0   • polyethylene glycol (MIRALAX) powder As Needed.     • telmisartan (MICARDIS) 80 MG tablet TAKE 1 TABLET BY MOUTH EVERY DAY 90 tablet 2   • timolol (TIMOPTIC) 0.5 % ophthalmic solution Administer 1 drop to both eyes 2 (Two) Times a Day.     • travoprost, BAK free, (TRAVATAN) 0.004 % solution ophthalmic solution 1 drop Every Evening. in affected eye(s)     • Zinc 50 MG tablet Take 50 mcg by mouth Daily.     • [DISCONTINUED] montelukast (Singulair) " 10 MG tablet Take 1 tablet by mouth Every Night. 30 tablet 0     No current facility-administered medications on file prior to visit.       Results for orders placed or performed during the hospital encounter of 03/13/23   Stress Test With Pet Myocardial Perfusion   Result Value Ref Range    Target HR (85%) 134 bpm    Max. Pred. HR (100%) 158 bpm    BH CV STRESS PROTOCOL 1 Pharmacologic     Stage 1 1     Duration Min Stage 1 1     Duration Sec Stage 1 0     Stress Dose Regadenoson Stage 1 0.4     Stress Comments Stage 1 10 sec bolus injection     Stage 2 2     Duration Min Stage 2 1     Duration Sec Stage 2 0     Stage 3 3     Duration Min Stage 3 1     Duration Sec Stage 3 0     Stage 4 4     Duration Min Stage 4 1     Duration Sec Stage 4 0     Baseline HR 62 bpm    Baseline /95 mmHg    O2 sat rest 100 %    HR Stage 1 90     BP Stage 1 156/96     O2 Stage 1 99     HR Stage 2 96     O2 Stage 2 100     HR Stage 3 90     BP Stage 3 149/90     O2 Stage 3 100     HR Stage 4 82     O2 Stage 4 99     Peak HR 96 bpm    Percent Max Pred HR 60.76 %    Percent Target HR 71 %    Peak /96 mmHg    O2 sat peak 100 %    Recovery HR 7 bpm    Recovery /75 mmHg    Recovery O2 99 %    Exercise duration (min) 4 min    Exercise duration (sec) 0 sec    Estimated workload 1.0 METS    BH CV REST NUCLEAR ISOTOPE DOSE 29.9 mCi    BH CV STRESS NUCLEAR ISOTOPE DOSE 30.0 mCi       PE    Physical Exam  Vitals reviewed.   Constitutional:       General: She is not in acute distress.     Appearance: Normal appearance. She is well-developed and normal weight. She is not ill-appearing or diaphoretic.   HENT:      Head: Normocephalic and atraumatic.   Eyes:      Extraocular Movements: Extraocular movements intact.      Conjunctiva/sclera: Conjunctivae normal.   Pulmonary:      Effort: No respiratory distress.   Musculoskeletal:         General: Normal range of motion.      Cervical back: Normal range of motion.         Legs:    Neurological:      General: No focal deficit present.      Mental Status: She is alert.   Psychiatric:         Attention and Perception: She is attentive.         Mood and Affect: Mood normal.         Speech: Speech normal.         Behavior: Behavior normal. Behavior is cooperative.         Thought Content: Thought content normal.         Judgment: Judgment normal.          A/P    Diagnoses and all orders for this visit:    1. Acute pain of left knee (Primary)  -     XR Knee 1 or 2 View Left; Future  -     MRI Knee Left Without Contrast; Future    2. Acute leg pain, left  -     XR Tibia Fibula 2 View Left; Future  -     MRI Tibia Fibula Left Without Contrast; Future    Continue with rest, time, compression brace, ice, elevation and topical diclofenac.  Tylenol as needed for pain.  Will order x-ray left knee and tibia/fibula.  If x-ray is unremarkable, will obtain MRI given degree of pain and area of pain.   Further recommendations pending imaging.  Return in 2-3 weeks to see Dr. Aguilar.    Plan of care reviewed with patient at the conclusion of today's visit. Education was provided regarding diagnosis, management and any prescribed or recommended OTC medications.  Patient verbalizes understanding of and agreement with management plan.    Dictated Utilizing Dragon Dictation     Please note that portions of this note were completed with a voice recognition program.     Part of this note may be an electronic transcription/translation of spoken language to printed text using the Dragon Dictation System.    Return in about 2 weeks (around 7/3/2023) for Recheck, left knee pain.     Mary Cassidy PA-C

## 2023-07-12 PROCEDURE — 88305 TISSUE EXAM BY PATHOLOGIST: CPT

## 2023-07-13 ENCOUNTER — LAB REQUISITION (OUTPATIENT)
Dept: LAB | Facility: HOSPITAL | Age: 63
End: 2023-07-13
Payer: COMMERCIAL

## 2023-07-13 DIAGNOSIS — R10.13 EPIGASTRIC PAIN: ICD-10-CM

## 2023-07-13 DIAGNOSIS — K31.89 OTHER DISEASES OF STOMACH AND DUODENUM: ICD-10-CM

## 2023-07-13 DIAGNOSIS — K44.9 DIAPHRAGMATIC HERNIA WITHOUT OBSTRUCTION OR GANGRENE: ICD-10-CM

## 2023-07-13 DIAGNOSIS — K21.9 GASTRO-ESOPHAGEAL REFLUX DISEASE WITHOUT ESOPHAGITIS: ICD-10-CM

## 2023-07-13 DIAGNOSIS — R11.0 NAUSEA: ICD-10-CM

## 2023-07-14 LAB — REF LAB TEST METHOD: NORMAL

## 2023-07-25 ENCOUNTER — HOSPITAL ENCOUNTER (OUTPATIENT)
Dept: MRI IMAGING | Facility: HOSPITAL | Age: 63
Discharge: HOME | End: 2023-07-25
Admitting: PHYSICIAN ASSISTANT
Payer: COMMERCIAL

## 2023-07-25 DIAGNOSIS — M25.562 ACUTE PAIN OF LEFT KNEE: ICD-10-CM

## 2023-07-25 PROCEDURE — 73721 MRI JNT OF LWR EXTRE W/O DYE: CPT

## 2023-07-26 ENCOUNTER — OFFICE VISIT (OUTPATIENT)
Dept: GASTROENTEROLOGY | Facility: CLINIC | Age: 63
End: 2023-07-26
Payer: COMMERCIAL

## 2023-07-26 VITALS
DIASTOLIC BLOOD PRESSURE: 78 MMHG | TEMPERATURE: 97.3 F | BODY MASS INDEX: 25.05 KG/M2 | SYSTOLIC BLOOD PRESSURE: 122 MMHG | WEIGHT: 137 LBS

## 2023-07-26 DIAGNOSIS — K29.70 GASTRITIS WITHOUT BLEEDING, UNSPECIFIED CHRONICITY, UNSPECIFIED GASTRITIS TYPE: ICD-10-CM

## 2023-07-26 DIAGNOSIS — K21.9 GASTROESOPHAGEAL REFLUX DISEASE, UNSPECIFIED WHETHER ESOPHAGITIS PRESENT: Primary | ICD-10-CM

## 2023-07-26 NOTE — PROGRESS NOTES
Follow Up      Patient Name: Rupa Finn  : 1960   MRN: [id number]     Chief Complaint:  No chief complaint on file.      History of Present Illness: Rupa Finn is a 63 y.o. female who is here today for post procedure follow up.  is with patient for visit today.     EGD 23 with Dr. Magallanes. Normal upper / middle third of esophagus. Dilation with Savary dilator with mild resistance to 54Fr. 1cm hiatal hernia. Bilious fluid in gastric body and fundus. Diffuse gastritis. Normal duodenum. Bx negative for H Pylori, celiac disease, intestinal metaplasia or dysplasia.     Pt did not yet start rx carafate due to concern for potential side effects / interaction with her Gamunex C. She has an appt with her neuromuscular specialist, Dr. Meghana Orta, on 23 to discuss further.     She continues to experience epigastric pain / cramping, reflux sx, but overall improved from last visit. She continues to take Dexilant 30mg daily.     EGD 2022 with Dr. Magallanes. Abnormal motility in lower third of esophagus. Tertiary peristaltic waves noted. Dilation performed with savary dilator to 54 Fr. Bx reveals intestinal metaplasia involving reactive squamocolumnar junctional mucosa, negative for EoE. Medium sized hiatal hernia. Diffuse mildly erythematous mucosa without bleeding throughout stomach. Bilious fluid in gastric body. Normal duodenum. Bx negative for H Pylori.      Subjective      Review of Systems:   Review of Systems   Constitutional:  Negative for appetite change, chills, diaphoresis, fatigue, fever, unexpected weight gain and unexpected weight loss.   HENT:  Negative for drooling, facial swelling, mouth sores, rhinorrhea, sore throat, tinnitus, trouble swallowing and voice change.    Eyes: Negative.    Respiratory:  Negative for cough, chest tightness and shortness of breath.    Cardiovascular:  Negative for chest pain.   Gastrointestinal:  Positive for abdominal pain  (epigastric) and GERD. Negative for blood in stool, constipation, diarrhea, nausea, vomiting and indigestion.   Genitourinary:  Negative for dysuria, flank pain, hematuria and pelvic pain.   Musculoskeletal:  Negative for arthralgias and myalgias.   Skin:  Negative for color change, pallor and rash.   Neurological:  Negative for dizziness, tremors, syncope, weakness and numbness.   Psychiatric/Behavioral:  Negative for hallucinations and sleep disturbance. The patient is not nervous/anxious.    All other systems reviewed and are negative.    Medications:     Current Outpatient Medications:     acetaZOLAMIDE (DIAMOX) 250 MG tablet, Take 125 mg by mouth 2 (two) times a day., Disp: , Rfl:     acyclovir (Zovirax) 5 % ointment, Apply 1 application topically to the appropriate area as directed Every 6 (Six) Hours., Disp: 15 g, Rfl: 0    Alirocumab (Praluent) 75 MG/ML solution auto-injector, Inject 1 mL under the skin into the appropriate area as directed Every 14 (Fourteen) Days., Disp: 6 mL, Rfl: 3    amLODIPine (NORVASC) 2.5 MG tablet, TAKE 1 TABLET BY MOUTH EVERY DAY, Disp: 90 tablet, Rfl: 0    Cholecalciferol (Vitamin D3) 50 MCG (2000 UT) tablet, Take  by mouth Daily., Disp: , Rfl:     dexlansoprazole (DEXILANT) 30 MG capsule, TAKE ONE CAPSULE BY MOUTH DAILY, Disp: 90 capsule, Rfl: 3    estradiol (Estrace) 0.5 MG tablet, Take 1 tablet by mouth Daily., Disp: 90 tablet, Rfl: 2    famotidine (Pepcid) 40 MG tablet, Take 1 tablet by mouth At Night As Needed for Heartburn., Disp: 30 tablet, Rfl: 5    levothyroxine (Synthroid) 75 MCG tablet, Take 1 tablet by mouth Every Other Day., Disp: 45 tablet, Rfl: 1    levothyroxine (SYNTHROID, LEVOTHROID) 88 MCG tablet, Take 1 tablet by mouth Every Other Day., Disp: 45 tablet, Rfl: 1    polyethylene glycol (MIRALAX) powder, As Needed., Disp: , Rfl:     telmisartan (MICARDIS) 80 MG tablet, TAKE 1 TABLET BY MOUTH EVERY DAY, Disp: 90 tablet, Rfl: 2    timolol (TIMOPTIC) 0.5 % ophthalmic  "solution, Administer 1 drop to both eyes 2 (Two) Times a Day., Disp: , Rfl:     travoprost, KATLYN free, (TRAVATAN) 0.004 % solution ophthalmic solution, 1 drop Every Evening. in affected eye(s), Disp: , Rfl:     Zinc 50 MG tablet, Take 50 mcg by mouth Daily., Disp: , Rfl:     Allergies:   Allergies   Allergen Reactions    Cefaclor Shortness Of Breath     hives    Naproxen Sodium Other (See Comments)     tongue swelling    Alphagan [Brimonidine] Other (See Comments)     Eyes itching, red, swollen    Atenolol Other (See Comments)     fatigue    Livalo [Pitavastatin] Myalgia    Statins Myalgia    Dorzolamide Hcl Other (See Comments)     Eyes burning, discharge.    Dorzolamide Hcl-Timolol Mal Unknown - High Severity    Doxycycline Unknown - High Severity    Gammagard [Immune Globulin] Dermatitis    Ibuprofen Swelling    Macrolides And Ketolides Provider Review Needed    Adhesive Tape Rash     \"burns, eats through my skin\"    E-Mycin [Erythromycin] Nausea And Vomiting       Social History:   Social History     Socioeconomic History    Marital status:    Tobacco Use    Smoking status: Never    Smokeless tobacco: Never   Vaping Use    Vaping Use: Never used   Substance and Sexual Activity    Alcohol use: No    Drug use: No    Sexual activity: Yes     Partners: Male     Birth control/protection: Post-menopausal, Hysterectomy        Surgical History:   Past Surgical History:   Procedure Laterality Date    CHOLECYSTECTOMY      COLONOSCOPY  2019 and 2022    ESOPHAGOSCOPY / EGD      esophageal stretching/dilation and gastric polypectomy, esophageal biopsies    EYE SURGERY  2012    Cataracts removed - lens replacement    LAPAROSCOPIC ASSISTED VAGINAL HYSTERECTOMY SALPINGO OOPHORECTOMY Bilateral     LAPAROSCOPIC DIAZ PROCEDURE      OOPHORECTOMY      OTHER SURGICAL HISTORY      Lap for endometriosis    TONSILLECTOMY      UPPER GASTROINTESTINAL ENDOSCOPY  ?        Medical History:   Past Medical History:   Diagnosis Date    " Anemia 2021    Autoimmune disease     Cholelithiasis Removed 2008    Colon polyp 2022    COVID-19     Diverticulosis 2010?    Fibrocystic breast changes, bilateral     GERD (gastroesophageal reflux disease)     not currently taking medication    Gestational diabetes     Glaucoma     Hyperlipidemia     Hypertension     Hypothyroidism     Lower extremity edema     Neuromuscular disorder 2020    OAB (overactive bladder)     Osteoarthritis     Palpitations     Post-menopause on HRT (hormone replacement therapy)     Raynaud's disease     Scleroderma     SINE scleroderma    Sjogren's disease     Urinary tract infection         Objective     Physical Exam:  Vital Signs: There were no vitals filed for this visit.  There is no height or weight on file to calculate BMI.     Physical Exam  Vitals and nursing note reviewed.   Constitutional:       Appearance: Normal appearance. She is normal weight. She is not ill-appearing or diaphoretic.      Comments: Pleasantly conversant   HENT:      Head: Normocephalic and atraumatic.      Right Ear: External ear normal.      Left Ear: External ear normal.      Nose: Nose normal.      Mouth/Throat:      Mouth: Mucous membranes are moist.      Pharynx: Oropharynx is clear.   Eyes:      Conjunctiva/sclera: Conjunctivae normal.      Pupils: Pupils are equal, round, and reactive to light.   Neck:      Thyroid: No thyromegaly.   Cardiovascular:      Rate and Rhythm: Normal rate and regular rhythm.      Pulses: Normal pulses.      Heart sounds: Normal heart sounds.   Pulmonary:      Effort: Pulmonary effort is normal.      Breath sounds: Normal breath sounds.   Abdominal:      General: Abdomen is flat. Bowel sounds are normal. There is no distension.      Tenderness: There is no abdominal tenderness.   Musculoskeletal:         General: Normal range of motion.      Cervical back: Normal range of motion and neck supple.      Right lower leg: No edema.      Left lower leg: No edema.   Skin:      General: Skin is warm and dry.   Neurological:      General: No focal deficit present.      Mental Status: She is oriented to person, place, and time.   Psychiatric:         Mood and Affect: Mood normal.       Assessment / Plan      Assessment/Plan:   There are no diagnoses linked to this encounter.     GERD  Gastritis   - continue Dexilant 30mg daily    - will discuss risk / benefit of moving forward with Carafate vs increased dosage of Dexilant with Dr. Orta, per patient request   - given timing of onset of pain, worsened GERD, etc, it may be that pt is experiencing side effect / adverse event from her IVIG treatment; will discuss further with Dr. Orta.    - pt given GERD diet instructions, advised to avoid GI irritants such as caffeine, carbonation, EtOH, tobacco, chocolate, peppermint, acid-based foods   - previous endoscopy and pathology reports reviewed   - follow up in clinic PRN   - call clinic at any time for questions or new / worsened sx    Follow Up:   Return for Next scheduled follow up.    Plan of care reviewed with the patient at the conclusion of today's visit.  Education was provided regarding diagnosis, management, and any prescribed or recommended OTC medications.  Patient verbalized understanding of and agreement with management plan.     NOTE TO PATIENT: The 21st Century Cures Act makes medical notes like these available to patients in the interest of transparency. However, be advised this is a medical document. It is intended as peer to peer communication. It is written in medical language and may contain abbreviations or verbiage that are unfamiliar. It may appear blunt or direct. Medical documents are intended to carry relevant information, facts as evident, and the clinical opinion of the practitioner.     Time Statement:   Discussed plan of care in detail with patient today. Patient verbally understands and agrees. I have spent 30 minutes reviewing available diagnostics, obtaining  history, examining the patient, developing a treatment plan, and educating the patient on disease process and plan of care.     Missy Rothman PA-C   [facility]

## 2023-07-27 ENCOUNTER — TELEPHONE (OUTPATIENT)
Dept: FAMILY MEDICINE CLINIC | Facility: CLINIC | Age: 63
End: 2023-07-27
Payer: COMMERCIAL

## 2023-07-27 DIAGNOSIS — M25.562 ACUTE PAIN OF LEFT KNEE: ICD-10-CM

## 2023-07-27 DIAGNOSIS — M25.561 ACUTE PAIN OF RIGHT KNEE: Primary | ICD-10-CM

## 2023-07-27 NOTE — TELEPHONE ENCOUNTER
Spoke with patient.  Reviewed MRI knee with patient.  She is established with Dr. Mane and will call his office to schedule an appointment.

## 2023-08-01 DIAGNOSIS — R42 POSTURAL DIZZINESS WITH PRESYNCOPE: Primary | ICD-10-CM

## 2023-08-01 DIAGNOSIS — R55 POSTURAL DIZZINESS WITH PRESYNCOPE: Primary | ICD-10-CM

## 2023-08-21 ENCOUNTER — OFFICE VISIT (OUTPATIENT)
Dept: FAMILY MEDICINE CLINIC | Facility: CLINIC | Age: 63
End: 2023-08-21
Payer: COMMERCIAL

## 2023-08-21 ENCOUNTER — LAB (OUTPATIENT)
Dept: LAB | Facility: HOSPITAL | Age: 63
End: 2023-08-21
Payer: COMMERCIAL

## 2023-08-21 VITALS
DIASTOLIC BLOOD PRESSURE: 80 MMHG | HEIGHT: 62 IN | BODY MASS INDEX: 24.84 KG/M2 | SYSTOLIC BLOOD PRESSURE: 124 MMHG | WEIGHT: 135 LBS

## 2023-08-21 DIAGNOSIS — R70.0 ELEVATED SED RATE: ICD-10-CM

## 2023-08-21 DIAGNOSIS — M33.20 POLYMYOSITIS: ICD-10-CM

## 2023-08-21 DIAGNOSIS — E87.1 CHRONIC HYPONATREMIA: ICD-10-CM

## 2023-08-21 DIAGNOSIS — R35.0 URINARY FREQUENCY: Primary | ICD-10-CM

## 2023-08-21 DIAGNOSIS — R35.0 URINARY FREQUENCY: ICD-10-CM

## 2023-08-21 DIAGNOSIS — M35.1 CONNECTIVE TISSUE DISEASE OVERLAP SYNDROME: ICD-10-CM

## 2023-08-21 DIAGNOSIS — M34.9 SCLERODERMA: ICD-10-CM

## 2023-08-21 DIAGNOSIS — H61.23 BILATERAL IMPACTED CERUMEN: ICD-10-CM

## 2023-08-21 LAB
BASOPHILS # BLD AUTO: 0.04 10*3/MM3 (ref 0–0.2)
BASOPHILS NFR BLD AUTO: 1.6 % (ref 0–1.5)
DEPRECATED RDW RBC AUTO: 43.4 FL (ref 37–54)
EOSINOPHIL # BLD AUTO: 0.08 10*3/MM3 (ref 0–0.4)
EOSINOPHIL NFR BLD AUTO: 3.3 % (ref 0.3–6.2)
ERYTHROCYTE [DISTWIDTH] IN BLOOD BY AUTOMATED COUNT: 13 % (ref 12.3–15.4)
HCT VFR BLD AUTO: 38.9 % (ref 34–46.6)
HGB BLD-MCNC: 12.8 G/DL (ref 12–15.9)
IMM GRANULOCYTES # BLD AUTO: 0.01 10*3/MM3 (ref 0–0.05)
IMM GRANULOCYTES NFR BLD AUTO: 0.4 % (ref 0–0.5)
LYMPHOCYTES # BLD AUTO: 0.76 10*3/MM3 (ref 0.7–3.1)
LYMPHOCYTES NFR BLD AUTO: 31 % (ref 19.6–45.3)
MCH RBC QN AUTO: 30.5 PG (ref 26.6–33)
MCHC RBC AUTO-ENTMCNC: 32.9 G/DL (ref 31.5–35.7)
MCV RBC AUTO: 92.6 FL (ref 79–97)
MONOCYTES # BLD AUTO: 0.29 10*3/MM3 (ref 0.1–0.9)
MONOCYTES NFR BLD AUTO: 11.8 % (ref 5–12)
NEUTROPHILS NFR BLD AUTO: 1.27 10*3/MM3 (ref 1.7–7)
NEUTROPHILS NFR BLD AUTO: 51.9 % (ref 42.7–76)
NRBC BLD AUTO-RTO: 0 /100 WBC (ref 0–0.2)
PLATELET # BLD AUTO: 213 10*3/MM3 (ref 140–450)
PMV BLD AUTO: 12.8 FL (ref 6–12)
RBC # BLD AUTO: 4.2 10*6/MM3 (ref 3.77–5.28)
WBC NRBC COR # BLD: 2.45 10*3/MM3 (ref 3.4–10.8)

## 2023-08-21 PROCEDURE — 81001 URINALYSIS AUTO W/SCOPE: CPT

## 2023-08-21 PROCEDURE — 85652 RBC SED RATE AUTOMATED: CPT

## 2023-08-21 PROCEDURE — 80053 COMPREHEN METABOLIC PANEL: CPT

## 2023-08-21 PROCEDURE — 85025 COMPLETE CBC W/AUTO DIFF WBC: CPT

## 2023-08-21 PROCEDURE — 99214 OFFICE O/P EST MOD 30 MIN: CPT | Performed by: STUDENT IN AN ORGANIZED HEALTH CARE EDUCATION/TRAINING PROGRAM

## 2023-08-21 PROCEDURE — 69209 REMOVE IMPACTED EAR WAX UNI: CPT | Performed by: STUDENT IN AN ORGANIZED HEALTH CARE EDUCATION/TRAINING PROGRAM

## 2023-08-21 NOTE — PROGRESS NOTES
Established Office Visit      Patient Name: Rupa Finn  : 1960   MRN: [id number]   Care Team: Patient Care Team:  Dinorah Aguilar DO as PCP - General (Internal Medicine)  Aspen Plata APRN as Nurse Practitioner (Cardiology)  Aspen Plata APRN as Nurse Practitioner (Cardiology)  Mariana Martinez APRN as Nurse Practitioner (Obstetrics and Gynecology)    Chief Complaint:    Chief Complaint   Patient presents with    Hypothyroidism     6 monthf/u    Labs Only     Review blood work        History of Present Illness: Rupa Finn is a 63 y.o. female  with hypothyroidism, HLD, HTN, post menopause on HRT, GERD, Sjogren syndrome with myopathy and Raynauds who is here today to follow up with some recent abnormal labs along with ear fullness.    Recently had labs with her rheumatologist which revealed elevated SED reate - 58. Wants this repeated today. She also had elevated glucose although this was not done with fasting.   Following with Dr. Orta [facility] Rheumatology and Neuromuscular Specialist for Sjogren syndrome with myopathy and Raynauds.     She reports some urinary frequency and is concerned she may have UTI    She has chronic hyponatremia which most recently was 129 (baseline 129-133)    She reports ear fullness bilaterally. No pain.   Does not use qtips or any OTC options       Subjective      Review of Systems:   Review of Systems - See HPI    I have reviewed and the following portions of the patient's history were updated as appropriate: past family history, past medical history, past social history, past surgical history and problem list.    Medications:     Current Outpatient Medications:     acetaZOLAMIDE (DIAMOX) 250 MG tablet, Take 0.5 tablets by mouth 2 (two) times a day., Disp: , Rfl:     Alirocumab (Praluent) 75 MG/ML solution auto-injector, Inject 1 mL under the skin into the appropriate area as directed Every 14 (Fourteen) Days., Disp: 6 mL,  Rfl: 3    Cholecalciferol (Vitamin D3) 50 MCG (2000 UT) tablet, Take  by mouth Daily., Disp: , Rfl:     dexlansoprazole (DEXILANT) 30 MG capsule, TAKE ONE CAPSULE BY MOUTH DAILY, Disp: 90 capsule, Rfl: 3    estradiol (Estrace) 0.5 MG tablet, Take 1 tablet by mouth Daily., Disp: 90 tablet, Rfl: 2    famotidine (Pepcid) 40 MG tablet, Take 1 tablet by mouth At Night As Needed for Heartburn., Disp: 30 tablet, Rfl: 5    Immune Globulin, Human, (GAMUNEX IV), Infuse  into a venous catheter., Disp: , Rfl:     levothyroxine (Synthroid) 75 MCG tablet, Take 1 tablet by mouth Every Other Day., Disp: 45 tablet, Rfl: 1    levothyroxine (SYNTHROID, LEVOTHROID) 88 MCG tablet, Take 1 tablet by mouth Every Other Day., Disp: 45 tablet, Rfl: 1    polyethylene glycol (MIRALAX) powder, As Needed., Disp: , Rfl:     Tacrolimus 0.1 % solution, Apply 0.1 applicators topically to the appropriate area as directed 2 (Two) Times a Day., Disp: , Rfl:     telmisartan (MICARDIS) 80 MG tablet, TAKE 1 TABLET BY MOUTH EVERY DAY, Disp: 90 tablet, Rfl: 2    timolol (TIMOPTIC) 0.5 % ophthalmic solution, Administer 1 drop to both eyes 2 (Two) Times a Day., Disp: , Rfl:     travoprost, BAK free, (TRAVATAN) 0.004 % solution ophthalmic solution, 1 drop Every Evening. in affected eye(s), Disp: , Rfl:     Zinc 50 MG tablet, Take 50 mcg by mouth Daily., Disp: , Rfl:     Allergies:   Allergies   Allergen Reactions    Cefaclor Shortness Of Breath     hives    Naproxen Sodium Other (See Comments)     tongue swelling    Alphagan [Brimonidine] Other (See Comments)     Eyes itching, red, swollen    Atenolol Other (See Comments)     fatigue    Livalo [Pitavastatin] Myalgia    Statins Myalgia    Dorzolamide Hcl Other (See Comments)     Eyes burning, discharge.    Dorzolamide Hcl-Timolol Mal Unknown - High Severity    Doxycycline Unknown - High Severity    Gammagard [Immune Globulin] Dermatitis    Ibuprofen Swelling    Macrolides And Ketolides Provider Review Needed     "Adhesive Tape Rash     \"burns, eats through my skin\"    E-Mycin [Erythromycin] Nausea And Vomiting       Objective     Physical Exam:  Vital Signs:   Vitals:    08/21/23 1047   BP: 124/80   Weight: 61.2 kg (135 lb)   Height: 157.5 cm (62.01\")     Body mass index is 24.68 kg/mý.     Physical Exam  Vitals reviewed.   Constitutional:       Appearance: Normal appearance.   HENT:      Ears:      Comments: Bilateral cerumen impaction  Following irrigation, TM is visible (normal) and impaction resolved   Cardiovascular:      Rate and Rhythm: Normal rate.   Pulmonary:      Effort: Pulmonary effort is normal. No respiratory distress.   Skin:     General: Skin is warm and dry.   Neurological:      Mental Status: She is alert.   Psychiatric:         Mood and Affect: Mood normal.         Behavior: Behavior normal.         Judgment: Judgment normal.     Ear Cerumen Removal    Date/Time: 8/21/2023 12:46 PM  Performed by: Dinorah Aguilar DO  Authorized by: Dinorah Aguilar DO     Anesthesia:  Local Anesthetic: none  Location details: left ear and right ear  Patient tolerance: patient tolerated the procedure well with no immediate complications  Comments: She did experience some transient dizziness/nausea which improves with lying flat for a few minutes following procedure.   Procedure type: irrigation   Sedation:  Patient sedated: no          Assessment / Plan      Assessment/Plan:   Problems Addressed This Visit  Diagnoses and all orders for this visit:    1. Urinary frequency (Primary)  -     Urinalysis With Microscopic - Urine, Clean Catch; Future  -     CBC w AUTO Differential; Future    2. Elevated sed rate  -     Sedimentation rate, automated; Future  -     CBC w AUTO Differential; Future  Elevated on most recent labs - likely related to connective tissue disorder/polymyositis.     She is following with [facility] Rheumatology and Neuromuscular Specialist for Sjogren syndrome with myopathy and Raynauds. 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 She is on IVIG treatments. Also follows with Rheumatology at [facility] Dr. Kim Douglas.    3. Polymyositis  -     Sedimentation rate, automated; Future  -     CBC w AUTO Differential; Future    4. Connective tissue disease overlap syndrome  -     Sedimentation rate, automated; Future  -     CBC w AUTO Differential; Future    5. Scleroderma  -     Sedimentation rate, automated; Future  -     CBC w AUTO Differential; Future    6. Chronic hyponatremia  -     Comprehensive metabolic panel; Future  -     CBC w AUTO Differential; Future  Discussed it is possible this is due to Diamox which she is taking for glaucoma per Ophthalmology.     7. Bilateral impacted cerumen   Resolved following irrigation  Recommend OTC Debrox drops or sweet oil to help prevent recurrent impaction    Following with Cardiology - HOWARD Malone for HTN, HLD, Raynauds. She is statin intolerant.     Plan of care reviewed with patient at the conclusion of today's visit. Education was provided regarding diagnosis and management.  Patient verbalizes understanding of and agreement with management plan.    Follow Up:   No follow-ups on file.        DO ILIANA Reyes RD  [facility]  [address]  [facility] [address]  Fax [phone]  Phone [phone]

## 2023-08-22 DIAGNOSIS — R77.9 ELEVATED SERUM PROTEIN LEVEL: ICD-10-CM

## 2023-08-22 DIAGNOSIS — R70.0 ELEVATED SED RATE: Primary | ICD-10-CM

## 2023-08-22 LAB
ALBUMIN SERPL-MCNC: 3.9 G/DL (ref 3.5–5.2)
ALBUMIN/GLOB SERPL: 0.7 G/DL
ALP SERPL-CCNC: 71 U/L (ref 39–117)
ALT SERPL W P-5'-P-CCNC: 15 U/L (ref 1–33)
ANION GAP SERPL CALCULATED.3IONS-SCNC: 14.2 MMOL/L (ref 5–15)
AST SERPL-CCNC: 33 U/L (ref 1–32)
BACTERIA UR QL AUTO: ABNORMAL /HPF
BILIRUB SERPL-MCNC: 0.5 MG/DL (ref 0–1.2)
BILIRUB UR QL STRIP: NEGATIVE
BUN SERPL-MCNC: 10 MG/DL (ref 8–23)
BUN/CREAT SERPL: 13 (ref 7–25)
CALCIUM SPEC-SCNC: 9.5 MG/DL (ref 8.6–10.5)
CHLORIDE SERPL-SCNC: 98 MMOL/L (ref 98–107)
CLARITY UR: CLEAR
CO2 SERPL-SCNC: 16.8 MMOL/L (ref 22–29)
COLOR UR: YELLOW
CREAT SERPL-MCNC: 0.77 MG/DL (ref 0.57–1)
EGFRCR SERPLBLD CKD-EPI 2021: 86.8 ML/MIN/1.73
ERYTHROCYTE [SEDIMENTATION RATE] IN BLOOD: >130 MM/HR (ref 0–30)
GLOBULIN UR ELPH-MCNC: 5.4 GM/DL
GLUCOSE SERPL-MCNC: 80 MG/DL (ref 65–99)
GLUCOSE UR STRIP-MCNC: NEGATIVE MG/DL
HGB UR QL STRIP.AUTO: NEGATIVE
HYALINE CASTS UR QL AUTO: ABNORMAL /LPF
KETONES UR QL STRIP: NEGATIVE
LEUKOCYTE ESTERASE UR QL STRIP.AUTO: NEGATIVE
NITRITE UR QL STRIP: NEGATIVE
PH UR STRIP.AUTO: 7.5 [PH] (ref 5–8)
POTASSIUM SERPL-SCNC: 4.2 MMOL/L (ref 3.5–5.2)
PROT SERPL-MCNC: 9.3 G/DL (ref 6–8.5)
PROT UR QL STRIP: NEGATIVE
RBC # UR STRIP: ABNORMAL /HPF
REF LAB TEST METHOD: ABNORMAL
SODIUM SERPL-SCNC: 129 MMOL/L (ref 136–145)
SP GR UR STRIP: 1.02 (ref 1–1.03)
SQUAMOUS #/AREA URNS HPF: ABNORMAL /HPF
UROBILINOGEN UR QL STRIP: NORMAL
WBC # UR STRIP: ABNORMAL /HPF

## 2023-08-31 ENCOUNTER — TELEPHONE (OUTPATIENT)
Dept: GASTROENTEROLOGY | Facility: CLINIC | Age: 63
End: 2023-08-31
Payer: COMMERCIAL

## 2023-08-31 DIAGNOSIS — K21.9 GASTROESOPHAGEAL REFLUX DISEASE, UNSPECIFIED WHETHER ESOPHAGITIS PRESENT: Primary | ICD-10-CM

## 2023-08-31 RX ORDER — FAMOTIDINE 40 MG/1
40 TABLET, FILM COATED ORAL NIGHTLY PRN
Qty: 90 TABLET | Refills: 3 | Status: SHIPPED | OUTPATIENT
Start: 2023-08-31

## 2023-09-05 ENCOUNTER — HOSPITAL ENCOUNTER (OUTPATIENT)
Facility: HOSPITAL | Age: 63
Discharge: HOME | End: 2023-09-06
Attending: STUDENT IN AN ORGANIZED HEALTH CARE EDUCATION/TRAINING PROGRAM | Admitting: HOSPITALIST
Payer: COMMERCIAL

## 2023-09-05 ENCOUNTER — APPOINTMENT (OUTPATIENT)
Dept: CT IMAGING | Facility: HOSPITAL | Age: 63
End: 2023-09-05
Payer: COMMERCIAL

## 2023-09-05 DIAGNOSIS — M35.9 AUTOIMMUNE DISEASE: ICD-10-CM

## 2023-09-05 DIAGNOSIS — R77.8 TROPONIN LEVEL ELEVATED: ICD-10-CM

## 2023-09-05 DIAGNOSIS — E78.2 MIXED HYPERLIPIDEMIA: ICD-10-CM

## 2023-09-05 DIAGNOSIS — R07.9 CHEST PAIN, UNSPECIFIED TYPE: Primary | ICD-10-CM

## 2023-09-05 DIAGNOSIS — R55 SYNCOPE AND COLLAPSE: ICD-10-CM

## 2023-09-05 LAB
ALBUMIN SERPL-MCNC: 3.7 G/DL (ref 3.5–5.2)
ALBUMIN/GLOB SERPL: 0.8 G/DL
ALP SERPL-CCNC: 81 U/L (ref 39–117)
ALT SERPL W P-5'-P-CCNC: 13 U/L (ref 1–33)
ANION GAP SERPL CALCULATED.3IONS-SCNC: 7 MMOL/L (ref 5–15)
AST SERPL-CCNC: 27 U/L (ref 1–32)
BASOPHILS # BLD AUTO: 0.04 10*3/MM3 (ref 0–0.2)
BASOPHILS NFR BLD AUTO: 1.2 % (ref 0–1.5)
BILIRUB SERPL-MCNC: 0.2 MG/DL (ref 0–1.2)
BUN SERPL-MCNC: 14 MG/DL (ref 8–23)
BUN/CREAT SERPL: 18.4 (ref 7–25)
CALCIUM SPEC-SCNC: 8.7 MG/DL (ref 8.6–10.5)
CHLORIDE SERPL-SCNC: 98 MMOL/L (ref 98–107)
CO2 SERPL-SCNC: 24 MMOL/L (ref 22–29)
CREAT BLDA-MCNC: 0.7 MG/DL
CREAT BLDA-MCNC: 0.7 MG/DL (ref 0.6–1.3)
CREAT SERPL-MCNC: 0.76 MG/DL (ref 0.57–1)
DEPRECATED RDW RBC AUTO: 44.9 FL (ref 37–54)
EGFRCR SERPLBLD CKD-EPI 2021: 88.2 ML/MIN/1.73
EOSINOPHIL # BLD AUTO: 0.09 10*3/MM3 (ref 0–0.4)
EOSINOPHIL NFR BLD AUTO: 2.6 % (ref 0.3–6.2)
ERYTHROCYTE [DISTWIDTH] IN BLOOD BY AUTOMATED COUNT: 13.3 % (ref 12.3–15.4)
GLOBULIN UR ELPH-MCNC: 4.9 GM/DL
GLUCOSE SERPL-MCNC: 96 MG/DL (ref 65–99)
HCT VFR BLD AUTO: 33.5 % (ref 34–46.6)
HGB BLD-MCNC: 11.2 G/DL (ref 12–15.9)
HOLD SPECIMEN: NORMAL
HOLD SPECIMEN: NORMAL
IMM GRANULOCYTES # BLD AUTO: 0.01 10*3/MM3 (ref 0–0.05)
IMM GRANULOCYTES NFR BLD AUTO: 0.3 % (ref 0–0.5)
LYMPHOCYTES # BLD AUTO: 0.76 10*3/MM3 (ref 0.7–3.1)
LYMPHOCYTES NFR BLD AUTO: 22.4 % (ref 19.6–45.3)
MAGNESIUM SERPL-MCNC: 2.1 MG/DL (ref 1.6–2.4)
MCH RBC QN AUTO: 30.7 PG (ref 26.6–33)
MCHC RBC AUTO-ENTMCNC: 33.4 G/DL (ref 31.5–35.7)
MCV RBC AUTO: 91.8 FL (ref 79–97)
MONOCYTES # BLD AUTO: 0.36 10*3/MM3 (ref 0.1–0.9)
MONOCYTES NFR BLD AUTO: 10.6 % (ref 5–12)
NEUTROPHILS NFR BLD AUTO: 2.14 10*3/MM3 (ref 1.7–7)
NEUTROPHILS NFR BLD AUTO: 62.9 % (ref 42.7–76)
NRBC BLD AUTO-RTO: 0 /100 WBC (ref 0–0.2)
PLATELET # BLD AUTO: 227 10*3/MM3 (ref 140–450)
PMV BLD AUTO: 10.9 FL (ref 6–12)
POTASSIUM SERPL-SCNC: 3.8 MMOL/L (ref 3.5–5.2)
PROT SERPL-MCNC: 8.6 G/DL (ref 6–8.5)
RBC # BLD AUTO: 3.65 10*6/MM3 (ref 3.77–5.28)
SODIUM SERPL-SCNC: 129 MMOL/L (ref 136–145)
WBC NRBC COR # BLD: 3.4 10*3/MM3 (ref 3.4–10.8)
WHOLE BLOOD HOLD COAG: NORMAL
WHOLE BLOOD HOLD SPECIMEN: NORMAL

## 2023-09-05 PROCEDURE — 96374 THER/PROPH/DIAG INJ IV PUSH: CPT

## 2023-09-05 PROCEDURE — 25010000002 ONDANSETRON PER 1 MG: Performed by: STUDENT IN AN ORGANIZED HEALTH CARE EDUCATION/TRAINING PROGRAM

## 2023-09-05 PROCEDURE — 25510000001 IOPAMIDOL PER 1 ML: Performed by: STUDENT IN AN ORGANIZED HEALTH CARE EDUCATION/TRAINING PROGRAM

## 2023-09-05 PROCEDURE — 93005 ELECTROCARDIOGRAM TRACING: CPT | Performed by: STUDENT IN AN ORGANIZED HEALTH CARE EDUCATION/TRAINING PROGRAM

## 2023-09-05 PROCEDURE — 83735 ASSAY OF MAGNESIUM: CPT | Performed by: STUDENT IN AN ORGANIZED HEALTH CARE EDUCATION/TRAINING PROGRAM

## 2023-09-05 PROCEDURE — 99285 EMERGENCY DEPT VISIT HI MDM: CPT

## 2023-09-05 PROCEDURE — 93005 ELECTROCARDIOGRAM TRACING: CPT

## 2023-09-05 PROCEDURE — 84439 ASSAY OF FREE THYROXINE: CPT | Performed by: STUDENT IN AN ORGANIZED HEALTH CARE EDUCATION/TRAINING PROGRAM

## 2023-09-05 PROCEDURE — 83880 ASSAY OF NATRIURETIC PEPTIDE: CPT | Performed by: STUDENT IN AN ORGANIZED HEALTH CARE EDUCATION/TRAINING PROGRAM

## 2023-09-05 PROCEDURE — 96361 HYDRATE IV INFUSION ADD-ON: CPT

## 2023-09-05 PROCEDURE — 36415 COLL VENOUS BLD VENIPUNCTURE: CPT

## 2023-09-05 PROCEDURE — 85025 COMPLETE CBC W/AUTO DIFF WBC: CPT | Performed by: STUDENT IN AN ORGANIZED HEALTH CARE EDUCATION/TRAINING PROGRAM

## 2023-09-05 PROCEDURE — 84443 ASSAY THYROID STIM HORMONE: CPT | Performed by: STUDENT IN AN ORGANIZED HEALTH CARE EDUCATION/TRAINING PROGRAM

## 2023-09-05 PROCEDURE — 80053 COMPREHEN METABOLIC PANEL: CPT | Performed by: STUDENT IN AN ORGANIZED HEALTH CARE EDUCATION/TRAINING PROGRAM

## 2023-09-05 PROCEDURE — 84484 ASSAY OF TROPONIN QUANT: CPT | Performed by: STUDENT IN AN ORGANIZED HEALTH CARE EDUCATION/TRAINING PROGRAM

## 2023-09-05 PROCEDURE — 82565 ASSAY OF CREATININE: CPT

## 2023-09-05 PROCEDURE — 84100 ASSAY OF PHOSPHORUS: CPT | Performed by: STUDENT IN AN ORGANIZED HEALTH CARE EDUCATION/TRAINING PROGRAM

## 2023-09-05 PROCEDURE — 71275 CT ANGIOGRAPHY CHEST: CPT

## 2023-09-05 RX ORDER — ONDANSETRON 2 MG/ML
4 INJECTION INTRAMUSCULAR; INTRAVENOUS ONCE
Status: COMPLETED | OUTPATIENT
Start: 2023-09-05 | End: 2023-09-05

## 2023-09-05 RX ORDER — FAMOTIDINE 20 MG/1
20 TABLET, FILM COATED ORAL ONCE
Status: COMPLETED | OUTPATIENT
Start: 2023-09-05 | End: 2023-09-05

## 2023-09-05 RX ORDER — SUCRALFATE 1 G/1
1 TABLET ORAL ONCE
Status: COMPLETED | OUTPATIENT
Start: 2023-09-05 | End: 2023-09-05

## 2023-09-05 RX ORDER — SODIUM CHLORIDE 0.9 % (FLUSH) 0.9 %
10 SYRINGE (ML) INJECTION AS NEEDED
Status: DISCONTINUED | OUTPATIENT
Start: 2023-09-05 | End: 2023-09-06 | Stop reason: HOSPADM

## 2023-09-05 RX ADMIN — SUCRALFATE 1 G: 1 TABLET ORAL at 21:40

## 2023-09-05 RX ADMIN — FAMOTIDINE 20 MG: 20 TABLET, FILM COATED ORAL at 21:40

## 2023-09-05 RX ADMIN — SODIUM CHLORIDE, POTASSIUM CHLORIDE, SODIUM LACTATE AND CALCIUM CHLORIDE 1000 ML: 600; 310; 30; 20 INJECTION, SOLUTION INTRAVENOUS at 21:40

## 2023-09-05 RX ADMIN — IOPAMIDOL 75 ML: 755 INJECTION, SOLUTION INTRAVENOUS at 22:26

## 2023-09-05 RX ADMIN — ONDANSETRON 4 MG: 2 INJECTION INTRAMUSCULAR; INTRAVENOUS at 21:40

## 2023-09-05 NOTE — Clinical Note
Level of Care: Telemetry [5]   Diagnosis: Syncope [206001]   Admitting Physician: ELI MACIEL III [[id number]]   Attending Physician: ELI MACIEL III [[id number]]   Bed Request Comments: tele obs

## 2023-09-06 ENCOUNTER — READMISSION MANAGEMENT (OUTPATIENT)
Dept: CALL CENTER | Facility: HOSPITAL | Age: 63
End: 2023-09-06
Payer: COMMERCIAL

## 2023-09-06 VITALS
SYSTOLIC BLOOD PRESSURE: 146 MMHG | BODY MASS INDEX: 23.61 KG/M2 | HEART RATE: 58 BPM | TEMPERATURE: 98.1 F | HEIGHT: 62 IN | DIASTOLIC BLOOD PRESSURE: 80 MMHG | WEIGHT: 128.3 LBS | OXYGEN SATURATION: 98 % | RESPIRATION RATE: 12 BRPM

## 2023-09-06 PROBLEM — E87.1 HYPONATREMIA: Status: ACTIVE | Noted: 2023-09-06

## 2023-09-06 PROBLEM — R07.9 CHEST PAIN: Status: ACTIVE | Noted: 2023-09-06

## 2023-09-06 PROBLEM — R55 NEAR SYNCOPE: Status: ACTIVE | Noted: 2023-09-06

## 2023-09-06 PROBLEM — D64.9 CHRONIC ANEMIA: Status: ACTIVE | Noted: 2023-09-06

## 2023-09-06 PROBLEM — R55 SYNCOPE: Status: ACTIVE | Noted: 2023-09-06

## 2023-09-06 LAB
ANION GAP SERPL CALCULATED.3IONS-SCNC: 11 MMOL/L (ref 5–15)
BILIRUB UR QL STRIP: NEGATIVE
BUN SERPL-MCNC: 12 MG/DL (ref 8–23)
BUN/CREAT SERPL: 18.2 (ref 7–25)
CALCIUM SPEC-SCNC: 9.3 MG/DL (ref 8.6–10.5)
CHLORIDE SERPL-SCNC: 100 MMOL/L (ref 98–107)
CHOLEST SERPL-MCNC: 231 MG/DL (ref 0–200)
CLARITY UR: CLEAR
CO2 SERPL-SCNC: 19 MMOL/L (ref 22–29)
COLOR UR: YELLOW
CREAT SERPL-MCNC: 0.66 MG/DL (ref 0.57–1)
DEPRECATED RDW RBC AUTO: 44.9 FL (ref 37–54)
EGFRCR SERPLBLD CKD-EPI 2021: 98.7 ML/MIN/1.73
ERYTHROCYTE [DISTWIDTH] IN BLOOD BY AUTOMATED COUNT: 13.2 % (ref 12.3–15.4)
GEN 5 2HR TROPONIN T REFLEX: 9 NG/L
GLUCOSE SERPL-MCNC: 94 MG/DL (ref 65–99)
GLUCOSE UR STRIP-MCNC: NEGATIVE MG/DL
HBA1C MFR BLD: 5.4 % (ref 4.8–5.6)
HCT VFR BLD AUTO: 36.2 % (ref 34–46.6)
HDLC SERPL-MCNC: 66 MG/DL (ref 40–60)
HGB BLD-MCNC: 12 G/DL (ref 12–15.9)
HGB UR QL STRIP.AUTO: NEGATIVE
HOLD SPECIMEN: NORMAL
KETONES UR QL STRIP: NEGATIVE
LDLC SERPL CALC-MCNC: 140 MG/DL (ref 0–100)
LDLC/HDLC SERPL: 2.07 {RATIO}
LEUKOCYTE ESTERASE UR QL STRIP.AUTO: NEGATIVE
MCH RBC QN AUTO: 30.5 PG (ref 26.6–33)
MCHC RBC AUTO-ENTMCNC: 33.1 G/DL (ref 31.5–35.7)
MCV RBC AUTO: 91.9 FL (ref 79–97)
NITRITE UR QL STRIP: NEGATIVE
NT-PROBNP SERPL-MCNC: 98.2 PG/ML (ref 0–900)
PH UR STRIP.AUTO: 6.5 [PH] (ref 5–8)
PHOSPHATE SERPL-MCNC: 2.8 MG/DL (ref 2.5–4.5)
PLATELET # BLD AUTO: 160 10*3/MM3 (ref 140–450)
PMV BLD AUTO: 11.5 FL (ref 6–12)
POTASSIUM SERPL-SCNC: 4.1 MMOL/L (ref 3.5–5.2)
PROT UR QL STRIP: NEGATIVE
QT INTERVAL: 390 MS
QT INTERVAL: 432 MS
QTC INTERVAL: 405 MS
QTC INTERVAL: 416 MS
RBC # BLD AUTO: 3.94 10*6/MM3 (ref 3.77–5.28)
SODIUM SERPL-SCNC: 130 MMOL/L (ref 136–145)
SP GR UR STRIP: >=1.03 (ref 1–1.03)
T4 FREE SERPL-MCNC: 1.39 NG/DL (ref 0.93–1.7)
TRIGL SERPL-MCNC: 142 MG/DL (ref 0–150)
TROPONIN T DELTA: -1 NG/L
TROPONIN T SERPL HS-MCNC: 10 NG/L
TROPONIN T SERPL HS-MCNC: 14 NG/L
TROPONIN T SERPL HS-MCNC: 9 NG/L
TSH SERPL DL<=0.05 MIU/L-ACNC: 1.51 UIU/ML (ref 0.27–4.2)
UROBILINOGEN UR QL STRIP: NORMAL
VLDLC SERPL-MCNC: 25 MG/DL (ref 5–40)
WBC NRBC COR # BLD: 3.72 10*3/MM3 (ref 3.4–10.8)

## 2023-09-06 PROCEDURE — 80048 BASIC METABOLIC PNL TOTAL CA: CPT | Performed by: NURSE PRACTITIONER

## 2023-09-06 PROCEDURE — 81003 URINALYSIS AUTO W/O SCOPE: CPT | Performed by: NURSE PRACTITIONER

## 2023-09-06 PROCEDURE — 85027 COMPLETE CBC AUTOMATED: CPT | Performed by: NURSE PRACTITIONER

## 2023-09-06 PROCEDURE — G0378 HOSPITAL OBSERVATION PER HR: HCPCS

## 2023-09-06 PROCEDURE — 84484 ASSAY OF TROPONIN QUANT: CPT | Performed by: STUDENT IN AN ORGANIZED HEALTH CARE EDUCATION/TRAINING PROGRAM

## 2023-09-06 PROCEDURE — 83036 HEMOGLOBIN GLYCOSYLATED A1C: CPT | Performed by: NURSE PRACTITIONER

## 2023-09-06 PROCEDURE — 93005 ELECTROCARDIOGRAM TRACING: CPT | Performed by: NURSE PRACTITIONER

## 2023-09-06 PROCEDURE — 93010 ELECTROCARDIOGRAM REPORT: CPT | Performed by: INTERNAL MEDICINE

## 2023-09-06 PROCEDURE — 99253 IP/OBS CNSLTJ NEW/EST LOW 45: CPT | Performed by: INTERNAL MEDICINE

## 2023-09-06 PROCEDURE — 84484 ASSAY OF TROPONIN QUANT: CPT | Performed by: NURSE PRACTITIONER

## 2023-09-06 PROCEDURE — 25010000002 ENOXAPARIN PER 10 MG: Performed by: NURSE PRACTITIONER

## 2023-09-06 PROCEDURE — 80061 LIPID PANEL: CPT | Performed by: NURSE PRACTITIONER

## 2023-09-06 RX ORDER — TIMOLOL MALEATE 5 MG/ML
1 SOLUTION/ DROPS OPHTHALMIC DAILY
Status: DISCONTINUED | OUTPATIENT
Start: 2023-09-06 | End: 2023-09-06 | Stop reason: HOSPADM

## 2023-09-06 RX ORDER — LEVOTHYROXINE SODIUM 88 UG/1
88 TABLET ORAL EVERY OTHER DAY
Status: DISCONTINUED | OUTPATIENT
Start: 2023-09-06 | End: 2023-09-06 | Stop reason: HOSPADM

## 2023-09-06 RX ORDER — LOSARTAN POTASSIUM 50 MG/1
100 TABLET ORAL
Status: DISCONTINUED | OUTPATIENT
Start: 2023-09-06 | End: 2023-09-06 | Stop reason: HOSPADM

## 2023-09-06 RX ORDER — SODIUM CHLORIDE 9 MG/ML
75 INJECTION, SOLUTION INTRAVENOUS CONTINUOUS
Status: DISCONTINUED | OUTPATIENT
Start: 2023-09-06 | End: 2023-09-06 | Stop reason: HOSPADM

## 2023-09-06 RX ORDER — SODIUM CHLORIDE 0.9 % (FLUSH) 0.9 %
10 SYRINGE (ML) INJECTION AS NEEDED
Status: DISCONTINUED | OUTPATIENT
Start: 2023-09-06 | End: 2023-09-06 | Stop reason: HOSPADM

## 2023-09-06 RX ORDER — SODIUM CHLORIDE 9 MG/ML
40 INJECTION, SOLUTION INTRAVENOUS AS NEEDED
Status: DISCONTINUED | OUTPATIENT
Start: 2023-09-06 | End: 2023-09-06 | Stop reason: HOSPADM

## 2023-09-06 RX ORDER — LATANOPROST 50 UG/ML
1 SOLUTION/ DROPS OPHTHALMIC NIGHTLY
Status: DISCONTINUED | OUTPATIENT
Start: 2023-09-06 | End: 2023-09-06 | Stop reason: HOSPADM

## 2023-09-06 RX ORDER — POLYETHYLENE GLYCOL 3350 17 G/17G
17 POWDER, FOR SOLUTION ORAL DAILY PRN
Status: DISCONTINUED | OUTPATIENT
Start: 2023-09-06 | End: 2023-09-06 | Stop reason: HOSPADM

## 2023-09-06 RX ORDER — ACETAZOLAMIDE 250 MG/1
125 TABLET ORAL 2 TIMES DAILY
Status: DISCONTINUED | OUTPATIENT
Start: 2023-09-06 | End: 2023-09-06 | Stop reason: HOSPADM

## 2023-09-06 RX ORDER — BISACODYL 5 MG/1
5 TABLET, DELAYED RELEASE ORAL DAILY PRN
Status: DISCONTINUED | OUTPATIENT
Start: 2023-09-06 | End: 2023-09-06 | Stop reason: HOSPADM

## 2023-09-06 RX ORDER — ESTRADIOL 0.5 MG/1
0.5 TABLET ORAL DAILY
Status: DISCONTINUED | OUTPATIENT
Start: 2023-09-06 | End: 2023-09-06 | Stop reason: HOSPADM

## 2023-09-06 RX ORDER — SODIUM CHLORIDE 0.9 % (FLUSH) 0.9 %
10 SYRINGE (ML) INJECTION EVERY 12 HOURS SCHEDULED
Status: DISCONTINUED | OUTPATIENT
Start: 2023-09-06 | End: 2023-09-06 | Stop reason: HOSPADM

## 2023-09-06 RX ORDER — LEVOTHYROXINE SODIUM 0.07 MG/1
75 TABLET ORAL EVERY OTHER DAY
Status: DISCONTINUED | OUTPATIENT
Start: 2023-09-06 | End: 2023-09-06

## 2023-09-06 RX ORDER — BISACODYL 10 MG
10 SUPPOSITORY, RECTAL RECTAL DAILY PRN
Status: DISCONTINUED | OUTPATIENT
Start: 2023-09-06 | End: 2023-09-06 | Stop reason: HOSPADM

## 2023-09-06 RX ORDER — AMOXICILLIN 250 MG
2 CAPSULE ORAL 2 TIMES DAILY
Status: DISCONTINUED | OUTPATIENT
Start: 2023-09-06 | End: 2023-09-06 | Stop reason: HOSPADM

## 2023-09-06 RX ORDER — LEVOTHYROXINE SODIUM 0.07 MG/1
75 TABLET ORAL EVERY OTHER DAY
Status: DISCONTINUED | OUTPATIENT
Start: 2023-09-07 | End: 2023-09-06 | Stop reason: HOSPADM

## 2023-09-06 RX ORDER — PANTOPRAZOLE SODIUM 40 MG/1
40 TABLET, DELAYED RELEASE ORAL
Status: DISCONTINUED | OUTPATIENT
Start: 2023-09-06 | End: 2023-09-06 | Stop reason: HOSPADM

## 2023-09-06 RX ORDER — LEVOTHYROXINE SODIUM 88 UG/1
88 TABLET ORAL EVERY OTHER DAY
Status: DISCONTINUED | OUTPATIENT
Start: 2023-09-07 | End: 2023-09-06

## 2023-09-06 RX ORDER — ENOXAPARIN SODIUM 100 MG/ML
40 INJECTION SUBCUTANEOUS DAILY
Status: DISCONTINUED | OUTPATIENT
Start: 2023-09-06 | End: 2023-09-06 | Stop reason: HOSPADM

## 2023-09-06 RX ADMIN — LEVOTHYROXINE SODIUM 88 MCG: 0.09 TABLET ORAL at 05:52

## 2023-09-06 RX ADMIN — ACETAZOLAMIDE 125 MG: 250 TABLET ORAL at 05:51

## 2023-09-06 RX ADMIN — PANTOPRAZOLE SODIUM 40 MG: 40 TABLET, DELAYED RELEASE ORAL at 05:52

## 2023-09-06 RX ADMIN — LOSARTAN POTASSIUM 100 MG: 50 TABLET, FILM COATED ORAL at 09:42

## 2023-09-06 RX ADMIN — ESTRADIOL 0.5 MG: 0.5 TABLET ORAL at 09:07

## 2023-09-06 RX ADMIN — SENNOSIDES AND DOCUSATE SODIUM 2 TABLET: 8.6; 5 TABLET ORAL at 09:11

## 2023-09-06 RX ADMIN — Medication 10 ML: at 09:14

## 2023-09-06 RX ADMIN — LATANOPROST 1 DROP: 50 SOLUTION OPHTHALMIC at 05:52

## 2023-09-06 RX ADMIN — SODIUM CHLORIDE 75 ML/HR: 9 INJECTION, SOLUTION INTRAVENOUS at 05:23

## 2023-09-06 RX ADMIN — TIMOLOL MALEATE 1 DROP: 5 SOLUTION/ DROPS OPHTHALMIC at 09:07

## 2023-09-06 NOTE — ED PROVIDER NOTES
EMERGENCY DEPARTMENT ENCOUNTER    Pt Name: Rupa Finn  MRN: [id number]  Pt :   1960  Room Number:    Date of encounter:  2023  PCP: Dinorah Oates DO  ED Provider: Lucian Eid MD    Historian: Patient, , EMS      HPI:  Chief Complaint: Chest pain, syncope        Context: Rupa Finn is a 63-year-old woman with history of Sjogren's, palpitations, hypertension, hyperlipidemia, who presents for evaluation of an episode of chest pain and presyncope.  She was at a sporting event when she suddenly started developing back pain that then became chest pain.  She did not have palpitations only the pain.  She began to feel lightheaded and dizzy and tried to walk away and then ultimately felt like she was going to pass out so had to lie down.  She did not completely lose consciousness.  She came here by EMS who treated her with aspirin but go of no other medications.  Her chest pain has now resolved but she continues to endorse nausea that she thinks is related to some spaghetti that she had earlier.  Denies recent illness or fevers.  No other complaints at this time.      PAST MEDICAL HISTORY  Past Medical History:   Diagnosis Date    Anemia     Autoimmune disease     Cholelithiasis Removed     Colon polyp     COVID-19     Diverticulosis 2010?    Fibrocystic breast changes, bilateral     GERD (gastroesophageal reflux disease)     not currently taking medication    Gestational diabetes     Glaucoma     Hyperlipidemia     Hypertension     Hypothyroidism     Lower extremity edema     Neuromuscular disorder     OAB (overactive bladder)     Osteoarthritis     Palpitations     Post-menopause on HRT (hormone replacement therapy)     Raynaud's disease     Scleroderma     SINE scleroderma    Sjogren's disease     Urinary tract infection          PAST SURGICAL HISTORY  Past Surgical History:   Procedure Laterality Date    CHOLECYSTECTOMY      COLONOSCOPY   and  2022    ESOPHAGOSCOPY / EGD      esophageal stretching/dilation and gastric polypectomy, esophageal biopsies    EYE SURGERY  2012    Cataracts removed - lens replacement    LAPAROSCOPIC ASSISTED VAGINAL HYSTERECTOMY SALPINGO OOPHORECTOMY Bilateral     LAPAROSCOPIC DIAZ PROCEDURE      OOPHORECTOMY      OTHER SURGICAL HISTORY      Lap for endometriosis    TONSILLECTOMY      UPPER GASTROINTESTINAL ENDOSCOPY  ?         FAMILY HISTORY  Family History   Problem Relation Age of Onset    Other Mother         has a pacemaker and is followed by Dr. Brooks    Heart disease Mother     Heart failure Mother     Hypertension Mother     Diabetes Mother     Vision loss Mother         Glaucoma/macular degeneration    Arthritis Mother     Glaucoma Father     Other Father          likely a cancer-related death;    Heart disease Father     Hypertension Father     Cancer Father         Prostate    Vision loss Father         Glaucoma/macular degeneration    Asthma Brother     Hypertension Brother     Heart attack Maternal Grandfather     Heart disease Maternal Grandfather     Heart failure Maternal Grandfather     Heart disease Maternal Grandmother     Hypertension Maternal Grandmother     Hypertension Paternal Grandmother     Colon cancer Paternal Grandfather     Cancer Paternal Grandfather         Prostate    Breast cancer Neg Hx     Ovarian cancer Neg Hx          SOCIAL HISTORY  Social History     Socioeconomic History    Marital status:    Tobacco Use    Smoking status: Never    Smokeless tobacco: Never   Vaping Use    Vaping Use: Never used   Substance and Sexual Activity    Alcohol use: No    Drug use: No    Sexual activity: Yes     Partners: Male     Birth control/protection: Post-menopausal, Hysterectomy         ALLERGIES  Cefaclor, Naproxen sodium, Alphagan [brimonidine], Atenolol, Livalo [pitavastatin], Statins, Dorzolamide hcl, Dorzolamide hcl-timolol mal, Doxycycline, Gammagard [immune globulin], Ibuprofen,  Macrolides and ketolides, Adhesive tape, and E-mycin [erythromycin]        REVIEW OF SYSTEMS  Review of Systems       All systems reviewed and negative except for those discussed in HPI.       PHYSICAL EXAM    I have reviewed the triage vital signs and nursing notes.    ED Triage Vitals [09/05/23 2025]   Temp Heart Rate Resp BP SpO2   98.8 °F (37.1 °C) 68 14 (!) 189/89 97 %      Temp src Heart Rate Source Patient Position BP Location FiO2 (%)   Oral Monitor Lying Left arm --       Physical Exam  GENERAL:   Appears in no acute distress.   HENT: Nares patent.  EYES: No scleral icterus.  CV: Regular rhythm, regular rate.  RESPIRATORY: Normal effort.  No audible wheezes, rales or rhonchi.  ABDOMEN: Soft, nontender  MUSCULOSKELETAL: No deformities.   NEURO: Alert, moves all extremities, follows commands.  SKIN: Warm, dry, no rash visualized.      LAB RESULTS  Recent Results (from the past 24 hour(s))   ECG 12 Lead ED Triage Standing Order; Syncope    Collection Time: 09/05/23  8:30 PM   Result Value Ref Range    QT Interval 390 ms    QTC Interval 405 ms   Comprehensive Metabolic Panel    Collection Time: 09/05/23  9:19 PM    Specimen: Blood   Result Value Ref Range    Glucose 96 65 - 99 mg/dL    BUN 14 8 - 23 mg/dL    Creatinine 0.76 0.57 - 1.00 mg/dL    Sodium 129 (L) 136 - 145 mmol/L    Potassium 3.8 3.5 - 5.2 mmol/L    Chloride 98 98 - 107 mmol/L    CO2 24.0 22.0 - 29.0 mmol/L    Calcium 8.7 8.6 - 10.5 mg/dL    Total Protein 8.6 (H) 6.0 - 8.5 g/dL    Albumin 3.7 3.5 - 5.2 g/dL    ALT (SGPT) 13 1 - 33 U/L    AST (SGOT) 27 1 - 32 U/L    Alkaline Phosphatase 81 39 - 117 U/L    Total Bilirubin 0.2 0.0 - 1.2 mg/dL    Globulin 4.9 gm/dL    A/G Ratio 0.8 g/dL    BUN/Creatinine Ratio 18.4 7.0 - 25.0    Anion Gap 7.0 5.0 - 15.0 mmol/L    eGFR 88.2 >60.0 mL/min/1.73   Magnesium    Collection Time: 09/05/23  9:19 PM    Specimen: Blood   Result Value Ref Range    Magnesium 2.1 1.6 - 2.4 mg/dL   Green Top (Gel)    Collection Time:  09/05/23  9:19 PM   Result Value Ref Range    Extra Tube Hold for add-ons.    Gold Top - SST    Collection Time: 09/05/23  9:19 PM   Result Value Ref Range    Extra Tube Hold for add-ons.    Gray Top    Collection Time: 09/05/23  9:19 PM   Result Value Ref Range    Extra Tube Hold for add-ons.    Light Blue Top    Collection Time: 09/05/23  9:19 PM   Result Value Ref Range    Extra Tube Hold for add-ons.    High Sensitivity Troponin T    Collection Time: 09/05/23  9:19 PM    Specimen: Blood   Result Value Ref Range    HS Troponin T 9 <10 ng/L   BNP    Collection Time: 09/05/23  9:19 PM    Specimen: Blood   Result Value Ref Range    proBNP 98.2 0.0 - 900.0 pg/mL   TSH    Collection Time: 09/05/23  9:19 PM    Specimen: Blood   Result Value Ref Range    TSH 1.510 0.270 - 4.200 uIU/mL   T4, Free    Collection Time: 09/05/23  9:19 PM    Specimen: Blood   Result Value Ref Range    Free T4 1.39 0.93 - 1.70 ng/dL   Phosphorus    Collection Time: 09/05/23  9:19 PM    Specimen: Blood   Result Value Ref Range    Phosphorus 2.8 2.5 - 4.5 mg/dL   Lavender Top    Collection Time: 09/05/23  9:20 PM   Result Value Ref Range    Extra Tube hold for add-on    CBC Auto Differential    Collection Time: 09/05/23  9:20 PM    Specimen: Blood   Result Value Ref Range    WBC 3.40 3.40 - 10.80 10*3/mm3    RBC 3.65 (L) 3.77 - 5.28 10*6/mm3    Hemoglobin 11.2 (L) 12.0 - 15.9 g/dL    Hematocrit 33.5 (L) 34.0 - 46.6 %    MCV 91.8 79.0 - 97.0 fL    MCH 30.7 26.6 - 33.0 pg    MCHC 33.4 31.5 - 35.7 g/dL    RDW 13.3 12.3 - 15.4 %    RDW-SD 44.9 37.0 - 54.0 fl    MPV 10.9 6.0 - 12.0 fL    Platelets 227 140 - 450 10*3/mm3    Neutrophil % 62.9 42.7 - 76.0 %    Lymphocyte % 22.4 19.6 - 45.3 %    Monocyte % 10.6 5.0 - 12.0 %    Eosinophil % 2.6 0.3 - 6.2 %    Basophil % 1.2 0.0 - 1.5 %    Immature Grans % 0.3 0.0 - 0.5 %    Neutrophils, Absolute 2.14 1.70 - 7.00 10*3/mm3    Lymphocytes, Absolute 0.76 0.70 - 3.10 10*3/mm3    Monocytes, Absolute 0.36 0.10 -  0.90 10*3/mm3    Eosinophils, Absolute 0.09 0.00 - 0.40 10*3/mm3    Basophils, Absolute 0.04 0.00 - 0.20 10*3/mm3    Immature Grans, Absolute 0.01 0.00 - 0.05 10*3/mm3    nRBC 0.0 0.0 - 0.2 /100 WBC   POC Creatinine    Collection Time: 09/05/23  9:41 PM    Specimen: Blood   Result Value Ref Range    Creatinine 0.70 0.60 - 1.30 mg/dL   POC Creatinine    Collection Time: 09/05/23  9:42 PM    Specimen: Blood   Result Value Ref Range    Creatinine 0.70 mg/dL   Single High Sensitivity Troponin T    Collection Time: 09/06/23 12:53 AM    Specimen: Arm, Right; Blood   Result Value Ref Range    HS Troponin T 14 (H) <10 ng/L       If labs were ordered, I independently reviewed the results and considered them in treating the patient.        RADIOLOGY  CT Angiogram Chest    Result Date: 9/5/2023  CT ANGIOGRAM CHEST Date of Exam: 9/5/2023 10:20 PM EDT Indication: Chest pain to back, hypertension, syncope. Comparison: None available. Technique: CTA of the chest was performed after the uneventful intravenous administration of 75 mL Isovue-370. Reconstructed coronal and sagittal images were also obtained. In addition, a 3-D volume rendered image was created for interpretation. Automated exposure control and iterative reconstruction methods were used. Findings: Normal appearance of the pulmonary arteries without evidence of pulmonary embolism. Unremarkable appearance of the thoracic aorta. Normal appearance of the cardiac chambers. No pericardial effusion or coronary artery calcification. The anterior mediastinum is unremarkable. No bulky or suspicious thoracic lymph nodes. Unremarkable appearance of the chest wall soft tissues. The trachea and mainstem bronchi are patent. The smaller peripheral airways are normal. The lungs are clear. No lung mass or  suspicious pulmonary nodule. No pleural effusion or pneumothorax. No acute or suspicious bony findings. The gallbladder is surgically absent. Unremarkable appearance of the upper  abdomen.     Impression: No pulmonary embolism. No acute intrathoracic findings. Electronically Signed: Stan Smiley MD  9/5/2023 10:32 PM EDT  Workstation ID: [id number]     I ordered and independently reviewed the above noted radiographic studies.      I viewed images of CTA of the chest which does not show pulmonary embolism, aortic dissection, pneumonia, pneumothorax, or any other acute pathology that I can appreciate.  See radiologist's dictation for official interpretation.        PROCEDURES    Procedures    ECG 12 Lead ED Triage Standing Order; Syncope   Preliminary Result   Test Reason : ED Triage Standing Order~   Blood Pressure :   */*   mmHG   Vent. Rate :  65 BPM     Atrial Rate :  65 BPM      P-R Int : 156 ms          QRS Dur :  78 ms       QT Int : 390 ms       P-R-T Axes :  31  33  45 degrees      QTc Int : 405 ms      Normal sinus rhythm   Nonspecific ST abnormality   Abnormal ECG   No previous ECGs available      Referred By: EDMD           Confirmed By:           MEDICATIONS GIVEN IN ER    Medications   sodium chloride 0.9 % flush 10 mL (has no administration in time range)   sodium chloride 0.9 % bolus 500 mL (500 mL Intravenous Not Given 9/6/23 0024)   lactated ringers bolus 1,000 mL (0 mL Intravenous Stopped 9/6/23 0121)   ondansetron (ZOFRAN) injection 4 mg (4 mg Intravenous Given 9/5/23 2140)   sucralfate (CARAFATE) tablet 1 g (1 g Oral Given 9/5/23 2140)   famotidine (PEPCID) tablet 20 mg (20 mg Oral Given 9/5/23 2140)   iopamidol (ISOVUE-370) 76 % injection 75 mL (75 mL Intravenous Given 9/5/23 2226)         MEDICAL DECISION MAKING, PROGRESS, and CONSULTS    All labs have been independently reviewed by me.  All radiology studies have been reviewed by me and the radiologist dictating the report.  All EKG's have been independently viewed and interpreted by me/my attending physician.      Discussion below represents my analysis of pertinent findings related to patient's condition, differential  diagnosis, treatment plan and final disposition.      Differential diagnosis:    Cardiac arrhythmia, cardiac syncope, vasovagal syncope, anxiety attack, myocardial infarction, pulmonary embolism, aortic dissection, pneumonia, pneumothorax, anemia, electrolyte abnormality      Additional sources:    - Discussed/ obtained information from independent historians:     - External (non-ED) record review: PCP notes with Dinorah Joanneaakash need for hypothyroidism, hyperlipidemia, hypertension, postmenopausal on hormone replacement, Sjogren's, myopathy, Raynaud's    - Chronic or social conditions impacting care: Hypertension, hyperlipidemia, hypothyroidism, autoimmune disease    - Shared decision making: Agreeable to hospital admission      Orders placed during this visit:  Orders Placed This Encounter   Procedures    CT Angiogram Chest    [place] Draw    Comprehensive Metabolic Panel    Magnesium    CBC Auto Differential    High Sensitivity Troponin T    BNP    TSH    T4, Free    Phosphorus    Single High Sensitivity Troponin T    High Sensitivity Troponin T 2Hr    NPO Diet NPO Type: Strict NPO    Undress & Gown    Continuous Pulse Oximetry    Vital Signs    Orthostatic Blood Pressure    Code Status and Medical Interventions:    Oxygen Therapy- Nasal Cannula; Titrate 1-6 LPM Per SpO2; 90 - 95%    POC Creatinine    POC Creatinine    ECG 12 Lead ED Triage Standing Order; Syncope    Insert Peripheral IV    CBC & Differential    Green Top (Gel)    Lavender Top    Gold Top - SST    Gray Top    Light Blue Top         Additional orders considered but not ordered:      ED Course:    Consultants:      ED Course as of 09/06/23 0313   Tue Sep 05, 2023   2057 This is a 63-year-old woman with history of Sjogren's, palpitations, hypertension, hyperlipidemia, who presents for evaluation of an episode of chest pain and presyncope.  She was at a sporting event when she suddenly started developing back pain that then became chest pain.   She did not have palpitations only the pain.  She began to feel lightheaded and dizzy and tried to walk away and then ultimately felt like she was going to pass out so had to lie down.  She did not completely lose consciousness.  She came here by EMS who treated her with aspirin but go of no other medications.  Her chest pain has now resolved but she continues to endorse nausea that she thinks is related to some spaghetti that she had earlier.  Denies recent illness or fevers.  No other complaints at this time. [CC]   2058 ECG shows a rate of 65, normal axis, no QRS widening or QT prolongation, no ST elevations. [CC]   2058 She arrived awake and alert initially hypertensive at 189/89 this is improving without intervention.  Resting comfortably at this time obtaining full cardiac work-up with her chest pain going to her back obtaining CTA of the chest as well to evaluate for pulmonary embolism or aortic injury.  Will need 0 and 2-hour high-sensitivity troponin. [CC]   Wed Sep 06, 2023   0136 No acute pathology on CT of the chest.  CBC shows mild anemia hemoglobin 11.2 metabolic panel shows hyponatremia sodium 129.  Normal magnesium and phosphorus.  Initial high-sensitivity troponin is not elevated at 9 and repeat 2-hour is mildly elevated at 14.  Normal TSH and free T4. [CC]   0138 [place] syncope score is 2.  Heart score is 5. [CC]   0201 I discussed these results at length with the patient and her  and the fact that she has both moderate risk for chest pain and syncope.  Ultimately we have collectively come to the decision to have her admitted overnight for continued troponin trending and cardiology evaluation in the morning.  Discussed with Dr. Lewis with the hospitalist team.  Right now she is chest pain-free so holding on any sort of heparin or other cardiac meds at this time. [CC]   0257 The hospitalist came down to see the patient she told them she did not want to stay in the hospital that she wanted  to get a another troponin here in the emergency department and then be discharged.  I have gone to discuss with her again at length explaining that trending troponins is a process that occurs over several hours I would want at least a 6-hour delta on a repeat troponin and also hospital admission would offer continued telemetry, the ability to get another ECG should her pain return and cardiology evaluation. [CC]   9214 We discussed how her chart shows several episodes of nonspecific chest pain but she assures me that this is different chest pain she is more concerned that this is related to her heart I implored her at this point that I think if that is the case she should allow us to work this up further inpatient and she is now agreeable to hospital admission again.  I have consulted hospital medicine again. [CC]      ED Course User Index  [CC] Lucian Eid MD              Shared Decision Making:  After my consideration of clinical presentation and any laboratory/radiology studies obtained, I discussed the findings with the patient/patient representative who is in agreement with the treatment plan and the final disposition.   Risks and benefits of discharge and/or observation/admission were discussed.       AS OF 03:13 EDT VITALS:    BP - 155/97  HR - 61  TEMP - 98.8 °F (37.1 °C) (Oral)  O2 SATS - 97%                  DIAGNOSIS  Final diagnoses:   Chest pain, unspecified type   Troponin level elevated   Syncope and collapse   Autoimmune disease   Mixed hyperlipidemia         DISPOSITION  Admit        Please note that portions of this document were completed with voice recognition software.        Lucian Eid MD  09/06/23 [id number]       Lucian Eid MD  09/06/23 [id number]
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
## 2023-09-06 NOTE — H&P
"    [facility] Medicine Services  HISTORY AND PHYSICAL    Patient Name: Rupa Finn  : 1960  MRN: [id number]  Primary Care Physician: Dinorah Oates DO  Date of admission: 2023    Subjective   Subjective     Chief Complaint:  Chest pain     HPI:  Rupa Finn is a 63 y.o. female w/ a hx of hypertension, hyperlipidemia, hypothyroidism, scleroderma, Sjogren's, Raynaud's disease, GERD, glaucoma, chronic anemia who presented to the ED with complaints of chest pain.  Patient reports that she was at her [employer] game Tuesday evening when she had sudden onset severe nausea.  Soon after she developed back pain.  Pain located above her right shoulder blade.  Patient then developed central chest pain, dizziness, paresthesias in her bilateral arms and hands and diaphoresis.  Chest pain located central chest.  Patient denies associated vomiting or shortness of breath.  Patient felt as if she needed to \"lie down\" 2/2 dizziness and nausea.  Patient was walking down the bleachers to her car when she felt as if she was going to \"vomit or pass out\".  Patient then rested on the  and EMS was called.  Patient was given an aspirin in route to the ED.  Patient's symptoms resolved shortly after presenting to the ED.  Additionally patient reports that this is her third episode of dizziness and near syncope with bilateral paresthesias of her arms and hands.  She reports that the first 2 episodes were within the past 5 to 6 weeks.  Patient does note that with the first 2 episodes she did not experience chest pain, nausea or diaphoresis.  Patient notes that she was recently taken off of her Norvasc for bradycardia.  Patient also recently wore a Holter monitor that she reports was \"normal\".  Patient denies fever, chills, dyspnea, cough, vomiting, diarrhea, abdominal pain, dysuria, edema, syncope.  Patient evaluated in the emergency department.  CTA Chest remarkable. " EKG c/w NSR. Initial troponin WNL and repeat troponin 14. Pt admitted to the hospital medicine service for further evaluation.     Review of Systems   Constitutional:  Positive for diaphoresis. Negative for chills, fatigue and fever.   HENT:  Negative for congestion, postnasal drip, rhinorrhea, sinus pressure, sinus pain, sneezing and sore throat.    Eyes: Negative.  Negative for visual disturbance.   Respiratory: Negative.  Negative for cough, shortness of breath and wheezing.    Cardiovascular:  Positive for chest pain. Negative for palpitations and leg swelling.   Gastrointestinal:  Positive for nausea. Negative for abdominal distention, abdominal pain, blood in stool, constipation, diarrhea and vomiting.   Endocrine: Negative.    Genitourinary: Negative.  Negative for decreased urine volume, difficulty urinating, dysuria, flank pain, frequency, hematuria, pelvic pain and urgency.   Musculoskeletal:  Positive for back pain. Negative for arthralgias, myalgias, neck pain and neck stiffness.   Skin: Negative.  Negative for wound.   Neurological:  Positive for dizziness, light-headedness and headaches.        Near-syncope.    Hematological: Negative.  Does not bruise/bleed easily.   Psychiatric/Behavioral: Negative.  Negative for confusion.    All other systems reviewed and are negative.     Personal History     Past Medical History:   Diagnosis Date    Anemia 2021    Autoimmune disease     Cholelithiasis Removed 2008    Colon polyp 2022    COVID-19     Diverticulosis 2010?    Fibrocystic breast changes, bilateral     GERD (gastroesophageal reflux disease)     not currently taking medication    Gestational diabetes     Glaucoma     Hyperlipidemia     Hypertension     Hypothyroidism     Lower extremity edema     Neuromuscular disorder 2020    OAB (overactive bladder)     Osteoarthritis     Palpitations     Post-menopause on HRT (hormone replacement therapy)     Raynaud's disease     Scleroderma     SINE scleroderma     Sjogren's disease     Urinary tract infection      Past Surgical History:   Procedure Laterality Date    CHOLECYSTECTOMY      COLONOSCOPY  2019 and 2022    ESOPHAGOSCOPY / EGD      esophageal stretching/dilation and gastric polypectomy, esophageal biopsies    EYE SURGERY  2012    Cataracts removed - lens replacement    LAPAROSCOPIC ASSISTED VAGINAL HYSTERECTOMY SALPINGO OOPHORECTOMY Bilateral     LAPAROSCOPIC DIAZ PROCEDURE      OOPHORECTOMY      OTHER SURGICAL HISTORY      Lap for endometriosis    TONSILLECTOMY      UPPER GASTROINTESTINAL ENDOSCOPY  ?     Family History: family history includes Arthritis in her mother; Asthma in her brother; Cancer in her father and paternal grandfather; Colon cancer in her paternal grandfather; Diabetes in her mother; Glaucoma in her father; Heart attack in her maternal grandfather; Heart disease in her father, maternal grandfather, maternal grandmother, and mother; Heart failure in her maternal grandfather and mother; Hypertension in her brother, father, maternal grandmother, mother, and paternal grandmother; Other in her father and mother; Vision loss in her father and mother.     Social History:  reports that she has never smoked. She has never used smokeless tobacco. She reports that she does not drink alcohol and does not use drugs.  Social History     Social History Narrative    Not on file     Medications:  Alirocumab, Immune Globulin (Human), Tacrolimus, Vitamin D3, Zinc, acetaZOLAMIDE, dexlansoprazole, estradiol, famotidine, levothyroxine, polyethylene glycol, telmisartan, timolol, and travoprost (KATLYN free)    Allergies   Allergen Reactions    Cefaclor Shortness Of Breath     hives    Naproxen Sodium Other (See Comments)     tongue swelling    Alphagan [Brimonidine] Other (See Comments)     Eyes itching, red, swollen    Atenolol Other (See Comments)     fatigue    Livalo [Pitavastatin] Myalgia    Statins Myalgia    Dorzolamide Hcl Other (See Comments)     Eyes burning,  "discharge.    Dorzolamide Hcl-Timolol Mal Unknown - High Severity    Doxycycline Unknown - High Severity    Gammagard [Immune Globulin] Dermatitis    Ibuprofen Swelling    Macrolides And Ketolides Provider Review Needed    Adhesive Tape Rash     \"burns, eats through my skin\"    E-Mycin [Erythromycin] Nausea And Vomiting     Objective   Objective     Vital Signs:   Temp:  [98.8 °F (37.1 °C)] 98.8 °F (37.1 °C)  Heart Rate:  [53-68] 61  Resp:  [14] 14  BP: (153-189)/(83-97) 155/97    Physical Exam     Constitutional: Awake, alert; non-toxic appearing   Eyes: PERRLA, sclerae anicteric, no conjunctival injection  HENT: NCAT, mucous membranes moist  Neck: Supple, no thyromegaly, no lymphadenopathy, trachea midline  Respiratory: Clear to auscultation bilaterally, nonlabored respirations   Cardiovascular: RRR, no murmurs, rubs, or gallops, no peripheral edema   Gastrointestinal: Positive bowel sounds, soft, nontender, nondistended  Musculoskeletal: Normal ROM bilaterally   Psychiatric: Appropriate affect, cooperative  Neurologic: Oriented x 3, strength symmetric in all extremities, Cranial Nerves grossly intact to confrontation, speech clear  Skin: No rashes, lesions or wounds     Result Review:  I have personally reviewed the results from the time of this admission to 9/6/2023 03:24 EDT and agree with these findings:  [x]  Laboratory list / accordion  []  Microbiology  [x]  Radiology  [x]  EKG/Telemetry   []  Cardiology/Vascular   []  Pathology  [x]  Old records    LAB RESULTS:      Lab 09/05/23 2120   WBC 3.40   HEMOGLOBIN 11.2*   HEMATOCRIT 33.5*   PLATELETS 227   NEUTROS ABS 2.14   IMMATURE GRANS (ABS) 0.01   LYMPHS ABS 0.76   MONOS ABS 0.36   EOS ABS 0.09   MCV 91.8         Lab 09/05/23 2142 09/05/23 2141 09/05/23 2119   SODIUM  --   --  129*   POTASSIUM  --   --  3.8   CHLORIDE  --   --  98   CO2  --   --  24.0   ANION GAP  --   --  7.0   BUN  --   --  14   CREATININE 0.70 0.70 0.76   EGFR  --   --  88.2   GLUCOSE "  --   --  96   CALCIUM  --   --  8.7   MAGNESIUM  --   --  2.1   PHOSPHORUS  --   --  2.8   TSH  --   --  1.510         Lab 09/05/23 2119   TOTAL PROTEIN 8.6*   ALBUMIN 3.7   GLOBULIN 4.9   ALT (SGPT) 13   AST (SGOT) 27   BILIRUBIN 0.2   ALK PHOS 81         Lab 09/06/23  0053 09/05/23 2119   PROBNP  --  98.2   HSTROP T 14* 9                 Brief Urine Lab Results  (Last result in the past 365 days)        Color   Clarity   Blood   Leuk Est   Nitrite   Protein   CREAT   Urine HCG        08/21/23 1219 Yellow   Clear   Negative   Negative   Negative   Negative                 Microbiology Results (last 10 days)       ** No results found for the last 240 hours. **          CT Angiogram Chest    Result Date: 9/5/2023  CT ANGIOGRAM CHEST Date of Exam: 9/5/2023 10:20 PM EDT Indication: Chest pain to back, hypertension, syncope. Comparison: None available. Technique: CTA of the chest was performed after the uneventful intravenous administration of 75 mL Isovue-370. Reconstructed coronal and sagittal images were also obtained. In addition, a 3-D volume rendered image was created for interpretation. Automated exposure control and iterative reconstruction methods were used. Findings: Normal appearance of the pulmonary arteries without evidence of pulmonary embolism. Unremarkable appearance of the thoracic aorta. Normal appearance of the cardiac chambers. No pericardial effusion or coronary artery calcification. The anterior mediastinum is unremarkable. No bulky or suspicious thoracic lymph nodes. Unremarkable appearance of the chest wall soft tissues. The trachea and mainstem bronchi are patent. The smaller peripheral airways are normal. The lungs are clear. No lung mass or  suspicious pulmonary nodule. No pleural effusion or pneumothorax. No acute or suspicious bony findings. The gallbladder is surgically absent. Unremarkable appearance of the upper abdomen.     Impression: Impression: No pulmonary embolism. No acute  "intrathoracic findings. Electronically Signed: Stan Smiley MD  9/5/2023 10:32 PM EDT  Workstation ID: [id number]     Results for orders placed during the hospital encounter of 02/20/23    Adult Transthoracic Echo Complete w/ Color, Spectral and Contrast if necessary per protocol    Interpretation Summary    Left ventricular systolic function is normal. Estimated left ventricular EF = 60%    Mild aortic insufficiency.    Trace mitral regurgitation.    Trace tricuspid regurgitation with normal RVSP.    Assessment & Plan   Assessment & Plan       Chest pain    Hypertension    Autoimmune disease    Hypothyroidism    GERD (gastroesophageal reflux disease)    Raynaud's disease    Hyperlipidemia    Sjogren syndrome with myopathy    Scleroderma    Near syncope    Hyponatremia    Chronic anemia    Rupa Finn is a 63 y.o. female w/ a hx of hypertension, hyperlipidemia, hypothyroidism, scleroderma, Sjogren's, Raynaud's disease, GERD, glaucoma, chronic anemia who presented to the ED with complaints of chest pain.    **Chest pain   **Near-syncope   **HTN, HLD   -stress test in 3/2023: \"Impressions are consistent with a low risk study.\"  -ECHO  2/2023 w/ EF 60%   -CTA Chest unremarkable   -EKG c/w NSR; repeat this am   -initial troponin 9, repeat 14; trend   -s/p ASA given via EMS   -continue routine Telmisartan (sub Losartan)   -pt on Praluent injections q 2 wks   -NPO pending Cardiology recommendations   -orthostatic BP  -tsh WNL, FLP and hem A1c pending   -symptom mgt  -Cardiology consult in am; previously seen by Dr. Olson     **Hyponatremia   -chronically low  -currently 129, previous 129 for last 1-2 months and 133 in 2/2023   -s/p 500ml NS bolus given in ED  -NS @ 75ml/hour x 12 hours  -repeat BMP this am     **Chronic anemia   -previous H/H 12.8/38.9 on 8/21 and 11.8/35.3 in July  -current H/H 11.2/33.5  -monitor     **Raynaud' disease  **Scleroderma   **Autoimmune disease  -pt follows w/ UC and receives " infusions very 2 weeks (Gamunex)   -continue Diamox     **Hypothyroidism   -tsh WNL   -continue synthroid     **GERD  -continue PPI     DVT prophylaxis:  Lovenox     CODE STATUS:    Code Status (Patient has no pulse and is not breathing): CPR (Attempt to Resuscitate)  Medical Interventions (Patient has pulse or is breathing): Full Support    Expected Discharge  Expected Discharge Date: 9/7/2023; Expected Discharge Time:     Signature: Electronically signed by HOWARD Carter, 09/06/23, 3:24 AM EDT.    Time spent: 55 minutes       The patient was seen independently by the APC.  I was available for any questions or concerns.     Electronically signed by Gal Lewis III, DO, 09/06/23, 3:56 AM EDT.

## 2023-09-06 NOTE — CONSULTS
Rupa Finn  [id number]  1960   LOS: 0 days   Patient Care Team:  Dinorah Oates DO as PCP - General (Internal Medicine)  Aspen Plata APRN as Nurse Practitioner (Cardiology)  Aspen Plata APRN as Nurse Practitioner (Cardiology)  Mariana Martinez APRN as Nurse Practitioner (Obstetrics and Gynecology)    ID: 63-year-old  white female retired [employer]  from [place], [place] admitted from [facility] ED via EMS.    Chief Complaint:  CHEST PAIN / NEAR SYNCOPE    Problem List:    Palpitations:  Remote cardiac stress testing, performed by [facility], Dr. Hartley; data deficit, date deficit; negative for ischemia.   Echocardiogram,  03/10/2014:  Normal, EF 60%, Dr. Hartley.  Holter monitor, 05/23/2014: Sinus katelyn to sinus rhythm with rates of 40 to 94, occasional PVCs, Dr. Hartley.   Cardiac event monitor, 11/10/2016-12/10/2016: sinus rhythm, sinus tach, sinus arrhythmia with rare PACs and PVCs.   Echocardiogram 2D Complete, February 2022: Left ventricular cavity and wall thickness normal. LVEF 55%-65%. Wall motion normal, with normal LV diastolic function. Mild AR and MR. Right ventricular systolic function normal by visual assessment, TAPSE: 1.8 cm  Echocardiogram 2/20/2023: EF 60%, mild AI, trace MR, trace TR, normal RVSP.  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  CT cardiac calcium score 5.6 August 2018  Stress echocardiogram, 8/13/2018: RVSP 30 mmHg, mild TR, no chest pain at baseline and during exercise with baseline ECG normal, acceptable negative echocardiographic exercise stress test suggestive of low probability for significant focal obstructive coronary artery disease with preserved LV function following  exercise to 122% predicted exercise capacity and 90% predicted maximum heart rate  CCS class I chest discomfort/NYHA class II dyspnea on exertion symptoms  Echocardiogram, April 2021: LVEF 0.60. Borderline pulmonary arterial hypertension. Mild aortic valve sclerosis. RVSP 36 mmHg.  Intermittent atypical nonexertional chest pain syndrome with normal electrocardiogram, September 2021.  Acceptable CT Chest without contrast, 3/3/2022: A few nonspecific sub-4 mm pulmonary nodules, likely benign were present.  Echocardiogram 2D Complete, February 2022: Left ventricular cavity and wall thickness normal. LVEF 55%-65%. Wall motion normal, with normal LV diastolic function. Mild AR and MR. Right ventricular systolic function normal by visual assessment, TAPSE: 1.8 cm  Elevated D-dimer with normal VQ scan and chest x-ray February 2023  Cardiac PET 3/13/2023: Normal myocardial perfusion study with no evidence of ischemia, rest EF 62%, rest EF 70%, no significant coronary artery calcification  Recurrent prolonged chest pain with acceptable serial electrocardiograms / HS troponins / thoracic CTA, September 2023  Chronic hypertension-probably essential  Hyperlipidemia; ASCVD 10-year risk is 4.5%, 1.9% if treated, started on Livalo but she was intolerant July 2018, has had previous intolerances to statins as well, now on Praluent.   Lower extremity edema.   Glaucoma.  Autoimmune diseases:  Patient reports positive genetic markers for scleroderma.   Rheumatoid arthritis, positive YONY, RF factor.  Raynaud's   Sjogren's  Hypothyroidism, treated.  Constipation.  Gestational diabetes.  GERD, with abnormal EGD suggestive of scleroderma and abnormal esophageal motility, medium size hiatal hernia, erythematous mucosa in the stomach, started on Dexilant July 2022  COVID positive, October 2020, with nausea, myalgias, sore throat, and nasal congestion with residual shortness of breath and fatigue March 2021.,  COVID-positive May 2022 with  "same symptoms that lasted about a week and did not require hospitalization  Pulmonary nodules on CT chest March 2022  Scleroderma  Hyponatremia, September 2023  Surgical history:    Hysterectomy.  Tonsillectomy.  Cholecystectomy.  Lap for endometriosis   Esophageal motility surgery  Remote EGD/colonoscopy      Allergies   Allergen Reactions    Cefaclor Shortness Of Breath     hives    Naproxen Sodium Other (See Comments)     tongue swelling    Alphagan [Brimonidine] Other (See Comments)     Eyes itching, red, swollen    Atenolol Other (See Comments)     fatigue    Livalo [Pitavastatin] Myalgia    Statins Myalgia    Dorzolamide Hcl Other (See Comments)     Eyes burning, discharge.    Dorzolamide Hcl-Timolol Mal Unknown - High Severity    Doxycycline Unknown - High Severity    Gammagard [Immune Globulin] Dermatitis    Ibuprofen Swelling    Macrolides And Ketolides Provider Review Needed    Adhesive Tape Rash     \"burns, eats through my skin\"    E-Mycin [Erythromycin] Nausea And Vomiting     Medications Prior to Admission   Medication Sig Dispense Refill Last Dose    Cholecalciferol (Vitamin D3) 50 MCG (2000 UT) tablet Take  by mouth Daily.   9/5/2023    dexlansoprazole (DEXILANT) 30 MG capsule TAKE ONE CAPSULE BY MOUTH DAILY 90 capsule 3 9/5/2023    estradiol (Estrace) 0.5 MG tablet Take 1 tablet by mouth Daily. 90 tablet 2 9/5/2023    levothyroxine (Synthroid) 75 MCG tablet Take 1 tablet by mouth Every Other Day. 45 tablet 1 9/5/2023    levothyroxine (SYNTHROID, LEVOTHROID) 88 MCG tablet Take 1 tablet by mouth Every Other Day. 45 tablet 1 9/4/2023    timolol (TIMOPTIC) 0.5 % ophthalmic solution Administer 1 drop to both eyes 2 (Two) Times a Day.   9/5/2023    acetaZOLAMIDE (DIAMOX) 250 MG tablet Take 0.5 tablets by mouth 2 (two) times a day.       Alirocumab (Praluent) 75 MG/ML solution auto-injector Inject 1 mL under the skin into the appropriate area as directed Every 14 (Fourteen) Days. 6 mL 3 9/3/2023    " famotidine (Pepcid) 40 MG tablet Take 1 tablet by mouth At Night As Needed for Heartburn. 90 tablet 3     Immune Globulin, Human, (GAMUNEX IV) Infuse  into a venous catheter.       polyethylene glycol (MIRALAX) powder As Needed.       Tacrolimus 0.1 % solution Apply 0.1 applicators topically to the appropriate area as directed 2 (Two) Times a Day.       telmisartan (MICARDIS) 80 MG tablet TAKE 1 TABLET BY MOUTH EVERY DAY 90 tablet 2 9/4/2023    travoprost, BAK free, (TRAVATAN) 0.004 % solution ophthalmic solution 1 drop Every Evening. in affected eye(s)   9/4/2023    Zinc 50 MG tablet Take 50 mcg by mouth Daily.   9/4/2023     Scheduled Meds:acetaZOLAMIDE, 125 mg, Oral, BID  enoxaparin, 40 mg, Subcutaneous, Daily  estradiol, 0.5 mg, Oral, Daily  latanoprost, 1 drop, Both Eyes, Nightly  [START ON 9/7/2023] levothyroxine, 75 mcg, Oral, Every Other Day  levothyroxine, 88 mcg, Oral, Every Other Day  losartan, 100 mg, Oral, Q24H  pantoprazole, 40 mg, Oral, Q AM  senna-docusate sodium, 2 tablet, Oral, BID  sodium chloride, 500 mL, Intravenous, Once  sodium chloride, 10 mL, Intravenous, Q12H  timolol, 1 drop, Both Eyes, Daily      Continuous Infusions:sodium chloride, 75 mL/hr, Last Rate: 75 mL/hr (09/06/23 0523)      PRN Meds:.  senna-docusate sodium **AND** polyethylene glycol **AND** bisacodyl **AND** bisacodyl    sodium chloride    sodium chloride    sodium chloride       History of Present Illness:      This middle-aged female is followed closely by HOWARD Bob for the above medical problems.  She underwent outpatient evaluation on 23 June 2023 and was doing well at that time and generally has pursued a fairly stable course over the past 2.5 months.  She has had 2 episodes over the past month of vague symptoms of weakness, digital paresthesias, mild nausea, and occasional palpitation.  She wore an event monitor that was unremarkable but did not have these episodes develop.  On 2 September 2023 she had  "severe sore throat and nasal congestion but did not undergo COVID-19 testing.  Subsequently she felt well and did well until yesterday.  While attending her [employer] football game in [place], [place] around 1930 hrs. but comfortable in the hotter inclement weather and taking hydration, she developed severe \"nausea\" with subsequent development of \"severe hurting pain\" in the central chest area as well as the right infrascapular area.  There was subsequently associated symptoms of diaphoresis, weakness, and presyncope.  There were no accompanying complaints of tachypalpitation, pleurisy, or air hunger.  There were no episodes of emesis or diarrhea and the patient did not note marked abdominal discomfort otherwise.  In retrospect she may have not taken her telmisartan yesterday.  She subsequently felt markedly worse while being helped to her car and developed near presyncope and was placed supine on a bleacher.  She underwent evaluation by several RNs and EMT attending the game and subsequently EMS was summoned and she was transported promptly to [facility] ED and shortly after presentation her symptoms resolved after approximately being present for 1 hour.  She subsequently has  remained asymptomatic.  No recent symptoms of angina pectoris or marked exercise intolerance/incapacity.  No episodes of sustained tachypalpitation or syncope.  She is reluctant to undergo LHC.    Cardiac risk factors: dyslipidemia, family history of premature cardiovascular disease, hypertension, and sedentary lifestyle.    Social History     Socioeconomic History    Marital status:    Tobacco Use    Smoking status: Never    Smokeless tobacco: Never   Vaping Use    Vaping Use: Never used   Substance and Sexual Activity    Alcohol use: No    Drug use: No    Sexual activity: Yes     Partners: Male     Birth control/protection: Post-menopausal, Hysterectomy     Family History   Problem Relation Age of Onset    Other Mother         " "has a pacemaker and is followed by Dr. Brooks    Heart disease Mother     Heart failure Mother     Hypertension Mother     Diabetes Mother     Vision loss Mother         Glaucoma/macular degeneration    Arthritis Mother     Glaucoma Father     Other Father          likely a cancer-related death;    Heart disease Father     Hypertension Father     Cancer Father         Prostate    Vision loss Father         Glaucoma/macular degeneration    Asthma Brother     Hypertension Brother     Heart attack Maternal Grandfather     Heart disease Maternal Grandfather     Heart failure Maternal Grandfather     Heart disease Maternal Grandmother     Hypertension Maternal Grandmother     Hypertension Paternal Grandmother     Colon cancer Paternal Grandfather     Cancer Paternal Grandfather         Prostate    Breast cancer Neg Hx     Ovarian cancer Neg Hx        Review of Systems  10 point review of systems was completed, positives outlined in the HPI, and otherwise all other systems are negative.      Objective:       Physical Exam  /75 (BP Location: Right arm, Patient Position: Lying)   Pulse 54   Temp 98.3 °F (36.8 °C) (Oral)   Resp 12   Ht 157.5 cm (62\")   Wt 58.2 kg (128 lb 4.8 oz)   LMP  (LMP Unknown)   SpO2 96%   BMI 23.47 kg/m²       09/05/23 2025 09/06/23  0454   Weight: 60.3 kg (133 lb) 58.2 kg (128 lb 4.8 oz)     Body mass index is 23.47 kg/m².    Intake/Output Summary (Last 24 hours) at 9/6/2023 0904  Last data filed at 9/6/2023 0121  Gross per 24 hour   Intake 600 ml   Output --   Net 600 ml       General Appearance:  Alert, cooperative, no distress, appears younger than stated age   Head:  Normocephalic, without obvious abnormality, atraumatic   Eyes:  PERRL, conjunctivae/corneas clear, EOM's intact, fundi benign, both eyes   Throat: Lips, mucosa, and tongue normal; teeth and gums normal   Neck: Supple, symmetrical, trachea midline, no adenopathy, thyroid: not enlarged, symmetric, no " tenderness/mass/nodules, no carotid bruit or JVD   Lungs:   Clear to auscultation bilaterally, respirations unlabored   Heart:  Regular rate and rhythm, S1, S2 normal, no murmur, rub or gallop   Abdomen:   Soft, nontender, no masses, no organomegaly, bowel sounds audible x4   Extremities: No edema, normal range of motion   Pulses: 2+ and symmetric   Skin: Skin color, texture, turgor normal, no rashes or lesions   Neurologic: Normal; gait not tested     Cardiographics:    EKG:    Sinus bradycardia  Otherwise normal ECG  When compared with ECG of 05-SEP-2023 20:30, (Unconfirmed)  Nonspecific T wave abnormality no longer evident in Anterior leads    Imaging:    Chest x-ray: NONE      CHEST CTA:    Findings:    Normal appearance of the pulmonary arteries without evidence of pulmonary embolism. Unremarkable appearance of the thoracic aorta. Normal appearance of the cardiac chambers. No pericardial effusion or coronary artery calcification. The anterior   mediastinum is unremarkable. No bulky or suspicious thoracic lymph nodes. Unremarkable appearance of the chest wall soft tissues. The trachea and mainstem bronchi are patent. The smaller peripheral airways are normal. The lungs are clear. No lung mass or suspicious pulmonary nodule. No pleural effusion or pneumothorax. No acute or suspicious bony findings. The gallbladder is surgically absent. Unremarkable appearance of the upper abdomen.     IMPRESSION: No pulmonary embolism. No acute intrathoracic findings.       Lab Review   Results from last 7 days   Lab Units 09/06/23 0522 09/05/23 2142 09/05/23 2141 09/05/23 2119   SODIUM mmol/L 130*  --   --  129*   POTASSIUM mmol/L 4.1  --   --  3.8   CHLORIDE mmol/L 100  --   --  98   CO2 mmol/L 19.0*  --   --  24.0   BUN mg/dL 12  --   --  14   CREATININE mg/dL 0.66 0.70 0.70 0.76   GLUCOSE mg/dL 94  --   --  96   CALCIUM mg/dL 9.3  --   --  8.7     Results from last 7 days   Lab Units 09/06/23 0522 09/05/23 2120   WBC  10*3/mm3 3.72 3.40   HEMOGLOBIN g/dL 12.0 11.2*   HEMATOCRIT % 36.2 33.5*   PLATELETS 10*3/mm3 160 227     Results from last 7 days   Lab Units 09/06/23  0522   CHOLESTEROL mg/dL 231*   TRIGLYCERIDES mg/dL 142   HDL CHOL mg/dL 66*   LDL CHOL mg/dL 140*     Results from last 7 days   Lab Units 09/06/23  0711 09/06/23  0522 09/06/23  0053   HSTROP T ng/L 9 10* 14*     URINALYSIS: WNL  proBNP: 98.2  ALBUMIN: 3.9  LFTs: WNL  NO D-DIMER / LIPASE / ESR  ESR (8/21/2023): > 130  TSH & FT4: WNL  DRIP = normal saline 75 cc/hour continuous infusion     Assessment:      Doubt acute coronary syndrome.  Concerned about possible GI etiology for her symptoms with severe nausea and subsequent chest discomfort which may well represent esophageal spasm/esophagitis with accompanying hyper vagal tone.  The next step in terms of further cardiac evaluation would be LHC and after discussing this option with patient and  present in her room she is reluctant to undergo study; I concur.  Would allow her to eat, ambulate, and monitor her course and possibly be discharged home later today if stable.  She will need to monitor her blood pressure as an outpatient and update Ms. Sahu next 1-2 weeks with blood pressure readings.  Would defer EGD at this time.  The patient states she has been compliant with Praluent injections biweekly.      Plan:     1.  Would allow fluids and nutrition; diet requested.  2.  Defer LHC as well as repeat MPS at this time  3.  Defer EGD/abdominal ultrasound at this time  4.  Would monitor with ambulation and possibly allow home later today if stable with the patient to update Ms. Plata in 1-2 weeks with blood pressure readings and symptom update  5.  Return to [facility] ED if recurrent chest pain develops for strong consideration at that time of LHC plus or minus subsequent EGD as needed  6.  Her fasting lipid panel remains uncontrolled and would add Zetia 10 mg daily to discharge medications    Discussed with  patient, , and RN.

## 2023-09-06 NOTE — PROGRESS NOTES
[facility] Medicine Services  ADMISSION FOLLOW-UP NOTE          Patient admitted after midnight, H&P by my partner performed earlier on today's date reviewed.  Interim findings, labs, and charting also reviewed.        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         ADDITIONAL PLAN:  - detailed assessment and plan from admission reviewed  - Chart reviewed and patient examined. No dyspnea or chest pain. NO palpitations. Denies dizziness/presyncope    Chest pain  Near syncope  -follow up cardiology recommendations today    Hyponatremia  -seemingly chronic, stable, and can follow up outpatient     Expected Discharge Home  Expected Discharge Date: 9/7/2023; Expected Discharge Time:      Willis Norman MD  09/06/23

## 2023-09-06 NOTE — OUTREACH NOTE
Prep Survey      Flowsheet Row Responses   [facility] [employer] facility patient discharged from? [facility]   Is LACE score < 7 ? Yes   Eligibility [facility]   Date of Admission 09/05/23   Date of Discharge 09/06/23   Discharge Disposition Home or Self Care   Discharge diagnosis Chest pain   Does the patient have one of the following disease processes/diagnoses(primary or secondary)? Other   Does the patient have Home health ordered? No   Is there a DME ordered? No   Prep survey completed? Yes            Mojgan MCHUGH - Registered Nurse

## 2023-09-07 ENCOUNTER — TRANSITIONAL CARE MANAGEMENT TELEPHONE ENCOUNTER (OUTPATIENT)
Dept: CALL CENTER | Facility: HOSPITAL | Age: 63
End: 2023-09-07
Payer: COMMERCIAL

## 2023-09-07 ENCOUNTER — TELEPHONE (OUTPATIENT)
Dept: FAMILY MEDICINE CLINIC | Facility: CLINIC | Age: 63
End: 2023-09-07
Payer: COMMERCIAL

## 2023-09-07 ENCOUNTER — TELEPHONE (OUTPATIENT)
Dept: CARDIOLOGY | Facility: CLINIC | Age: 63
End: 2023-09-07

## 2023-09-07 NOTE — TELEPHONE ENCOUNTER
"Caller: Rupa Finn \"Jeannette\"    Relationship to patient: Self    Best call back number: [phone]    Type of visit: FOLLOW UP    Requested date:  AFTER 10/02/23    Additional notes:PATIENT CALLED IN AFTER BEING RELEASED FROM THE HOSPITAL ON 09/06/23, SHE SAID SHE WAS TOLD TO FOLLOW UP WITH HER PRIMARY CARE PROVIDER BUT WOULD RATHER FOLLOW UP WITH CALLI CA. PATIENT WILL BE GOING OUT OF TOWN AND WILL RETURN AROUND 10/02/23 AND WOULD LIKE TO BE SCHEDULED AFTER THEN. PATIENT STATED SHE DOES HAVE INFUSIONS ON 10/04/23 AND 10/18/23, AND COULD NOT COME IN THESE DAYS.       PATIENT IS NOT CURRENTLY HAVING ANY CARDIAC ISSUES  "

## 2023-09-07 NOTE — TELEPHONE ENCOUNTER
HUB TO READ:  [facility] FOR PATIENT TO R/S [facility] F/U; PATIENT'S PCP IS OUT OF OFFICE UNTIL JAN.  PATIENT WILL NEED TO BE SCHEDULED BY SOMEONE IN OUR PRACTICE.

## 2023-09-07 NOTE — OUTREACH NOTE
Call Center TCM Note      Flowsheet Row Responses   [facility] patient discharged from? [place]   Does the patient have one of the following disease processes/diagnoses(primary or secondary)? Other   TCM attempt successful? Yes   Call start time 0935   Call end time 0937   Discharge diagnosis Chest pain   Does the patient have all medications ordered at discharge? N/A   Prescription comments no new medications ordered at discharge   Is the patient taking all medications as directed (includes completed medication regime)? Yes   Comments Hospital f/u with PCP Dr. Oates on 9/15   Does the patient have an appointment with their PCP within 7-14 days of discharge? Yes   Has home health visited the patient within 72 hours of discharge? N/A   Psychosocial issues? No   Did the patient receive a copy of their discharge instructions? Yes   Nursing interventions Reviewed instructions with patient   What is the patient's perception of their health status since discharge? Improving   Is the patient/caregiver able to teach back signs and symptoms related to disease process for when to call PCP? Yes   Is the patient/caregiver able to teach back signs and symptoms related to disease process for when to call 911? Yes   Is the patient/caregiver able to teach back the hierarchy of who to call/visit for symptoms/problems? PCP, Specialist, Home health nurse, Urgent Care, ED, 911 Yes   TCM call completed? Yes   Wrap up additional comments Doing well, all concerns addressed, needs hospital f/u appt rescheduled and no open appts noted.   Call end time 0937   Would this patient benefit from a Referral to Amb Social Work? No   Is the patient interested in additional calls from an ambulatory ? No            Debo Solorzano RN    9/7/2023, 09:38 EDT

## 2023-09-21 PROBLEM — R07.89 ATYPICAL CHEST PAIN: Status: ACTIVE | Noted: 2023-09-06
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
## 2023-10-06 ENCOUNTER — LAB (OUTPATIENT)
Dept: LAB | Facility: HOSPITAL | Age: 63
End: 2023-10-06
Payer: COMMERCIAL

## 2023-10-06 ENCOUNTER — OFFICE VISIT (OUTPATIENT)
Dept: CARDIOLOGY | Facility: CLINIC | Age: 63
End: 2023-10-06
Payer: COMMERCIAL

## 2023-10-06 ENCOUNTER — TRANSCRIBE ORDERS (OUTPATIENT)
Dept: LAB | Facility: HOSPITAL | Age: 63
End: 2023-10-06
Payer: COMMERCIAL

## 2023-10-06 VITALS
SYSTOLIC BLOOD PRESSURE: 136 MMHG | WEIGHT: 135.8 LBS | HEART RATE: 54 BPM | BODY MASS INDEX: 24.99 KG/M2 | DIASTOLIC BLOOD PRESSURE: 72 MMHG | HEIGHT: 62 IN

## 2023-10-06 DIAGNOSIS — R07.89 ATYPICAL CHEST PAIN: ICD-10-CM

## 2023-10-06 DIAGNOSIS — M35.00 SICCA SYNDROME: Primary | ICD-10-CM

## 2023-10-06 DIAGNOSIS — E78.2 MIXED HYPERLIPIDEMIA: ICD-10-CM

## 2023-10-06 DIAGNOSIS — R00.2 PALPITATIONS: Primary | ICD-10-CM

## 2023-10-06 DIAGNOSIS — G72.9 MYOPATHY, UNSPECIFIED: ICD-10-CM

## 2023-10-06 DIAGNOSIS — I10 PRIMARY HYPERTENSION: ICD-10-CM

## 2023-10-06 DIAGNOSIS — M35.00 SICCA SYNDROME: ICD-10-CM

## 2023-10-06 LAB
ALBUMIN SERPL-MCNC: 4.1 G/DL (ref 3.5–5.2)
ALBUMIN/GLOB SERPL: 0.7 G/DL
ALP SERPL-CCNC: 79 U/L (ref 39–117)
ALT SERPL W P-5'-P-CCNC: 16 U/L (ref 1–33)
ANION GAP SERPL CALCULATED.3IONS-SCNC: 8.5 MMOL/L (ref 5–15)
AST SERPL-CCNC: 28 U/L (ref 1–32)
BASOPHILS # BLD AUTO: 0.03 10*3/MM3 (ref 0–0.2)
BASOPHILS NFR BLD AUTO: 1.1 % (ref 0–1.5)
BILIRUB SERPL-MCNC: 0.4 MG/DL (ref 0–1.2)
BUN SERPL-MCNC: 10 MG/DL (ref 8–23)
BUN/CREAT SERPL: 12.7 (ref 7–25)
C4 SERPL-MCNC: 14 MG/DL (ref 14–44)
CALCIUM SPEC-SCNC: 9.2 MG/DL (ref 8.6–10.5)
CHLORIDE SERPL-SCNC: 100 MMOL/L (ref 98–107)
CK SERPL-CCNC: 64 U/L (ref 20–180)
CO2 SERPL-SCNC: 22.5 MMOL/L (ref 22–29)
CREAT SERPL-MCNC: 0.79 MG/DL (ref 0.57–1)
CRP SERPL-MCNC: <0.3 MG/DL (ref 0–0.5)
DEPRECATED RDW RBC AUTO: 42 FL (ref 37–54)
EGFRCR SERPLBLD CKD-EPI 2021: 84.2 ML/MIN/1.73
EOSINOPHIL # BLD AUTO: 0.1 10*3/MM3 (ref 0–0.4)
EOSINOPHIL NFR BLD AUTO: 3.8 % (ref 0.3–6.2)
ERYTHROCYTE [DISTWIDTH] IN BLOOD BY AUTOMATED COUNT: 12.8 % (ref 12.3–15.4)
ERYTHROCYTE [SEDIMENTATION RATE] IN BLOOD: 75 MM/HR (ref 0–30)
GLOBULIN UR ELPH-MCNC: 5.5 GM/DL
GLUCOSE SERPL-MCNC: 88 MG/DL (ref 65–99)
HCT VFR BLD AUTO: 36.6 % (ref 34–46.6)
HGB BLD-MCNC: 12.3 G/DL (ref 12–15.9)
IMM GRANULOCYTES # BLD AUTO: 0.01 10*3/MM3 (ref 0–0.05)
IMM GRANULOCYTES NFR BLD AUTO: 0.4 % (ref 0–0.5)
LYMPHOCYTES # BLD AUTO: 0.56 10*3/MM3 (ref 0.7–3.1)
LYMPHOCYTES NFR BLD AUTO: 21.1 % (ref 19.6–45.3)
MCH RBC QN AUTO: 30.8 PG (ref 26.6–33)
MCHC RBC AUTO-ENTMCNC: 33.6 G/DL (ref 31.5–35.7)
MCV RBC AUTO: 91.7 FL (ref 79–97)
MONOCYTES # BLD AUTO: 0.37 10*3/MM3 (ref 0.1–0.9)
MONOCYTES NFR BLD AUTO: 13.9 % (ref 5–12)
NEUTROPHILS NFR BLD AUTO: 1.59 10*3/MM3 (ref 1.7–7)
NEUTROPHILS NFR BLD AUTO: 59.7 % (ref 42.7–76)
NRBC BLD AUTO-RTO: 0 /100 WBC (ref 0–0.2)
PLATELET # BLD AUTO: 270 10*3/MM3 (ref 140–450)
PMV BLD AUTO: 11.4 FL (ref 6–12)
POTASSIUM SERPL-SCNC: 4.3 MMOL/L (ref 3.5–5.2)
PROT SERPL-MCNC: 9.6 G/DL (ref 6–8.5)
RBC # BLD AUTO: 3.99 10*6/MM3 (ref 3.77–5.28)
SODIUM SERPL-SCNC: 131 MMOL/L (ref 136–145)
WBC NRBC COR # BLD: 2.66 10*3/MM3 (ref 3.4–10.8)

## 2023-10-06 PROCEDURE — 99214 OFFICE O/P EST MOD 30 MIN: CPT | Performed by: NURSE PRACTITIONER

## 2023-10-06 PROCEDURE — 85652 RBC SED RATE AUTOMATED: CPT

## 2023-10-06 PROCEDURE — 36415 COLL VENOUS BLD VENIPUNCTURE: CPT

## 2023-10-06 PROCEDURE — 86160 COMPLEMENT ANTIGEN: CPT

## 2023-10-06 PROCEDURE — 84165 PROTEIN E-PHORESIS SERUM: CPT

## 2023-10-06 PROCEDURE — 93000 ELECTROCARDIOGRAM COMPLETE: CPT | Performed by: NURSE PRACTITIONER

## 2023-10-06 PROCEDURE — 86140 C-REACTIVE PROTEIN: CPT

## 2023-10-06 PROCEDURE — 80053 COMPREHEN METABOLIC PANEL: CPT

## 2023-10-06 PROCEDURE — 85025 COMPLETE CBC W/AUTO DIFF WBC: CPT

## 2023-10-06 PROCEDURE — 82550 ASSAY OF CK (CPK): CPT

## 2023-10-09 LAB
ALBUMIN SERPL ELPH-MCNC: 3.4 G/DL (ref 2.9–4.4)
ALBUMIN/GLOB SERPL: 0.6 {RATIO} (ref 0.7–1.7)
ALPHA1 GLOB SERPL ELPH-MCNC: 0.3 G/DL (ref 0–0.4)
ALPHA2 GLOB SERPL ELPH-MCNC: 0.8 G/DL (ref 0.4–1)
B-GLOBULIN SERPL ELPH-MCNC: 1.1 G/DL (ref 0.7–1.3)
GAMMA GLOB SERPL ELPH-MCNC: 3.4 G/DL (ref 0.4–1.8)
GLOBULIN SER CALC-MCNC: 5.6 G/DL (ref 2.2–3.9)
LABORATORY COMMENT REPORT: ABNORMAL
M PROTEIN SERPL ELPH-MCNC: ABNORMAL G/DL
PROT PATTERN SERPL ELPH-IMP: ABNORMAL
PROT SERPL-MCNC: 9 G/DL (ref 6–8.5)

## 2023-11-09 RX ORDER — ALIROCUMAB 75 MG/ML
INJECTION, SOLUTION SUBCUTANEOUS
Qty: 6 ML | Refills: 0 | Status: SHIPPED | OUTPATIENT
Start: 2023-11-09

## 2023-12-04 ENCOUNTER — LAB (OUTPATIENT)
Dept: LAB | Facility: HOSPITAL | Age: 63
End: 2023-12-04
Payer: COMMERCIAL

## 2023-12-04 DIAGNOSIS — G72.9 MYOPATHY, UNSPECIFIED: ICD-10-CM

## 2023-12-04 DIAGNOSIS — M35.00 SICCA SYNDROME: ICD-10-CM

## 2023-12-04 PROCEDURE — 36415 COLL VENOUS BLD VENIPUNCTURE: CPT

## 2023-12-04 PROCEDURE — 84165 PROTEIN E-PHORESIS SERUM: CPT

## 2023-12-04 PROCEDURE — 86140 C-REACTIVE PROTEIN: CPT

## 2023-12-04 PROCEDURE — 85025 COMPLETE CBC W/AUTO DIFF WBC: CPT

## 2023-12-04 PROCEDURE — 85652 RBC SED RATE AUTOMATED: CPT

## 2023-12-04 PROCEDURE — 86160 COMPLEMENT ANTIGEN: CPT

## 2023-12-04 PROCEDURE — 80053 COMPREHEN METABOLIC PANEL: CPT

## 2023-12-04 PROCEDURE — 82550 ASSAY OF CK (CPK): CPT

## 2023-12-05 LAB
ALBUMIN SERPL ELPH-MCNC: 3.3 G/DL (ref 2.9–4.4)
ALBUMIN SERPL-MCNC: 4 G/DL (ref 3.5–5.2)
ALBUMIN/GLOB SERPL: 0.6 {RATIO} (ref 0.7–1.7)
ALBUMIN/GLOB SERPL: 0.8 G/DL
ALP SERPL-CCNC: 80 U/L (ref 39–117)
ALPHA1 GLOB SERPL ELPH-MCNC: 0.3 G/DL (ref 0–0.4)
ALPHA2 GLOB SERPL ELPH-MCNC: 0.8 G/DL (ref 0.4–1)
ALT SERPL W P-5'-P-CCNC: 19 U/L (ref 1–33)
ANION GAP SERPL CALCULATED.3IONS-SCNC: 12.9 MMOL/L (ref 5–15)
AST SERPL-CCNC: 31 U/L (ref 1–32)
B-GLOBULIN SERPL ELPH-MCNC: 1.1 G/DL (ref 0.7–1.3)
BASOPHILS # BLD AUTO: 0.05 10*3/MM3 (ref 0–0.2)
BASOPHILS NFR BLD AUTO: 1.7 % (ref 0–1.5)
BILIRUB SERPL-MCNC: 0.4 MG/DL (ref 0–1.2)
BUN SERPL-MCNC: 11 MG/DL (ref 8–23)
BUN/CREAT SERPL: 13.4 (ref 7–25)
C4 SERPL-MCNC: 13 MG/DL (ref 14–44)
CALCIUM SPEC-SCNC: 8.9 MG/DL (ref 8.6–10.5)
CHLORIDE SERPL-SCNC: 98 MMOL/L (ref 98–107)
CK SERPL-CCNC: 49 U/L (ref 20–180)
CO2 SERPL-SCNC: 21.1 MMOL/L (ref 22–29)
CREAT SERPL-MCNC: 0.82 MG/DL (ref 0.57–1)
CRP SERPL-MCNC: <0.3 MG/DL (ref 0–0.5)
DEPRECATED RDW RBC AUTO: 46 FL (ref 37–54)
EGFRCR SERPLBLD CKD-EPI 2021: 80.5 ML/MIN/1.73
EOSINOPHIL # BLD AUTO: 0.11 10*3/MM3 (ref 0–0.4)
EOSINOPHIL NFR BLD AUTO: 3.8 % (ref 0.3–6.2)
ERYTHROCYTE [DISTWIDTH] IN BLOOD BY AUTOMATED COUNT: 13.5 % (ref 12.3–15.4)
ERYTHROCYTE [SEDIMENTATION RATE] IN BLOOD: 76 MM/HR (ref 0–30)
GAMMA GLOB SERPL ELPH-MCNC: 3.3 G/DL (ref 0.4–1.8)
GLOBULIN SER CALC-MCNC: 5.5 G/DL (ref 2.2–3.9)
GLOBULIN UR ELPH-MCNC: 4.9 GM/DL
GLUCOSE SERPL-MCNC: 79 MG/DL (ref 65–99)
HCT VFR BLD AUTO: 36.8 % (ref 34–46.6)
HGB BLD-MCNC: 12 G/DL (ref 12–15.9)
IMM GRANULOCYTES # BLD AUTO: 0 10*3/MM3 (ref 0–0.05)
IMM GRANULOCYTES NFR BLD AUTO: 0 % (ref 0–0.5)
LABORATORY COMMENT REPORT: ABNORMAL
LYMPHOCYTES # BLD AUTO: 0.88 10*3/MM3 (ref 0.7–3.1)
LYMPHOCYTES NFR BLD AUTO: 30.2 % (ref 19.6–45.3)
M PROTEIN SERPL ELPH-MCNC: ABNORMAL G/DL
MCH RBC QN AUTO: 30.1 PG (ref 26.6–33)
MCHC RBC AUTO-ENTMCNC: 32.6 G/DL (ref 31.5–35.7)
MCV RBC AUTO: 92.2 FL (ref 79–97)
MONOCYTES # BLD AUTO: 0.36 10*3/MM3 (ref 0.1–0.9)
MONOCYTES NFR BLD AUTO: 12.4 % (ref 5–12)
NEUTROPHILS NFR BLD AUTO: 1.51 10*3/MM3 (ref 1.7–7)
NEUTROPHILS NFR BLD AUTO: 51.9 % (ref 42.7–76)
NRBC BLD AUTO-RTO: 0 /100 WBC (ref 0–0.2)
PLATELET # BLD AUTO: 255 10*3/MM3 (ref 140–450)
PMV BLD AUTO: 11.9 FL (ref 6–12)
POTASSIUM SERPL-SCNC: 4.1 MMOL/L (ref 3.5–5.2)
PROT PATTERN SERPL ELPH-IMP: ABNORMAL
PROT SERPL-MCNC: 8.8 G/DL (ref 6–8.5)
PROT SERPL-MCNC: 8.9 G/DL (ref 6–8.5)
RBC # BLD AUTO: 3.99 10*6/MM3 (ref 3.77–5.28)
SODIUM SERPL-SCNC: 132 MMOL/L (ref 136–145)
WBC NRBC COR # BLD AUTO: 2.91 10*3/MM3 (ref 3.4–10.8)

## 2023-12-14 DIAGNOSIS — E03.9 ACQUIRED HYPOTHYROIDISM: ICD-10-CM

## 2023-12-15 RX ORDER — LEVOTHYROXINE SODIUM 0.07 MG/1
75 TABLET ORAL EVERY OTHER DAY
Qty: 45 TABLET | Refills: 1 | Status: SHIPPED | OUTPATIENT
Start: 2023-12-15

## 2023-12-15 RX ORDER — LEVOTHYROXINE SODIUM 88 UG/1
88 TABLET ORAL EVERY OTHER DAY
Qty: 45 TABLET | Refills: 1 | Status: SHIPPED | OUTPATIENT
Start: 2023-12-15

## 2023-12-28 ENCOUNTER — TELEPHONE (OUTPATIENT)
Dept: OBSTETRICS AND GYNECOLOGY | Facility: CLINIC | Age: 63
End: 2023-12-28
Payer: COMMERCIAL

## 2023-12-28 RX ORDER — DEXLANSOPRAZOLE 30 MG/1
1 CAPSULE, DELAYED RELEASE ORAL DAILY
Qty: 90 CAPSULE | Refills: 3 | Status: SHIPPED | OUTPATIENT
Start: 2023-12-28

## 2023-12-28 RX ORDER — ESTRADIOL 0.5 MG/1
0.5 TABLET ORAL DAILY
Qty: 90 TABLET | Refills: 2 | Status: SHIPPED | OUTPATIENT
Start: 2023-12-28

## 2023-12-28 NOTE — TELEPHONE ENCOUNTER
Rx Refill Note  Pending Prescriptions:                       Disp   Refills    dexlansoprazole (DEXILANT) 30 MG capsule [*90 cap*0        Sig: TAKE 1 CAPSULE BY MOUTH EVERY DAY    Last office visit with prescribing clinician: 7/26/2023   Last telemedicine visit with prescribing clinician: Visit date not found   Next office visit with prescribing clinician: Visit date not found         Carolyn Garcia MA  12/28/23, 13:29 EST

## 2024-01-09 NOTE — PROGRESS NOTES
Subjective:     Encounter Date:01/19/2024      Patient ID: Rupa Finn is a 63 y.o.  white female, retired [employer] , from [place], [place].     SELF-REFERRED  FORMER PHYSICIAN: Gena Tse MD  CURRENT PHYSICIAN: Farntz Lin MD  PREVIOUS CARDIOLOGIST: Glenna Melendez MD, [facility]  RHEUMATOLOGISTS:  Kim Douglas MD, Meghana Orta MD (), Marco Jasso MD ()  GYNECOLOGIST: Fabio Armenta MD  NEUROLOGIST: Naga Jarquin MD .  GASTROENTEROLOGIST: Fidel Magallanes MD/ Missy Rothman PA-C.    Chief Complaint:   Chief Complaint   Patient presents with    Palpitations     Problem List:  Palpitations:  Remote cardiac stress testing, performed by [facility], Dr. Hartley; data deficit, date deficit; negative for ischemia.   Echocardiogram,  03/10/2014:  Normal, EF 60%, Dr. Hartley.  Holter monitor, 05/23/2014: Sinus katelyn to sinus rhythm with rates of 40 to 94, occasional PVCs, Dr. Hartley.   Cardiac event monitor, 11/10/2016-12/10/2016: sinus rhythm, sinus tach, sinus arrhythmia with rare PACs and PVCs.   Echocardiogram 2D Complete, February 2022: Left ventricular cavity and wall thickness normal. LVEF 55%-65%. Wall motion normal, with normal LV diastolic function. Mild AR and MR. Right ventricular systolic function normal by visual assessment, TAPSE: 1.8 cm  Echocardiogram 2/20/2023: EF 60%, mild AI, trace MR, trace TR, normal RVSP.  Normal Holter monitor February 2023  Holter monitor August 2023 rather benign, average heart rate 58 bpm, 1 episode of SVT for only 4 beats, no heart block, VT, atrial fibrillation/flutter, or pauses  Increased palpitations October 2023 with dizziness   Holter monitor October 2023 : relatively benign.  6 beats of atrial tachycardia and less than 1% PVC burden. 1 episode of ventricular tachycardia for only 3 beats. Recommendations for PRN acebutalol  Dyspnea on exertion and chest pain syndrome - possible  combined hypertensive and ischemic cardiovascular disease:  CCS class I-II chest discomfort/NYHA class II TERRAZAS, with normal EKG (July 2018).  CT cardiac calcium score 5.6 August 2018  Stress echocardiogram, 8/13/2018: RVSP 30 mmHg, mild TR, no chest pain at baseline and during exercise with baseline ECG normal, acceptable negative echocardiographic exercise stress test suggestive of low probability for significant focal obstructive coronary artery disease with preserved LV function following exercise to 122% predicted exercise capacity and 90% predicted maximum heart rate  CCS class I chest discomfort/NYHA class II dyspnea on exertion symptoms  Echocardiogram, April 2021: LVEF 0.60. Borderline pulmonary arterial hypertension. Mild aortic valve sclerosis. RVSP 36 mmHg.  Intermittent atypical nonexertional chest pain syndrome with normal electrocardiogram, September 2021.  Acceptable CT Chest without contrast, 3/3/2022: A few nonspecific sub-4 mm pulmonary nodules, likely benign were present.  Echocardiogram 2D Complete, February 2022: Left ventricular cavity and wall thickness normal. LVEF 55%-65%. Wall motion normal, with normal LV diastolic function. Mild AR and MR. Right ventricular systolic function normal by visual assessment, TAPSE: 1.8 cm  Elevated D-dimer with normal VQ scan and chest x-ray February 2023  Echocardiogram 2/20/2023: EF 60%, mild AI, trace MR, trace TR, normal RVSP.  Cardiac PET 3/13/2023: Normal myocardial perfusion study with no evidence of ischemia, rest EF 62%, rest EF 70%, no significant coronary artery calcification  [facility] ED 9/5/2023 for atypical chest pain, weakness, nausea, vomiting, felt to be GI in etiology  Hypertension, intolerant to amlodipine with bradycardia, hydrochlorothiazide.   Hyperlipidemia; ASCVD 10-year risk is 4.5%, 1.9% if treated, started on Livalo but she was intolerant July 2018, has had previous intolerances to statins as well, now on Praluent.   Lower extremity edema.  "  Glaucoma.  Autoimmune diseases:  Patient reports positive genetic markers for scleroderma.   Rheumatoid arthritis, positive YONY, RF factor.  Raynaud's   Sjogren's  Hypothyroidism, treated.  Constipation.  Gestational diabetes.  GERD, with abnormal EGD suggestive of scleroderma and abnormal esophageal motility, medium size hiatal hernia, erythematous mucosa in the stomach, started on Dexilant July 2022  COVID positive, October 2020, with nausea, myalgias, sore throat, and nasal congestion with residual shortness of breath and fatigue March 2021.,  COVID-positive May 2022 with same symptoms that lasted about a week and did not require hospitalization  Pulmonary nodules on CT chest March 2022  Scleroderma  Surgical history:    Hysterectomy.  Tonsillectomy.  Cholecystectomy.  Lap for endometriosis   Esophageal motility surgery    Allergies   Allergen Reactions    Cefaclor Shortness Of Breath     hives    Naproxen Sodium Other (See Comments)     tongue swelling    Nsaids Angioedema    Alphagan [Brimonidine] Other (See Comments)     Eyes itching, red, swollen    Atenolol Other (See Comments)     fatigue    Livalo [Pitavastatin] Myalgia    Statins Myalgia    Dorzolamide Hcl Other (See Comments)     Eyes burning, discharge.    Dorzolamide Hcl-Timolol Mal Unknown - High Severity    Doxycycline Unknown - High Severity    Gammagard [Immune Globulin] Dermatitis    Ibuprofen Swelling    Macrolides And Ketolides Provider Review Needed    Adhesive Tape Rash     \"burns, eats through my skin\"    E-Mycin [Erythromycin] Nausea And Vomiting    Levofloxacin Unknown (See Comments)       Current Outpatient Medications   Medication Instructions    acetaZOLAMIDE (DIAMOX) 125 mg, Oral, 2 times daily    Cholecalciferol (Vitamin D3) 50 MCG (2000 UT) tablet Oral, Daily    dexlansoprazole (DEXILANT) 30 mg, Oral, Daily    estradiol (ESTRACE) 0.5 mg, Oral, Daily    famotidine (PEPCID) 40 mg, Oral, Nightly PRN    hydrALAZINE (APRESOLINE) 10 mg, " Oral, As Needed    Immune Globulin, Human, (GAMUNEX IV) Intravenous, Every 14 Days    levothyroxine (SYNTHROID, LEVOTHROID) 88 mcg, Oral, Every Other Day    levothyroxine (SYNTHROID, LEVOTHROID) 75 mcg, Oral, Every Other Day    mupirocin (BACTROBAN) 2 % ointment Apply 1 g topically daily to wound for 14 days or until healed    mycophenolate (CELLCEPT) 250 mg, Oral    polyethylene glycol (MIRALAX) powder As Needed    Praluent 75 MG/ML solution auto-injector INJECT 1 ML UNDER THE SKIN ONCE EVERY 14 DAYS.    telmisartan (MICARDIS) 80 MG tablet TAKE 1 TABLET BY MOUTH EVERY DAY    timolol (TIMOPTIC) 0.5 % ophthalmic solution 1 drop, Both Eyes, 2 Times Daily    travoprost, BAK free, (TRAVATAN) 0.004 % solution ophthalmic solution 1 drop, Every Evening, in affected eye(s)     Zinc 50 mcg, Oral, Daily         HISTORY OF PRESENT ILLNESS:  The patient is here for 3 month follow up. Her echocardiogram 2/20/2023 showed EF 60%, mild AI, trace MR, trace TR, normal RVSP. Stress test normal with no evidence of ischemia February 2023.  She had a normal Holter monitor.  The patient was having infusions at [facility] with bradycardia.  She will occasionally have episodes where she felt like she could pass out and her arms and hands became tingly.  Her amlodipine was discontinued with improvement in blood pressure readings.  She wore a Holter monitor August 2023 that was relatively benign. She then had more palpitations and had a repeat relatively benign Holter monitor August 2023.  She wore another heart monitor October 2023 which was relatively benign.  She had 6 beats of atrial tachycardia and less than 1% PVC burden. 1 episode of ventricular tachycardia for only 3 beats. Recommendations for PRN acebutalol.  The patient denies any chest pain, shortness of breath, palpitations, presyncope, or syncope.  She had a couple episodes of lightheadedness when she was standing up but it only lasted for a second or 2 and then  "resolved.  Patient has noticed that she has had elevated blood pressure readings when she goes for her infusions.  She is about to start on mycophenolate mofetil and she had concerns with her blood pressure going up and also was told that it was hard on her kidneys.  She brought her blood pressure log from home to review and her blood pressures were WNL except 1 reading that was 145mmHg.  She says that when she has had her IVIG infusions that her blood pressure will sometimes get up to 160 but if they gave her Benadryl and Tylenol, her blood pressures will come down.  The patient believes that she has had issues with hydrochlorothiazide in the past but cannot recall what this symptom was.  She has tried low-dose amlodipine in the past but had bradycardia.      ROS   All other systems reviewed and otherwise negative.    Procedures       Objective:       Vitals:    01/19/24 1037 01/19/24 1052 01/19/24 1116   BP: 140/90 138/88 136/70   BP Location: Right arm Right arm Right arm   Patient Position: Sitting Standing Sitting   Cuff Size: Adult Adult    Pulse: 90     SpO2: 98% 98%    Weight: 66.2 kg (146 lb)     Height: 157.5 cm (62\")       Body mass index is 26.7 kg/m².  Last weight October 2023 was 135 pounds  Constitutional:       Appearance: Healthy appearance. Not in distress.   Neck:      Vascular: No JVR. JVD normal.   Pulmonary:      Effort: Pulmonary effort is normal.      Breath sounds: Normal breath sounds. No wheezing. No rhonchi. No rales.   Chest:      Chest wall: Not tender to palpatation.   Cardiovascular:      PMI at left midclavicular line. Normal rate. Regular rhythm. Normal S1. Normal S2.       Murmurs: There is a grade 1/6 systolic murmur at the LLSB.      No gallop.  No click. No rub.   Pulses:     Intact distal pulses.   Edema:     Peripheral edema absent.   Abdominal:      General: Bowel sounds are normal.      Palpations: Abdomen is soft.      Tenderness: There is no abdominal tenderness. "   Musculoskeletal: Normal range of motion.         General: No tenderness. Skin:     General: Skin is warm and dry.   Neurological:      General: No focal deficit present.      Mental Status: Alert and oriented to person, place and time.           Lab Review:   Lab Results   Component Value Date    GLUCOSE 79 12/04/2023    BUN 11 12/04/2023    CREATININE 0.82 12/04/2023    EGFRIFNONA 73 03/19/2021    BCR 13.4 12/04/2023    CO2 21.1 (L) 12/04/2023    CALCIUM 8.9 12/04/2023    PROTENTOTREF 8.8 (H) 12/04/2023    ALBUMIN 4.0 12/04/2023    ALBUMIN 3.3 12/04/2023    LABIL2 0.6 (L) 12/04/2023    AST 31 12/04/2023    ALT 19 12/04/2023       Lab Results   Component Value Date    WBC 2.91 (L) 12/04/2023    HGB 12.0 12/04/2023    HCT 36.8 12/04/2023    MCV 92.2 12/04/2023     12/04/2023       Lab Results   Component Value Date    HGBA1C 5.40 09/06/2023       Lab Results   Component Value Date    TSH 1.510 09/05/2023       Lab Results   Component Value Date    CHOL 231 (H) 09/06/2023    CHOL 204 (H) 02/13/2023     Lab Results   Component Value Date    TRIG 142 09/06/2023    TRIG 123 02/13/2023     Lab Results   Component Value Date    HDL 66 (H) 09/06/2023    HDL 78 (H) 02/13/2023     Lab Results   Component Value Date     (H) 09/06/2023     (H) 02/13/2023     Advance Care Planning   ACP discussion was held with the patient during this visit. Patient has an advance directive (not in EMR), copy requested.              Assessment:     Overall continued acceptable course with no new interim cardiopulmonary complaints with acceptable functional status. We will defer additional diagnostic or therapeutic intervention from a cardiac perspective at this time.  The patient has noticed that she has elevated blood pressure readings when she is getting her IVIG infusions.  She has been intolerant to atenolol, amlodipine, and hydrochlorothiazide in the past.  I will enroll her in the hypertension care companion.  I will  also give her access to hydralazine to use as needed for sustained blood pressure over 160mmHg.  Patient's home blood pressure readings were acceptable.  She will bring her home blood pressure monitor to her next appointment to check against a manual reading.     Diagnosis Plan   1. Palpitations  Holter monitor October 2023 which was relatively benign.  She had 6 beats of atrial tachycardia and less than 1% PVC burden. 1 episode of ventricular tachycardia for only 3 beats. Recommendations for PRN acebutalol.  No recurrent palpitations since October 2023.      2. TERRAZAS (dyspnea on exertion)  No recurrent angina pectoris or CHF on current activity schedule; continue current treatment       3. Primary hypertension  Controlled, continue telmisartan, enroll patient in hypertension care companion, add hydralazine 10 mg as needed for sustained blood pressures over 160      4. Mixed hyperlipidemia  Abnormal lipid panel September 2023, on Praluent, FLP order placed in computer to be obtained in the next 1-2 months             Plan:         Patient to continue current medications and close follow up with the above providers.  Tentative cardiology follow up in July 2024 or patient may return sooner PRN.  FLP in a couple months since being on Praluent- orders in epic  Enroll patient in hypertension care companion.    I will also give her access to hydralazine 10 mg to use as needed for sustained blood pressure over 160mmHg.   She will bring her home blood pressure monitor to her next appointment to check against a manual reading.      Electronically signed by HOWARD Wilkerson, 01/19/24, 11:20 AM EST.

## 2024-01-11 ENCOUNTER — OFFICE VISIT (OUTPATIENT)
Dept: FAMILY MEDICINE CLINIC | Facility: CLINIC | Age: 64
End: 2024-01-11
Payer: COMMERCIAL

## 2024-01-11 VITALS
DIASTOLIC BLOOD PRESSURE: 90 MMHG | WEIGHT: 142 LBS | HEIGHT: 62 IN | HEART RATE: 76 BPM | SYSTOLIC BLOOD PRESSURE: 140 MMHG | BODY MASS INDEX: 26.13 KG/M2 | OXYGEN SATURATION: 99 %

## 2024-01-11 DIAGNOSIS — I10 PRIMARY HYPERTENSION: ICD-10-CM

## 2024-01-11 DIAGNOSIS — J32.4 CHRONIC PANSINUSITIS: Primary | ICD-10-CM

## 2024-01-11 DIAGNOSIS — J34.89 NASAL SORE: ICD-10-CM

## 2024-01-11 PROCEDURE — 99214 OFFICE O/P EST MOD 30 MIN: CPT | Performed by: STUDENT IN AN ORGANIZED HEALTH CARE EDUCATION/TRAINING PROGRAM

## 2024-01-11 RX ORDER — AMOXICILLIN AND CLAVULANATE POTASSIUM 875; 125 MG/1; MG/1
1 TABLET, FILM COATED ORAL 2 TIMES DAILY
Qty: 10 TABLET | Refills: 0 | Status: SHIPPED | OUTPATIENT
Start: 2024-01-11 | End: 2024-01-16

## 2024-01-11 RX ORDER — MYCOPHENOLATE MOFETIL 250 MG/1
250 CAPSULE ORAL
COMMUNITY
Start: 2024-01-02 | End: 2025-01-01

## 2024-01-11 NOTE — PROGRESS NOTES
Established Office Visit      Patient Name: Rupa Finn  : 1960   MRN: [id number]   Care Team: Patient Care Team:  Dinorah Oates DO as PCP - General (Internal Medicine)  Aspen Plata APRN as Nurse Practitioner (Cardiology)  Mariana Martinez APRN as Nurse Practitioner (Obstetrics and Gynecology)    Chief Complaint:    Chief Complaint   Patient presents with    Sinusitis    Rash     On chest     Hypertension     X6 weeks        History of Present Illness: Rupa Finn is a 63 y.o. female with hypothyroidism, HLD, HTN, post menopause on HRT, GERD, Sjogren syndrome with myopathy and Raynauds who is here today to follow up with chronic medical conditions.     Following with Dr. [facility] for Sjogren syndrome, polymyositis with myopathy and Raynauds.  Her rheumatologist is considering starting Mycophenolate but had some concerns about possible sinus infection needing to be treated prior to initiation of medication.     Sinus congestion ongoing since . Reports nose sores, runny nose, congestion. No improvement but no significant worsening. She is using nasal saline spray lately.     HTN - taking telmisartan daily. Has had a few elevated BP readings lately, up to . Most of the time at home her BP is normal.    Subjective      Review of Systems:   Review of Systems - See HPI    I have reviewed and the following portions of the patient's history were updated as appropriate: past family history, past medical history, past social history, past surgical history and problem list.    Medications:     Current Outpatient Medications:     acetaZOLAMIDE (DIAMOX) 250 MG tablet, Take 0.5 tablets by mouth 2 (two) times a day., Disp: , Rfl:     Cholecalciferol (Vitamin D3) 50 MCG (2000) tablet, Take  by mouth Daily., Disp: , Rfl:     dexlansoprazole (DEXILANT) 30 MG capsule, TAKE 1 CAPSULE BY MOUTH EVERY DAY, Disp: 90  capsule, Rfl: 3    estradiol (Estrace) 0.5 MG tablet, Take 1 tablet by mouth Daily., Disp: 90 tablet, Rfl: 2    famotidine (Pepcid) 40 MG tablet, Take 1 tablet by mouth At Night As Needed for Heartburn., Disp: 90 tablet, Rfl: 3    Immune Globulin, Human, (GAMUNEX IV), Infuse  into a venous catheter Every 14 (Fourteen) Days., Disp: , Rfl:     levothyroxine (SYNTHROID, LEVOTHROID) 75 MCG tablet, TAKE 1 TABLET BY MOUTH EVERY other  DAY, Disp: 45 tablet, Rfl: 1    levothyroxine (SYNTHROID, LEVOTHROID) 88 MCG tablet, TAKE 1 TABLET BY MOUTH EVERY other DAY, Disp: 45 tablet, Rfl: 1    mycophenolate (CELLCEPT) 250 MG capsule, Take 1 capsule by mouth., Disp: , Rfl:     polyethylene glycol (MIRALAX) powder, As Needed., Disp: , Rfl:     Praluent 75 MG/ML solution auto-injector, INJECT 1 ML UNDER THE SKIN ONCE EVERY 14 DAYS., Disp: 6 mL, Rfl: 0    telmisartan (MICARDIS) 80 MG tablet, TAKE 1 TABLET BY MOUTH EVERY DAY, Disp: 90 tablet, Rfl: 2    timolol (TIMOPTIC) 0.5 % ophthalmic solution, Administer 1 drop to both eyes 2 (Two) Times a Day., Disp: , Rfl:     travoprost, BAK free, (TRAVATAN) 0.004 % solution ophthalmic solution, 1 drop Every Evening. in affected eye(s), Disp: , Rfl:     Zinc 50 MG tablet, Take 50 mcg by mouth Daily., Disp: , Rfl:     amoxicillin-clavulanate (AUGMENTIN) 875-125 MG per tablet, Take 1 tablet by mouth 2 (Two) Times a Day for 5 days., Disp: 10 tablet, Rfl: 0    mupirocin (BACTROBAN) 2 % ointment, Apply 1 g topically daily to wound for 14 days or until healed, Disp: 14 g, Rfl: 1    Allergies:   Allergies   Allergen Reactions    Cefaclor Shortness Of Breath     hives    Naproxen Sodium Other (See Comments)     tongue swelling    Nsaids Angioedema    Alphagan [Brimonidine] Other (See Comments)     Eyes itching, red, swollen    Atenolol Other (See Comments)     fatigue    Livalo [Pitavastatin] Myalgia    Statins Myalgia    Dorzolamide Hcl Other (See Comments)     Eyes burning, discharge.    Dorzolamide  "Hcl-Timolol Mal Unknown - High Severity    Doxycycline Unknown - High Severity    Gammagard [Immune Globulin] Dermatitis    Ibuprofen Swelling    Macrolides And Ketolides Provider Review Needed    Adhesive Tape Rash     \"burns, eats through my skin\"    E-Mycin [Erythromycin] Nausea And Vomiting    Levofloxacin Unknown (See Comments)       Objective     Physical Exam:  Vital Signs:   Vitals:    01/11/24 1047   BP: 140/90   Pulse: 76   SpO2: 99%   Weight: 64.4 kg (142 lb)   Height: 157.5 cm (62\")     Body mass index is 25.97 kg/m².     Physical Exam  Vitals reviewed.   Constitutional:       Appearance: Normal appearance. She is not ill-appearing.   HENT:      Nose: Congestion present. No rhinorrhea.      Comments: Bilateral shallow internal nasal sores   Cardiovascular:      Rate and Rhythm: Normal rate.   Pulmonary:      Effort: Pulmonary effort is normal. No respiratory distress.   Skin:     General: Skin is warm and dry.   Neurological:      Mental Status: She is alert.   Psychiatric:         Mood and Affect: Mood normal.         Behavior: Behavior normal.         Judgment: Judgment normal.         Assessment / Plan      Assessment/Plan:   Problems Addressed This Visit  Diagnoses and all orders for this visit:    1. Chronic pansinusitis (Primary)  -     amoxicillin-clavulanate (AUGMENTIN) 875-125 MG per tablet; Take 1 tablet by mouth 2 (Two) Times a Day for 5 days.  Dispense: 10 tablet; Refill: 0  Prior to starting immunosuppressive therapy we will treat for bacterial sinusitis given persistence of congestion despite conservative measures over the past several months. We discussed her extensive allergy history to various antibiotics. She will let me know and seek medical attention if she develops signs/symptoms of antibiotic allergy reaction  Encourage her to continue nasal saline spray for irrigation and moisture  Avoid flonase     2. Nasal sore  -     mupirocin (BACTROBAN) 2 % ointment; Apply 1 g topically daily " to wound for 14 days or until healed  Dispense: 14 g; Refill: 1    3. Primary hypertension  Uncontrolled, goal <130/80 and BP is 140/90 today. Has had some occasional elevated readings lately but previously home readings have all been normotensive. Encouraged her to closely monitor her home readings and let me know if persistently elevated. She is taking Telmisartan 80mg now and we discussed adding to this. Avoid BB due to hx of bradycardia. Amlodipine has previously caused her bradycardia even at dose of 2.5mg daily. Reports  she has had negative reaction to HCTZ. Will plan to discuss any new BP medications with her cardiologist whom she sees next week.         Plan of care reviewed with patient at the conclusion of today's visit. Education was provided regarding diagnosis and management.  Patient verbalizes understanding of and agreement with management plan.    Follow Up:   No follow-ups on file.        DO ILIANA Garza RD  [facility]  [address]  [facility] [address]  Fax [phone]  Phone [phone]

## 2024-01-19 ENCOUNTER — OFFICE VISIT (OUTPATIENT)
Dept: CARDIOLOGY | Facility: CLINIC | Age: 64
End: 2024-01-19
Payer: COMMERCIAL

## 2024-01-19 VITALS
HEART RATE: 90 BPM | OXYGEN SATURATION: 98 % | HEIGHT: 62 IN | BODY MASS INDEX: 26.87 KG/M2 | DIASTOLIC BLOOD PRESSURE: 70 MMHG | WEIGHT: 146 LBS | SYSTOLIC BLOOD PRESSURE: 136 MMHG

## 2024-01-19 DIAGNOSIS — R06.09 DOE (DYSPNEA ON EXERTION): ICD-10-CM

## 2024-01-19 DIAGNOSIS — I10 PRIMARY HYPERTENSION: ICD-10-CM

## 2024-01-19 DIAGNOSIS — R00.2 PALPITATIONS: Primary | ICD-10-CM

## 2024-01-19 DIAGNOSIS — E78.2 MIXED HYPERLIPIDEMIA: ICD-10-CM

## 2024-01-19 PROCEDURE — 99214 OFFICE O/P EST MOD 30 MIN: CPT | Performed by: NURSE PRACTITIONER

## 2024-01-19 RX ORDER — HYDRALAZINE HYDROCHLORIDE 10 MG/1
10 TABLET, FILM COATED ORAL AS NEEDED
Qty: 30 TABLET | Refills: 0 | Status: SHIPPED | OUTPATIENT
Start: 2024-01-19

## 2024-02-09 RX ORDER — ALIROCUMAB 75 MG/ML
INJECTION, SOLUTION SUBCUTANEOUS
Qty: 6 ML | Refills: 0 | Status: SHIPPED | OUTPATIENT
Start: 2024-02-09

## 2024-02-16 ENCOUNTER — TELEPHONE (OUTPATIENT)
Dept: CARDIOLOGY | Facility: CLINIC | Age: 64
End: 2024-02-16
Payer: COMMERCIAL

## 2024-03-04 ENCOUNTER — TRANSCRIBE ORDERS (OUTPATIENT)
Dept: ADMINISTRATIVE | Facility: HOSPITAL | Age: 64
End: 2024-03-04
Payer: COMMERCIAL

## 2024-03-04 DIAGNOSIS — Z12.31 SCREENING MAMMOGRAM FOR BREAST CANCER: Primary | ICD-10-CM

## 2024-03-04 RX ORDER — ALIROCUMAB 75 MG/ML
INJECTION, SOLUTION SUBCUTANEOUS
Qty: 6 ML | Refills: 0 | Status: SHIPPED | OUTPATIENT
Start: 2024-03-04

## 2024-03-07 ENCOUNTER — OFFICE VISIT (OUTPATIENT)
Dept: OBSTETRICS AND GYNECOLOGY | Facility: CLINIC | Age: 64
End: 2024-03-07
Payer: COMMERCIAL

## 2024-03-07 ENCOUNTER — TELEPHONE (OUTPATIENT)
Dept: OBSTETRICS AND GYNECOLOGY | Facility: CLINIC | Age: 64
End: 2024-03-07

## 2024-03-07 VITALS
SYSTOLIC BLOOD PRESSURE: 150 MMHG | BODY MASS INDEX: 27.05 KG/M2 | HEIGHT: 62 IN | DIASTOLIC BLOOD PRESSURE: 80 MMHG | WEIGHT: 147 LBS

## 2024-03-07 DIAGNOSIS — Z01.419 ENCOUNTER FOR GYNECOLOGICAL EXAMINATION WITHOUT ABNORMAL FINDING: Primary | ICD-10-CM

## 2024-03-07 DIAGNOSIS — Z79.890 POSTMENOPAUSAL HORMONE REPLACEMENT THERAPY: ICD-10-CM

## 2024-03-07 DIAGNOSIS — Z13.820 SCREENING FOR OSTEOPOROSIS: ICD-10-CM

## 2024-03-07 RX ORDER — TACROLIMUS 1 MG/1
CAPSULE ORAL
COMMUNITY
Start: 2024-02-15

## 2024-03-07 RX ORDER — ESTRADIOL 0.75 MG/.75G
1 GEL TOPICAL DAILY
Qty: 30 EACH | Refills: 11 | Status: SHIPPED | OUTPATIENT
Start: 2024-03-07

## 2024-03-07 RX ORDER — ROFLUMILAST 3 MG/G
1 CREAM TOPICAL DAILY
COMMUNITY

## 2024-03-07 RX ORDER — MYCOPHENOLATE MOFETIL 250 MG/1
250 CAPSULE ORAL 2 TIMES DAILY
COMMUNITY
Start: 2024-01-02

## 2024-03-07 NOTE — TELEPHONE ENCOUNTER
Pt said her and Katharine had spoken about and Estrogen cream that was 66.00, but her pharmacy is saying 115$ with [facility] and with [facility] was 148$. Where did Katharine find it for 66.00? The pharmacist said he would look into that for the pt.    Please advise    Thank you!

## 2024-03-07 NOTE — PROGRESS NOTES
"Chief Complaint  Rupa Finn is a 63 y.o.  female presenting for Annual Exam (Please order DEXA.  Order .) and Med Refill (Estradiol 0.5 mg (#90 with refills).  [facility] pharmacy.)    History of Present Illness  Jeannette is a pleasant 62yo woman, , here for annual gyn exam.  Her past surgical history includes, in part, a LAVH/BSO/ lap Lee.  She is a medically complex patient.    She has been on 3 different IVIGs for eczema.   The first one helped best.    She has now developed Lupus of the skin.  (Using Zoryve for this.)  (She cannot tolerate even small amts of topical steroids, as it increases eye pressure.  She has glaucoma.)  She does note some vaginal dryness.    We discussed HRT in depth (risks/ benefits, etc) and she would like to get off oral Tx / change to transdermal, for less risk of thromboembolic events.      Last mammogram 23, negative  Colonoscopy 22, polyp  DEXA order ;  she will get one this yr       The following portions of the patient's history were reviewed and updated as appropriate: allergies, current medications, past family history, past medical history, past social history, past surgical history, and problem list.    Allergies   Allergen Reactions    Cefaclor Shortness Of Breath     hives    Naproxen Sodium Other (See Comments)     tongue swelling    Nsaids Angioedema    Alphagan [Brimonidine] Other (See Comments)     Eyes itching, red, swollen    Atenolol Other (See Comments)     fatigue    Livalo [Pitavastatin] Myalgia    Statins Myalgia    Dorzolamide Hcl Other (See Comments)     Eyes burning, discharge.    Dorzolamide Hcl-Timolol Mal Unknown - High Severity    Doxycycline Unknown - High Severity    Ibuprofen Swelling    Macrolides And Ketolides Provider Review Needed    Adhesive Tape Rash     \"burns, eats through my skin\"    E-Mycin [Erythromycin] Nausea And Vomiting    Levofloxacin Unknown (See Comments)         Current Outpatient Medications:    "  mycophenolate (CELLCEPT) 250 MG capsule, Take 1 capsule by mouth 2 (Two) Times a Day., Disp: , Rfl:     tacrolimus (PROGRAF) 1 MG capsule, Dissolve 1 cap a day in 10 oz of water (1 cup), then rinse and spit for 2 min with 5 oz twice a day NPO 40 min, Disp: , Rfl:     acetaZOLAMIDE (DIAMOX) 250 MG tablet, Take 0.5 tablets by mouth 2 (two) times a day., Disp: , Rfl:     Cholecalciferol (Vitamin D3) 50 MCG (2000 UT) tablet, Take  by mouth Daily., Disp: , Rfl:     dexlansoprazole (DEXILANT) 30 MG capsule, TAKE 1 CAPSULE BY MOUTH EVERY DAY, Disp: 90 capsule, Rfl: 3    Estradiol 0.75 MG/0.75GM gel, Place 1 each on the skin as directed by provider Daily., Disp: 30 each, Rfl: 11    famotidine (Pepcid) 40 MG tablet, Take 1 tablet by mouth At Night As Needed for Heartburn., Disp: 90 tablet, Rfl: 3    hydrALAZINE (APRESOLINE) 10 MG tablet, Take 1 tablet by mouth As Needed (Blood pressure sustained >160mmHg)., Disp: 30 tablet, Rfl: 0    Immune Globulin, Human, (GAMMAGARD IV), Infuse 1 dose into a venous catheter Every 14 (Fourteen) Days., Disp: , Rfl:     levothyroxine (SYNTHROID, LEVOTHROID) 75 MCG tablet, TAKE 1 TABLET BY MOUTH EVERY other  DAY, Disp: 45 tablet, Rfl: 1    levothyroxine (SYNTHROID, LEVOTHROID) 88 MCG tablet, TAKE 1 TABLET BY MOUTH EVERY other DAY, Disp: 45 tablet, Rfl: 1    polyethylene glycol (MIRALAX) powder, As Needed., Disp: , Rfl:     Praluent 75 MG/ML solution auto-injector, INJECT 1 ML UNDER THE SKIN ONCE EVERY 14 DAYS., Disp: 6 mL, Rfl: 0    Roflumilast (Zoryve) 0.3 % cream, Apply 1 application  topically to the appropriate area as directed Daily., Disp: , Rfl:     telmisartan (MICARDIS) 80 MG tablet, TAKE 1 TABLET BY MOUTH EVERY DAY, Disp: 90 tablet, Rfl: 2    timolol (TIMOPTIC) 0.5 % ophthalmic solution, Administer 1 drop to both eyes 2 (Two) Times a Day., Disp: , Rfl:     travoprost, BAK free, (TRAVATAN) 0.004 % solution ophthalmic solution, 1 drop Every Evening. in affected eye(s), Disp: , Rfl:     " Zinc 50 MG tablet, Take 50 mcg by mouth Daily., Disp: , Rfl:     Past Medical History:   Diagnosis Date    Anemia 2021    Autoimmune disease     Cholelithiasis Removed 2008    Colon polyp 2022    COVID-19     Cutaneous lupus erythematosus     Diverticulosis 2010?    Fibrocystic breast changes, bilateral     GERD (gastroesophageal reflux disease)     not currently taking medication    Gestational diabetes     Glaucoma     Hyperlipidemia     Hypertension     Hypothyroidism     Lower extremity edema     Neuromuscular disorder 2020    OAB (overactive bladder)     Osteoarthritis     Palpitations     Post-menopause on HRT (hormone replacement therapy)     Raynaud's disease     Scleroderma     SINE scleroderma    Sjogren's disease     Urinary tract infection         Past Surgical History:   Procedure Laterality Date    CHOLECYSTECTOMY      COLONOSCOPY  2019 and 2022    ESOPHAGOSCOPY / EGD      esophageal stretching/dilation and gastric polypectomy, esophageal biopsies    EYE SURGERY  2012    Cataracts removed - lens replacement    LAPAROSCOPIC ASSISTED VAGINAL HYSTERECTOMY SALPINGO OOPHORECTOMY Bilateral     LAPAROSCOPIC DIAZ PROCEDURE      OOPHORECTOMY      OTHER SURGICAL HISTORY      Lap for endometriosis    TONSILLECTOMY      UPPER GASTROINTESTINAL ENDOSCOPY  ?       Objective  /80   Ht 157.5 cm (62\")   Wt 66.7 kg (147 lb)   LMP  (LMP Unknown)   Breastfeeding No   BMI 26.89 kg/m²     Physical Exam  Vitals and nursing note reviewed. Exam conducted with a chaperone present.   Constitutional:       General: She is not in acute distress.     Appearance: Normal appearance. She is not ill-appearing.   HENT:      Head: Normocephalic.   Neck:      Thyroid: No thyroid mass or thyromegaly.   Cardiovascular:      Rate and Rhythm: Normal rate and regular rhythm.      Heart sounds: Normal heart sounds. No murmur heard.  Pulmonary:      Effort: Pulmonary effort is normal. No respiratory distress.      Breath sounds: " Normal breath sounds.   Chest:   Breasts:     Right: No inverted nipple, mass or nipple discharge.      Left: No inverted nipple, mass or nipple discharge.   Abdominal:      Palpations: Abdomen is soft. There is no mass.      Tenderness: There is no abdominal tenderness.   Genitourinary:     General: Normal vulva.      Labia:         Right: No rash, tenderness or lesion.         Left: No rash, tenderness or lesion.       Vagina: No vaginal discharge or erythema.      Uterus: Absent.       Adnexa:         Right: No mass or tenderness.          Left: No mass or tenderness.        Comments: Anus appears wnl.  No rectal exam performed.  Lymphadenopathy:      Upper Body:      Right upper body: No supraclavicular or axillary adenopathy.      Left upper body: No supraclavicular or axillary adenopathy.   Skin:     General: Skin is warm and dry.   Neurological:      Mental Status: She is alert and oriented to person, place, and time.   Psychiatric:         Mood and Affect: Mood normal.         Behavior: Behavior normal.         Assessment/Plan   Diagnoses and all orders for this visit:    1. Encounter for gynecological examination without abnormal finding (Primary)    2. Postmenopausal hormone replacement therapy    3. Screening for osteoporosis  -     DEXA Bone Density Axial; Future    Other orders  -     Estradiol 0.75 MG/0.75GM gel; Place 1 each on the skin as directed by provider Daily.  Dispense: 30 each; Refill: 11        Procedures    40 to 64: Counseling/Anticipatory Guidance Discussed: physical activity, screenings, self-breast exam, and ERT (risks, benefits with risks not limited to, but involving blood clots/ DVTs, heart att acks, strokes, pulmonary emboli, sudden death, and theoretical increased risk of breast cancer)  She verbalized good understanding.      Return in about 1 year (around 3/7/2025) for Annual physical.    Mariana Martinez, APRN  03/07/2024

## 2024-03-11 NOTE — TELEPHONE ENCOUNTER
She found the Divigel to be $ 49 at [facility].  (Switching the Rx from [facility] to [facility])

## 2024-03-14 DIAGNOSIS — E03.9 ACQUIRED HYPOTHYROIDISM: ICD-10-CM

## 2024-03-15 ENCOUNTER — LAB (OUTPATIENT)
Dept: LAB | Facility: HOSPITAL | Age: 64
End: 2024-03-15
Payer: COMMERCIAL

## 2024-03-15 DIAGNOSIS — M35.00 SICCA SYNDROME: ICD-10-CM

## 2024-03-15 DIAGNOSIS — G72.9 MYOPATHY, UNSPECIFIED: ICD-10-CM

## 2024-03-15 DIAGNOSIS — E78.2 MIXED HYPERLIPIDEMIA: ICD-10-CM

## 2024-03-15 LAB
ALBUMIN SERPL-MCNC: 4.2 G/DL (ref 3.5–5.2)
ALBUMIN/GLOB SERPL: 0.9 G/DL
ALP SERPL-CCNC: 92 U/L (ref 39–117)
ALT SERPL W P-5'-P-CCNC: 15 U/L (ref 1–33)
ANION GAP SERPL CALCULATED.3IONS-SCNC: 10 MMOL/L (ref 5–15)
AST SERPL-CCNC: 21 U/L (ref 1–32)
BASOPHILS # BLD AUTO: 0.01 10*3/MM3 (ref 0–0.2)
BASOPHILS NFR BLD AUTO: 0.4 % (ref 0–1.5)
BILIRUB SERPL-MCNC: 0.4 MG/DL (ref 0–1.2)
BUN SERPL-MCNC: 10 MG/DL (ref 8–23)
BUN/CREAT SERPL: 13.5 (ref 7–25)
C4 SERPL-MCNC: 13 MG/DL (ref 14–44)
CALCIUM SPEC-SCNC: 9.3 MG/DL (ref 8.6–10.5)
CHLORIDE SERPL-SCNC: 100 MMOL/L (ref 98–107)
CHOLEST SERPL-MCNC: 284 MG/DL (ref 0–200)
CK SERPL-CCNC: 59 U/L (ref 20–180)
CO2 SERPL-SCNC: 21 MMOL/L (ref 22–29)
CREAT SERPL-MCNC: 0.74 MG/DL (ref 0.57–1)
CRP SERPL-MCNC: <0.3 MG/DL (ref 0–0.5)
DEPRECATED RDW RBC AUTO: 42.4 FL (ref 37–54)
EGFRCR SERPLBLD CKD-EPI 2021: 91 ML/MIN/1.73
EOSINOPHIL # BLD AUTO: 0.02 10*3/MM3 (ref 0–0.4)
EOSINOPHIL NFR BLD AUTO: 0.9 % (ref 0.3–6.2)
ERYTHROCYTE [DISTWIDTH] IN BLOOD BY AUTOMATED COUNT: 13.4 % (ref 12.3–15.4)
ERYTHROCYTE [SEDIMENTATION RATE] IN BLOOD: 37 MM/HR (ref 0–30)
GLOBULIN UR ELPH-MCNC: 4.8 GM/DL
GLUCOSE SERPL-MCNC: 89 MG/DL (ref 65–99)
HCT VFR BLD AUTO: 36.4 % (ref 34–46.6)
HDLC SERPL-MCNC: 84 MG/DL (ref 40–60)
HGB BLD-MCNC: 11.9 G/DL (ref 12–15.9)
IMM GRANULOCYTES # BLD AUTO: 0 10*3/MM3 (ref 0–0.05)
IMM GRANULOCYTES NFR BLD AUTO: 0 % (ref 0–0.5)
LDLC SERPL CALC-MCNC: 191 MG/DL (ref 0–100)
LDLC/HDLC SERPL: 2.24 {RATIO}
LYMPHOCYTES # BLD AUTO: 0.82 10*3/MM3 (ref 0.7–3.1)
LYMPHOCYTES NFR BLD AUTO: 35.3 % (ref 19.6–45.3)
MCH RBC QN AUTO: 28.7 PG (ref 26.6–33)
MCHC RBC AUTO-ENTMCNC: 32.7 G/DL (ref 31.5–35.7)
MCV RBC AUTO: 87.9 FL (ref 79–97)
MONOCYTES # BLD AUTO: 0.31 10*3/MM3 (ref 0.1–0.9)
MONOCYTES NFR BLD AUTO: 13.4 % (ref 5–12)
NEUTROPHILS NFR BLD AUTO: 1.16 10*3/MM3 (ref 1.7–7)
NEUTROPHILS NFR BLD AUTO: 50 % (ref 42.7–76)
NRBC BLD AUTO-RTO: 0 /100 WBC (ref 0–0.2)
PLATELET # BLD AUTO: 292 10*3/MM3 (ref 140–450)
PMV BLD AUTO: 11 FL (ref 6–12)
POTASSIUM SERPL-SCNC: 4.4 MMOL/L (ref 3.5–5.2)
PROT SERPL-MCNC: 9 G/DL (ref 6–8.5)
RBC # BLD AUTO: 4.14 10*6/MM3 (ref 3.77–5.28)
SODIUM SERPL-SCNC: 131 MMOL/L (ref 136–145)
TRIGL SERPL-MCNC: 61 MG/DL (ref 0–150)
VLDLC SERPL-MCNC: 9 MG/DL (ref 5–40)
WBC NRBC COR # BLD AUTO: 2.32 10*3/MM3 (ref 3.4–10.8)

## 2024-03-15 PROCEDURE — 80061 LIPID PANEL: CPT

## 2024-03-15 PROCEDURE — 85652 RBC SED RATE AUTOMATED: CPT

## 2024-03-15 PROCEDURE — 80053 COMPREHEN METABOLIC PANEL: CPT

## 2024-03-15 PROCEDURE — 86160 COMPLEMENT ANTIGEN: CPT

## 2024-03-15 PROCEDURE — 86140 C-REACTIVE PROTEIN: CPT

## 2024-03-15 PROCEDURE — 85025 COMPLETE CBC W/AUTO DIFF WBC: CPT

## 2024-03-15 PROCEDURE — 84165 PROTEIN E-PHORESIS SERUM: CPT

## 2024-03-15 PROCEDURE — 82550 ASSAY OF CK (CPK): CPT

## 2024-03-15 PROCEDURE — 36415 COLL VENOUS BLD VENIPUNCTURE: CPT

## 2024-03-15 RX ORDER — LEVOTHYROXINE SODIUM 88 UG/1
88 TABLET ORAL EVERY OTHER DAY
Qty: 45 TABLET | Refills: 0 | Status: SHIPPED | OUTPATIENT
Start: 2024-03-15

## 2024-03-18 ENCOUNTER — HOSPITAL ENCOUNTER (OUTPATIENT)
Dept: BONE DENSITY | Facility: HOSPITAL | Age: 64
Discharge: HOME | End: 2024-03-18
Admitting: NURSE PRACTITIONER
Payer: COMMERCIAL

## 2024-03-18 DIAGNOSIS — Z13.820 SCREENING FOR OSTEOPOROSIS: ICD-10-CM

## 2024-03-18 LAB
ALBUMIN SERPL ELPH-MCNC: 3.6 G/DL (ref 2.9–4.4)
ALBUMIN/GLOB SERPL: 0.7 {RATIO} (ref 0.7–1.7)
ALPHA1 GLOB SERPL ELPH-MCNC: 0.3 G/DL (ref 0–0.4)
ALPHA2 GLOB SERPL ELPH-MCNC: 0.8 G/DL (ref 0.4–1)
B-GLOBULIN SERPL ELPH-MCNC: 1.2 G/DL (ref 0.7–1.3)
GAMMA GLOB SERPL ELPH-MCNC: 3 G/DL (ref 0.4–1.8)
GLOBULIN SER CALC-MCNC: 5.3 G/DL (ref 2.2–3.9)
LABORATORY COMMENT REPORT: ABNORMAL
M PROTEIN SERPL ELPH-MCNC: ABNORMAL G/DL
PROT PATTERN SERPL ELPH-IMP: ABNORMAL
PROT SERPL-MCNC: 8.9 G/DL (ref 6–8.5)

## 2024-03-18 PROCEDURE — 77080 DXA BONE DENSITY AXIAL: CPT

## 2024-03-19 DIAGNOSIS — E78.5 HYPERLIPIDEMIA, UNSPECIFIED HYPERLIPIDEMIA TYPE: Primary | ICD-10-CM

## 2024-04-19 ENCOUNTER — TELEPHONE (OUTPATIENT)
Dept: OBSTETRICS AND GYNECOLOGY | Facility: CLINIC | Age: 64
End: 2024-04-19
Payer: COMMERCIAL

## 2024-04-19 RX ORDER — TELMISARTAN 80 MG/1
80 TABLET ORAL DAILY
Qty: 90 TABLET | Refills: 2 | Status: SHIPPED | OUTPATIENT
Start: 2024-04-19

## 2024-04-19 NOTE — TELEPHONE ENCOUNTER
Pt is really miserable with sweating, especially night sweats & is concerned with the warmer weather coming. She is wondering if it would be alright to bump up her Estradiol 0.75 MG/0.75GM gel ?     Can only get this through her insurance at [facility] location.     Please advise    Thank you

## 2024-04-23 RX ORDER — ESTRADIOL 1 MG/G
1 GEL TOPICAL DAILY
Qty: 30 G | Refills: 6 | Status: SHIPPED | OUTPATIENT
Start: 2024-04-23

## 2024-04-23 NOTE — TELEPHONE ENCOUNTER
Pt informed and stated understanding.    Per HOWARD Khalil:  Please let her know I sent in an increased dose.  Give it a few weeks.  Let me know if she is still symptomatic after a month on the new dose.

## 2024-05-16 ENCOUNTER — HOSPITAL ENCOUNTER (OUTPATIENT)
Dept: MAMMOGRAPHY | Facility: HOSPITAL | Age: 64
Discharge: HOME | End: 2024-05-16
Admitting: NURSE PRACTITIONER
Payer: COMMERCIAL

## 2024-05-16 DIAGNOSIS — Z12.31 SCREENING MAMMOGRAM FOR BREAST CANCER: ICD-10-CM

## 2024-05-16 PROCEDURE — 77067 SCR MAMMO BI INCL CAD: CPT

## 2024-05-16 PROCEDURE — 77063 BREAST TOMOSYNTHESIS BI: CPT

## 2024-06-14 DIAGNOSIS — E03.9 ACQUIRED HYPOTHYROIDISM: ICD-10-CM

## 2024-06-14 RX ORDER — LEVOTHYROXINE SODIUM 0.07 MG/1
75 TABLET ORAL EVERY OTHER DAY
Qty: 45 TABLET | Refills: 0 | Status: SHIPPED | OUTPATIENT
Start: 2024-06-14

## 2024-06-14 NOTE — TELEPHONE ENCOUNTER
"Caller: Rupa Finn JUAQUIN \"Jeannette\"    Relationship: Self    Best call back number:      [phone] (Mobile)     Requested Prescriptions:   Requested Prescriptions     Pending Prescriptions Disp Refills    levothyroxine (SYNTHROID, LEVOTHROID) 75 MCG tablet [Pharmacy Med Name: Levothyroxine Sodium Oral Tablet 75 MCG] 45 tablet 0     Sig: TAKE 1 TABLET BY MOUTH EVERY OTHER DAY      Pharmacy where request should be sent: [id number] - [place], [place] - [address] [place] [address] - [phone]  - [phone]      Last office visit with prescribing clinician: 1/11/2024   Last telemedicine visit with prescribing clinician: Visit date not found   Next office visit with prescribing clinician: Visit date not found     Additional details provided by patient:  PATIENT IS LEAVING FOR VACATION AND IS REQUESTING THIS REFILL     Does the patient have less than a 3 day supply:  [x] Yes  [] No    Would you like a call back once the refill request has been completed: [] Yes [x] No    If the office needs to give you a call back, can they leave a voicemail: [] Yes [x] No    "

## 2024-07-11 NOTE — PROGRESS NOTES
Subjective:     Encounter Date:07/19/2024      Patient ID: Rupa Finn is a 64 y.o.  white female, retired [employer] , from .     SELF-REFERRED  FORMER PHYSICIAN: Gena Tse MD  CURRENT PHYSICIAN: Frantz Lin MD  PREVIOUS CARDIOLOGIST: Glenna Melendez MD, [facility]  RHEUMATOLOGISTS:  Kim Douglas MD, Meghana Orta MD (), Marco Jasso MD ()  GYNECOLOGIST: Fabio Armenta MD  NEUROLOGIST: Naga Jarquin MD .  GASTROENTEROLOGIST: Fidel Magallanes MD/ Missy Rothman PA-C.    Chief Complaint:   Chief Complaint   Patient presents with    Edema    Hyperlipidemia     Problem List:  Palpitations:  Remote cardiac stress testing, performed by [facility], Dr. Hartley; data deficit, date deficit; negative for ischemia.   Echocardiogram,  03/10/2014:  Normal, EF 60%, Dr. Hartley.  Holter monitor, 05/23/2014: Sinus katelyn to sinus rhythm with rates of 40 to 94, occasional PVCs, Dr. Hartley.   Cardiac event monitor, 11/10/2016-12/10/2016: sinus rhythm, sinus tach, sinus arrhythmia with rare PACs and PVCs.   Echocardiogram 2D Complete, February 2022: Left ventricular cavity and wall thickness normal. LVEF 55%-65%. Wall motion normal, with normal LV diastolic function. Mild AR and MR. Right ventricular systolic function normal by visual assessment, TAPSE: 1.8 cm  Echocardiogram 2/20/2023: EF 60%, mild AI, trace MR, trace TR, normal RVSP.  Normal Holter monitor February 2023  Holter monitor August 2023 rather benign, average heart rate 58 bpm, 1 episode of SVT for only 4 beats, no heart block, VT, atrial fibrillation/flutter, or pauses  Increased palpitations October 2023 with dizziness   Holter monitor October 2023 : relatively benign.  6 beats of atrial tachycardia and less than 1% PVC burden. 1 episode of ventricular tachycardia for only 3 beats. 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  CT cardiac calcium score 5.6 August 2018  Stress echocardiogram, 8/13/2018: RVSP 30 mmHg, mild TR, no chest pain at baseline and during exercise with baseline ECG normal, acceptable negative echocardiographic exercise stress test suggestive of low probability for significant focal obstructive coronary artery disease with preserved LV function following exercise to 122% predicted exercise capacity and 90% predicted maximum heart rate  CCS class I chest discomfort/NYHA class II dyspnea on exertion symptoms  Echocardiogram, April 2021: LVEF 0.60. Borderline pulmonary arterial hypertension. Mild aortic valve sclerosis. RVSP 36 mmHg.  Intermittent atypical nonexertional chest pain syndrome with normal electrocardiogram, September 2021.  Acceptable CT Chest without contrast, 3/3/2022: A few nonspecific sub-4 mm pulmonary nodules, likely benign were present.  Echocardiogram 2D Complete, February 2022: Left ventricular cavity and wall thickness normal. LVEF 55%-65%. Wall motion normal, with normal LV diastolic function. Mild AR and MR. Right ventricular systolic function normal by visual assessment, TAPSE: 1.8 cm  Elevated D-dimer with normal VQ scan and chest x-ray February 2023  Echocardiogram 2/20/2023: EF 60%, mild AI, trace MR, trace TR, normal RVSP.  Cardiac PET 3/13/2023: Normal myocardial perfusion study with no evidence of ischemia, rest EF 62%, rest EF 70%, no significant coronary artery calcification  [facility] ED 9/5/2023 for atypical chest pain, weakness, nausea, vomiting, felt to be GI in etiology  Hypertension, intolerant to amlodipine with bradycardia, hydrochlorothiazide.   Hyperlipidemia; ASCVD 10-year risk is 4.5%, 1.9% if treated, started on Livalo but she was intolerant July 2018, has had previous intolerances to statins as well, now on Praluent.   Lower  "extremity edema.   Glaucoma.  Autoimmune diseases:  Patient reports positive genetic markers for scleroderma.   Rheumatoid arthritis, positive YONY, RF factor.  Raynaud's   Sjogren's  Hypothyroidism, treated.  Constipation.  Gestational diabetes.  GERD, with abnormal EGD suggestive of scleroderma and abnormal esophageal motility, medium size hiatal hernia, erythematous mucosa in the stomach, started on Dexilant July 2022  COVID positive, October 2020, with nausea, myalgias, sore throat, and nasal congestion with residual shortness of breath and fatigue March 2021.,  COVID-positive May 2022 with same symptoms that lasted about a week and did not require hospitalization  Pulmonary nodules on CT chest March 2022  Scleroderma  Surgical history:    Hysterectomy.  Tonsillectomy.  Cholecystectomy.  Lap for endometriosis   Esophageal motility surgery    Allergies   Allergen Reactions    Cefaclor Shortness Of Breath     hives    Naproxen Sodium Other (See Comments)     tongue swelling    Nsaids Angioedema    Alphagan [Brimonidine] Other (See Comments)     Eyes itching, red, swollen    Atenolol Other (See Comments)     fatigue    Livalo [Pitavastatin] Myalgia    Statins Myalgia    Dorzolamide Hcl Other (See Comments)     Eyes burning, discharge.    Dorzolamide Hcl-Timolol Mal Unknown - High Severity    Doxycycline Unknown - High Severity    Ibuprofen Swelling    Macrolides And Ketolides Provider Review Needed    Adhesive Tape Rash     \"burns, eats through my skin\"    E-Mycin [Erythromycin] Nausea And Vomiting    Levofloxacin Unknown (See Comments)       Current Outpatient Medications   Medication Instructions    acetaZOLAMIDE (DIAMOX) 125 mg, Oral, 2 times daily    Alirocumab (PRALUENT) 150 mg, Subcutaneous, Every 14 Days    Cholecalciferol (Vitamin D3) 50 MCG (2000 UT) tablet Oral, Daily    dexlansoprazole (DEXILANT) 30 mg, Oral, Daily    estradiol 1 MG/GM gel 1 Application, Transdermal, Daily    famotidine (PEPCID) 40 mg, " Oral, Nightly PRN    hydrALAZINE (APRESOLINE) 10 mg, Oral, As Needed    Immune Globulin, Human, (GAMMAGARD IV) 1 dose, Intravenous, Every 14 Days    levothyroxine (SYNTHROID, LEVOTHROID) 88 mcg, Oral, Every Other Day    levothyroxine (SYNTHROID, LEVOTHROID) 75 mcg, Oral, Every Other Day    polyethylene glycol (MIRALAX) powder As Needed    Praluent 75 MG/ML solution auto-injector INJECT 1 ML UNDER THE SKIN ONCE EVERY 14 DAYS.    Roflumilast (Zoryve) 0.3 % cream 1 application , Topical, Daily    telmisartan (MICARDIS) 80 mg, Oral, Daily    timolol (TIMOPTIC) 0.5 % ophthalmic solution 1 drop, Both Eyes, 2 Times Daily    travoprost, BAK free, (TRAVATAN) 0.004 % solution ophthalmic solution 1 drop, Every Evening, in affected eye(s)     Zinc 50 mcg, Oral, Daily         HISTORY OF PRESENT ILLNESS:  The patient is here for 6-month follow-up. Her echocardiogram 2/20/2023 showed EF 60%, mild AI, trace MR, trace TR, normal RVSP. Stress test normal with no evidence of ischemia February 2023.  She had a normal Holter monitor. She then had more palpitations and had a repeat relatively benign Holter monitor August 2023.  She wore another heart monitor October 2023 which was relatively benign.  She had 6 beats of atrial tachycardia and less than 1% PVC burden. 1 episode of ventricular tachycardia for only 3 beats. Recommendations for PRN acebutalol.   The patient has noticed that she has elevated blood pressure readings when she is getting her IVIG infusions.  She has been intolerant to atenolol, amlodipine, and hydrochlorothiazide in the past.  She has as needed hydralazine to use for sustained blood pressure over 160 mmHg.  Her CellCept dose has been increased since her last visit.  She denies any chest pain, shortness of breath, palpitations, dizziness, presyncope, or syncope.  She has done 160 miles over the past 3 weeks on an electric bike when she was in [place].  She also walks for activity.  Her blood pressures have been in  "the 120s-130s over 70s at home.  Her blood pressure right arm sitting manually in office was 136/74.  She has labs in August with her rheumatologist.      ROS   All other systems reviewed and otherwise negative.    Procedures       Objective:       Vitals:    07/22/24 1500 07/22/24 1501 07/22/24 1553   BP: 142/81 133/76 136/74   BP Location: Right arm Right arm Right arm   Patient Position: Sitting Standing Sitting   Pulse: 56 58    Weight: 67.1 kg (148 lb)     Height: 157.5 cm (62\")       Body mass index is 27.07 kg/m².  Wt Readings from Last 2 Encounters:   07/22/24 67.1 kg (148 lb)   03/07/24 66.7 kg (147 lb)        Constitutional:       Appearance: Healthy appearance. Not in distress.   Neck:      Vascular: No JVR. JVD normal.   Pulmonary:      Effort: Pulmonary effort is normal.      Breath sounds: Normal breath sounds. No wheezing. No rhonchi. No rales.   Chest:      Chest wall: Not tender to palpatation.   Cardiovascular:      PMI at left midclavicular line. Normal rate. Regular rhythm. Normal S1. Normal S2.       Murmurs: There is a grade 1/6 systolic murmur at the LLSB.      No gallop.  No click. No rub.   Pulses:     Intact distal pulses.   Edema:     Peripheral edema absent.   Abdominal:      General: Bowel sounds are normal.      Palpations: Abdomen is soft.      Tenderness: There is no abdominal tenderness.   Musculoskeletal: Normal range of motion.         General: No tenderness. Skin:     General: Skin is warm and dry.   Neurological:      General: No focal deficit present.      Mental Status: Alert and oriented to person, place and time.           Lab Review:   Lab Results   Component Value Date    GLUCOSE 89 03/15/2024    BUN 10 03/15/2024    CREATININE 0.74 03/15/2024    EGFRIFNONA 73 03/19/2021    BCR 13.5 03/15/2024    CO2 21.0 (L) 03/15/2024    CALCIUM 9.3 03/15/2024    PROTENTOTREF 8.9 (H) 03/15/2024    ALBUMIN 4.2 03/15/2024    ALBUMIN 3.6 03/15/2024    LABIL2 0.7 03/15/2024    AST 21 " 03/15/2024    ALT 15 03/15/2024       Lab Results   Component Value Date    WBC 3.6 (L) 06/03/2024    HGB 11.5 (L) 06/03/2024    HCT 33.8 (L) 06/03/2024    MCV 91.0 06/03/2024     06/03/2024       Lab Results   Component Value Date    HGBA1C 5.40 09/06/2023       Lab Results   Component Value Date    TSH 1.510 09/05/2023       Lab Results   Component Value Date    CHOL 284 (H) 03/15/2024    CHOL 231 (H) 09/06/2023     Lab Results   Component Value Date    TRIG 61 03/15/2024    TRIG 142 09/06/2023     Lab Results   Component Value Date    HDL 84 (H) 03/15/2024    HDL 66 (H) 09/06/2023     Lab Results   Component Value Date     (H) 03/15/2024     (H) 09/06/2023             Results for orders placed during the hospital encounter of 02/20/23    Adult Transthoracic Echo Complete w/ Color, Spectral and Contrast if necessary per protocol    Interpretation Summary    Left ventricular systolic function is normal. Estimated left ventricular EF = 60%    Mild aortic insufficiency.    Trace mitral regurgitation.    Trace tricuspid regurgitation with normal RVSP.            Advance Care Planning   ACP discussion was held with the patient during this visit. Patient has an advance directive (not in EMR), copy requested.      Assessment:     Overall continued acceptable course with no new interim cardiopulmonary complaints with acceptable functional status. We will defer additional diagnostic or therapeutic intervention from a cardiac perspective at this time.  I will mail her an FLP lab slip to be drawn when she has labs next month with rheumatology.  Patient on Praluent.     Diagnosis Plan   1. Palpitations  Stable      2. Primary hypertension  Controlled, continue current cardiac medications      3. Mixed hyperlipidemia  FLP lab slip mailed to patient             Plan:         Patient to continue current medications and close follow up with the above providers.  Tentative cardiology follow up in January 2025  or patient may return sooner PRN.  FLP since increased dose of Praluent      Electronically signed by Aspen Plata, HOWARD, 07/22/24, 3:55 PM EDT.

## 2024-07-19 ENCOUNTER — OFFICE VISIT (OUTPATIENT)
Dept: CARDIOLOGY | Facility: CLINIC | Age: 64
End: 2024-07-19
Payer: COMMERCIAL

## 2024-07-19 DIAGNOSIS — E78.2 MIXED HYPERLIPIDEMIA: ICD-10-CM

## 2024-07-19 DIAGNOSIS — I10 PRIMARY HYPERTENSION: ICD-10-CM

## 2024-07-19 DIAGNOSIS — R00.2 PALPITATIONS: Primary | ICD-10-CM

## 2024-07-19 PROCEDURE — 99214 OFFICE O/P EST MOD 30 MIN: CPT | Performed by: NURSE PRACTITIONER

## 2024-07-22 VITALS
SYSTOLIC BLOOD PRESSURE: 136 MMHG | WEIGHT: 148 LBS | DIASTOLIC BLOOD PRESSURE: 74 MMHG | BODY MASS INDEX: 27.23 KG/M2 | HEIGHT: 62 IN | HEART RATE: 58 BPM

## 2024-07-22 DIAGNOSIS — E78.5 HYPERLIPIDEMIA, UNSPECIFIED HYPERLIPIDEMIA TYPE: Primary | ICD-10-CM

## 2024-08-09 ENCOUNTER — LAB (OUTPATIENT)
Dept: LAB | Facility: HOSPITAL | Age: 64
End: 2024-08-09
Payer: COMMERCIAL

## 2024-08-09 ENCOUNTER — TRANSCRIBE ORDERS (OUTPATIENT)
Dept: LAB | Facility: HOSPITAL | Age: 64
End: 2024-08-09
Payer: COMMERCIAL

## 2024-08-09 DIAGNOSIS — M62.81 MUSCLE WEAKNESS (GENERALIZED): ICD-10-CM

## 2024-08-09 DIAGNOSIS — M34.9 SYSTEMIC SCLEROSIS: ICD-10-CM

## 2024-08-09 DIAGNOSIS — L93.0 LUPUS ERYTHEMATOSUS, UNSPECIFIED FORM: Primary | ICD-10-CM

## 2024-08-09 DIAGNOSIS — Z51.81 ENCOUNTER FOR THERAPEUTIC DRUG MONITORING: ICD-10-CM

## 2024-08-09 DIAGNOSIS — L93.0 LUPUS ERYTHEMATOSUS, UNSPECIFIED FORM: ICD-10-CM

## 2024-08-09 DIAGNOSIS — E78.5 HYPERLIPIDEMIA, UNSPECIFIED HYPERLIPIDEMIA TYPE: ICD-10-CM

## 2024-08-09 DIAGNOSIS — L93.2 CUTANEOUS LUPUS ERYTHEMATOSUS: Primary | ICD-10-CM

## 2024-08-09 LAB
ALBUMIN SERPL-MCNC: 4.3 G/DL (ref 3.5–5.2)
ALBUMIN/GLOB SERPL: 0.8 G/DL
ALP SERPL-CCNC: 97 U/L (ref 39–117)
ALT SERPL W P-5'-P-CCNC: 10 U/L (ref 1–33)
ANION GAP SERPL CALCULATED.3IONS-SCNC: 10.8 MMOL/L (ref 5–15)
AST SERPL-CCNC: 21 U/L (ref 1–32)
BASOPHILS # BLD AUTO: 0.06 10*3/MM3 (ref 0–0.2)
BASOPHILS NFR BLD AUTO: 1.7 % (ref 0–1.5)
BILIRUB SERPL-MCNC: 0.4 MG/DL (ref 0–1.2)
BILIRUB UR QL STRIP: NEGATIVE
BUN SERPL-MCNC: 11 MG/DL (ref 8–23)
BUN/CREAT SERPL: 15.7 (ref 7–25)
C3 SERPL-MCNC: 136 MG/DL (ref 82–167)
C4 SERPL-MCNC: 14 MG/DL (ref 14–44)
CALCIUM SPEC-SCNC: 9.5 MG/DL (ref 8.6–10.5)
CHLORIDE SERPL-SCNC: 99 MMOL/L (ref 98–107)
CHOLEST SERPL-MCNC: 258 MG/DL (ref 0–200)
CK SERPL-CCNC: 74 U/L (ref 20–180)
CLARITY UR: CLEAR
CO2 SERPL-SCNC: 21.2 MMOL/L (ref 22–29)
COLOR UR: YELLOW
CREAT SERPL-MCNC: 0.7 MG/DL (ref 0.57–1)
CRP SERPL-MCNC: <0.3 MG/DL (ref 0–0.5)
DEPRECATED RDW RBC AUTO: 44.6 FL (ref 37–54)
EGFRCR SERPLBLD CKD-EPI 2021: 96.7 ML/MIN/1.73
EOSINOPHIL # BLD AUTO: 0.04 10*3/MM3 (ref 0–0.4)
EOSINOPHIL NFR BLD AUTO: 1.1 % (ref 0.3–6.2)
ERYTHROCYTE [DISTWIDTH] IN BLOOD BY AUTOMATED COUNT: 13.4 % (ref 12.3–15.4)
ERYTHROCYTE [SEDIMENTATION RATE] IN BLOOD: 70 MM/HR (ref 0–30)
GLOBULIN UR ELPH-MCNC: 5.3 GM/DL
GLUCOSE SERPL-MCNC: 80 MG/DL (ref 65–99)
GLUCOSE UR STRIP-MCNC: NEGATIVE MG/DL
HCT VFR BLD AUTO: 36.2 % (ref 34–46.6)
HDLC SERPL-MCNC: 81 MG/DL (ref 40–60)
HGB BLD-MCNC: 11.9 G/DL (ref 12–15.9)
HGB UR QL STRIP.AUTO: NEGATIVE
HOLD SPECIMEN: NORMAL
IMM GRANULOCYTES # BLD AUTO: 0.01 10*3/MM3 (ref 0–0.05)
IMM GRANULOCYTES NFR BLD AUTO: 0.3 % (ref 0–0.5)
KETONES UR QL STRIP: NEGATIVE
LDLC SERPL CALC-MCNC: 170 MG/DL (ref 0–100)
LDLC/HDLC SERPL: 2.07 {RATIO}
LEUKOCYTE ESTERASE UR QL STRIP.AUTO: NEGATIVE
LYMPHOCYTES # BLD AUTO: 0.61 10*3/MM3 (ref 0.7–3.1)
LYMPHOCYTES NFR BLD AUTO: 16.9 % (ref 19.6–45.3)
MCH RBC QN AUTO: 30.1 PG (ref 26.6–33)
MCHC RBC AUTO-ENTMCNC: 32.9 G/DL (ref 31.5–35.7)
MCV RBC AUTO: 91.6 FL (ref 79–97)
MONOCYTES # BLD AUTO: 0.5 10*3/MM3 (ref 0.1–0.9)
MONOCYTES NFR BLD AUTO: 13.9 % (ref 5–12)
NEUTROPHILS NFR BLD AUTO: 2.38 10*3/MM3 (ref 1.7–7)
NEUTROPHILS NFR BLD AUTO: 66.1 % (ref 42.7–76)
NITRITE UR QL STRIP: NEGATIVE
NRBC BLD AUTO-RTO: 0 /100 WBC (ref 0–0.2)
PH UR STRIP.AUTO: 6 [PH] (ref 5–8)
PLATELET # BLD AUTO: 256 10*3/MM3 (ref 140–450)
PMV BLD AUTO: 12.1 FL (ref 6–12)
POTASSIUM SERPL-SCNC: 4.3 MMOL/L (ref 3.5–5.2)
PROT SERPL-MCNC: 9.6 G/DL (ref 6–8.5)
PROT UR QL STRIP: NEGATIVE
RBC # BLD AUTO: 3.95 10*6/MM3 (ref 3.77–5.28)
SODIUM SERPL-SCNC: 131 MMOL/L (ref 136–145)
SP GR UR STRIP: 1.02 (ref 1–1.03)
TRIGL SERPL-MCNC: 46 MG/DL (ref 0–150)
UROBILINOGEN UR QL STRIP: NORMAL
VLDLC SERPL-MCNC: 7 MG/DL (ref 5–40)
WBC NRBC COR # BLD AUTO: 3.6 10*3/MM3 (ref 3.4–10.8)

## 2024-08-09 PROCEDURE — 82550 ASSAY OF CK (CPK): CPT

## 2024-08-09 PROCEDURE — 80053 COMPREHEN METABOLIC PANEL: CPT

## 2024-08-09 PROCEDURE — 85652 RBC SED RATE AUTOMATED: CPT

## 2024-08-09 PROCEDURE — 86140 C-REACTIVE PROTEIN: CPT

## 2024-08-09 PROCEDURE — 36415 COLL VENOUS BLD VENIPUNCTURE: CPT

## 2024-08-09 PROCEDURE — 85025 COMPLETE CBC W/AUTO DIFF WBC: CPT

## 2024-08-09 PROCEDURE — 86160 COMPLEMENT ANTIGEN: CPT

## 2024-08-09 PROCEDURE — 80061 LIPID PANEL: CPT

## 2024-08-09 PROCEDURE — 81003 URINALYSIS AUTO W/O SCOPE: CPT

## 2024-08-29 DIAGNOSIS — E03.9 ACQUIRED HYPOTHYROIDISM: ICD-10-CM

## 2024-08-30 RX ORDER — LEVOTHYROXINE SODIUM 88 UG/1
88 TABLET ORAL EVERY OTHER DAY
Qty: 45 TABLET | Refills: 0 | Status: SHIPPED | OUTPATIENT
Start: 2024-08-30

## 2024-09-20 ENCOUNTER — TELEPHONE (OUTPATIENT)
Dept: GASTROENTEROLOGY | Facility: CLINIC | Age: 64
End: 2024-09-20

## 2024-09-20 DIAGNOSIS — E03.9 ACQUIRED HYPOTHYROIDISM: ICD-10-CM

## 2024-09-20 RX ORDER — LEVOTHYROXINE SODIUM 75 UG/1
75 TABLET ORAL EVERY OTHER DAY
Qty: 45 TABLET | Refills: 0 | Status: SHIPPED | OUTPATIENT
Start: 2024-09-20

## 2024-09-20 NOTE — TELEPHONE ENCOUNTER
"    Hub staff attempted to follow warm transfer process and was unsuccessful     Caller: Rupa Finn \"Jeannette\"    Relationship to patient: Self    Best call back number: [phone]     Patient is needing: PT REPORTS SYMPTOMS OF NAUSEA, PAIN IN LEFT SHOULDER BLADE, PAIN AND BURNING IN STERNUM AREA, CRAMPING IN INTESTINES. GOES BACK AND FORTH BETWEEN DIARRHEA AND CONSTIPATION         "

## 2024-10-09 ENCOUNTER — TELEPHONE (OUTPATIENT)
Dept: OBSTETRICS AND GYNECOLOGY | Facility: CLINIC | Age: 64
End: 2024-10-09
Payer: COMMERCIAL

## 2024-10-09 NOTE — TELEPHONE ENCOUNTER
"  \"Arm will be okay  (elbow to shoulder ---- on clean/dry skin---- no lotion on, but can be put on AFTER the medication)\" -HOWARD Rich     "

## 2024-10-09 NOTE — TELEPHONE ENCOUNTER
Pt states that she has been using estradiol gel on legs. She states that she would like to use on arms instead legs. Is there a specific reason to use legs instead of arms. She is having a hard time with it drying and it would be easier to have an arm exposed instead of leg.     Patient would like it sent to [facility] on [address] [facility] [address].     Patient prefers [facility] message back instead of call.

## 2024-10-15 ENCOUNTER — TELEPHONE (OUTPATIENT)
Dept: OBSTETRICS AND GYNECOLOGY | Facility: CLINIC | Age: 64
End: 2024-10-15
Payer: COMMERCIAL

## 2024-10-15 RX ORDER — ESTRADIOL 0.75 MG/.75G
1 GEL TOPICAL DAILY
Qty: 30 EACH | Refills: 11 | Status: SHIPPED | OUTPATIENT
Start: 2024-10-15 | End: 2024-10-23 | Stop reason: SDUPTHER

## 2024-10-15 RX ORDER — ESTRADIOL 1 MG/G
1 GEL TOPICAL DAILY
Qty: 30 G | Refills: 6 | Status: SHIPPED | OUTPATIENT
Start: 2024-10-15 | End: 2024-10-15

## 2024-10-15 NOTE — TELEPHONE ENCOUNTER
PT CALLING WENT TO GET REFILL FROM  [facility] THEY ADVISED SINCE SHE SWITCHED PROVIDER THAT DR RIGGS WILL NOW HAVE TO REQUEST REFILL WITH A NEW PRESCRIPTION - ESTRADIOL 1 MG GEL

## 2024-10-15 NOTE — TELEPHONE ENCOUNTER
I sent in a new prescription for the correct dosage and application instructions.     Dixie Marin MD

## 2024-10-23 ENCOUNTER — TELEPHONE (OUTPATIENT)
Dept: OBSTETRICS AND GYNECOLOGY | Facility: CLINIC | Age: 64
End: 2024-10-23
Payer: COMMERCIAL

## 2024-10-23 RX ORDER — ESTRADIOL 0.75 MG/.75G
1 GEL TOPICAL DAILY
Qty: 30 EACH | Refills: 11 | Status: SHIPPED | OUTPATIENT
Start: 2024-10-23 | End: 2024-10-28

## 2024-10-23 NOTE — TELEPHONE ENCOUNTER
Spoke with patient and let her know that the prior authorization for Estradiol gel had been denied and she stated that she knew that it wasn't covered and she has been able to get it from [facility] using a [employer] card for $49.  I stated that they wanted her to try a patch and she stated that she is allergic to adhesive.  I told her that the Rx had been sent to [facility] but that I would get it switched to [facility].  Pt gave verbal understanding.

## 2024-10-25 ENCOUNTER — TELEPHONE (OUTPATIENT)
Dept: OBSTETRICS AND GYNECOLOGY | Facility: CLINIC | Age: 64
End: 2024-10-25
Payer: COMMERCIAL

## 2024-10-25 NOTE — TELEPHONE ENCOUNTER
Pts estradiol gel is not being covered by her insurance. They want to do the patches, but she is allergic to adhesive. Provider does not want her to do the pills either due to infusions that she does. The insurance just needs us to give a medical reason as to why she cannot do the patches they will cover the gel.     Please advise    Thank you

## 2024-10-25 NOTE — TELEPHONE ENCOUNTER
Spoke with pt about adding documentation of severe adhesive tape allergy to current denied PA.  I clarified the dose that pt is using and she stated Estradiol 1mg gel.  I stated that I would speak with Dr. Marin on Monday about Rx and get her signature for Prior Auth and fax information.  Informed pt I would contact her when I received approval or denial.  Pt gave verbal understanding.

## 2024-10-28 RX ORDER — ESTRADIOL 1 MG/G
1 GEL TOPICAL DAILY
Qty: 1 G | Refills: 11 | Status: SHIPPED | OUTPATIENT
Start: 2024-10-28

## 2024-10-28 NOTE — TELEPHONE ENCOUNTER
Can you please send in Estradiol 1 mg gel to the  [facility] that is on file?

## 2024-10-28 NOTE — TELEPHONE ENCOUNTER
Pt aware that Rx was sent in and that prior authorization request was faxed.   Pt informed that I will call with approval or denial.

## 2024-10-30 ENCOUNTER — TELEPHONE (OUTPATIENT)
Dept: OBSTETRICS AND GYNECOLOGY | Facility: CLINIC | Age: 64
End: 2024-10-30
Payer: COMMERCIAL

## 2024-10-31 ENCOUNTER — LAB (OUTPATIENT)
Dept: LAB | Facility: HOSPITAL | Age: 64
End: 2024-10-31
Payer: COMMERCIAL

## 2024-11-18 ENCOUNTER — TRANSCRIBE ORDERS (OUTPATIENT)
Dept: LAB | Facility: HOSPITAL | Age: 64
End: 2024-11-18
Payer: COMMERCIAL

## 2024-11-18 ENCOUNTER — LAB (OUTPATIENT)
Dept: LAB | Facility: HOSPITAL | Age: 64
End: 2024-11-18
Payer: COMMERCIAL

## 2024-11-18 DIAGNOSIS — G25.81 RESTLESS LEGS: ICD-10-CM

## 2024-11-18 DIAGNOSIS — G72.9 MYOPATHY, UNSPECIFIED: ICD-10-CM

## 2024-11-18 DIAGNOSIS — M35.03: ICD-10-CM

## 2024-11-18 DIAGNOSIS — M34.9 SYSTEMIC SCLEROSIS: ICD-10-CM

## 2024-11-18 DIAGNOSIS — G25.81 RESTLESS LEGS: Primary | ICD-10-CM

## 2024-11-18 DIAGNOSIS — L93.2 OTHER LOCAL LUPUS ERYTHEMATOSUS: ICD-10-CM

## 2024-11-18 DIAGNOSIS — E03.9 ACQUIRED HYPOTHYROIDISM: ICD-10-CM

## 2024-11-18 LAB
ALBUMIN SERPL-MCNC: 3.9 G/DL (ref 3.5–5.2)
ALBUMIN/GLOB SERPL: 0.7 G/DL
ALP SERPL-CCNC: 84 U/L (ref 39–117)
ALT SERPL W P-5'-P-CCNC: 9 U/L (ref 1–33)
ANION GAP SERPL CALCULATED.3IONS-SCNC: 9.5 MMOL/L (ref 5–15)
AST SERPL-CCNC: 22 U/L (ref 1–32)
BACTERIA UR QL AUTO: ABNORMAL /HPF
BASOPHILS # BLD AUTO: 0.03 10*3/MM3 (ref 0–0.2)
BASOPHILS NFR BLD AUTO: 1.2 % (ref 0–1.5)
BILIRUB SERPL-MCNC: 0.4 MG/DL (ref 0–1.2)
BILIRUB UR QL STRIP: NEGATIVE
BUN SERPL-MCNC: 10 MG/DL (ref 8–23)
BUN/CREAT SERPL: 12.5 (ref 7–25)
CALCIUM SPEC-SCNC: 9.1 MG/DL (ref 8.6–10.5)
CHLORIDE SERPL-SCNC: 94 MMOL/L (ref 98–107)
CK SERPL-CCNC: 67 U/L (ref 20–180)
CLARITY UR: CLEAR
CO2 SERPL-SCNC: 24.5 MMOL/L (ref 22–29)
COLOR UR: YELLOW
CREAT SERPL-MCNC: 0.8 MG/DL (ref 0.57–1)
CRP SERPL-MCNC: <0.3 MG/DL (ref 0–0.5)
DEPRECATED RDW RBC AUTO: 47.2 FL (ref 37–54)
EGFRCR SERPLBLD CKD-EPI 2021: 82.4 ML/MIN/1.73
EOSINOPHIL # BLD AUTO: 0.06 10*3/MM3 (ref 0–0.4)
EOSINOPHIL NFR BLD AUTO: 2.4 % (ref 0.3–6.2)
ERYTHROCYTE [DISTWIDTH] IN BLOOD BY AUTOMATED COUNT: 14.1 % (ref 12.3–15.4)
ERYTHROCYTE [SEDIMENTATION RATE] IN BLOOD: 75 MM/HR (ref 0–30)
FERRITIN SERPL-MCNC: 51.7 NG/ML (ref 13–150)
GLOBULIN UR ELPH-MCNC: 5.5 GM/DL
GLUCOSE SERPL-MCNC: 79 MG/DL (ref 65–99)
GLUCOSE UR STRIP-MCNC: NEGATIVE MG/DL
HCT VFR BLD AUTO: 35.3 % (ref 34–46.6)
HGB BLD-MCNC: 11.2 G/DL (ref 12–15.9)
HGB UR QL STRIP.AUTO: NEGATIVE
HYALINE CASTS UR QL AUTO: ABNORMAL /LPF
IMM GRANULOCYTES # BLD AUTO: 0 10*3/MM3 (ref 0–0.05)
IMM GRANULOCYTES NFR BLD AUTO: 0 % (ref 0–0.5)
IRON 24H UR-MRATE: 68 MCG/DL (ref 37–145)
IRON SATN MFR SERPL: 16 % (ref 20–50)
KETONES UR QL STRIP: NEGATIVE
LEUKOCYTE ESTERASE UR QL STRIP.AUTO: NEGATIVE
LYMPHOCYTES # BLD AUTO: 0.68 10*3/MM3 (ref 0.7–3.1)
LYMPHOCYTES NFR BLD AUTO: 27.1 % (ref 19.6–45.3)
MAGNESIUM SERPL-MCNC: 2.1 MG/DL (ref 1.6–2.4)
MCH RBC QN AUTO: 29.3 PG (ref 26.6–33)
MCHC RBC AUTO-ENTMCNC: 31.7 G/DL (ref 31.5–35.7)
MCV RBC AUTO: 92.4 FL (ref 79–97)
MONOCYTES # BLD AUTO: 0.36 10*3/MM3 (ref 0.1–0.9)
MONOCYTES NFR BLD AUTO: 14.3 % (ref 5–12)
NEUTROPHILS NFR BLD AUTO: 1.38 10*3/MM3 (ref 1.7–7)
NEUTROPHILS NFR BLD AUTO: 55 % (ref 42.7–76)
NITRITE UR QL STRIP: NEGATIVE
NRBC BLD AUTO-RTO: 0 /100 WBC (ref 0–0.2)
PH UR STRIP.AUTO: 6.5 [PH] (ref 5–8)
PLATELET # BLD AUTO: 260 10*3/MM3 (ref 140–450)
PMV BLD AUTO: 11.9 FL (ref 6–12)
POTASSIUM SERPL-SCNC: 4.3 MMOL/L (ref 3.5–5.2)
PROT SERPL-MCNC: 9.4 G/DL (ref 6–8.5)
PROT UR QL STRIP: NEGATIVE
RBC # BLD AUTO: 3.82 10*6/MM3 (ref 3.77–5.28)
RBC # UR STRIP: ABNORMAL /HPF
REF LAB TEST METHOD: ABNORMAL
SODIUM SERPL-SCNC: 128 MMOL/L (ref 136–145)
SP GR UR STRIP: 1.01 (ref 1–1.03)
SQUAMOUS #/AREA URNS HPF: ABNORMAL /HPF
T4 FREE SERPL-MCNC: 1.46 NG/DL (ref 0.92–1.68)
TIBC SERPL-MCNC: 426 MCG/DL (ref 298–536)
TRANSFERRIN SERPL-MCNC: 286 MG/DL (ref 200–360)
TSH SERPL DL<=0.05 MIU/L-ACNC: 1.72 UIU/ML (ref 0.27–4.2)
UROBILINOGEN UR QL STRIP: NORMAL
WBC # UR STRIP: ABNORMAL /HPF
WBC NRBC COR # BLD AUTO: 2.51 10*3/MM3 (ref 3.4–10.8)

## 2024-11-18 PROCEDURE — 83540 ASSAY OF IRON: CPT

## 2024-11-18 PROCEDURE — 84466 ASSAY OF TRANSFERRIN: CPT

## 2024-11-18 PROCEDURE — 86140 C-REACTIVE PROTEIN: CPT

## 2024-11-18 PROCEDURE — 83735 ASSAY OF MAGNESIUM: CPT

## 2024-11-18 PROCEDURE — 85652 RBC SED RATE AUTOMATED: CPT

## 2024-11-18 PROCEDURE — 81001 URINALYSIS AUTO W/SCOPE: CPT

## 2024-11-18 PROCEDURE — 84439 ASSAY OF FREE THYROXINE: CPT

## 2024-11-18 PROCEDURE — 80053 COMPREHEN METABOLIC PANEL: CPT

## 2024-11-18 PROCEDURE — 82728 ASSAY OF FERRITIN: CPT

## 2024-11-18 PROCEDURE — 36415 COLL VENOUS BLD VENIPUNCTURE: CPT

## 2024-11-18 PROCEDURE — 84443 ASSAY THYROID STIM HORMONE: CPT

## 2024-11-18 PROCEDURE — 82550 ASSAY OF CK (CPK): CPT

## 2024-11-18 PROCEDURE — 85025 COMPLETE CBC W/AUTO DIFF WBC: CPT

## 2024-11-20 DIAGNOSIS — E78.5 HYPERLIPIDEMIA, UNSPECIFIED HYPERLIPIDEMIA TYPE: ICD-10-CM

## 2024-11-22 RX ORDER — ALIROCUMAB 150 MG/ML
INJECTION, SOLUTION SUBCUTANEOUS
Qty: 2 ML | Refills: 0 | Status: SHIPPED | OUTPATIENT
Start: 2024-11-22

## 2024-11-29 DIAGNOSIS — E03.9 ACQUIRED HYPOTHYROIDISM: ICD-10-CM

## 2024-12-02 RX ORDER — LEVOTHYROXINE SODIUM 88 UG/1
88 TABLET ORAL EVERY OTHER DAY
Qty: 45 TABLET | Refills: 0 | Status: SHIPPED | OUTPATIENT
Start: 2024-12-02

## 2024-12-18 DIAGNOSIS — E03.9 ACQUIRED HYPOTHYROIDISM: ICD-10-CM

## 2024-12-18 RX ORDER — ALIROCUMAB 75 MG/ML
1 INJECTION, SOLUTION SUBCUTANEOUS
Qty: 2 ML | Refills: 6 | Status: SHIPPED | OUTPATIENT
Start: 2024-12-18

## 2024-12-18 RX ORDER — LEVOTHYROXINE SODIUM 75 UG/1
75 TABLET ORAL EVERY OTHER DAY
Qty: 45 TABLET | Refills: 0 | Status: SHIPPED | OUTPATIENT
Start: 2024-12-18

## 2024-12-18 NOTE — TELEPHONE ENCOUNTER
Rx Refill Note  Requested Prescriptions     Pending Prescriptions Disp Refills    levothyroxine (SYNTHROID, LEVOTHROID) 75 MCG tablet [Pharmacy Med Name: Levothyroxine Sodium Oral Tablet 75 MCG] 45 tablet 0     Sig: TAKE 1 TABLET BY MOUTH EVERY OTHER DAY      Last office visit with prescribing clinician: 1/11/2024   Last telemedicine visit with prescribing clinician: Visit date not found   Next office visit with prescribing clinician: Visit date not found                         Would you like a call back once the refill request has been completed: [] Yes [] No    If the office needs to give you a call back, can they leave a voicemail: [] Yes [] No    Yvette Guzman MA  12/18/24, 14:46 EST

## 2024-12-20 ENCOUNTER — OFFICE VISIT (OUTPATIENT)
Dept: FAMILY MEDICINE CLINIC | Facility: CLINIC | Age: 64
End: 2024-12-20
Payer: COMMERCIAL

## 2024-12-20 VITALS
SYSTOLIC BLOOD PRESSURE: 124 MMHG | DIASTOLIC BLOOD PRESSURE: 86 MMHG | HEART RATE: 72 BPM | HEIGHT: 62 IN | BODY MASS INDEX: 27.27 KG/M2 | TEMPERATURE: 98.9 F | WEIGHT: 148.2 LBS | OXYGEN SATURATION: 99 %

## 2024-12-20 DIAGNOSIS — H61.23 BILATERAL IMPACTED CERUMEN: ICD-10-CM

## 2024-12-20 DIAGNOSIS — J32.4 CHRONIC PANSINUSITIS: Primary | ICD-10-CM

## 2024-12-20 NOTE — PROGRESS NOTES
"    Office Note     Name: Rupa Finn    : 1960     MRN: [id number]     Chief Complaint  Sinus Problem (Pt. States Ongoing Sinus Issues since last with Иван back in January due to Chronic Pansinusitis. Pt. States she feels worse.)    Subjective     History of Present Illness:  Rupa Finn is a 64 y.o. female who presents today for chornic issue of sinus pressure and rhinorrhea.     Patient states this started in 2023- she was evaluated for this problem by Dr. Oates in January. She has sinus pressure and frequent rhinorrhea, productive of clear sputum. States she has foregone antibiotic therapy and cannot use steroid or antihistamine therapies due to Sjogren's syndrome and immunosuppressants.  She states her worst associated symptom is headaches particularly around and behind her eyes.  She denies fever, chills, purulent discharge.  She states that she does not feel \"sick\" but is concerned with the chronicity of this condition.    At home she is using saline nasal sprays and humidifiers to attempt to keep nasal passages moist as this is affected by Sjogren's syndrome. She also adds that she started IVIG therapy in 2023, and wonders about its association with sinus issues.     Answers submitted by the patient for this visit:  Primary Reason for Visit (Submitted on 2024)  What is the primary reason for your visit?: Problem Not Listed  Problem not listed (Submitted on 2024)  Chief Complaint: Other medical problem  Reason for appointment: Long term possible sinus infection, nose bleeds & sores, sinus headache, pain atound eyes, ear hirts  anorexia: No  joint pain: No  change in stool: No  joint swelling: Yes  swollen glands: No  vertigo: No  visual change: No  Onset: 1 to 5 years  Chronicity: chronic  Frequency: constantly  Medications tried: Tylenol      Review of Systems:   Review of Systems   Constitutional:  Negative for chills, diaphoresis, fatigue and fever. "   HENT:  Positive for congestion. Negative for sore throat.    Respiratory:  Negative for cough.    Cardiovascular:  Negative for chest pain.   Gastrointestinal:  Positive for nausea. Negative for abdominal pain and vomiting.   Genitourinary:  Negative for dysuria.   Musculoskeletal:  Positive for myalgias. Negative for neck pain.   Skin:  Negative for rash.   Neurological:  Positive for weakness and headaches. Negative for numbness.        Past Medical History:   Past Medical History:   Diagnosis Date    Anemia 2021    Autoimmune disease     Cholelithiasis Removed 2008    Colon polyp 2022    COVID-19     Cutaneous lupus erythematosus     Diverticulosis 2010?    Fibrocystic breast changes, bilateral     GERD (gastroesophageal reflux disease)     not currently taking medication    Gestational diabetes     Glaucoma     Hyperlipidemia     Hypertension     Hypothyroidism     Lower extremity edema     Neuromuscular disorder 2020    OAB (overactive bladder)     Osteoarthritis     Palpitations     Post-menopause on HRT (hormone replacement therapy)     Raynaud's disease     Scleroderma     SINE scleroderma    Sjogren's disease     Urinary tract infection        Past Surgical History:   Past Surgical History:   Procedure Laterality Date    CHOLECYSTECTOMY      COLONOSCOPY  2019 and 2022    ESOPHAGOSCOPY / EGD      esophageal stretching/dilation and gastric polypectomy, esophageal biopsies    EYE SURGERY  2012    Cataracts removed - lens replacement    LAPAROSCOPIC ASSISTED VAGINAL HYSTERECTOMY SALPINGO OOPHORECTOMY Bilateral     LAPAROSCOPIC DIAZ PROCEDURE      OOPHORECTOMY      OTHER SURGICAL HISTORY      Lap for endometriosis    TONSILLECTOMY      UPPER GASTROINTESTINAL ENDOSCOPY  ?       Family History:   Family History   Problem Relation Age of Onset    Other Mother         has a pacemaker and is followed by Dr. Brooks    Heart disease Mother     Heart failure Mother     Hypertension Mother     Diabetes Mother      Vision loss Mother         Glaucoma/macular degeneration    Arthritis Mother     Glaucoma Father     Other Father          likely a cancer-related death;    Heart disease Father     Hypertension Father     Cancer Father         Prostate    Vision loss Father         Glaucoma/macular degeneration    Asthma Brother     Hypertension Brother     Heart attack Maternal Grandfather     Heart disease Maternal Grandfather     Heart failure Maternal Grandfather     Heart disease Maternal Grandmother     Hypertension Maternal Grandmother     Hypertension Paternal Grandmother     Colon cancer Paternal Grandfather     Cancer Paternal Grandfather         Prostate    Breast cancer Neg Hx     Ovarian cancer Neg Hx        Social History:   Social History     Socioeconomic History    Marital status:    Tobacco Use    Smoking status: Never     Passive exposure: Never    Smokeless tobacco: Never   Vaping Use    Vaping status: Never Used   Substance and Sexual Activity    Alcohol use: No    Drug use: No    Sexual activity: Yes     Partners: Male     Birth control/protection: Post-menopausal, Hysterectomy       Immunizations:   Immunization History   Administered Date(s) Administered    Hep A, 2 Dose 04/20/2019, 10/18/2019    Hepatitis A 04/20/2019, 10/18/2019    Tdap 09/17/2019        Medications:     Current Outpatient Medications:     acetaZOLAMIDE (DIAMOX) 250 MG tablet, Take 0.5 tablets by mouth 2 (two) times a day., Disp: , Rfl:     Alirocumab (Praluent) 75 MG/ML solution auto-injector, Inject 1 mL under the skin into the appropriate area as directed Every 14 (Fourteen) Days., Disp: 2 mL, Rfl: 6    Cholecalciferol (Vitamin D3) 50 MCG (2000 UT) tablet, Take  by mouth Daily., Disp: , Rfl:     dexlansoprazole (DEXILANT) 30 MG capsule, TAKE 1 CAPSULE BY MOUTH EVERY DAY, Disp: 90 capsule, Rfl: 3    estradiol 1 MG/GM gel, Place 1 Application on the skin as directed by provider Daily., Disp: 1 g, Rfl: 11    famotidine (Pepcid) 40  "MG tablet, Take 1 tablet by mouth At Night As Needed for Heartburn., Disp: 90 tablet, Rfl: 3    hydrALAZINE (APRESOLINE) 10 MG tablet, Take 1 tablet by mouth As Needed (Blood pressure sustained >160mmHg)., Disp: 30 tablet, Rfl: 0    Immune Globulin, Human, (GAMMAGARD IV), Infuse 1 dose into a venous catheter Every 14 (Fourteen) Days., Disp: , Rfl:     levothyroxine (SYNTHROID, LEVOTHROID) 75 MCG tablet, TAKE 1 TABLET BY MOUTH EVERY OTHER DAY, Disp: 45 tablet, Rfl: 0    polyethylene glycol (MIRALAX) powder, As Needed., Disp: , Rfl:     Praluent 150 MG/ML injection pen, INJECT 1 ML UNDER THE SKIN EVERY 14 DAYS AS DIRECTED, Disp: 2 mL, Rfl: 0    Roflumilast (Zoryve) 0.3 % cream, Apply 1 application  topically to the appropriate area as directed Daily., Disp: , Rfl:     telmisartan (MICARDIS) 80 MG tablet, Take 1 tablet by mouth Daily., Disp: 90 tablet, Rfl: 2    timolol (TIMOPTIC) 0.5 % ophthalmic solution, Administer 1 drop to both eyes 2 (Two) Times a Day., Disp: , Rfl:     travoprost, BAK free, (TRAVATAN) 0.004 % solution ophthalmic solution, 1 drop Every Evening. in affected eye(s), Disp: , Rfl:     Zinc 50 MG tablet, Take 50 mcg by mouth Daily., Disp: , Rfl:     carbamide peroxide (Debrox) 6.5 % otic solution, Administer 5 drops into both ears 2 (Two) Times a Day., Disp: 22 mL, Rfl: 3    Allergies:   Allergies   Allergen Reactions    Cefaclor Shortness Of Breath     hives    Naproxen Sodium Other (See Comments)     tongue swelling    Nsaids Angioedema    Alphagan [Brimonidine] Other (See Comments)     Eyes itching, red, swollen    Atenolol Other (See Comments)     fatigue    Livalo [Pitavastatin] Myalgia    Statins Myalgia    Dorzolamide Hcl Other (See Comments)     Eyes burning, discharge.    Dorzolamide Hcl-Timolol Mal Unknown - High Severity    Doxycycline Unknown - High Severity    Ibuprofen Swelling    Levothyroxine Hives    Macrolides And Ketolides Provider Review Needed    Adhesive Tape Rash     \"burns, " "eats through my skin\"    E-Mycin [Erythromycin] Nausea And Vomiting    Levofloxacin Unknown (See Comments)       Objective     Vital Signs  /86   Pulse 72   Temp 98.9 °F (37.2 °C) (Oral)   Ht 157.5 cm (62.01\")   Wt 67.2 kg (148 lb 3.2 oz)   SpO2 99%   BMI 27.10 kg/m²   Estimated body mass index is 27.1 kg/m² as calculated from the following:    Height as of this encounter: 157.5 cm (62.01\").    Weight as of this encounter: 67.2 kg (148 lb 3.2 oz).           Physical Exam  Vitals reviewed.   Constitutional:       General: She is not in acute distress.     Appearance: Normal appearance.   HENT:      Head: Normocephalic and atraumatic.      Right Ear: There is impacted cerumen.      Left Ear: There is impacted cerumen.      Nose: Nose normal. No congestion.      Mouth/Throat:      Mouth: Mucous membranes are moist.      Pharynx: Oropharynx is clear.   Eyes:      Pupils: Pupils are equal, round, and reactive to light.   Cardiovascular:      Rate and Rhythm: Normal rate and regular rhythm.      Pulses: Normal pulses.      Heart sounds: No murmur heard.  Pulmonary:      Effort: Pulmonary effort is normal. No respiratory distress.      Breath sounds: Normal breath sounds.   Musculoskeletal:      Cervical back: Normal range of motion.      Right lower leg: No edema.      Left lower leg: No edema.   Lymphadenopathy:      Cervical: No cervical adenopathy.   Skin:     General: Skin is warm and dry.      Capillary Refill: Capillary refill takes less than 2 seconds.      Findings: No rash.   Neurological:      General: No focal deficit present.      Mental Status: She is alert.   Psychiatric:         Mood and Affect: Mood normal.         Thought Content: Thought content normal.          Ear Cerumen Removal    Date/Time: 12/20/2024 1:19 PM    Performed by: Jo Duong PA-C  Authorized by: Jo Duong PA-C    Anesthesia:  Local Anesthetic: none  Location details: left ear and right ear  Patient tolerance: " patient tolerated the procedure well with no immediate complications  Comments: Successfully removed all impacted cerumen from L ear, and about 50% removal from R ear.   Procedure type: instrumentation, curette          Results:  No results found for this or any previous visit (from the past 24 hours).     Assessment and Plan     Assessment/Plan:  Assessment & Plan  Chronic pansinusitis  Discussed further evaluation and treatment options with patient, understanding her immunosuppressant therapy and multiple medication allergies which severely limit pharmacological treatment options.  She decided to proceed with referral to [facility] for suspected chronic rhinosinusitis for evaluation with nasopharyngoscopy (instead of CT head) and discuss treatment options. would like to confirm with nasopharyngoscopy.     Orders:    Ambulatory Referral to ENT (Otolaryngology)    Bilateral impacted cerumen    Orders:    carbamide peroxide (Debrox) 6.5 % otic solution; Administer 5 drops into both ears 2 (Two) Times a Day.        Follow Up: With PCP after ENT visit        Jo Duong PA-C   [facility]

## 2024-12-30 ENCOUNTER — TRANSCRIBE ORDERS (OUTPATIENT)
Dept: LAB | Facility: HOSPITAL | Age: 64
End: 2024-12-30
Payer: COMMERCIAL

## 2024-12-30 ENCOUNTER — LAB (OUTPATIENT)
Dept: LAB | Facility: HOSPITAL | Age: 64
End: 2024-12-30
Payer: COMMERCIAL

## 2024-12-30 DIAGNOSIS — L93.2 CUTANEOUS LUPUS ERYTHEMATOSUS: ICD-10-CM

## 2024-12-30 DIAGNOSIS — G72.9 MYOPATHY, UNSPECIFIED: ICD-10-CM

## 2024-12-30 DIAGNOSIS — M34.9 SYSTEMIC SCLEROSIS: ICD-10-CM

## 2024-12-30 DIAGNOSIS — G25.81 RESTLESS LEGS: Primary | ICD-10-CM

## 2024-12-30 DIAGNOSIS — G25.81 RESTLESS LEGS: ICD-10-CM

## 2024-12-30 LAB
ALBUMIN SERPL-MCNC: 3.9 G/DL (ref 3.5–5.2)
ALBUMIN/GLOB SERPL: 0.7 G/DL
ALP SERPL-CCNC: 83 U/L (ref 39–117)
ALT SERPL W P-5'-P-CCNC: 13 U/L (ref 1–33)
ANION GAP SERPL CALCULATED.3IONS-SCNC: 8.1 MMOL/L (ref 5–15)
AST SERPL-CCNC: 27 U/L (ref 1–32)
BACTERIA UR QL AUTO: ABNORMAL /HPF
BASOPHILS # BLD AUTO: 0.01 10*3/MM3 (ref 0–0.2)
BASOPHILS NFR BLD AUTO: 0.3 % (ref 0–1.5)
BILIRUB SERPL-MCNC: 0.4 MG/DL (ref 0–1.2)
BILIRUB UR QL STRIP: NEGATIVE
BUN SERPL-MCNC: 12 MG/DL (ref 8–23)
BUN/CREAT SERPL: 13.8 (ref 7–25)
CALCIUM SPEC-SCNC: 9.1 MG/DL (ref 8.6–10.5)
CHLORIDE SERPL-SCNC: 97 MMOL/L (ref 98–107)
CK SERPL-CCNC: 68 U/L (ref 20–180)
CLARITY UR: CLEAR
CO2 SERPL-SCNC: 25.9 MMOL/L (ref 22–29)
COLOR UR: YELLOW
CREAT SERPL-MCNC: 0.87 MG/DL (ref 0.57–1)
CRP SERPL-MCNC: <0.3 MG/DL (ref 0–0.5)
DEPRECATED RDW RBC AUTO: 44.2 FL (ref 37–54)
EGFRCR SERPLBLD CKD-EPI 2021: 74.5 ML/MIN/1.73
EOSINOPHIL # BLD AUTO: 0.01 10*3/MM3 (ref 0–0.4)
EOSINOPHIL NFR BLD AUTO: 0.3 % (ref 0.3–6.2)
ERYTHROCYTE [DISTWIDTH] IN BLOOD BY AUTOMATED COUNT: 13.1 % (ref 12.3–15.4)
ERYTHROCYTE [SEDIMENTATION RATE] IN BLOOD: 84 MM/HR (ref 0–30)
FERRITIN SERPL-MCNC: 51.4 NG/ML (ref 13–150)
GLOBULIN UR ELPH-MCNC: 5.9 GM/DL
GLUCOSE SERPL-MCNC: 88 MG/DL (ref 65–99)
GLUCOSE UR STRIP-MCNC: NEGATIVE MG/DL
HCT VFR BLD AUTO: 34.3 % (ref 34–46.6)
HGB BLD-MCNC: 11.3 G/DL (ref 12–15.9)
HGB UR QL STRIP.AUTO: NEGATIVE
HYALINE CASTS UR QL AUTO: ABNORMAL /LPF
IMM GRANULOCYTES # BLD AUTO: 0.01 10*3/MM3 (ref 0–0.05)
IMM GRANULOCYTES NFR BLD AUTO: 0.3 % (ref 0–0.5)
IRON 24H UR-MRATE: 54 MCG/DL (ref 37–145)
IRON SATN MFR SERPL: 13 % (ref 20–50)
KETONES UR QL STRIP: NEGATIVE
LEUKOCYTE ESTERASE UR QL STRIP.AUTO: NEGATIVE
LYMPHOCYTES # BLD AUTO: 0.43 10*3/MM3 (ref 0.7–3.1)
LYMPHOCYTES NFR BLD AUTO: 14.3 % (ref 19.6–45.3)
MAGNESIUM SERPL-MCNC: 2.1 MG/DL (ref 1.6–2.4)
MCH RBC QN AUTO: 30.4 PG (ref 26.6–33)
MCHC RBC AUTO-ENTMCNC: 32.9 G/DL (ref 31.5–35.7)
MCV RBC AUTO: 92.2 FL (ref 79–97)
MONOCYTES # BLD AUTO: 0.3 10*3/MM3 (ref 0.1–0.9)
MONOCYTES NFR BLD AUTO: 10 % (ref 5–12)
NEUTROPHILS NFR BLD AUTO: 2.25 10*3/MM3 (ref 1.7–7)
NEUTROPHILS NFR BLD AUTO: 74.8 % (ref 42.7–76)
NITRITE UR QL STRIP: NEGATIVE
NRBC BLD AUTO-RTO: 0 /100 WBC (ref 0–0.2)
PH UR STRIP.AUTO: 7 [PH] (ref 5–8)
PLATELET # BLD AUTO: 264 10*3/MM3 (ref 140–450)
PMV BLD AUTO: 11.6 FL (ref 6–12)
POTASSIUM SERPL-SCNC: 4.3 MMOL/L (ref 3.5–5.2)
PROT SERPL-MCNC: 9.8 G/DL (ref 6–8.5)
PROT UR QL STRIP: NEGATIVE
RBC # BLD AUTO: 3.72 10*6/MM3 (ref 3.77–5.28)
RBC # UR STRIP: ABNORMAL /HPF
REF LAB TEST METHOD: ABNORMAL
SODIUM SERPL-SCNC: 131 MMOL/L (ref 136–145)
SP GR UR STRIP: 1.02 (ref 1–1.03)
SQUAMOUS #/AREA URNS HPF: ABNORMAL /HPF
TIBC SERPL-MCNC: 419 MCG/DL (ref 298–536)
TRANSFERRIN SERPL-MCNC: 281 MG/DL (ref 200–360)
UROBILINOGEN UR QL STRIP: NORMAL
WBC # UR STRIP: ABNORMAL /HPF
WBC NRBC COR # BLD AUTO: 3.01 10*3/MM3 (ref 3.4–10.8)

## 2024-12-30 PROCEDURE — 36415 COLL VENOUS BLD VENIPUNCTURE: CPT

## 2024-12-30 PROCEDURE — 82550 ASSAY OF CK (CPK): CPT

## 2024-12-30 PROCEDURE — 85025 COMPLETE CBC W/AUTO DIFF WBC: CPT

## 2024-12-30 PROCEDURE — 85652 RBC SED RATE AUTOMATED: CPT

## 2024-12-30 PROCEDURE — 81001 URINALYSIS AUTO W/SCOPE: CPT

## 2024-12-30 PROCEDURE — 82728 ASSAY OF FERRITIN: CPT

## 2024-12-30 PROCEDURE — 86140 C-REACTIVE PROTEIN: CPT

## 2024-12-30 PROCEDURE — 80053 COMPREHEN METABOLIC PANEL: CPT

## 2024-12-30 PROCEDURE — 83540 ASSAY OF IRON: CPT

## 2024-12-30 PROCEDURE — 83735 ASSAY OF MAGNESIUM: CPT

## 2024-12-30 PROCEDURE — 84466 ASSAY OF TRANSFERRIN: CPT

## 2025-01-02 DIAGNOSIS — E78.2 MIXED HYPERLIPIDEMIA: Primary | ICD-10-CM

## 2025-01-14 ENCOUNTER — OFFICE VISIT (OUTPATIENT)
Dept: FAMILY MEDICINE CLINIC | Facility: CLINIC | Age: 65
End: 2025-01-14
Payer: COMMERCIAL

## 2025-01-14 VITALS
SYSTOLIC BLOOD PRESSURE: 138 MMHG | OXYGEN SATURATION: 99 % | BODY MASS INDEX: 28.74 KG/M2 | WEIGHT: 156.2 LBS | HEIGHT: 62 IN | HEART RATE: 66 BPM | DIASTOLIC BLOOD PRESSURE: 84 MMHG

## 2025-01-14 DIAGNOSIS — H61.23 BILATERAL IMPACTED CERUMEN: ICD-10-CM

## 2025-01-14 DIAGNOSIS — H93.8X3 CLOGGED EAR, BILATERAL: ICD-10-CM

## 2025-01-14 DIAGNOSIS — K12.0 APHTHOUS ULCER OF MOUTH: Primary | ICD-10-CM

## 2025-01-14 PROCEDURE — 69209 REMOVE IMPACTED EAR WAX UNI: CPT | Performed by: PHYSICIAN ASSISTANT

## 2025-01-14 PROCEDURE — 99214 OFFICE O/P EST MOD 30 MIN: CPT | Performed by: PHYSICIAN ASSISTANT

## 2025-01-14 RX ORDER — MYCOPHENOLATE MOFETIL 500 MG/1
TABLET ORAL
COMMUNITY
Start: 2024-12-29

## 2025-01-14 NOTE — PROGRESS NOTES
Chief Complaint   Patient presents with    Ear Fullness     Pt. States she noticed both Ears Clogged for the last week.    Mouth Lesions     Pt. States she noticed about 3 days ago Painful Rash. Pt. States it feels like thrash.         Rupa Finn is a 64 y.o. female who presents for Ear Fullness (Pt. States she noticed both Ears Clogged for the last week.) and Mouth Lesions (Pt. States she noticed about 3 days ago Painful Rash. Pt. States it feels like thrash.)    Patient reports bilateral, left worse than right, hearing changes/ear clogged.  Has been using debrox regularly and this started after a recent application of medicine.  Has appointment with ENT at the end of this month.  Has tolerated irrigation cerumen removal in the past.    She also reports a new lesion along the left interior mucosa of her cheek.  Has had thrush in the past and feels this is similar.  No recent antibiotic, steroid or inhaler use.  On medicine that causes immune deficiency.    Past Medical History:   Diagnosis Date    Anemia 2021    Autoimmune disease     Cholelithiasis Removed 2008    Colon polyp 2022    COVID-19     Cutaneous lupus erythematosus     Diverticulosis 2010?    Fibrocystic breast changes, bilateral     GERD (gastroesophageal reflux disease)     not currently taking medication    Gestational diabetes     Glaucoma     Hyperlipidemia     Hypertension     Hypothyroidism     Lower extremity edema     Neuromuscular disorder 2020    OAB (overactive bladder)     Osteoarthritis     Palpitations     Post-menopause on HRT (hormone replacement therapy)     Raynaud's disease     Scleroderma     SINE scleroderma    Sjogren's disease     Urinary tract infection        Past Surgical History:   Procedure Laterality Date    CHOLECYSTECTOMY      COLONOSCOPY  2019 and 2022    ESOPHAGOSCOPY / EGD      esophageal stretching/dilation and gastric polypectomy, esophageal biopsies    EYE SURGERY  2012    Cataracts removed - lens  replacement    LAPAROSCOPIC ASSISTED VAGINAL HYSTERECTOMY SALPINGO OOPHORECTOMY Bilateral     LAPAROSCOPIC DIAZ PROCEDURE      OOPHORECTOMY      OTHER SURGICAL HISTORY      Lap for endometriosis    TONSILLECTOMY      UPPER GASTROINTESTINAL ENDOSCOPY  ?       Family History   Problem Relation Age of Onset    Other Mother         has a pacemaker and is followed by Dr. Brooks    Heart disease Mother     Heart failure Mother     Hypertension Mother     Diabetes Mother     Vision loss Mother         Glaucoma/macular degeneration    Arthritis Mother     Glaucoma Father     Other Father          likely a cancer-related death;    Heart disease Father     Hypertension Father     Cancer Father         Prostate    Vision loss Father         Glaucoma/macular degeneration    Asthma Brother     Hypertension Brother     Heart attack Maternal Grandfather     Heart disease Maternal Grandfather     Heart failure Maternal Grandfather     Heart disease Maternal Grandmother     Hypertension Maternal Grandmother     Hypertension Paternal Grandmother     Colon cancer Paternal Grandfather     Cancer Paternal Grandfather         Prostate    Breast cancer Neg Hx     Ovarian cancer Neg Hx        Social History     Socioeconomic History    Marital status:    Tobacco Use    Smoking status: Never     Passive exposure: Never    Smokeless tobacco: Never   Vaping Use    Vaping status: Never Used   Substance and Sexual Activity    Alcohol use: No    Drug use: No    Sexual activity: Yes     Partners: Male     Birth control/protection: Post-menopausal, Hysterectomy       Allergies   Allergen Reactions    Cefaclor Shortness Of Breath     hives    Naproxen Sodium Other (See Comments)     tongue swelling    Nsaids Angioedema    Alphagan [Brimonidine] Other (See Comments)     Eyes itching, red, swollen    Atenolol Other (See Comments)     fatigue    Livalo [Pitavastatin] Myalgia    Statins Myalgia    Dorzolamide Hcl Other (See Comments)      "Eyes burning, discharge.    Dorzolamide Hcl-Timolol Mal Unknown - High Severity    Doxycycline Unknown - High Severity    Ibuprofen Swelling    Levothyroxine Hives    Macrolides And Ketolides Provider Review Needed    Adhesive Tape Rash     \"burns, eats through my skin\"    E-Mycin [Erythromycin] Nausea And Vomiting    Levofloxacin Unknown (See Comments)       ROS    Review of Systems   Constitutional:  Negative for chills and fever.   HENT:  Positive for congestion, ear pain, hearing loss, mouth sores, rhinorrhea and tinnitus. Negative for sinus pressure and sore throat.    Respiratory:  Negative for cough.    Musculoskeletal:  Negative for neck pain.   Skin:  Negative for rash.   Neurological:  Negative for dizziness.       Vitals:    01/14/25 0903   BP: 138/84   Pulse: 66   SpO2: 99%   Weight: 70.9 kg (156 lb 3.2 oz)   Height: 157.5 cm (62.01\")   PainSc:   2   PainLoc: Mouth     Body mass index is 28.56 kg/m².    Current Outpatient Medications on File Prior to Visit   Medication Sig Dispense Refill    Alirocumab (PRALUENT) 150 MG/ML injection pen Inject 1 mL under the skin into the appropriate area as directed Every 14 (Fourteen) Days. 2.24 mL 6    carbamide peroxide (Debrox) 6.5 % otic solution Administer 5 drops into both ears 2 (Two) Times a Day. 22 mL 3    Cholecalciferol (Vitamin D3) 50 MCG (2000 UT) tablet Take  by mouth Daily.      dexlansoprazole (DEXILANT) 30 MG capsule TAKE 1 CAPSULE BY MOUTH EVERY DAY 90 capsule 3    estradiol 1 MG/GM gel Place 1 Application on the skin as directed by provider Daily. 1 g 11    famotidine (Pepcid) 40 MG tablet Take 1 tablet by mouth At Night As Needed for Heartburn. 90 tablet 3    hydrALAZINE (APRESOLINE) 10 MG tablet Take 1 tablet by mouth As Needed (Blood pressure sustained >160mmHg). 30 tablet 0    Immune Globulin, Human, (GAMMAGARD IV) Infuse 1 dose into a venous catheter Every 14 (Fourteen) Days.      levothyroxine (SYNTHROID, LEVOTHROID) 75 MCG tablet TAKE 1 TABLET " BY MOUTH EVERY OTHER DAY 45 tablet 0    mycophenolate (CELLCEPT) 500 MG tablet TAKE 1 TABLET BY MOUTH 3 TIMES A DAY AND TAKE 1 TABLET AT NIGHT      polyethylene glycol (MIRALAX) powder As Needed.      Roflumilast (Zoryve) 0.3 % cream Apply 1 application  topically to the appropriate area as directed Daily.      telmisartan (MICARDIS) 80 MG tablet Take 1 tablet by mouth Daily. 90 tablet 2    timolol (TIMOPTIC) 0.5 % ophthalmic solution Administer 1 drop to both eyes 2 (Two) Times a Day.      travoprost, BAK free, (TRAVATAN) 0.004 % solution ophthalmic solution 1 drop Every Evening. in affected eye(s)      Zinc 50 MG tablet Take 50 mcg by mouth Daily.      [DISCONTINUED] acetaZOLAMIDE (DIAMOX) 250 MG tablet Take 0.5 tablets by mouth 2 (two) times a day.       No current facility-administered medications on file prior to visit.       Results for orders placed or performed in visit on 12/30/24   Iron Profile    Collection Time: 12/30/24  1:09 PM    Specimen: Blood   Result Value Ref Range    Iron 54 37 - 145 mcg/dL    Iron Saturation (TSAT) 13 (L) 20 - 50 %    Transferrin 281 200 - 360 mg/dL    TIBC 419 298 - 536 mcg/dL   CK    Collection Time: 12/30/24  1:09 PM    Specimen: Blood   Result Value Ref Range    Creatine Kinase 68 20 - 180 U/L   Magnesium    Collection Time: 12/30/24  1:09 PM    Specimen: Blood   Result Value Ref Range    Magnesium 2.1 1.6 - 2.4 mg/dL   Ferritin    Collection Time: 12/30/24  1:09 PM    Specimen: Blood   Result Value Ref Range    Ferritin 51.40 13.00 - 150.00 ng/mL   Sedimentation Rate    Collection Time: 12/30/24  1:09 PM    Specimen: Blood   Result Value Ref Range    Sed Rate 84 (H) 0 - 30 mm/hr   C-reactive Protein    Collection Time: 12/30/24  1:09 PM    Specimen: Blood   Result Value Ref Range    C-Reactive Protein <0.30 0.00 - 0.50 mg/dL   Comprehensive Metabolic Panel    Collection Time: 12/30/24  1:09 PM    Specimen: Blood   Result Value Ref Range    Glucose 88 65 - 99 mg/dL    BUN  12 8 - 23 mg/dL    Creatinine 0.87 0.57 - 1.00 mg/dL    Sodium 131 (L) 136 - 145 mmol/L    Potassium 4.3 3.5 - 5.2 mmol/L    Chloride 97 (L) 98 - 107 mmol/L    CO2 25.9 22.0 - 29.0 mmol/L    Calcium 9.1 8.6 - 10.5 mg/dL    Total Protein 9.8 (H) 6.0 - 8.5 g/dL    Albumin 3.9 3.5 - 5.2 g/dL    ALT (SGPT) 13 1 - 33 U/L    AST (SGOT) 27 1 - 32 U/L    Alkaline Phosphatase 83 39 - 117 U/L    Total Bilirubin 0.4 0.0 - 1.2 mg/dL    Globulin 5.9 gm/dL    A/G Ratio 0.7 g/dL    BUN/Creatinine Ratio 13.8 7.0 - 25.0    Anion Gap 8.1 5.0 - 15.0 mmol/L    eGFR 74.5 >60.0 mL/min/1.73   Urinalysis without microscopic (no culture) - Urine, Clean Catch    Collection Time: 12/30/24  1:09 PM    Specimen: Urine, Clean Catch   Result Value Ref Range    Color, UA Yellow Yellow, Straw    Appearance, UA Clear Clear    pH, UA 7.0 5.0 - 8.0    Specific Gravity, UA 1.018 1.005 - 1.030    Glucose, UA Negative Negative    Ketones, UA Negative Negative    Bilirubin, UA Negative Negative    Blood, UA Negative Negative    Protein, UA Negative Negative    Leuk Esterase, UA Negative Negative    Nitrite, UA Negative Negative    Urobilinogen, UA 1.0 E.U./dL 0.2 - 1.0 E.U./dL   Urinalysis, Microscopic Only - Urine, Clean Catch    Collection Time: 12/30/24  1:09 PM    Specimen: Urine, Clean Catch   Result Value Ref Range    RBC, UA 0-2 None Seen, 0-2 /HPF    WBC, UA 3-5 (A) None Seen, 0-2 /HPF    Bacteria, UA 2+ (A) None Seen /HPF    Squamous Epithelial Cells, UA 7-12 (A) None Seen, 0-2 /HPF    Hyaline Casts, UA 3-6 None Seen /LPF    Methodology Manual Light Microscopy    CBC Auto Differential    Collection Time: 12/30/24  1:09 PM    Specimen: Blood   Result Value Ref Range    WBC 3.01 (L) 3.40 - 10.80 10*3/mm3    RBC 3.72 (L) 3.77 - 5.28 10*6/mm3    Hemoglobin 11.3 (L) 12.0 - 15.9 g/dL    Hematocrit 34.3 34.0 - 46.6 %    MCV 92.2 79.0 - 97.0 fL    MCH 30.4 26.6 - 33.0 pg    MCHC 32.9 31.5 - 35.7 g/dL    RDW 13.1 12.3 - 15.4 %    RDW-SD 44.2 37.0 - 54.0  fl    MPV 11.6 6.0 - 12.0 fL    Platelets 264 140 - 450 10*3/mm3    Neutrophil % 74.8 42.7 - 76.0 %    Lymphocyte % 14.3 (L) 19.6 - 45.3 %    Monocyte % 10.0 5.0 - 12.0 %    Eosinophil % 0.3 0.3 - 6.2 %    Basophil % 0.3 0.0 - 1.5 %    Immature Grans % 0.3 0.0 - 0.5 %    Neutrophils, Absolute 2.25 1.70 - 7.00 10*3/mm3    Lymphocytes, Absolute 0.43 (L) 0.70 - 3.10 10*3/mm3    Monocytes, Absolute 0.30 0.10 - 0.90 10*3/mm3    Eosinophils, Absolute 0.01 0.00 - 0.40 10*3/mm3    Basophils, Absolute 0.01 0.00 - 0.20 10*3/mm3    Immature Grans, Absolute 0.01 0.00 - 0.05 10*3/mm3    nRBC 0.0 0.0 - 0.2 /100 WBC       PE    Physical Exam  Vitals reviewed.   Constitutional:       General: She is not in acute distress.     Appearance: Normal appearance. She is well-developed. She is not ill-appearing or diaphoretic.   HENT:      Head: Normocephalic and atraumatic.      Right Ear: There is impacted cerumen.      Left Ear: There is impacted cerumen.      Mouth/Throat:     Eyes:      Extraocular Movements: Extraocular movements intact.      Conjunctiva/sclera: Conjunctivae normal.   Pulmonary:      Effort: No respiratory distress.   Musculoskeletal:         General: Normal range of motion.      Cervical back: Normal range of motion.   Neurological:      General: No focal deficit present.      Mental Status: She is alert.   Psychiatric:         Attention and Perception: She is attentive.         Mood and Affect: Mood normal.         Speech: Speech normal.         Behavior: Behavior normal. Behavior is cooperative.         Thought Content: Thought content normal.         Judgment: Judgment normal.     .  Ear Cerumen Removal    Date/Time: 1/14/2025 2:19 PM    Performed by: Mary Cassidy PA-C  Authorized by: Mary Cassidy PA-C    Anesthesia:  Local Anesthetic: none  Location details: left ear and right ear  Patient tolerance: patient tolerated the procedure well with no immediate complications  Procedure type:  irrigation   Sedation:  Patient sedated: no                 A/P    Diagnoses and all orders for this visit:    1. Aphthous ulcer of mouth (Primary)  Recommend symptomatic treatment.  Should resolve with time.    2. Clogged ear, bilateral  3. Bilateral impacted cerumen  -     Ear Cerumen Removal  Improved after cerumen removal.  Keep appointment with ENT in a few weeks.       Plan of care reviewed with patient at the conclusion of today's visit. Education was provided regarding diagnosis, management and any prescribed or recommended OTC medications.  Patient verbalizes understanding of and agreement with management plan.    Dictated Utilizing Dragon Dictation     Please note that portions of this note were completed with a voice recognition program.     Part of this note may be an electronic transcription/translation of spoken language to printed text using the Dragon Dictation System.    No follow-ups on file.     Mary Cassidy PA-C    Answers submitted by the patient for this visit:  Primary Reason for Visit (Submitted on 1/13/2025)  What is the primary reason for your visit?: Ear Pain  Ear Pain Questionnaire (Submitted on 1/13/2025)  Chief Complaint: Ear pain  Affected ear: both  Chronicity: new  Onset: in the past 7 days  Progression since onset: worsening  Frequency: daily  Fever: no fever  Pain - numeric: 5/10  drainage: Yes  headaches: Yes  hoarse voice: No  jaw pain: Yes  Treatments tried : acetaminophen, analgesic ear drops  Improvement on treatment: mild  Additional Information: Was using debrox ear drops per dr recommendation for ear wax buildup. After little over a week, left ear completely clogged. Now cannot hear, ringing in ear,left outside ear is swollen and numb. Other

## 2025-01-17 RX ORDER — DEXLANSOPRAZOLE 30 MG/1
1 CAPSULE, DELAYED RELEASE ORAL DAILY
Qty: 90 CAPSULE | Refills: 3 | Status: SHIPPED | OUTPATIENT
Start: 2025-01-17

## 2025-01-17 NOTE — TELEPHONE ENCOUNTER
Rx Refill Note  Pending Prescriptions:                       Disp   Refills    dexlansoprazole (DEXILANT) 30 MG capsule [*90 cap*0        Sig: TAKE 1 CAPSULE BY MOUTH EVERY DAY    Last office visit with prescribing clinician: 7/26/2023   Last telemedicine visit with prescribing clinician: Visit date not found   Next office visit with prescribing clinician: Visit date not found   Bárbara Bajwa MA  01/17/25, 07:10 EST

## 2025-01-20 NOTE — PROGRESS NOTES
Subjective:     Encounter Date:01/24/2025      Patient ID: Rupa Finn is a 64 y.o.  white female, retired [employer] , from [place], [place].     SELF-REFERRED  FORMER PHYSICIAN: Gena Tse MD  CURRENT PHYSICIAN: Frantz Lin MD  PREVIOUS CARDIOLOGIST: Glenna Melendez MD, [facility]  RHEUMATOLOGISTS:  Kim Douglas MD, Meghana Orta MD (), Mraco Jasso MD ()  GYNECOLOGIST: Fabio Armenta MD  NEUROLOGIST: Naga Jarquin MD .  GASTROENTEROLOGIST: Fidel Magallanes MD/ Missy Rothman PA-C.    Chief Complaint:   Chief Complaint   Patient presents with    Palpitations     Problem List:  Palpitations:  Remote cardiac stress testing, performed by [facility], Dr. Hartley; data deficit, date deficit; negative for ischemia.   Echocardiogram,  03/10/2014:  Normal, EF 60%, Dr. Hartley.  Holter monitor, 05/23/2014: Sinus katelyn to sinus rhythm with rates of 40 to 94, occasional PVCs, Dr. Hartley.   Cardiac event monitor, 11/10/2016-12/10/2016: sinus rhythm, sinus tach, sinus arrhythmia with rare PACs and PVCs.   Echocardiogram 2D Complete, February 2022: Left ventricular cavity and wall thickness normal. LVEF 55%-65%. Wall motion normal, with normal LV diastolic function. Mild AR and MR. Right ventricular systolic function normal by visual assessment, TAPSE: 1.8 cm  Echocardiogram 2/20/2023: EF 60%, mild AI, trace MR, trace TR, normal RVSP.  Normal Holter monitor February 2023  Holter monitor August 2023 rather benign, average heart rate 58 bpm, 1 episode of SVT for only 4 beats, no heart block, VT, atrial fibrillation/flutter, or pauses  Increased palpitations October 2023 with dizziness   Holter monitor October 2023 : relatively benign.  6 beats of atrial tachycardia and less than 1% PVC burden. 1 episode of ventricular tachycardia for only 3 beats. Recommendations for PRN acebutalol  Dyspnea on exertion and chest pain syndrome - possible  combined hypertensive and ischemic cardiovascular disease:  CCS class I-II chest discomfort/NYHA class II TERRAZAS, with normal EKG (July 2018).  CT cardiac calcium score 5.6 August 2018  Stress echocardiogram, 8/13/2018: RVSP 30 mmHg, mild TR, no chest pain at baseline and during exercise with baseline ECG normal, acceptable negative echocardiographic exercise stress test suggestive of low probability for significant focal obstructive coronary artery disease with preserved LV function following exercise to 122% predicted exercise capacity and 90% predicted maximum heart rate  CCS class I chest discomfort/NYHA class II dyspnea on exertion symptoms  Echocardiogram, April 2021: LVEF 0.60. Borderline pulmonary arterial hypertension. Mild aortic valve sclerosis. RVSP 36 mmHg.  Intermittent atypical nonexertional chest pain syndrome with normal electrocardiogram, September 2021.  Acceptable CT Chest without contrast, 3/3/2022: A few nonspecific sub-4 mm pulmonary nodules, likely benign were present.  Echocardiogram 2D Complete, February 2022: Left ventricular cavity and wall thickness normal. LVEF 55%-65%. Wall motion normal, with normal LV diastolic function. Mild AR and MR. Right ventricular systolic function normal by visual assessment, TAPSE: 1.8 cm  Elevated D-dimer with normal VQ scan and chest x-ray February 2023  Echocardiogram 2/20/2023: EF 60%, mild AI, trace MR, trace TR, normal RVSP.  Cardiac PET 3/13/2023: Normal myocardial perfusion study with no evidence of ischemia, rest EF 62%, rest EF 70%, no significant coronary artery calcification  [facility] ED 9/5/2023 for atypical chest pain, weakness, nausea, vomiting, felt to be GI in etiology  Normal ECG January 2025  Hypertension, intolerant to amlodipine with bradycardia, hydrochlorothiazide.   Hyperlipidemia; ASCVD 10-year risk is 4.5%, 1.9% if treated, started on Livalo but she was intolerant July 2018, has had previous intolerances to statins as well, now on Praluent.  "  Lower extremity edema.   Glaucoma.  Autoimmune diseases:  Patient reports positive genetic markers for scleroderma.   Rheumatoid arthritis, positive YONY, RF factor.  Raynaud's   Sjogren's  Hypothyroidism, treated.  Constipation.  Gestational diabetes.  GERD, with abnormal EGD suggestive of scleroderma and abnormal esophageal motility, medium size hiatal hernia, erythematous mucosa in the stomach, started on Dexilant July 2022  COVID positive, October 2020, with nausea, myalgias, sore throat, and nasal congestion with residual shortness of breath and fatigue March 2021.,  COVID-positive May 2022 with same symptoms that lasted about a week and did not require hospitalization  Pulmonary nodules on CT chest March 2022  Scleroderma  Surgical history:    Hysterectomy.  Tonsillectomy.  Cholecystectomy.  Lap for endometriosis   Esophageal motility surgery    Allergies   Allergen Reactions    Cefaclor Shortness Of Breath     hives    Naproxen Sodium Other (See Comments)     tongue swelling    Nsaids Angioedema    Alphagan [Brimonidine] Other (See Comments)     Eyes itching, red, swollen    Atenolol Other (See Comments)     fatigue    Livalo [Pitavastatin] Myalgia    Statins Myalgia    Dorzolamide Hcl Other (See Comments)     Eyes burning, discharge.    Dorzolamide Hcl-Timolol Mal Unknown - High Severity    Doxycycline Unknown - High Severity    Ibuprofen Swelling    Levothyroxine Hives    Macrolides And Ketolides Provider Review Needed    Adhesive Tape Rash     \"burns, eats through my skin\"    E-Mycin [Erythromycin] Nausea And Vomiting    Levofloxacin Unknown (See Comments)       Current Outpatient Medications   Medication Instructions    Alirocumab (PRALUENT) 150 mg, Subcutaneous, Every 14 Days    carbamide peroxide (Debrox) 6.5 % otic solution 5 drops, Both Ears, 2 Times Daily    Cholecalciferol (Vitamin D3) 50 MCG (2000 UT) tablet Daily    dexlansoprazole (DEXILANT) 30 mg, Oral, Daily    estradiol 1 MG/GM gel 1 " Application, Transdermal, Daily    famotidine (PEPCID) 40 mg, Oral, Nightly PRN    hydrALAZINE (APRESOLINE) 10 mg, Oral, As Needed    Immune Globulin, Human, (GAMMAGARD IV) 1 dose, Every 14 Days    levothyroxine (SYNTHROID, LEVOTHROID) 75 mcg, Oral, Every Other Day    mycophenolate (CELLCEPT) 500 MG tablet TAKE 1 TABLET BY MOUTH 3 TIMES A DAY AND TAKE 1 TABLET AT NIGHT    polyethylene glycol (MIRALAX) powder As Needed    Roflumilast (Zoryve) 0.3 % cream Daily    telmisartan (MICARDIS) 80 mg, Oral, Daily    timolol (TIMOPTIC) 0.5 % ophthalmic solution 1 drop, 2 Times Daily    travoprost, BAK free, (TRAVATAN) 0.004 % solution ophthalmic solution 1 drop, Every Evening    Zinc 50 mcg, Daily         HISTORY OF PRESENT ILLNESS:  The patient is here for 6-month follow-up. Her echocardiogram 2/20/2023 showed EF 60%, mild AI, trace MR, trace TR, normal RVSP. Stress test normal with no evidence of ischemia February 2023.  She had a normal Holter monitor. She then had more palpitations and had a repeat relatively benign Holter monitor August 2023.  She wore another heart monitor October 2023 which was relatively benign.  She had 6 beats of atrial tachycardia and less than 1% PVC burden. 1 episode of ventricular tachycardia for only 3 beats. Recommendations for PRN acebutalol.   She has been intolerant to atenolol, amlodipine, and hydrochlorothiazide in the past. She has as needed hydralazine to use for sustained blood pressure over 160 mmHg.  Patient called our office in October 2024 reporting occasional elevated blood pressure readings.  Patient had stopped taking acetazoloamide for glaucoma and also had an increase in CellCept dose.  Patient has scleroderma.  Patient has noticed that her blood pressures are more in the 130s lately and she has gained some weight.  When she looked into this with her CellCept she read that it could either make you gain weight or lose weight.  Patient had an episode where she had some chest pain  "at night that radiated to her back that lasted about 15 minutes.  She tries to sleep with the head of her bed elevated.  She is supposed to go to see a hematologist soon because her white and red blood cell counts were lower than they used to be.  Patient has noticed that the last time she had her infusion that her blood pressure was elevated but she thinks that part of this is because she is a hard stick and her body anticipates having to be stuck multiple times before they can get an IV on her.    ROS   All other systems reviewed and otherwise negative.    Procedures       Objective:       Vitals:    01/24/25 1113 01/24/25 1122   BP: 142/70 138/72   BP Location: Right arm Right arm   Patient Position: Sitting Standing   Cuff Size: Adult Adult   Pulse:  64   SpO2: 97% 97%   Weight: 70.6 kg (155 lb 9.6 oz)    Height: 160 cm (63\")      Body mass index is 27.56 kg/m².  Wt Readings from Last 2 Encounters:   01/24/25 70.6 kg (155 lb 9.6 oz)   01/23/25 70.4 kg (155 lb 4.8 oz)        Constitutional:       Appearance: Healthy appearance. Not in distress.   Neck:      Vascular: No JVR. JVD normal.   Pulmonary:      Effort: Pulmonary effort is normal.      Breath sounds: Normal breath sounds. No wheezing. No rhonchi. No rales.   Chest:      Chest wall: Not tender to palpatation.   Cardiovascular:      PMI at left midclavicular line. Normal rate. Regular rhythm. Normal S1. Normal S2.       Murmurs: There is a grade 1/6 systolic murmur at the LLSB.      No gallop.  No click. No rub.   Pulses:     Intact distal pulses.   Edema:     Peripheral edema absent.   Abdominal:      General: Bowel sounds are normal.      Palpations: Abdomen is soft.      Tenderness: There is no abdominal tenderness.   Musculoskeletal: Normal range of motion.         General: No tenderness. Skin:     General: Skin is warm and dry.   Neurological:      General: No focal deficit present.      Mental Status: Alert and oriented to person, place and time. "           Lab Review:   Lab Results   Component Value Date    GLUCOSE 88 12/30/2024    BUN 12 12/30/2024    CREATININE 0.87 12/30/2024    EGFRIFNONA 73 03/19/2021    BCR 13.8 12/30/2024    CO2 25.9 12/30/2024    CALCIUM 9.1 12/30/2024    PROTENTOTREF 8.9 (H) 03/15/2024    ALBUMIN 3.9 12/30/2024    LABIL2 0.7 03/15/2024    AST 27 12/30/2024    ALT 13 12/30/2024       Lab Results   Component Value Date    WBC 3.01 (L) 12/30/2024    HGB 11.3 (L) 12/30/2024    HCT 34.3 12/30/2024    MCV 92.2 12/30/2024     12/30/2024       Lab Results   Component Value Date    HGBA1C 5.40 09/06/2023       Lab Results   Component Value Date    TSH 1.720 11/18/2024       Lab Results   Component Value Date    CHOL 258 (H) 08/09/2024    CHOL 284 (H) 03/15/2024     Lab Results   Component Value Date    TRIG 46 08/09/2024    TRIG 61 03/15/2024     Lab Results   Component Value Date    HDL 81 (H) 08/09/2024    HDL 84 (H) 03/15/2024     Lab Results   Component Value Date     (H) 08/09/2024     (H) 03/15/2024             Results for orders placed during the hospital encounter of 02/20/23    Adult Transthoracic Echo Complete w/ Color, Spectral and Contrast if necessary per protocol    Interpretation Summary    Left ventricular systolic function is normal. Estimated left ventricular EF = 60%    Mild aortic insufficiency.    Trace mitral regurgitation.    Trace tricuspid regurgitation with normal RVSP.            Advance Care Planning   ACP discussion was held with the patient during this visit. Patient has an advance directive (not in EMR), copy requested.      Assessment:       Patient had episode of chest pain with radiation to her back.  Will order CTA coronaries to further assess.  Will plan on echocardiogram in 2026 in view of scleroderma.  ECG normal in office today.  Stress test negative for ischemia in 2023.       Diagnosis Plan   1. Palpitations  Stable      2. Primary hypertension  Enroll patient in hypertension care  , patient will let me know if her systolic blood pressures are consistently over 140, patient has as needed hydralazine to use if blood pressure sustained over 160      3. Mixed hyperlipidemia  Abnormal lipid panel August 2024, continue Praluent, consideration of adding Zetia if next labs still abnormal   4.  Chest pain of unknown etiology: CTA coronaries, ECG normal in office today          Plan:         Patient to continue current medications and close follow up with the above providers.  Tentative cardiology follow up in June 2025 or patient may return sooner PRN.  CTA coronaries  Echocardiogram in 2026 in view of scleroderma  Patient will monitor blood pressure and heart rate and let me know if blood pressure readings are consistently staying over 140 systolic  Will restart hypertension gera      Electronically signed by HOWARD Wilkerson, 01/24/25, 12:39 PM EST.

## 2025-01-23 ENCOUNTER — OFFICE VISIT (OUTPATIENT)
Dept: FAMILY MEDICINE CLINIC | Facility: CLINIC | Age: 65
End: 2025-01-23
Payer: COMMERCIAL

## 2025-01-23 VITALS
HEIGHT: 62 IN | DIASTOLIC BLOOD PRESSURE: 84 MMHG | BODY MASS INDEX: 28.58 KG/M2 | OXYGEN SATURATION: 99 % | HEART RATE: 68 BPM | SYSTOLIC BLOOD PRESSURE: 140 MMHG | WEIGHT: 155.3 LBS

## 2025-01-23 DIAGNOSIS — R82.90 ABNORMAL URINE ODOR: ICD-10-CM

## 2025-01-23 DIAGNOSIS — I10 PRIMARY HYPERTENSION: ICD-10-CM

## 2025-01-23 DIAGNOSIS — D64.9 NORMOCYTIC ANEMIA: ICD-10-CM

## 2025-01-23 DIAGNOSIS — D72.819 LEUKOPENIA, UNSPECIFIED TYPE: Primary | ICD-10-CM

## 2025-01-23 PROCEDURE — 99214 OFFICE O/P EST MOD 30 MIN: CPT | Performed by: STUDENT IN AN ORGANIZED HEALTH CARE EDUCATION/TRAINING PROGRAM

## 2025-01-23 NOTE — PROGRESS NOTES
Established Office Visit      Patient Name: Rupa Finn  : 1960   MRN: [id number]   Care Team: Patient Care Team:  Dinorah Oates DO as PCP - General (Internal Medicine)  Aspen Plata APRN as Nurse Practitioner (Cardiology)  Mariana Martinez APRN (Inactive) as Nurse Practitioner (Obstetrics and Gynecology)    Chief Complaint:    Chief Complaint   Patient presents with    Results     Pt. States she would like to discus her results from rheumatologist          History of Present Illness: Rupa Finn is a 64 y.o. female with hypothyroidism, HLD, HTN, post menopause on HRT, GERD, Sjogren syndrome, cutaneous lupus, scleroderma with myopathy and Raynauds  who is here today to follow up with chronic medical problems and review recent labs.    She is following with [facility] for Sjogren syndrome with myopathy and Raynauds. She is on IVIG treatments, mycophenolate.   She recently had blood work and brings this today - she has had persistently low WBC, RBC and hemoglobin.     UA with 3-5 WBC. She reports urinary urgency and some foul odor. No dysuria.     She has history of iron def and reports intolerance to several oral iron supplements.     She has noticed swollen lymph nodes in her neck coming and going over several months. Not present today.    BP slightly elevated today. She is compliant with her telmisartan. She has appointment with her cardiologist tomorrow and will plan to address this tomorrow.       Subjective      Review of Systems:   Review of Systems - See HPI    I have reviewed and the following portions of the patient's history were updated as appropriate: past family history, past medical history, past social history, past surgical history and problem list.    Medications:     Current Outpatient Medications:     Alirocumab (PRALUENT) 150 MG/ML injection pen, Inject 1 mL under the skin into the appropriate area as  directed Every 14 (Fourteen) Days., Disp: 2.24 mL, Rfl: 6    carbamide peroxide (Debrox) 6.5 % otic solution, Administer 5 drops into both ears 2 (Two) Times a Day., Disp: 22 mL, Rfl: 3    Cholecalciferol (Vitamin D3) 50 MCG (2000 UT) tablet, Take  by mouth Daily., Disp: , Rfl:     dexlansoprazole (DEXILANT) 30 MG capsule, TAKE 1 CAPSULE BY MOUTH EVERY DAY, Disp: 90 capsule, Rfl: 3    estradiol 1 MG/GM gel, Place 1 Application on the skin as directed by provider Daily., Disp: 1 g, Rfl: 11    famotidine (Pepcid) 40 MG tablet, Take 1 tablet by mouth At Night As Needed for Heartburn., Disp: 90 tablet, Rfl: 3    hydrALAZINE (APRESOLINE) 10 MG tablet, Take 1 tablet by mouth As Needed (Blood pressure sustained >160mmHg)., Disp: 30 tablet, Rfl: 0    Immune Globulin, Human, (GAMMAGARD IV), Infuse 1 dose into a venous catheter Every 14 (Fourteen) Days., Disp: , Rfl:     levothyroxine (SYNTHROID, LEVOTHROID) 75 MCG tablet, TAKE 1 TABLET BY MOUTH EVERY OTHER DAY, Disp: 45 tablet, Rfl: 0    mycophenolate (CELLCEPT) 500 MG tablet, TAKE 1 TABLET BY MOUTH 3 TIMES A DAY AND TAKE 1 TABLET AT NIGHT, Disp: , Rfl:     polyethylene glycol (MIRALAX) powder, As Needed., Disp: , Rfl:     Roflumilast (Zoryve) 0.3 % cream, Apply 1 application  topically to the appropriate area as directed Daily., Disp: , Rfl:     telmisartan (MICARDIS) 80 MG tablet, Take 1 tablet by mouth Daily., Disp: 90 tablet, Rfl: 2    timolol (TIMOPTIC) 0.5 % ophthalmic solution, Administer 1 drop to both eyes 2 (Two) Times a Day., Disp: , Rfl:     travoprost, BAK free, (TRAVATAN) 0.004 % solution ophthalmic solution, 1 drop Every Evening. in affected eye(s), Disp: , Rfl:     Zinc 50 MG tablet, Take 50 mcg by mouth Daily., Disp: , Rfl:     Allergies:   Allergies   Allergen Reactions    Cefaclor Shortness Of Breath     hives    Naproxen Sodium Other (See Comments)     tongue swelling    Nsaids Angioedema    Alphagan [Brimonidine] Other (See Comments)     Eyes itching,  "red, swollen    Atenolol Other (See Comments)     fatigue    Livalo [Pitavastatin] Myalgia    Statins Myalgia    Dorzolamide Hcl Other (See Comments)     Eyes burning, discharge.    Dorzolamide Hcl-Timolol Mal Unknown - High Severity    Doxycycline Unknown - High Severity    Ibuprofen Swelling    Levothyroxine Hives    Macrolides And Ketolides Provider Review Needed    Adhesive Tape Rash     \"burns, eats through my skin\"    E-Mycin [Erythromycin] Nausea And Vomiting    Levofloxacin Unknown (See Comments)       Objective     Physical Exam:  Vital Signs:   Vitals:    01/23/25 1114   BP: 140/84   Pulse: 68   SpO2: 99%   Weight: 70.4 kg (155 lb 4.8 oz)   Height: 157.5 cm (62.01\")   PainSc: 0-No pain     Body mass index is 28.4 kg/m².     Physical Exam  Vitals reviewed.   Constitutional:       Appearance: Normal appearance. She is not ill-appearing.   Neck:      Comments: No cervical or supraclavicular LAD  Cardiovascular:      Rate and Rhythm: Normal rate.   Pulmonary:      Effort: Pulmonary effort is normal. No respiratory distress.   Skin:     General: Skin is warm and dry.   Neurological:      Mental Status: She is alert.   Psychiatric:         Mood and Affect: Mood normal.         Behavior: Behavior normal.         Judgment: Judgment normal.         Assessment / Plan      Assessment/Plan:   Problems Addressed This Visit  Diagnoses and all orders for this visit:    1. Leukopenia, unspecified type (Primary)  -     Ambulatory Referral to Hematology  -     Peripheral Blood Smear; Future    2. Abnormal urine odor  -     Urine Culture - Urine, Urine, Clean Catch; Future    3. Normocytic anemia  -     Ambulatory Referral to Hematology  -     Peripheral Blood Smear; Future    4. Primary hypertension      Patient presents with concerns of mild although persistent leukopenia and normocytic anemia. Discussed this may be related to her underlying autoimmune disease (Sjrogren, scleroderma with myopathy, polymyositis) and/or " medication (mycophenolate, IVIG) but would like to rule out underlying blood disorder.  Will obtain peripheral smear and refer to hematology. She also expressed concerns of coming and going right sided supraclavicular LAD which is not present today. We discussed obtaining CT imaging for further evaluation but she would like to defer this and plans to discuss with hem/onc. This is reasonable since there are not enlarged lymph nodes palpable on exam today.                   Component  Ref Range & Units 3 wk ago  (12/30/24) 2 mo ago  (11/18/24) 5 mo ago  (8/9/24) 7 mo ago  (6/3/24) 7 mo ago  (6/3/24) 10 mo ago  (3/15/24) 11 mo ago  (2/21/24) 11 mo ago  (2/21/24)   WBC  3.40 - 10.80 10*3/mm3 3.01 Low  2.51 Low  3.60 3.6 Low  R  2.32 Low  3.9 R    RBC  3.77 - 5.28 10*6/mm3 3.72 Low  3.82 3.95 3.71 Low  R  4.14 4.25 R    Hemoglobin  12.0 - 15.9 g/dL 11.3 Low  11.2 Low  11.9 Low  11.5 Low  R  11.9 Low  13.0 R         HTN - uncontrolled - goal <130/80 and she plans to discuss with cardiology tomorrow. Telmisartan managed by her cardiologist.     Plan of care reviewed with patient at the conclusion of today's visit. Education was provided regarding diagnosis and management.  Patient verbalizes understanding of and agreement with management plan.    Follow Up:   No follow-ups on file.        DO ILIANA Garza RD  [facility]  [address]  [facility] [phone]  Fax [phone]  Phone [phone]

## 2025-01-24 ENCOUNTER — OFFICE VISIT (OUTPATIENT)
Dept: CARDIOLOGY | Facility: CLINIC | Age: 65
End: 2025-01-24
Payer: COMMERCIAL

## 2025-01-24 VITALS
HEIGHT: 63 IN | SYSTOLIC BLOOD PRESSURE: 138 MMHG | OXYGEN SATURATION: 97 % | WEIGHT: 155.6 LBS | HEART RATE: 64 BPM | BODY MASS INDEX: 27.57 KG/M2 | DIASTOLIC BLOOD PRESSURE: 72 MMHG

## 2025-01-24 DIAGNOSIS — I10 PRIMARY HYPERTENSION: ICD-10-CM

## 2025-01-24 DIAGNOSIS — R07.9 CHEST PAIN OF UNCERTAIN ETIOLOGY: ICD-10-CM

## 2025-01-24 DIAGNOSIS — R00.2 PALPITATIONS: Primary | ICD-10-CM

## 2025-01-24 DIAGNOSIS — E78.2 MIXED HYPERLIPIDEMIA: ICD-10-CM

## 2025-01-24 PROCEDURE — 99214 OFFICE O/P EST MOD 30 MIN: CPT | Performed by: NURSE PRACTITIONER

## 2025-01-24 RX ORDER — SODIUM CHLORIDE 0.9 % (FLUSH) 0.9 %
10 SYRINGE (ML) INJECTION AS NEEDED
OUTPATIENT
Start: 2025-01-24

## 2025-01-24 RX ORDER — SODIUM CHLORIDE 9 MG/ML
40 INJECTION, SOLUTION INTRAVENOUS AS NEEDED
OUTPATIENT
Start: 2025-01-24

## 2025-01-24 RX ORDER — NITROGLYCERIN 0.4 MG/1
0.4 TABLET SUBLINGUAL
OUTPATIENT
Start: 2025-01-24 | End: 2025-01-24

## 2025-01-24 RX ORDER — IVABRADINE 5 MG/1
15 TABLET, FILM COATED ORAL ONCE
OUTPATIENT
Start: 2025-01-24 | End: 2025-01-24

## 2025-01-24 RX ORDER — SODIUM CHLORIDE 0.9 % (FLUSH) 0.9 %
10 SYRINGE (ML) INJECTION EVERY 12 HOURS SCHEDULED
OUTPATIENT
Start: 2025-01-24

## 2025-01-24 RX ORDER — NITROGLYCERIN 0.4 MG/1
0.8 TABLET SUBLINGUAL
OUTPATIENT
Start: 2025-01-24

## 2025-01-27 RX ORDER — IVABRADINE 7.5 MG/1
7.5 TABLET, FILM COATED ORAL ONCE
Qty: 1 TABLET | Refills: 0 | Status: SHIPPED | OUTPATIENT
Start: 2025-01-27 | End: 2025-01-27

## 2025-02-10 ENCOUNTER — LAB (OUTPATIENT)
Dept: LAB | Facility: HOSPITAL | Age: 65
End: 2025-02-10
Payer: COMMERCIAL

## 2025-02-10 ENCOUNTER — TRANSCRIBE ORDERS (OUTPATIENT)
Dept: LAB | Facility: HOSPITAL | Age: 65
End: 2025-02-10
Payer: COMMERCIAL

## 2025-02-10 DIAGNOSIS — I73.00 RAYNAUD'S DISEASE WITHOUT GANGRENE: ICD-10-CM

## 2025-02-10 DIAGNOSIS — D64.9 NORMOCYTIC ANEMIA: ICD-10-CM

## 2025-02-10 DIAGNOSIS — G72.9 MYOPATHY, UNSPECIFIED: ICD-10-CM

## 2025-02-10 DIAGNOSIS — G62.9 PERIPHERAL NERVE DISORDER: ICD-10-CM

## 2025-02-10 DIAGNOSIS — D72.819 LEUKOPENIA, UNSPECIFIED TYPE: ICD-10-CM

## 2025-02-10 DIAGNOSIS — L93.2 CUTANEOUS LUPUS ERYTHEMATOSUS: ICD-10-CM

## 2025-02-10 DIAGNOSIS — R82.90 ABNORMAL URINE ODOR: ICD-10-CM

## 2025-02-10 DIAGNOSIS — G25.81 RESTLESS LEGS: ICD-10-CM

## 2025-02-10 DIAGNOSIS — G25.81 RESTLESS LEGS: Primary | ICD-10-CM

## 2025-02-10 LAB
ALBUMIN SERPL-MCNC: 3.9 G/DL (ref 3.5–5.2)
ALBUMIN/GLOB SERPL: 0.8 G/DL
ALP SERPL-CCNC: 79 U/L (ref 39–117)
ALT SERPL W P-5'-P-CCNC: 6 U/L (ref 1–33)
ANION GAP SERPL CALCULATED.3IONS-SCNC: 9.5 MMOL/L (ref 5–15)
AST SERPL-CCNC: 24 U/L (ref 1–32)
BASOPHILS # BLD AUTO: 0.03 10*3/MM3 (ref 0–0.2)
BASOPHILS NFR BLD AUTO: 0.9 % (ref 0–1.5)
BILIRUB SERPL-MCNC: 0.3 MG/DL (ref 0–1.2)
BUN SERPL-MCNC: 8 MG/DL (ref 8–23)
BUN/CREAT SERPL: 9.6 (ref 7–25)
CALCIUM SPEC-SCNC: 8.8 MG/DL (ref 8.6–10.5)
CHLORIDE SERPL-SCNC: 95 MMOL/L (ref 98–107)
CHROMATIN AB SERPL-ACNC: 19.3 IU/ML (ref 0–14)
CK SERPL-CCNC: 121 U/L (ref 20–180)
CO2 SERPL-SCNC: 25.5 MMOL/L (ref 22–29)
CREAT SERPL-MCNC: 0.83 MG/DL (ref 0.57–1)
CRP SERPL-MCNC: <0.3 MG/DL (ref 0–0.5)
DEPRECATED RDW RBC AUTO: 45.4 FL (ref 37–54)
EGFRCR SERPLBLD CKD-EPI 2021: 78.8 ML/MIN/1.73
EOSINOPHIL # BLD AUTO: 0.02 10*3/MM3 (ref 0–0.4)
EOSINOPHIL NFR BLD AUTO: 0.6 % (ref 0.3–6.2)
ERYTHROCYTE [DISTWIDTH] IN BLOOD BY AUTOMATED COUNT: 13.7 % (ref 12.3–15.4)
ERYTHROCYTE [SEDIMENTATION RATE] IN BLOOD: 65 MM/HR (ref 0–30)
FOLATE SERPL-MCNC: 11.4 NG/ML (ref 4.78–24.2)
GLOBULIN UR ELPH-MCNC: 4.9 GM/DL
GLUCOSE SERPL-MCNC: 78 MG/DL (ref 65–99)
HCT VFR BLD AUTO: 32.1 % (ref 34–46.6)
HGB BLD-MCNC: 10.6 G/DL (ref 12–15.9)
IMM GRANULOCYTES # BLD AUTO: 0 10*3/MM3 (ref 0–0.05)
IMM GRANULOCYTES NFR BLD AUTO: 0 % (ref 0–0.5)
LYMPHOCYTES # BLD AUTO: 0.64 10*3/MM3 (ref 0.7–3.1)
LYMPHOCYTES NFR BLD AUTO: 19.8 % (ref 19.6–45.3)
MCH RBC QN AUTO: 29.8 PG (ref 26.6–33)
MCHC RBC AUTO-ENTMCNC: 33 G/DL (ref 31.5–35.7)
MCV RBC AUTO: 90.2 FL (ref 79–97)
MONOCYTES # BLD AUTO: 0.47 10*3/MM3 (ref 0.1–0.9)
MONOCYTES NFR BLD AUTO: 14.5 % (ref 5–12)
NEUTROPHILS NFR BLD AUTO: 2.08 10*3/MM3 (ref 1.7–7)
NEUTROPHILS NFR BLD AUTO: 64.2 % (ref 42.7–76)
NRBC BLD AUTO-RTO: 0 /100 WBC (ref 0–0.2)
PATHOLOGY REVIEW: YES
PLATELET # BLD AUTO: 254 10*3/MM3 (ref 140–450)
PMV BLD AUTO: 10.6 FL (ref 6–12)
POTASSIUM SERPL-SCNC: 4.3 MMOL/L (ref 3.5–5.2)
PROT SERPL-MCNC: 8.8 G/DL (ref 6–8.5)
RBC # BLD AUTO: 3.56 10*6/MM3 (ref 3.77–5.28)
SODIUM SERPL-SCNC: 130 MMOL/L (ref 136–145)
VIT B12 BLD-MCNC: 263 PG/ML (ref 211–946)
WBC NRBC COR # BLD AUTO: 3.24 10*3/MM3 (ref 3.4–10.8)

## 2025-02-10 PROCEDURE — 85652 RBC SED RATE AUTOMATED: CPT

## 2025-02-10 PROCEDURE — 86431 RHEUMATOID FACTOR QUANT: CPT

## 2025-02-10 PROCEDURE — 85025 COMPLETE CBC W/AUTO DIFF WBC: CPT

## 2025-02-10 PROCEDURE — 82746 ASSAY OF FOLIC ACID SERUM: CPT

## 2025-02-10 PROCEDURE — 84207 ASSAY OF VITAMIN B-6: CPT

## 2025-02-10 PROCEDURE — 36415 COLL VENOUS BLD VENIPUNCTURE: CPT

## 2025-02-10 PROCEDURE — 82607 VITAMIN B-12: CPT

## 2025-02-10 PROCEDURE — 82550 ASSAY OF CK (CPK): CPT

## 2025-02-10 PROCEDURE — 80053 COMPREHEN METABOLIC PANEL: CPT

## 2025-02-10 PROCEDURE — 84165 PROTEIN E-PHORESIS SERUM: CPT

## 2025-02-10 PROCEDURE — 86140 C-REACTIVE PROTEIN: CPT

## 2025-02-10 PROCEDURE — 87086 URINE CULTURE/COLONY COUNT: CPT

## 2025-02-11 LAB
ALBUMIN SERPL ELPH-MCNC: 3.4 G/DL (ref 2.9–4.4)
ALBUMIN/GLOB SERPL: 0.7 {RATIO} (ref 0.7–1.7)
ALPHA1 GLOB SERPL ELPH-MCNC: 0.3 G/DL (ref 0–0.4)
ALPHA2 GLOB SERPL ELPH-MCNC: 0.7 G/DL (ref 0.4–1)
B-GLOBULIN SERPL ELPH-MCNC: 1 G/DL (ref 0.7–1.3)
GAMMA GLOB SERPL ELPH-MCNC: 2.9 G/DL (ref 0.4–1.8)
GLOBULIN SER CALC-MCNC: 4.9 G/DL (ref 2.2–3.9)
LAB AP CASE REPORT: NORMAL
LABORATORY COMMENT REPORT: ABNORMAL
M PROTEIN SERPL ELPH-MCNC: ABNORMAL G/DL
PATH REPORT.FINAL DX SPEC: NORMAL
PROT PATTERN SERPL ELPH-IMP: ABNORMAL
PROT SERPL-MCNC: 8.3 G/DL (ref 6–8.5)

## 2025-02-12 LAB — BACTERIA SPEC AEROBE CULT: NO GROWTH

## 2025-02-13 LAB — PYRIDOXAL PHOS SERPL-MCNC: 8.7 UG/L (ref 3.4–65.2)

## 2025-02-20 DIAGNOSIS — E03.9 ACQUIRED HYPOTHYROIDISM: ICD-10-CM

## 2025-02-20 RX ORDER — LEVOTHYROXINE SODIUM 88 UG/1
88 TABLET ORAL EVERY OTHER DAY
Qty: 45 TABLET | Refills: 0 | OUTPATIENT
Start: 2025-02-20

## 2025-02-21 RX ORDER — TELMISARTAN 80 MG/1
80 TABLET ORAL DAILY
Qty: 90 TABLET | Refills: 0 | Status: SHIPPED | OUTPATIENT
Start: 2025-02-21

## 2025-02-22 DIAGNOSIS — E03.9 ACQUIRED HYPOTHYROIDISM: ICD-10-CM

## 2025-02-24 RX ORDER — LEVOTHYROXINE SODIUM 75 UG/1
75 TABLET ORAL EVERY OTHER DAY
Qty: 45 TABLET | Refills: 0 | OUTPATIENT
Start: 2025-02-24

## 2025-02-24 RX ORDER — LEVOTHYROXINE SODIUM 88 UG/1
88 TABLET ORAL EVERY OTHER DAY
Qty: 45 TABLET | Refills: 0 | Status: SHIPPED | OUTPATIENT
Start: 2025-02-24

## 2025-02-28 ENCOUNTER — CONSULT (OUTPATIENT)
Dept: ONCOLOGY | Facility: CLINIC | Age: 65
End: 2025-02-28
Payer: COMMERCIAL

## 2025-02-28 ENCOUNTER — LAB (OUTPATIENT)
Dept: LAB | Facility: HOSPITAL | Age: 65
End: 2025-02-28
Payer: COMMERCIAL

## 2025-02-28 VITALS
OXYGEN SATURATION: 97 % | HEIGHT: 62 IN | BODY MASS INDEX: 28.76 KG/M2 | RESPIRATION RATE: 16 BRPM | WEIGHT: 156.3 LBS | TEMPERATURE: 97.3 F | SYSTOLIC BLOOD PRESSURE: 176 MMHG | DIASTOLIC BLOOD PRESSURE: 102 MMHG

## 2025-02-28 DIAGNOSIS — D72.818 OTHER DECREASED WHITE BLOOD CELL (WBC) COUNT: Primary | ICD-10-CM

## 2025-02-28 DIAGNOSIS — D72.818 OTHER DECREASED WHITE BLOOD CELL (WBC) COUNT: ICD-10-CM

## 2025-02-28 PROBLEM — D72.819 LEUKOPENIA: Status: ACTIVE | Noted: 2025-02-28

## 2025-02-28 LAB
ALBUMIN SERPL-MCNC: 4.1 G/DL (ref 3.5–5.2)
ALBUMIN/GLOB SERPL: 0.9 G/DL
ALP SERPL-CCNC: 80 U/L (ref 39–117)
ALT SERPL W P-5'-P-CCNC: 9 U/L (ref 1–33)
ANION GAP SERPL CALCULATED.3IONS-SCNC: 11 MMOL/L (ref 5–15)
AST SERPL-CCNC: 22 U/L (ref 1–32)
BASOPHILS # BLD AUTO: 0.02 10*3/MM3 (ref 0–0.2)
BASOPHILS NFR BLD AUTO: 0.7 % (ref 0–1.5)
BILIRUB SERPL-MCNC: 0.2 MG/DL (ref 0–1.2)
BUN SERPL-MCNC: 10 MG/DL (ref 8–23)
BUN/CREAT SERPL: 13.9 (ref 7–25)
CALCIUM SPEC-SCNC: 8.7 MG/DL (ref 8.6–10.5)
CHLORIDE SERPL-SCNC: 98 MMOL/L (ref 98–107)
CO2 SERPL-SCNC: 24 MMOL/L (ref 22–29)
CREAT SERPL-MCNC: 0.72 MG/DL (ref 0.57–1)
DEPRECATED RDW RBC AUTO: 46.9 FL (ref 37–54)
EGFRCR SERPLBLD CKD-EPI 2021: 93.5 ML/MIN/1.73
EOSINOPHIL # BLD AUTO: 0.02 10*3/MM3 (ref 0–0.4)
EOSINOPHIL NFR BLD AUTO: 0.7 % (ref 0.3–6.2)
ERYTHROCYTE [DISTWIDTH] IN BLOOD BY AUTOMATED COUNT: 13.9 % (ref 12.3–15.4)
FERRITIN SERPL-MCNC: 58.14 NG/ML (ref 13–150)
GLOBULIN UR ELPH-MCNC: 4.4 GM/DL
GLUCOSE SERPL-MCNC: 94 MG/DL (ref 65–99)
HAPTOGLOB SERPL-MCNC: 153 MG/DL (ref 30–200)
HCT VFR BLD AUTO: 31.8 % (ref 34–46.6)
HGB BLD-MCNC: 10.7 G/DL (ref 12–15.9)
IMM GRANULOCYTES # BLD AUTO: 0 10*3/MM3 (ref 0–0.05)
IMM GRANULOCYTES NFR BLD AUTO: 0 % (ref 0–0.5)
IRON 24H UR-MRATE: 65 MCG/DL (ref 37–145)
IRON SATN MFR SERPL: 16 % (ref 20–50)
LDH SERPL-CCNC: 162 U/L (ref 135–214)
LYMPHOCYTES # BLD AUTO: 0.51 10*3/MM3 (ref 0.7–3.1)
LYMPHOCYTES NFR BLD AUTO: 16.8 % (ref 19.6–45.3)
MCH RBC QN AUTO: 30.6 PG (ref 26.6–33)
MCHC RBC AUTO-ENTMCNC: 33.6 G/DL (ref 31.5–35.7)
MCV RBC AUTO: 90.9 FL (ref 79–97)
MONOCYTES # BLD AUTO: 0.26 10*3/MM3 (ref 0.1–0.9)
MONOCYTES NFR BLD AUTO: 8.6 % (ref 5–12)
NEUTROPHILS NFR BLD AUTO: 2.22 10*3/MM3 (ref 1.7–7)
NEUTROPHILS NFR BLD AUTO: 73.2 % (ref 42.7–76)
PLATELET # BLD AUTO: 251 10*3/MM3 (ref 140–450)
PMV BLD AUTO: 10.4 FL (ref 6–12)
POTASSIUM SERPL-SCNC: 4.2 MMOL/L (ref 3.5–5.2)
PROT SERPL-MCNC: 8.5 G/DL (ref 6–8.5)
RBC # BLD AUTO: 3.5 10*6/MM3 (ref 3.77–5.28)
RETICS # AUTO: 0.04 10*6/MM3 (ref 0.02–0.13)
RETICS/RBC NFR AUTO: 1.1 % (ref 0.7–1.9)
SODIUM SERPL-SCNC: 133 MMOL/L (ref 136–145)
TIBC SERPL-MCNC: 405 MCG/DL (ref 298–536)
TRANSFERRIN SERPL-MCNC: 272 MG/DL (ref 200–360)
WBC NRBC COR # BLD AUTO: 3.03 10*3/MM3 (ref 3.4–10.8)

## 2025-02-28 PROCEDURE — 83615 LACTATE (LD) (LDH) ENZYME: CPT

## 2025-02-28 PROCEDURE — 84630 ASSAY OF ZINC: CPT

## 2025-02-28 PROCEDURE — 80053 COMPREHEN METABOLIC PANEL: CPT

## 2025-02-28 PROCEDURE — 85045 AUTOMATED RETICULOCYTE COUNT: CPT

## 2025-02-28 PROCEDURE — 81342 TRG GENE REARRANGEMENT ANAL: CPT

## 2025-02-28 PROCEDURE — 84466 ASSAY OF TRANSFERRIN: CPT

## 2025-02-28 PROCEDURE — 82525 ASSAY OF COPPER: CPT

## 2025-02-28 PROCEDURE — 82728 ASSAY OF FERRITIN: CPT

## 2025-02-28 PROCEDURE — 85025 COMPLETE CBC W/AUTO DIFF WBC: CPT

## 2025-02-28 PROCEDURE — 99204 OFFICE O/P NEW MOD 45 MIN: CPT | Performed by: INTERNAL MEDICINE

## 2025-02-28 PROCEDURE — 83540 ASSAY OF IRON: CPT

## 2025-02-28 PROCEDURE — 36415 COLL VENOUS BLD VENIPUNCTURE: CPT

## 2025-02-28 PROCEDURE — 83010 ASSAY OF HAPTOGLOBIN QUANT: CPT

## 2025-02-28 RX ORDER — MUPIROCIN 20 MG/G
OINTMENT TOPICAL 2 TIMES DAILY
COMMUNITY
Start: 2025-01-29

## 2025-02-28 RX ORDER — AMLODIPINE BESYLATE 2.5 MG/1
1 TABLET ORAL DAILY
COMMUNITY
Start: 2025-02-10

## 2025-02-28 RX ORDER — FLUOROURACIL 50 MG/ML
SOLUTION TOPICAL 2 TIMES DAILY
COMMUNITY
Start: 2025-01-24

## 2025-02-28 NOTE — PROGRESS NOTES
New Patient Office Visit      Date: 2025     Patient Name: Rupa Finn  MRN: [id number]  : 1960  Referring Physician: Dinorah Oates    Chief Complaint: Establish care for leukopenia and anemia    History of Present Illness: Rupa Finn is a pleasant 64 y.o. female with a past medical history of chronic fatigue syndrome, Sjogren syndrome, Raynaud's syndrome, polymyositis, rheumatoid arthritis, glaucoma, hyperlipidemia, hypertension who presents today for evaluation of leukopenia and anemia. The patient is accompanied by their  who contributes to the history of their care.  Patient has had multiple labs checked by her rheumatologist and PCP over the past several years.  Most recently over the past several months she has had slight worsening leukopenia and anemia.  Iron studies in 2024 consistent with a mild iron deficiency anemia.  She has been on multiple formulations of oral iron with no significant improvement of her symptoms and most causing significant GI upset.  In regards to her leukopenia.  This is primarily due to it deficiency and her lymphocytes.  She is set up for a long history of multiple autoimmune conditions for which she has been on multiple biologic therapies.  Recently started Gammagard at the [facility] she denies any explained fevers, chills, night sweats, weight loss [place].    Oncology History:    Oncology/Hematology History    No history exists.       Subjective      Review of Systems:     Constitutional: Negative for fevers, chills, or weight loss  Eyes: Negative for blurred vision or discharge         Ear/Nose/Throat: Negative for difficulty swallowing, sore throat, LAD                                                       Respiratory: Negative for cough, SOA, wheezing                                                                                        Cardiovascular: Negative for chest pain or palpitations                                                                   Gastrointestinal: Negative for nausea, vomiting or diarrhea                                                                     Genitourinary: Negative for dysuria or hematuria                                                                                           Musculoskeletal: Negative for any joint pains or muscle aches                                                                        Neurologic: Negative for any weakness, headaches, dizziness                                                                         Hematologic: Negative for any easy bleeding or bruising                                                                                   Psychiatric: Negative for anxiety or depression                             Past Medical History:   Past Medical History:   Diagnosis Date    Anemia 2021    Autoimmune disease     Cholelithiasis Removed 2008    Colon polyp 2022    COVID-19     Cutaneous lupus erythematosus     Diverticulosis 2010?    Fibrocystic breast changes, bilateral     GERD (gastroesophageal reflux disease)     not currently taking medication    Gestational diabetes     Glaucoma     Hyperlipidemia     Hypertension     Hypothyroidism     Lower extremity edema     Neuromuscular disorder 2020    OAB (overactive bladder)     Osteoarthritis     Palpitations     Post-menopause on HRT (hormone replacement therapy)     Raynaud's disease     Scleroderma     SINE scleroderma    Sjogren's disease     Urinary tract infection        Past Surgical History:   Past Surgical History:   Procedure Laterality Date    CHOLECYSTECTOMY      COLONOSCOPY  2019 and 2022    ESOPHAGOSCOPY / EGD      esophageal stretching/dilation and gastric polypectomy, esophageal biopsies    EYE SURGERY  2012    Cataracts removed - lens replacement    LAPAROSCOPIC ASSISTED VAGINAL HYSTERECTOMY SALPINGO OOPHORECTOMY Bilateral     LAPAROSCOPIC DIAZ PROCEDURE      OOPHORECTOMY      OTHER  SURGICAL HISTORY      Lap for endometriosis    TONSILLECTOMY      UPPER GASTROINTESTINAL ENDOSCOPY  ?       Family History:   Family History   Problem Relation Age of Onset    Other Mother         has a pacemaker and is followed by Dr. Brooks    Heart disease Mother     Heart failure Mother     Hypertension Mother     Diabetes Mother     Vision loss Mother         Glaucoma/macular degeneration    Arthritis Mother     Glaucoma Father     Other Father          likely a cancer-related death;    Heart disease Father     Hypertension Father     Cancer Father         Prostate    Vision loss Father         Glaucoma/macular degeneration    Asthma Brother     Hypertension Brother     Heart attack Maternal Grandfather     Heart disease Maternal Grandfather     Heart failure Maternal Grandfather     Heart disease Maternal Grandmother     Hypertension Maternal Grandmother     Hypertension Paternal Grandmother     Colon cancer Paternal Grandfather     Cancer Paternal Grandfather         Prostate    Breast cancer Neg Hx     Ovarian cancer Neg Hx        Social History:   Social History     Socioeconomic History    Marital status:    Tobacco Use    Smoking status: Never     Passive exposure: Never    Smokeless tobacco: Never   Vaping Use    Vaping status: Never Used   Substance and Sexual Activity    Alcohol use: No    Drug use: No    Sexual activity: Yes     Partners: Male     Birth control/protection: Post-menopausal, Hysterectomy       Medications:     Current Outpatient Medications:     Alirocumab (PRALUENT) 150 MG/ML injection pen, Inject 1 mL under the skin into the appropriate area as directed Every 14 (Fourteen) Days., Disp: 2.24 mL, Rfl: 6    amLODIPine (NORVASC) 2.5 MG tablet, Take 1 tablet by mouth Daily., Disp: , Rfl:     Cholecalciferol (Vitamin D3) 50 MCG (2000 UT) tablet, Take  by mouth Daily., Disp: , Rfl:     dexlansoprazole (DEXILANT) 30 MG capsule, TAKE 1 CAPSULE BY MOUTH EVERY DAY, Disp: 90 capsule,  Rfl: 3    estradiol 1 MG/GM gel, Place 1 Application on the skin as directed by provider Daily., Disp: 1 g, Rfl: 11    famotidine (Pepcid) 40 MG tablet, Take 1 tablet by mouth At Night As Needed for Heartburn., Disp: 90 tablet, Rfl: 3    Fluorouracil 5 % solution, 2 (Two) Times a Day. Twice daily, 2 days in a row, once a month, Disp: , Rfl:     hydrALAZINE (APRESOLINE) 10 MG tablet, Take 1 tablet by mouth As Needed (Blood pressure sustained >160mmHg)., Disp: 30 tablet, Rfl: 0    Immune Globulin, Human, (GAMMAGARD IV), Infuse 1 dose into a venous catheter Every 14 (Fourteen) Days., Disp: , Rfl:     levothyroxine (SYNTHROID, LEVOTHROID) 75 MCG tablet, TAKE 1 TABLET BY MOUTH EVERY OTHER DAY (Patient taking differently: Take 1 tablet by mouth Every Other Day. 88 MCG EVERY OTHER DAY), Disp: 45 tablet, Rfl: 0    levothyroxine (SYNTHROID, LEVOTHROID) 88 MCG tablet, TAKE 1 TABLET BY MOUTH EVERY OTHER DAY, Disp: 45 tablet, Rfl: 0    mupirocin (BACTROBAN) 2 % ointment, Apply  topically to the appropriate area as directed 2 (Two) Times a Day., Disp: , Rfl:     mycophenolate (CELLCEPT) 500 MG tablet, TAKE 1 TABLET BY MOUTH 3 TIMES A DAY AND TAKE 1 TABLET AT NIGHT, Disp: , Rfl:     polyethylene glycol (MIRALAX) powder, As Needed., Disp: , Rfl:     Roflumilast (Zoryve) 0.3 % cream, Apply 1 application  topically to the appropriate area as directed Daily., Disp: , Rfl:     telmisartan (MICARDIS) 80 MG tablet, TAKE 1 TABLET BY MOUTH EVERY DAY, Disp: 90 tablet, Rfl: 0    timolol (TIMOPTIC) 0.5 % ophthalmic solution, Administer 1 drop to both eyes 2 (Two) Times a Day., Disp: , Rfl:     travoprost, BAK free, (TRAVATAN) 0.004 % solution ophthalmic solution, 1 drop Every Evening. in affected eye(s), Disp: , Rfl:     Zinc 50 MG tablet, Take 50 mcg by mouth Daily., Disp: , Rfl:     carbamide peroxide (Debrox) 6.5 % otic solution, Administer 5 drops into both ears 2 (Two) Times a Day. (Patient not taking: Reported on 2/28/2025), Disp: 22  "mL, Rfl: 3    Allergies:   Allergies   Allergen Reactions    Cefaclor Shortness Of Breath     hives    Naproxen Sodium Other (See Comments)     tongue swelling    Nsaids Angioedema    Prednisone Anaphylaxis and Other (See Comments)     Allergic to all steroids!  Eye pressure shoots up to dangerous levels.    Alphagan [Brimonidine] Other (See Comments)     Eyes itching, red, swollen    Atenolol Other (See Comments)     fatigue    Livalo [Pitavastatin] Myalgia    Statins Myalgia    Dorzolamide Hcl Other (See Comments)     Eyes burning, discharge.    Dorzolamide Hcl-Timolol Mal Unknown - High Severity    Doxycycline Unknown - High Severity    Ibuprofen Swelling    Levothyroxine Hives    Macrolides And Ketolides Provider Review Needed    Adhesive Tape Rash     \"burns, eats through my skin\"    E-Mycin [Erythromycin] Nausea And Vomiting    Levofloxacin Unknown (See Comments)       Objective     Physical Exam:  Vital Signs:   Vitals:    02/28/25 1459   BP: (!) 176/102   Resp: 16   Temp: 97.3 °F (36.3 °C)   TempSrc: Temporal   SpO2: 97%   Weight: 70.9 kg (156 lb 4.8 oz)   Height: 157.5 cm (62.01\")   PainSc: 0-No pain     Pain Score    02/28/25 1459   PainSc: 0-No pain     ECOG Performance Status: 1 - Symptomatic but completely ambulatory    Constitutional: NAD, ECOG 1  Eyes: PERRLA, scleral anicteric  ENT: No LAD, no thyromegaly  Respiratory: CTAB, no wheezing, rales, rhonchi  Cardiovascular: RRR, no murmurs, pulses 2+ bilaterally  Abdomen: soft, NT/ND, no HSM  Musculoskeletal: strength 5/5 bilaterally, no c/c/e  Neurologic: A&O x 3, CN II-XII intact grossly  Psych: mood and affect congruent, no SI or HI    Results Review:   No visits with results within 2 Week(s) from this visit.   Latest known visit with results is:   Lab on 02/10/2025   Component Date Value Ref Range Status    Urine Culture 02/10/2025 No growth   Final    Pathology Review 02/10/2025 Yes   Final    Vitamin B-12 02/10/2025 263  211 - 946 pg/mL Final    " Folate 02/10/2025 11.40  4.78 - 24.20 ng/mL Final    Creatine Kinase 02/10/2025 121  20 - 180 U/L Final    Vitamin B6 02/10/2025 8.7  3.4 - 65.2 ug/L Final                                 Deficiency:         <3.4                               Marginal:      3.4 - 5.1                               Adequate:           >5.1    Sed Rate 02/10/2025 65 (H)  0 - 30 mm/hr Final    Rheumatoid Factor Quantitative 02/10/2025 19.3 (H)  0.0 - 14.0 IU/mL Final    C-Reactive Protein 02/10/2025 <0.30  0.00 - 0.50 mg/dL Final    Total Protein 02/10/2025 8.3  6.0 - 8.5 g/dL Final    Albumin 02/10/2025 3.4  2.9 - 4.4 g/dL Final    Alpha-1-Globulin 02/10/2025 0.3  0.0 - 0.4 g/dL Final    Alpha-2-Globulin 02/10/2025 0.7  0.4 - 1.0 g/dL Final    Beta Globulin 02/10/2025 1.0  0.7 - 1.3 g/dL Final    Gamma Globulin 02/10/2025 2.9 (H)  0.4 - 1.8 g/dL Final    M-Pasquale 02/10/2025 Not Observed  Not Observed g/dL Final    Globulin 02/10/2025 4.9 (H)  2.2 - 3.9 g/dL Final    A/G Ratio 02/10/2025 0.7  0.7 - 1.7 Final    Please note 02/10/2025 Comment   Final    Protein electrophoresis scan will follow via computer, mail, or   delivery.    SPE Interpretation 02/10/2025 Comment   Final    The SPE pattern reflects a polyclonal increase in gamma globulin.  Hypergammaglobulinemia is found in a wide variety of infectious,  non-infectious, and autoimmune disease states. Evidence of  monoclonal protein is not apparent.    Glucose 02/10/2025 78  65 - 99 mg/dL Final    BUN 02/10/2025 8  8 - 23 mg/dL Final    Creatinine 02/10/2025 0.83  0.57 - 1.00 mg/dL Final    Sodium 02/10/2025 130 (L)  136 - 145 mmol/L Final    Potassium 02/10/2025 4.3  3.5 - 5.2 mmol/L Final    Chloride 02/10/2025 95 (L)  98 - 107 mmol/L Final    CO2 02/10/2025 25.5  22.0 - 29.0 mmol/L Final    Calcium 02/10/2025 8.8  8.6 - 10.5 mg/dL Final    Total Protein 02/10/2025 8.8 (H)  6.0 - 8.5 g/dL Final    Albumin 02/10/2025 3.9  3.5 - 5.2 g/dL Final    ALT (SGPT) 02/10/2025 6  1 - 33  U/L Final    AST (SGOT) 02/10/2025 24  1 - 32 U/L Final    Alkaline Phosphatase 02/10/2025 79  39 - 117 U/L Final    Total Bilirubin 02/10/2025 0.3  0.0 - 1.2 mg/dL Final    Globulin 02/10/2025 4.9  gm/dL Final    A/G Ratio 02/10/2025 0.8  g/dL Final    BUN/Creatinine Ratio 02/10/2025 9.6  7.0 - 25.0 Final    Anion Gap 02/10/2025 9.5  5.0 - 15.0 mmol/L Final    eGFR 02/10/2025 78.8  >60.0 mL/min/1.73 Final    WBC 02/10/2025 3.24 (L)  3.40 - 10.80 10*3/mm3 Final    RBC 02/10/2025 3.56 (L)  3.77 - 5.28 10*6/mm3 Final    Hemoglobin 02/10/2025 10.6 (L)  12.0 - 15.9 g/dL Final    Hematocrit 02/10/2025 32.1 (L)  34.0 - 46.6 % Final    MCV 02/10/2025 90.2  79.0 - 97.0 fL Final    MCH 02/10/2025 29.8  26.6 - 33.0 pg Final    MCHC 02/10/2025 33.0  31.5 - 35.7 g/dL Final    RDW 02/10/2025 13.7  12.3 - 15.4 % Final    RDW-SD 02/10/2025 45.4  37.0 - 54.0 fl Final    MPV 02/10/2025 10.6  6.0 - 12.0 fL Final    Platelets 02/10/2025 254  140 - 450 10*3/mm3 Final    Neutrophil % 02/10/2025 64.2  42.7 - 76.0 % Final    Lymphocyte % 02/10/2025 19.8  19.6 - 45.3 % Final    Monocyte % 02/10/2025 14.5 (H)  5.0 - 12.0 % Final    Eosinophil % 02/10/2025 0.6  0.3 - 6.2 % Final    Basophil % 02/10/2025 0.9  0.0 - 1.5 % Final    Immature Grans % 02/10/2025 0.0  0.0 - 0.5 % Final    Neutrophils, Absolute 02/10/2025 2.08  1.70 - 7.00 10*3/mm3 Final    Lymphocytes, Absolute 02/10/2025 0.64 (L)  0.70 - 3.10 10*3/mm3 Final    Monocytes, Absolute 02/10/2025 0.47  0.10 - 0.90 10*3/mm3 Final    Eosinophils, Absolute 02/10/2025 0.02  0.00 - 0.40 10*3/mm3 Final    Basophils, Absolute 02/10/2025 0.03  0.00 - 0.20 10*3/mm3 Final    Immature Grans, Absolute 02/10/2025 0.00  0.00 - 0.05 10*3/mm3 Final    nRBC 02/10/2025 0.0  0.0 - 0.2 /100 WBC Final    Final Diagnosis 02/10/2025    Final                    Value:1.  Peripheral blood, smear review:   -Mild leukopenia.   -Normocytic anemia.    COMMENT: White blood cells are minimally decreased, comprised of  mature segmented neutrophils, lymphocytes and rare monocytes.  Platelets are normal in morphology.  Erythrocytes demonstrate mild anisopoikilocytosis with microcytic cells, red cell fragments and increased central pallor present. The patient has a mild degree of leukopenia, for which there are many possible causes, including various drugs, acute infections (especially viral), myelophthisic disorders, nutritional deficiency, hypersplenism, and debilitated states such as alcoholism or cachexia. In the case of viral infections, leukopenia develops in the first two days and may persist for about 5 days. Persistent unexplained leukopenia generally warrants further investigation. Normocytic anemia may be seen with acute hemorrhage, chronic hemolytic disorders, or anemia associated with chronic diseases such as liver disease, chronic renal failure,                           chronic inflammation, various endocrine disorders, or multiple myeloma. A reticulocyte count would be useful in the differential diagnosis.    [facility]      Case Report 02/10/2025    Final                    Value:Surgical Pathology Report                         Case: [id number]                                  Authorizing Provider:  Dinorah Oates DO      Collected:           02/10/2025 01:58 PM          Ordering Location:     [facility]   Received:            02/10/2025 07:09 PM                                 LABORATORY                                                                   Pathologist:           Racheal Powers MD                                                    Specimen:    Blood, Venous, Peripheral Blood Smear                                                         No results found.    Assessment / Plan      Assessment/Plan:   1. Other decreased white blood cell (WBC) count (Primary)  -Unclear etiology but likely related to her multiple autoimmune conditions as well as biologic therapies.  Could be related to  T-cell LGL  -Checking labs as below to rule out secondary causes  -No indication for bone marrow biopsy at this time  -     CBC & Differential; Future  -     Comprehensive Metabolic Panel; Future  -     Lactate Dehydrogenase; Future  -     Flow Cytometry ([facility]); Future  -     Cancel: Peripheral Blood Smear; Future  -     Ferritin; Future  -     Iron Profile; Future  -     Copper, Serum; Future  -     Zinc; Future  -     T-Cell Gene Rearrangement, PCR; Future  -     Haptoglobin; Future  -     Reticulocytes; Future       2.  Anemia  -Likely related to iron deficiency anemia  -Will repeat iron studies, reticulocyte count today  -Checking LDH and haptoglobin as above  -As she is intolerant of oral iron, will consider IV supplementation should she be deficient    Follow Up:   Follow-up pending lab results     Taiwo Mosquera MD  Hematology and Oncology     Please note that portions of this note may have been completed with a voice recognition program. Efforts were made to edit the dictations, but occasionally words are mistranscribed.

## 2025-03-04 LAB — Lab: NORMAL

## 2025-03-06 DIAGNOSIS — K21.9 GASTROESOPHAGEAL REFLUX DISEASE, UNSPECIFIED WHETHER ESOPHAGITIS PRESENT: ICD-10-CM

## 2025-03-06 LAB
COPPER SERPL-MCNC: 91 UG/DL (ref 80–158)
ZINC SERPL-MCNC: 49 UG/DL (ref 44–115)

## 2025-03-07 LAB
LAB DIRECTOR NAME PROVIDER: NORMAL
Lab: NORMAL
REF LAB TEST METHOD: NORMAL
TCRG GENE REAR BLD/T QL: NORMAL

## 2025-03-07 RX ORDER — FAMOTIDINE 40 MG/1
TABLET, FILM COATED ORAL
Qty: 90 TABLET | Refills: 3 | Status: SHIPPED | OUTPATIENT
Start: 2025-03-07

## 2025-03-07 NOTE — TELEPHONE ENCOUNTER
Rx Refill Note  Pending Prescriptions:                       Disp   Refills    famotidine (PEPCID) 40 MG tablet [Pharmacy*90 tab*0        Sig: TAKE 1 TABLET BY MOUTH EVERY DAY AT NIGHT AS NEEDED           FOR HEARTBURN    Last office visit with prescribing clinician: 7/26/2023   Last telemedicine visit with prescribing clinician: Visit date not found   Next office visit with prescribing clinician: Visit date not found         Carolyn Garcia MA  03/07/25, 07:41 EST

## 2025-03-11 ENCOUNTER — TELEPHONE (OUTPATIENT)
Facility: HOSPITAL | Age: 65
End: 2025-03-11
Payer: COMMERCIAL

## 2025-03-11 DIAGNOSIS — K90.9 MALABSORPTION OF IRON: Primary | ICD-10-CM

## 2025-03-11 DIAGNOSIS — D50.9 IRON DEFICIENCY ANEMIA, UNSPECIFIED IRON DEFICIENCY ANEMIA TYPE: ICD-10-CM

## 2025-03-11 RX ORDER — SODIUM CHLORIDE 9 MG/ML
20 INJECTION, SOLUTION INTRAVENOUS ONCE
Status: CANCELLED | OUTPATIENT
Start: 2025-03-25

## 2025-03-11 RX ORDER — FAMOTIDINE 10 MG/ML
20 INJECTION, SOLUTION INTRAVENOUS AS NEEDED
Status: CANCELLED | OUTPATIENT
Start: 2025-03-25

## 2025-03-11 RX ORDER — HYDROCORTISONE SODIUM SUCCINATE 100 MG/2ML
100 INJECTION INTRAMUSCULAR; INTRAVENOUS AS NEEDED
Status: CANCELLED | OUTPATIENT
Start: 2025-03-25

## 2025-03-11 RX ORDER — SODIUM CHLORIDE 9 MG/ML
20 INJECTION, SOLUTION INTRAVENOUS ONCE
Status: CANCELLED | OUTPATIENT
Start: 2025-03-18

## 2025-03-11 RX ORDER — FAMOTIDINE 10 MG/ML
20 INJECTION, SOLUTION INTRAVENOUS AS NEEDED
Status: CANCELLED | OUTPATIENT
Start: 2025-03-18

## 2025-03-11 RX ORDER — DIPHENHYDRAMINE HYDROCHLORIDE 50 MG/ML
50 INJECTION INTRAMUSCULAR; INTRAVENOUS AS NEEDED
Status: CANCELLED | OUTPATIENT
Start: 2025-03-25

## 2025-03-11 RX ORDER — DIPHENHYDRAMINE HYDROCHLORIDE 50 MG/ML
50 INJECTION INTRAMUSCULAR; INTRAVENOUS AS NEEDED
Status: CANCELLED | OUTPATIENT
Start: 2025-03-18

## 2025-03-11 RX ORDER — HYDROCORTISONE SODIUM SUCCINATE 100 MG/2ML
100 INJECTION INTRAMUSCULAR; INTRAVENOUS AS NEEDED
Status: CANCELLED | OUTPATIENT
Start: 2025-03-18

## 2025-03-11 NOTE — TELEPHONE ENCOUNTER
Pt contacted as pre-procedure phone call prior to planned CTA coronary for 3/12/25. Reviewed with patient arrival time of 0730 to main registration,  no caffeine after midnight, please take premedications night before and morning of procedure with a small sip of water as instructed,  recommended,  reviewed procedure instructions and allowed time for questions, and reviewed home medications, allergies, and medical history.

## 2025-03-12 ENCOUNTER — HOSPITAL ENCOUNTER (OUTPATIENT)
Facility: HOSPITAL | Age: 65
Discharge: HOME | End: 2025-03-12
Payer: COMMERCIAL

## 2025-03-12 VITALS
OXYGEN SATURATION: 99 % | SYSTOLIC BLOOD PRESSURE: 145 MMHG | WEIGHT: 152.56 LBS | HEART RATE: 58 BPM | RESPIRATION RATE: 14 BRPM | HEIGHT: 62 IN | BODY MASS INDEX: 28.07 KG/M2 | DIASTOLIC BLOOD PRESSURE: 95 MMHG | TEMPERATURE: 97.5 F

## 2025-03-12 DIAGNOSIS — R07.9 CHEST PAIN OF UNCERTAIN ETIOLOGY: ICD-10-CM

## 2025-03-12 PROCEDURE — 75574 CT ANGIO HRT W/3D IMAGE: CPT | Performed by: INTERNAL MEDICINE

## 2025-03-12 PROCEDURE — 75574 CT ANGIO HRT W/3D IMAGE: CPT

## 2025-03-12 PROCEDURE — 25510000001 IOPAMIDOL PER 1 ML: Performed by: NURSE PRACTITIONER

## 2025-03-12 RX ORDER — NITROGLYCERIN 0.4 MG/1
0.4 TABLET SUBLINGUAL
Status: COMPLETED | OUTPATIENT
Start: 2025-03-12 | End: 2025-03-12

## 2025-03-12 RX ORDER — IVABRADINE 5 MG/1
15 TABLET, FILM COATED ORAL ONCE
Status: DISCONTINUED | OUTPATIENT
Start: 2025-03-12 | End: 2025-03-13 | Stop reason: HOSPADM

## 2025-03-12 RX ORDER — NITROGLYCERIN 0.4 MG/1
0.8 TABLET SUBLINGUAL
Status: COMPLETED | OUTPATIENT
Start: 2025-03-12 | End: 2025-03-12

## 2025-03-12 RX ORDER — SODIUM CHLORIDE 0.9 % (FLUSH) 0.9 %
10 SYRINGE (ML) INJECTION EVERY 12 HOURS SCHEDULED
Status: DISCONTINUED | OUTPATIENT
Start: 2025-03-12 | End: 2025-03-13 | Stop reason: HOSPADM

## 2025-03-12 RX ORDER — SODIUM CHLORIDE 9 MG/ML
40 INJECTION, SOLUTION INTRAVENOUS AS NEEDED
Status: DISCONTINUED | OUTPATIENT
Start: 2025-03-12 | End: 2025-03-13 | Stop reason: HOSPADM

## 2025-03-12 RX ORDER — SODIUM CHLORIDE 0.9 % (FLUSH) 0.9 %
10 SYRINGE (ML) INJECTION AS NEEDED
Status: DISCONTINUED | OUTPATIENT
Start: 2025-03-12 | End: 2025-03-13 | Stop reason: HOSPADM

## 2025-03-12 RX ORDER — IOPAMIDOL 755 MG/ML
100 INJECTION, SOLUTION INTRAVASCULAR
Status: COMPLETED | OUTPATIENT
Start: 2025-03-12 | End: 2025-03-12

## 2025-03-12 RX ADMIN — IOPAMIDOL 65 ML: 755 INJECTION, SOLUTION INTRAVENOUS at 08:50

## 2025-03-12 RX ADMIN — NITROGLYCERIN 0.8 MG: 0.4 TABLET SUBLINGUAL at 08:29

## 2025-03-14 DIAGNOSIS — D50.9 IRON DEFICIENCY ANEMIA, UNSPECIFIED IRON DEFICIENCY ANEMIA TYPE: ICD-10-CM

## 2025-03-14 DIAGNOSIS — K90.9 MALABSORPTION OF IRON: Primary | ICD-10-CM

## 2025-03-14 RX ORDER — HYDROCORTISONE SODIUM SUCCINATE 100 MG/2ML
100 INJECTION INTRAMUSCULAR; INTRAVENOUS AS NEEDED
OUTPATIENT
Start: 2025-04-03

## 2025-03-14 RX ORDER — CETIRIZINE HYDROCHLORIDE 10 MG/1
10 TABLET ORAL DAILY
OUTPATIENT
Start: 2025-03-27

## 2025-03-14 RX ORDER — DIPHENHYDRAMINE HYDROCHLORIDE 50 MG/ML
50 INJECTION INTRAMUSCULAR; INTRAVENOUS AS NEEDED
OUTPATIENT
Start: 2025-03-27

## 2025-03-14 RX ORDER — FAMOTIDINE 10 MG/ML
20 INJECTION, SOLUTION INTRAVENOUS AS NEEDED
OUTPATIENT
Start: 2025-04-10

## 2025-03-14 RX ORDER — DIPHENHYDRAMINE HYDROCHLORIDE 50 MG/ML
50 INJECTION INTRAMUSCULAR; INTRAVENOUS AS NEEDED
OUTPATIENT
Start: 2025-04-03

## 2025-03-14 RX ORDER — ACETAMINOPHEN 325 MG/1
650 TABLET ORAL ONCE
OUTPATIENT
Start: 2025-04-03

## 2025-03-14 RX ORDER — CETIRIZINE HYDROCHLORIDE 10 MG/1
10 TABLET ORAL DAILY
OUTPATIENT
Start: 2025-03-20

## 2025-03-14 RX ORDER — HYDROCORTISONE SODIUM SUCCINATE 100 MG/2ML
100 INJECTION INTRAMUSCULAR; INTRAVENOUS AS NEEDED
OUTPATIENT
Start: 2025-04-17

## 2025-03-14 RX ORDER — SODIUM CHLORIDE 9 MG/ML
20 INJECTION, SOLUTION INTRAVENOUS ONCE
OUTPATIENT
Start: 2025-04-17

## 2025-03-14 RX ORDER — HYDROCORTISONE SODIUM SUCCINATE 100 MG/2ML
100 INJECTION INTRAMUSCULAR; INTRAVENOUS AS NEEDED
OUTPATIENT
Start: 2025-04-24

## 2025-03-14 RX ORDER — DIPHENHYDRAMINE HYDROCHLORIDE 50 MG/ML
50 INJECTION INTRAMUSCULAR; INTRAVENOUS AS NEEDED
OUTPATIENT
Start: 2025-03-20

## 2025-03-14 RX ORDER — ACETAMINOPHEN 325 MG/1
650 TABLET ORAL ONCE
OUTPATIENT
Start: 2025-04-10

## 2025-03-14 RX ORDER — CETIRIZINE HYDROCHLORIDE 10 MG/1
10 TABLET ORAL DAILY
OUTPATIENT
Start: 2025-04-10

## 2025-03-14 RX ORDER — SODIUM CHLORIDE 9 MG/ML
20 INJECTION, SOLUTION INTRAVENOUS ONCE
OUTPATIENT
Start: 2025-04-03

## 2025-03-14 RX ORDER — DIPHENHYDRAMINE HYDROCHLORIDE 50 MG/ML
50 INJECTION INTRAMUSCULAR; INTRAVENOUS AS NEEDED
OUTPATIENT
Start: 2025-04-24

## 2025-03-14 RX ORDER — FAMOTIDINE 10 MG/ML
20 INJECTION, SOLUTION INTRAVENOUS AS NEEDED
OUTPATIENT
Start: 2025-03-20

## 2025-03-14 RX ORDER — ACETAMINOPHEN 325 MG/1
650 TABLET ORAL ONCE
OUTPATIENT
Start: 2025-04-17

## 2025-03-14 RX ORDER — FAMOTIDINE 10 MG/ML
20 INJECTION, SOLUTION INTRAVENOUS AS NEEDED
OUTPATIENT
Start: 2025-04-17

## 2025-03-14 RX ORDER — SODIUM CHLORIDE 9 MG/ML
20 INJECTION, SOLUTION INTRAVENOUS ONCE
OUTPATIENT
Start: 2025-03-27

## 2025-03-14 RX ORDER — HYDROCORTISONE SODIUM SUCCINATE 100 MG/2ML
100 INJECTION INTRAMUSCULAR; INTRAVENOUS AS NEEDED
OUTPATIENT
Start: 2025-03-27

## 2025-03-14 RX ORDER — SODIUM CHLORIDE 9 MG/ML
20 INJECTION, SOLUTION INTRAVENOUS ONCE
OUTPATIENT
Start: 2025-03-20

## 2025-03-14 RX ORDER — ACETAMINOPHEN 325 MG/1
650 TABLET ORAL ONCE
OUTPATIENT
Start: 2025-03-27

## 2025-03-14 RX ORDER — FAMOTIDINE 10 MG/ML
20 INJECTION, SOLUTION INTRAVENOUS AS NEEDED
OUTPATIENT
Start: 2025-03-27

## 2025-03-14 RX ORDER — DIPHENHYDRAMINE HYDROCHLORIDE 50 MG/ML
50 INJECTION INTRAMUSCULAR; INTRAVENOUS AS NEEDED
OUTPATIENT
Start: 2025-04-10

## 2025-03-14 RX ORDER — SODIUM CHLORIDE 9 MG/ML
20 INJECTION, SOLUTION INTRAVENOUS ONCE
OUTPATIENT
Start: 2025-04-10

## 2025-03-14 RX ORDER — CETIRIZINE HYDROCHLORIDE 10 MG/1
10 TABLET ORAL DAILY
OUTPATIENT
Start: 2025-04-03

## 2025-03-14 RX ORDER — HYDROCORTISONE SODIUM SUCCINATE 100 MG/2ML
100 INJECTION INTRAMUSCULAR; INTRAVENOUS AS NEEDED
OUTPATIENT
Start: 2025-04-10

## 2025-03-14 RX ORDER — HYDROCORTISONE SODIUM SUCCINATE 100 MG/2ML
100 INJECTION INTRAMUSCULAR; INTRAVENOUS AS NEEDED
OUTPATIENT
Start: 2025-03-20

## 2025-03-14 RX ORDER — SODIUM CHLORIDE 9 MG/ML
20 INJECTION, SOLUTION INTRAVENOUS ONCE
OUTPATIENT
Start: 2025-04-24

## 2025-03-14 RX ORDER — DIPHENHYDRAMINE HYDROCHLORIDE 50 MG/ML
50 INJECTION INTRAMUSCULAR; INTRAVENOUS AS NEEDED
OUTPATIENT
Start: 2025-04-17

## 2025-03-14 RX ORDER — FAMOTIDINE 10 MG/ML
20 INJECTION, SOLUTION INTRAVENOUS AS NEEDED
OUTPATIENT
Start: 2025-04-24

## 2025-03-14 RX ORDER — CETIRIZINE HYDROCHLORIDE 10 MG/1
10 TABLET ORAL DAILY
OUTPATIENT
Start: 2025-04-17

## 2025-03-14 RX ORDER — FAMOTIDINE 10 MG/ML
20 INJECTION, SOLUTION INTRAVENOUS AS NEEDED
OUTPATIENT
Start: 2025-04-03

## 2025-03-14 RX ORDER — ACETAMINOPHEN 325 MG/1
650 TABLET ORAL ONCE
OUTPATIENT
Start: 2025-04-24

## 2025-03-14 RX ORDER — CETIRIZINE HYDROCHLORIDE 10 MG/1
10 TABLET ORAL DAILY
OUTPATIENT
Start: 2025-04-24

## 2025-03-14 RX ORDER — ACETAMINOPHEN 325 MG/1
650 TABLET ORAL ONCE
OUTPATIENT
Start: 2025-03-20

## 2025-03-17 ENCOUNTER — TELEPHONE (OUTPATIENT)
Dept: CARDIOLOGY | Facility: CLINIC | Age: 65
End: 2025-03-17
Payer: COMMERCIAL

## 2025-03-18 ENCOUNTER — PATIENT MESSAGE (OUTPATIENT)
Dept: ONCOLOGY | Facility: CLINIC | Age: 65
End: 2025-03-18
Payer: COMMERCIAL

## 2025-03-18 ENCOUNTER — TELEPHONE (OUTPATIENT)
Dept: ONCOLOGY | Facility: CLINIC | Age: 65
End: 2025-03-18
Payer: COMMERCIAL

## 2025-03-18 NOTE — TELEPHONE ENCOUNTER
Spoke with patient. Let her know that we switched her to ferrlecit, no prior auth required for this one. Gave her appts for the next 6 weeks.

## 2025-03-18 NOTE — TELEPHONE ENCOUNTER
"  Caller: Rupa Finn \"Jeannette\"    Relationship: Self    Best call back number: [phone]     What is the best time to reach you: ANYTIME    Who are you requesting to speak with (clinical staff, provider,  specific staff member):     What was the call regarding: PATIENT WANTS TO KNOW IF HER 3/20 IRON INFUSION HAS BEEN APPROVED? DENIAL IN CHART SO NOT SURE IF THIS HAD AN APPEAL SENT OR NOT.    PLEASE ADVISE.     "

## 2025-03-20 ENCOUNTER — PATIENT MESSAGE (OUTPATIENT)
Dept: FAMILY MEDICINE CLINIC | Facility: CLINIC | Age: 65
End: 2025-03-20
Payer: COMMERCIAL

## 2025-03-20 ENCOUNTER — HOSPITAL ENCOUNTER (OUTPATIENT)
Dept: ONCOLOGY | Facility: HOSPITAL | Age: 65
Discharge: HOME | End: 2025-03-20
Admitting: INTERNAL MEDICINE
Payer: COMMERCIAL

## 2025-03-20 VITALS
TEMPERATURE: 97.7 F | WEIGHT: 153.2 LBS | DIASTOLIC BLOOD PRESSURE: 88 MMHG | SYSTOLIC BLOOD PRESSURE: 147 MMHG | HEART RATE: 60 BPM | RESPIRATION RATE: 16 BRPM | BODY MASS INDEX: 28.02 KG/M2

## 2025-03-20 DIAGNOSIS — D50.9 IRON DEFICIENCY ANEMIA, UNSPECIFIED IRON DEFICIENCY ANEMIA TYPE: Primary | ICD-10-CM

## 2025-03-20 DIAGNOSIS — E03.9 ACQUIRED HYPOTHYROIDISM: ICD-10-CM

## 2025-03-20 DIAGNOSIS — Z45.2 ENCOUNTER FOR INSERTION OF VENOUS ACCESS PORT: Primary | ICD-10-CM

## 2025-03-20 DIAGNOSIS — Z87.898 HISTORY OF DIFFICULT VENOUS ACCESS: ICD-10-CM

## 2025-03-20 DIAGNOSIS — K90.9 MALABSORPTION OF IRON: ICD-10-CM

## 2025-03-20 PROCEDURE — 25810000003 SODIUM CHLORIDE 0.9 % SOLUTION: Performed by: INTERNAL MEDICINE

## 2025-03-20 PROCEDURE — 96365 THER/PROPH/DIAG IV INF INIT: CPT

## 2025-03-20 PROCEDURE — 25010000002 NA FERRIC GLUC CPLX PER 12.5 MG: Performed by: INTERNAL MEDICINE

## 2025-03-20 RX ORDER — CETIRIZINE HYDROCHLORIDE 10 MG/1
10 TABLET ORAL ONCE
Status: COMPLETED | OUTPATIENT
Start: 2025-03-20 | End: 2025-03-20

## 2025-03-20 RX ORDER — ACETAMINOPHEN 325 MG/1
650 TABLET ORAL ONCE
Status: COMPLETED | OUTPATIENT
Start: 2025-03-20 | End: 2025-03-20

## 2025-03-20 RX ORDER — FAMOTIDINE 10 MG/ML
20 INJECTION, SOLUTION INTRAVENOUS AS NEEDED
Status: DISCONTINUED | OUTPATIENT
Start: 2025-03-20 | End: 2025-03-21 | Stop reason: HOSPADM

## 2025-03-20 RX ORDER — SODIUM CHLORIDE 9 MG/ML
20 INJECTION, SOLUTION INTRAVENOUS ONCE
Status: COMPLETED | OUTPATIENT
Start: 2025-03-20 | End: 2025-03-20

## 2025-03-20 RX ORDER — DIPHENHYDRAMINE HYDROCHLORIDE 50 MG/ML
50 INJECTION INTRAMUSCULAR; INTRAVENOUS AS NEEDED
Status: DISCONTINUED | OUTPATIENT
Start: 2025-03-20 | End: 2025-03-21 | Stop reason: HOSPADM

## 2025-03-20 RX ADMIN — CETIRIZINE HYDROCHLORIDE 10 MG: 10 TABLET, FILM COATED ORAL at 13:37

## 2025-03-20 RX ADMIN — ACETAMINOPHEN 650 MG: 325 TABLET, FILM COATED ORAL at 13:38

## 2025-03-20 RX ADMIN — SODIUM CHLORIDE 125 MG: 9 INJECTION, SOLUTION INTRAVENOUS at 14:13

## 2025-03-20 RX ADMIN — SODIUM CHLORIDE 20 ML/HR: 9 INJECTION, SOLUTION INTRAVENOUS at 14:11

## 2025-03-21 ENCOUNTER — OFFICE VISIT (OUTPATIENT)
Dept: OBSTETRICS AND GYNECOLOGY | Facility: CLINIC | Age: 65
End: 2025-03-21
Payer: COMMERCIAL

## 2025-03-21 VITALS
BODY MASS INDEX: 27.97 KG/M2 | WEIGHT: 152 LBS | DIASTOLIC BLOOD PRESSURE: 90 MMHG | SYSTOLIC BLOOD PRESSURE: 146 MMHG | HEIGHT: 62 IN

## 2025-03-21 DIAGNOSIS — Z12.31 ENCOUNTER FOR SCREENING MAMMOGRAM FOR BREAST CANCER: ICD-10-CM

## 2025-03-21 DIAGNOSIS — Z79.890 POST-MENOPAUSE ON HRT (HORMONE REPLACEMENT THERAPY): ICD-10-CM

## 2025-03-21 DIAGNOSIS — Z01.419 WOMEN'S ANNUAL ROUTINE GYNECOLOGICAL EXAMINATION: Primary | ICD-10-CM

## 2025-03-21 DIAGNOSIS — N95.2 VAGINAL ATROPHY: ICD-10-CM

## 2025-03-21 RX ORDER — FLUOCINONIDE GEL 0.5 MG/G
GEL TOPICAL
COMMUNITY
Start: 2025-03-03

## 2025-03-21 RX ORDER — LEVOTHYROXINE SODIUM 75 UG/1
75 TABLET ORAL
Qty: 45 TABLET | Refills: 0 | Status: SHIPPED | OUTPATIENT
Start: 2025-03-21

## 2025-03-21 NOTE — PROGRESS NOTES
"Subjective   Chief Complaint   Patient presents with    Gynecologic Exam     Annual.     Rupa Finn is a 64 y.o. year old  who is post-menopausal.  She is S/P LAVH, BSO, [place], presenting to be seen for her annual exam.  She is doing well and has no complaints. However she reports being diagnosed with mild CAD this year.     This past year she has been on hormone replacement therapy.  There has not had vaginal bleeding in the last 12 months.  Menopausal symptoms are present (hot flashes and vaginal dryness) but not affecting her quality of life .    Up to date on DEXA  Mammogram due in May  Colonoscopy due     Of note she has a history of dermatologic Lupus and is getting IVIG and iron infusions. Normal CMP 2025.    SEXUAL Hx:  She is currently sexually active.  In the past year there there has been NO new sexual partners.    Condoms are never used.  She would not like to be screened for STD's at today's exam.  [place] is painful: yes - but OTC lubricants ARE effective  HEALTH Hx:  She wears her seat belt: yes.  She has concerns about domestic violence: no.      The following portions of the patient's history were reviewed and updated as appropriate:problem list, current medications, allergies, past family history, past medical history, past social history, and past surgical history.    Social History    Tobacco Use      Smoking status: Never        Passive exposure: Never      Smokeless tobacco: Never         Objective   /90 (BP Location: Left arm, Patient Position: Sitting, Cuff Size: Adult)   Ht 157.5 cm (62.01\")   Wt 68.9 kg (152 lb)   LMP  (LMP Unknown)   Breastfeeding No   BMI 27.79 kg/m²     General:  well developed; well nourished  no acute distress  mentation appropriate   Breasts:  Examined in supine position  Symmetric without masses or skin dimpling  Nipples normal without inversion, lesions or discharge  There are no palpable axillary nodes  Fibrocystic changes " are present both breasts without a discrete mass   Pelvis: Clinical staff was present for exam  External genitalia:  normal appearance of the external genitalia including Bartholin's and Gilead's glands.  :  urethral meatus normal; urethra normal:  Vaginal:  atrophic mucosal changes are present;  Cervix:  absent.  Uterus:  absent.  Adnexa:  absent, bilateral.  Rectal:  digital rectal exam not performed; anus visually normal appearing.        Assessment   Annual GYN exam   New diagnosis of CAD   Vaginal atrophy   Menopausal female currently on HRT - with significant symptoms affecting activities of daily living  She is up to date on all relevant gynecologic and colorectal screenings     Plan   Pap was not done today.  I explained to Rupa that the Pap smears are no longer recommended in patient's after hysterectomy.   I stressed to Rupa that she still should be seen to be seen yearly for a full physical including breast and pelvic exam.   She was encouraged to get yearly mammograms, order placed today.  She should report any palpable breast lump(s) or skin changes regardless of mammographic findings.  I explained to Rupa that notification regarding her mammogram results will come from the center performing the study.  Our office will not be routinely calling with mammogram results.  It is her responsibility to make sure that the results from the mammogram are communicated to her by the breast center.  If she has any questions about the results, she is welcome to call our office anytime.  Extensively discussed new diagnosis of coronary artery disease and contraindication to estrogen therapy.  Patient concerned about hot flashes, and discussed nonhormonal options of Thermella and Veozah, along with vaginal estrogen cream for vaginal dryness.  Patient would like to think about her options, and will call if she decides to proceed with any of these options in the future. Patient to taper off the estrogen gel with  the one remaining pack she has.   The importance of keeping all planned follow-up and taking all medications as prescribed was emphasized.  Follow up for annual exam in 1 year or sooner PRN      No orders of the defined types were placed in this encounter.         This note was electronically signed.    Dixie Marin MD   March 21, 2025

## 2025-03-27 ENCOUNTER — HOSPITAL ENCOUNTER (OUTPATIENT)
Dept: ONCOLOGY | Facility: HOSPITAL | Age: 65
Discharge: HOME | End: 2025-03-27
Payer: COMMERCIAL

## 2025-03-27 VITALS
HEART RATE: 65 BPM | SYSTOLIC BLOOD PRESSURE: 148 MMHG | DIASTOLIC BLOOD PRESSURE: 83 MMHG | RESPIRATION RATE: 16 BRPM | TEMPERATURE: 97 F | WEIGHT: 149 LBS | BODY MASS INDEX: 27.42 KG/M2 | HEIGHT: 62 IN

## 2025-03-27 DIAGNOSIS — K90.9 MALABSORPTION OF IRON: ICD-10-CM

## 2025-03-27 DIAGNOSIS — D50.9 IRON DEFICIENCY ANEMIA, UNSPECIFIED IRON DEFICIENCY ANEMIA TYPE: Primary | ICD-10-CM

## 2025-03-27 PROCEDURE — 25010000002 NA FERRIC GLUC CPLX PER 12.5 MG: Performed by: INTERNAL MEDICINE

## 2025-03-27 PROCEDURE — 25810000003 SODIUM CHLORIDE 0.9 % SOLUTION: Performed by: INTERNAL MEDICINE

## 2025-03-27 PROCEDURE — 96365 THER/PROPH/DIAG IV INF INIT: CPT

## 2025-03-27 RX ORDER — CETIRIZINE HYDROCHLORIDE 10 MG/1
10 TABLET ORAL ONCE
Status: COMPLETED | OUTPATIENT
Start: 2025-03-27 | End: 2025-03-27

## 2025-03-27 RX ORDER — DIPHENHYDRAMINE HYDROCHLORIDE 50 MG/ML
50 INJECTION, SOLUTION INTRAMUSCULAR; INTRAVENOUS AS NEEDED
Status: DISCONTINUED | OUTPATIENT
Start: 2025-03-27 | End: 2025-03-29 | Stop reason: HOSPADM

## 2025-03-27 RX ORDER — HYDROCORTISONE SODIUM SUCCINATE 100 MG/2ML
100 INJECTION INTRAMUSCULAR; INTRAVENOUS AS NEEDED
Status: DISCONTINUED | OUTPATIENT
Start: 2025-03-27 | End: 2025-03-29 | Stop reason: HOSPADM

## 2025-03-27 RX ORDER — FAMOTIDINE 10 MG/ML
20 INJECTION, SOLUTION INTRAVENOUS AS NEEDED
Status: DISCONTINUED | OUTPATIENT
Start: 2025-03-27 | End: 2025-03-29 | Stop reason: HOSPADM

## 2025-03-27 RX ORDER — ACETAMINOPHEN 325 MG/1
650 TABLET ORAL ONCE
Status: COMPLETED | OUTPATIENT
Start: 2025-03-27 | End: 2025-03-27

## 2025-03-27 RX ORDER — SODIUM CHLORIDE 9 MG/ML
20 INJECTION, SOLUTION INTRAVENOUS ONCE
Status: COMPLETED | OUTPATIENT
Start: 2025-03-27 | End: 2025-03-27

## 2025-03-27 RX ADMIN — SODIUM CHLORIDE 20 ML/HR: 9 INJECTION, SOLUTION INTRAVENOUS at 14:00

## 2025-03-27 RX ADMIN — SODIUM CHLORIDE 125 MG: 9 INJECTION, SOLUTION INTRAVENOUS at 14:05

## 2025-03-27 RX ADMIN — CETIRIZINE HYDROCHLORIDE 10 MG: 10 TABLET, FILM COATED ORAL at 13:28

## 2025-03-27 RX ADMIN — ACETAMINOPHEN 650 MG: 325 TABLET, FILM COATED ORAL at 13:28

## 2025-04-03 ENCOUNTER — HOSPITAL ENCOUNTER (OUTPATIENT)
Dept: ONCOLOGY | Facility: HOSPITAL | Age: 65
Discharge: HOME | End: 2025-04-03
Admitting: INTERNAL MEDICINE
Payer: COMMERCIAL

## 2025-04-03 VITALS
DIASTOLIC BLOOD PRESSURE: 84 MMHG | HEIGHT: 62 IN | TEMPERATURE: 98.2 F | WEIGHT: 150 LBS | HEART RATE: 58 BPM | SYSTOLIC BLOOD PRESSURE: 146 MMHG | RESPIRATION RATE: 16 BRPM | BODY MASS INDEX: 27.6 KG/M2

## 2025-04-03 DIAGNOSIS — K90.9 MALABSORPTION OF IRON: ICD-10-CM

## 2025-04-03 DIAGNOSIS — D50.9 IRON DEFICIENCY ANEMIA, UNSPECIFIED IRON DEFICIENCY ANEMIA TYPE: Primary | ICD-10-CM

## 2025-04-03 PROCEDURE — 25010000002 NA FERRIC GLUC CPLX PER 12.5 MG: Performed by: INTERNAL MEDICINE

## 2025-04-03 PROCEDURE — 96365 THER/PROPH/DIAG IV INF INIT: CPT

## 2025-04-03 RX ORDER — ACETAMINOPHEN 325 MG/1
650 TABLET ORAL ONCE
Status: COMPLETED | OUTPATIENT
Start: 2025-04-03 | End: 2025-04-03

## 2025-04-03 RX ORDER — FAMOTIDINE 10 MG/ML
20 INJECTION, SOLUTION INTRAVENOUS AS NEEDED
Status: DISCONTINUED | OUTPATIENT
Start: 2025-04-03 | End: 2025-04-04 | Stop reason: HOSPADM

## 2025-04-03 RX ORDER — CETIRIZINE HYDROCHLORIDE 10 MG/1
10 TABLET ORAL ONCE
Status: COMPLETED | OUTPATIENT
Start: 2025-04-03 | End: 2025-04-03

## 2025-04-03 RX ORDER — SODIUM CHLORIDE 9 MG/ML
20 INJECTION, SOLUTION INTRAVENOUS ONCE
Status: COMPLETED | OUTPATIENT
Start: 2025-04-03 | End: 2025-04-03

## 2025-04-03 RX ORDER — HYDROCORTISONE SODIUM SUCCINATE 100 MG/2ML
100 INJECTION INTRAMUSCULAR; INTRAVENOUS AS NEEDED
Status: DISCONTINUED | OUTPATIENT
Start: 2025-04-03 | End: 2025-04-04 | Stop reason: HOSPADM

## 2025-04-03 RX ORDER — DIPHENHYDRAMINE HYDROCHLORIDE 50 MG/ML
50 INJECTION, SOLUTION INTRAMUSCULAR; INTRAVENOUS AS NEEDED
Status: DISCONTINUED | OUTPATIENT
Start: 2025-04-03 | End: 2025-04-04 | Stop reason: HOSPADM

## 2025-04-03 RX ADMIN — SODIUM CHLORIDE 20 ML/HR: 9 INJECTION, SOLUTION INTRAVENOUS at 13:58

## 2025-04-03 RX ADMIN — CETIRIZINE HYDROCHLORIDE 10 MG: 10 TABLET, FILM COATED ORAL at 13:40

## 2025-04-03 RX ADMIN — ACETAMINOPHEN 650 MG: 325 TABLET, FILM COATED ORAL at 13:40

## 2025-04-03 RX ADMIN — SODIUM CHLORIDE 125 MG: 9 INJECTION, SOLUTION INTRAVENOUS at 14:16

## 2025-04-10 ENCOUNTER — HOSPITAL ENCOUNTER (OUTPATIENT)
Dept: ONCOLOGY | Facility: HOSPITAL | Age: 65
Discharge: HOME | End: 2025-04-10
Admitting: INTERNAL MEDICINE
Payer: COMMERCIAL

## 2025-04-10 VITALS
HEART RATE: 54 BPM | WEIGHT: 148 LBS | TEMPERATURE: 96.9 F | RESPIRATION RATE: 16 BRPM | BODY MASS INDEX: 27.23 KG/M2 | DIASTOLIC BLOOD PRESSURE: 66 MMHG | SYSTOLIC BLOOD PRESSURE: 118 MMHG | HEIGHT: 62 IN

## 2025-04-10 DIAGNOSIS — D50.9 IRON DEFICIENCY ANEMIA, UNSPECIFIED IRON DEFICIENCY ANEMIA TYPE: Primary | ICD-10-CM

## 2025-04-10 DIAGNOSIS — K90.9 MALABSORPTION OF IRON: ICD-10-CM

## 2025-04-10 LAB
BASOPHILS # BLD AUTO: 0.02 10*3/MM3 (ref 0–0.2)
BASOPHILS NFR BLD AUTO: 0.8 % (ref 0–1.5)
DEPRECATED RDW RBC AUTO: 48.4 FL (ref 37–54)
EOSINOPHIL # BLD AUTO: 0.03 10*3/MM3 (ref 0–0.4)
EOSINOPHIL NFR BLD AUTO: 1.1 % (ref 0.3–6.2)
ERYTHROCYTE [DISTWIDTH] IN BLOOD BY AUTOMATED COUNT: 14.4 % (ref 12.3–15.4)
HCT VFR BLD AUTO: 31.5 % (ref 34–46.6)
HGB BLD-MCNC: 10.5 G/DL (ref 12–15.9)
IMM GRANULOCYTES # BLD AUTO: 0 10*3/MM3 (ref 0–0.05)
IMM GRANULOCYTES NFR BLD AUTO: 0 % (ref 0–0.5)
LYMPHOCYTES # BLD AUTO: 0.41 10*3/MM3 (ref 0.7–3.1)
LYMPHOCYTES NFR BLD AUTO: 15.6 % (ref 19.6–45.3)
MCH RBC QN AUTO: 30.5 PG (ref 26.6–33)
MCHC RBC AUTO-ENTMCNC: 33.3 G/DL (ref 31.5–35.7)
MCV RBC AUTO: 91.6 FL (ref 79–97)
MONOCYTES # BLD AUTO: 0.39 10*3/MM3 (ref 0.1–0.9)
MONOCYTES NFR BLD AUTO: 14.8 % (ref 5–12)
NEUTROPHILS NFR BLD AUTO: 1.78 10*3/MM3 (ref 1.7–7)
NEUTROPHILS NFR BLD AUTO: 67.7 % (ref 42.7–76)
PLATELET # BLD AUTO: 204 10*3/MM3 (ref 140–450)
PMV BLD AUTO: 10.4 FL (ref 6–12)
RBC # BLD AUTO: 3.44 10*6/MM3 (ref 3.77–5.28)
WBC NRBC COR # BLD AUTO: 2.63 10*3/MM3 (ref 3.4–10.8)

## 2025-04-10 PROCEDURE — 36415 COLL VENOUS BLD VENIPUNCTURE: CPT

## 2025-04-10 PROCEDURE — 25810000003 SODIUM CHLORIDE 0.9 % SOLUTION: Performed by: INTERNAL MEDICINE

## 2025-04-10 PROCEDURE — 85025 COMPLETE CBC W/AUTO DIFF WBC: CPT | Performed by: INTERNAL MEDICINE

## 2025-04-10 PROCEDURE — 96365 THER/PROPH/DIAG IV INF INIT: CPT

## 2025-04-10 PROCEDURE — 25010000002 NA FERRIC GLUC CPLX PER 12.5 MG: Performed by: INTERNAL MEDICINE

## 2025-04-10 RX ORDER — SODIUM CHLORIDE 9 MG/ML
20 INJECTION, SOLUTION INTRAVENOUS ONCE
Status: COMPLETED | OUTPATIENT
Start: 2025-04-10 | End: 2025-04-10

## 2025-04-10 RX ORDER — FAMOTIDINE 10 MG/ML
20 INJECTION, SOLUTION INTRAVENOUS AS NEEDED
Status: DISCONTINUED | OUTPATIENT
Start: 2025-04-10 | End: 2025-04-11 | Stop reason: HOSPADM

## 2025-04-10 RX ORDER — ACETAMINOPHEN 325 MG/1
650 TABLET ORAL ONCE
Status: COMPLETED | OUTPATIENT
Start: 2025-04-10 | End: 2025-04-10

## 2025-04-10 RX ORDER — HYDROCORTISONE SODIUM SUCCINATE 100 MG/2ML
100 INJECTION INTRAMUSCULAR; INTRAVENOUS AS NEEDED
Status: DISCONTINUED | OUTPATIENT
Start: 2025-04-10 | End: 2025-04-11 | Stop reason: HOSPADM

## 2025-04-10 RX ORDER — CETIRIZINE HYDROCHLORIDE 10 MG/1
10 TABLET ORAL ONCE
Status: COMPLETED | OUTPATIENT
Start: 2025-04-10 | End: 2025-04-10

## 2025-04-10 RX ORDER — DIPHENHYDRAMINE HYDROCHLORIDE 50 MG/ML
50 INJECTION, SOLUTION INTRAMUSCULAR; INTRAVENOUS AS NEEDED
Status: DISCONTINUED | OUTPATIENT
Start: 2025-04-10 | End: 2025-04-11 | Stop reason: HOSPADM

## 2025-04-10 RX ADMIN — ACETAMINOPHEN 650 MG: 325 TABLET, FILM COATED ORAL at 13:22

## 2025-04-10 RX ADMIN — SODIUM CHLORIDE 20 ML/HR: 9 INJECTION, SOLUTION INTRAVENOUS at 14:14

## 2025-04-10 RX ADMIN — SODIUM CHLORIDE 125 MG: 9 INJECTION, SOLUTION INTRAVENOUS at 14:17

## 2025-04-10 RX ADMIN — CETIRIZINE HYDROCHLORIDE 10 MG: 10 TABLET, FILM COATED ORAL at 13:23

## 2025-04-17 ENCOUNTER — APPOINTMENT (OUTPATIENT)
Dept: GENERAL RADIOLOGY | Facility: HOSPITAL | Age: 65
DRG: 916 | End: 2025-04-17
Payer: COMMERCIAL

## 2025-04-17 ENCOUNTER — HOSPITAL ENCOUNTER (INPATIENT)
Facility: HOSPITAL | Age: 65
LOS: 2 days | Discharge: HOME | DRG: 916 | End: 2025-04-19
Attending: EMERGENCY MEDICINE | Admitting: INTERNAL MEDICINE
Payer: COMMERCIAL

## 2025-04-17 ENCOUNTER — HOSPITAL ENCOUNTER (OUTPATIENT)
Dept: ONCOLOGY | Facility: HOSPITAL | Age: 65
Discharge: HOME | End: 2025-04-17
Admitting: INTERNAL MEDICINE
Payer: COMMERCIAL

## 2025-04-17 VITALS
SYSTOLIC BLOOD PRESSURE: 100 MMHG | BODY MASS INDEX: 27.6 KG/M2 | HEART RATE: 100 BPM | RESPIRATION RATE: 16 BRPM | WEIGHT: 150 LBS | HEIGHT: 62 IN | DIASTOLIC BLOOD PRESSURE: 67 MMHG | TEMPERATURE: 96.7 F | OXYGEN SATURATION: 80 %

## 2025-04-17 DIAGNOSIS — K90.9 MALABSORPTION OF IRON: ICD-10-CM

## 2025-04-17 DIAGNOSIS — D50.9 IRON DEFICIENCY ANEMIA, UNSPECIFIED IRON DEFICIENCY ANEMIA TYPE: Primary | ICD-10-CM

## 2025-04-17 DIAGNOSIS — T78.40XA ALLERGIC REACTION, INITIAL ENCOUNTER: ICD-10-CM

## 2025-04-17 DIAGNOSIS — I21.4 NON-STEMI (NON-ST ELEVATED MYOCARDIAL INFARCTION): ICD-10-CM

## 2025-04-17 DIAGNOSIS — T78.2XXA ANAPHYLAXIS, INITIAL ENCOUNTER: Primary | ICD-10-CM

## 2025-04-17 DIAGNOSIS — E03.9 ACQUIRED HYPOTHYROIDISM: ICD-10-CM

## 2025-04-17 DIAGNOSIS — I95.9 HYPOTENSION, UNSPECIFIED HYPOTENSION TYPE: ICD-10-CM

## 2025-04-17 LAB
ALBUMIN SERPL-MCNC: 3.5 G/DL (ref 3.5–5.2)
ALBUMIN/GLOB SERPL: 0.7 G/DL
ALP SERPL-CCNC: 63 U/L (ref 39–117)
ALT SERPL W P-5'-P-CCNC: 16 U/L (ref 1–33)
ANION GAP SERPL CALCULATED.3IONS-SCNC: 13 MMOL/L (ref 5–15)
ARTERIAL PATENCY WRIST A: ABNORMAL
AST SERPL-CCNC: 29 U/L (ref 1–32)
ATMOSPHERIC PRESS: ABNORMAL MM[HG]
B PARAPERT DNA SPEC QL NAA+PROBE: NOT DETECTED
B PERT DNA SPEC QL NAA+PROBE: NOT DETECTED
BACTERIA UR QL AUTO: ABNORMAL /HPF
BASE EXCESS BLDA CALC-SCNC: -5.1 MMOL/L (ref 0–2)
BASOPHILS # BLD AUTO: 0.02 10*3/MM3 (ref 0–0.2)
BASOPHILS NFR BLD AUTO: 0.7 % (ref 0–1.5)
BILIRUB SERPL-MCNC: 0.3 MG/DL (ref 0–1.2)
BILIRUB UR QL STRIP: NEGATIVE
BODY TEMPERATURE: 37
BUN SERPL-MCNC: 9 MG/DL (ref 8–23)
BUN/CREAT SERPL: 12.5 (ref 7–25)
C PNEUM DNA NPH QL NAA+NON-PROBE: NOT DETECTED
CALCIUM SPEC-SCNC: 8.1 MG/DL (ref 8.6–10.5)
CHLORIDE SERPL-SCNC: 98 MMOL/L (ref 98–107)
CLARITY UR: CLEAR
CO2 BLDA-SCNC: 23.3 MMOL/L (ref 22–33)
CO2 SERPL-SCNC: 19 MMOL/L (ref 22–29)
COHGB MFR BLD: 0.8 % (ref 0–2)
COLOR UR: YELLOW
CREAT SERPL-MCNC: 0.72 MG/DL (ref 0.57–1)
D-LACTATE SERPL-SCNC: 1.7 MMOL/L (ref 0.5–2)
D-LACTATE SERPL-SCNC: 3 MMOL/L (ref 0.5–2)
D-LACTATE SERPL-SCNC: 3 MMOL/L (ref 0.5–2)
DEPRECATED RDW RBC AUTO: 50.1 FL (ref 37–54)
EGFRCR SERPLBLD CKD-EPI 2021: 93.5 ML/MIN/1.73
EOSINOPHIL # BLD AUTO: 0.03 10*3/MM3 (ref 0–0.4)
EOSINOPHIL NFR BLD AUTO: 1.1 % (ref 0.3–6.2)
EPAP: 0
ERYTHROCYTE [DISTWIDTH] IN BLOOD BY AUTOMATED COUNT: 14.7 % (ref 12.3–15.4)
FLUAV SUBTYP SPEC NAA+PROBE: NOT DETECTED
FLUBV RNA ISLT QL NAA+PROBE: NOT DETECTED
GEN 5 1HR TROPONIN T REFLEX: 770 NG/L
GLOBULIN UR ELPH-MCNC: 4.8 GM/DL
GLUCOSE SERPL-MCNC: 120 MG/DL (ref 65–99)
GLUCOSE UR STRIP-MCNC: NEGATIVE MG/DL
HADV DNA SPEC NAA+PROBE: NOT DETECTED
HCO3 BLDA-SCNC: 21.9 MMOL/L (ref 20–26)
HCOV 229E RNA SPEC QL NAA+PROBE: NOT DETECTED
HCOV HKU1 RNA SPEC QL NAA+PROBE: NOT DETECTED
HCOV NL63 RNA SPEC QL NAA+PROBE: NOT DETECTED
HCOV OC43 RNA SPEC QL NAA+PROBE: NOT DETECTED
HCT VFR BLD AUTO: 39 % (ref 34–46.6)
HCT VFR BLD CALC: 38.4 % (ref 38–51)
HGB BLD-MCNC: 12.7 G/DL (ref 12–15.9)
HGB BLDA-MCNC: 12.5 G/DL (ref 14–18)
HGB UR QL STRIP.AUTO: ABNORMAL
HMPV RNA NPH QL NAA+NON-PROBE: NOT DETECTED
HPIV1 RNA ISLT QL NAA+PROBE: NOT DETECTED
HPIV2 RNA SPEC QL NAA+PROBE: NOT DETECTED
HPIV3 RNA NPH QL NAA+PROBE: NOT DETECTED
HPIV4 P GENE NPH QL NAA+PROBE: NOT DETECTED
HYALINE CASTS UR QL AUTO: ABNORMAL /LPF
IMM GRANULOCYTES # BLD AUTO: 0 10*3/MM3 (ref 0–0.05)
IMM GRANULOCYTES NFR BLD AUTO: 0 % (ref 0–0.5)
INHALED O2 CONCENTRATION: 32 %
IPAP: 0
KETONES UR QL STRIP: NEGATIVE
LEUKOCYTE ESTERASE UR QL STRIP.AUTO: NEGATIVE
LYMPHOCYTES # BLD AUTO: 0.77 10*3/MM3 (ref 0.7–3.1)
LYMPHOCYTES NFR BLD AUTO: 28.8 % (ref 19.6–45.3)
M PNEUMO IGG SER IA-ACNC: NOT DETECTED
MCH RBC QN AUTO: 30.4 PG (ref 26.6–33)
MCHC RBC AUTO-ENTMCNC: 32.6 G/DL (ref 31.5–35.7)
MCV RBC AUTO: 93.3 FL (ref 79–97)
METHGB BLD QL: 0.5 % (ref 0–1.5)
MODALITY: ABNORMAL
MONOCYTES # BLD AUTO: 0.27 10*3/MM3 (ref 0.1–0.9)
MONOCYTES NFR BLD AUTO: 10.1 % (ref 5–12)
MUCOUS THREADS URNS QL MICRO: ABNORMAL /HPF
NEUTROPHILS NFR BLD AUTO: 1.58 10*3/MM3 (ref 1.7–7)
NEUTROPHILS NFR BLD AUTO: 59.3 % (ref 42.7–76)
NITRITE UR QL STRIP: NEGATIVE
NRBC BLD AUTO-RTO: 0 /100 WBC (ref 0–0.2)
OXYHGB MFR BLDV: 29.6 % (ref 94–99)
PCO2 BLDA: 47.3 MM HG (ref 35–45)
PCO2 TEMP ADJ BLD: 47.3 MM HG (ref 35–45)
PH BLDA: 7.27 PH UNITS (ref 7.35–7.45)
PH UR STRIP.AUTO: 6.5 [PH] (ref 5–8)
PH, TEMP CORRECTED: 7.27 PH UNITS
PLATELET # BLD AUTO: 281 10*3/MM3 (ref 140–450)
PMV BLD AUTO: 10.9 FL (ref 6–12)
PO2 BLDA: 22.9 MM HG (ref 83–108)
PO2 TEMP ADJ BLD: 22.9 MM HG (ref 83–108)
POTASSIUM SERPL-SCNC: 3.9 MMOL/L (ref 3.5–5.2)
PROT SERPL-MCNC: 8.3 G/DL (ref 6–8.5)
PROT UR QL STRIP: ABNORMAL
RBC # BLD AUTO: 4.18 10*6/MM3 (ref 3.77–5.28)
RBC # UR STRIP: ABNORMAL /HPF
REF LAB TEST METHOD: ABNORMAL
RHINOVIRUS RNA SPEC NAA+PROBE: NOT DETECTED
RSV RNA NPH QL NAA+NON-PROBE: NOT DETECTED
SARS-COV-2 RNA NPH QL NAA+NON-PROBE: NOT DETECTED
SODIUM SERPL-SCNC: 130 MMOL/L (ref 136–145)
SP GR UR STRIP: 1.02 (ref 1–1.03)
SQUAMOUS #/AREA URNS HPF: ABNORMAL /HPF
TOTAL RATE: 0 BREATHS/MINUTE
TROPONIN T % DELTA: 564
TROPONIN T NUMERIC DELTA: 654 NG/L
TROPONIN T SERPL HS-MCNC: 116 NG/L
TROPONIN T SERPL HS-MCNC: 422 NG/L
UROBILINOGEN UR QL STRIP: ABNORMAL
WBC # UR STRIP: ABNORMAL /HPF
WBC NRBC COR # BLD AUTO: 2.67 10*3/MM3 (ref 3.4–10.8)

## 2025-04-17 PROCEDURE — 25010000002 ENOXAPARIN PER 10 MG

## 2025-04-17 PROCEDURE — 99291 CRITICAL CARE FIRST HOUR: CPT | Performed by: INTERNAL MEDICINE

## 2025-04-17 PROCEDURE — 85025 COMPLETE CBC W/AUTO DIFF WBC: CPT | Performed by: EMERGENCY MEDICINE

## 2025-04-17 PROCEDURE — 82375 ASSAY CARBOXYHB QUANT: CPT

## 2025-04-17 PROCEDURE — 96375 TX/PRO/DX INJ NEW DRUG ADDON: CPT

## 2025-04-17 PROCEDURE — 83605 ASSAY OF LACTIC ACID: CPT | Performed by: EMERGENCY MEDICINE

## 2025-04-17 PROCEDURE — 36600 WITHDRAWAL OF ARTERIAL BLOOD: CPT

## 2025-04-17 PROCEDURE — 25010000002 EPINEPHRINE (ANAPHYLAXIS) 1 MG/ML SOLUTION

## 2025-04-17 PROCEDURE — 36415 COLL VENOUS BLD VENIPUNCTURE: CPT

## 2025-04-17 PROCEDURE — 0202U NFCT DS 22 TRGT SARS-COV-2: CPT | Performed by: EMERGENCY MEDICINE

## 2025-04-17 PROCEDURE — 25010000002 ONDANSETRON PER 1 MG: Performed by: INTERNAL MEDICINE

## 2025-04-17 PROCEDURE — 94799 UNLISTED PULMONARY SVC/PX: CPT

## 2025-04-17 PROCEDURE — 25010000002 NA FERRIC GLUC CPLX PER 12.5 MG: Performed by: INTERNAL MEDICINE

## 2025-04-17 PROCEDURE — 80053 COMPREHEN METABOLIC PANEL: CPT | Performed by: EMERGENCY MEDICINE

## 2025-04-17 PROCEDURE — 25010000002 FAMOTIDINE 10 MG/ML SOLUTION: Performed by: INTERNAL MEDICINE

## 2025-04-17 PROCEDURE — P9612 CATHETERIZE FOR URINE SPEC: HCPCS

## 2025-04-17 PROCEDURE — 71045 X-RAY EXAM CHEST 1 VIEW: CPT

## 2025-04-17 PROCEDURE — 83050 HGB METHEMOGLOBIN QUAN: CPT

## 2025-04-17 PROCEDURE — 84484 ASSAY OF TROPONIN QUANT: CPT | Performed by: INTERNAL MEDICINE

## 2025-04-17 PROCEDURE — 87040 BLOOD CULTURE FOR BACTERIA: CPT | Performed by: EMERGENCY MEDICINE

## 2025-04-17 PROCEDURE — 99291 CRITICAL CARE FIRST HOUR: CPT

## 2025-04-17 PROCEDURE — 25010000002 DIPHENHYDRAMINE PER 50 MG: Performed by: INTERNAL MEDICINE

## 2025-04-17 PROCEDURE — 25810000003 SODIUM CHLORIDE 0.9 % SOLUTION: Performed by: EMERGENCY MEDICINE

## 2025-04-17 PROCEDURE — 25010000002 HYDROCORTISONE SOD SUC (PF) 100 MG RECONSTITUTED SOLUTION

## 2025-04-17 PROCEDURE — 81001 URINALYSIS AUTO W/SCOPE: CPT | Performed by: EMERGENCY MEDICINE

## 2025-04-17 PROCEDURE — 82805 BLOOD GASES W/O2 SATURATION: CPT

## 2025-04-17 PROCEDURE — 93005 ELECTROCARDIOGRAM TRACING: CPT | Performed by: EMERGENCY MEDICINE

## 2025-04-17 PROCEDURE — 96365 THER/PROPH/DIAG IV INF INIT: CPT

## 2025-04-17 RX ORDER — SODIUM CHLORIDE 9 MG/ML
20 INJECTION, SOLUTION INTRAVENOUS ONCE
Status: COMPLETED | OUTPATIENT
Start: 2025-04-17 | End: 2025-04-17

## 2025-04-17 RX ORDER — HYDROCORTISONE SODIUM SUCCINATE 100 MG/2ML
INJECTION INTRAMUSCULAR; INTRAVENOUS
Status: COMPLETED
Start: 2025-04-17 | End: 2025-04-17

## 2025-04-17 RX ORDER — ACETAMINOPHEN 325 MG/1
650 TABLET ORAL ONCE
Status: COMPLETED | OUTPATIENT
Start: 2025-04-17 | End: 2025-04-17

## 2025-04-17 RX ORDER — FAMOTIDINE 20 MG/1
40 TABLET, FILM COATED ORAL
Status: DISCONTINUED | OUTPATIENT
Start: 2025-04-17 | End: 2025-04-19 | Stop reason: HOSPADM

## 2025-04-17 RX ORDER — SODIUM CHLORIDE 0.9 % (FLUSH) 0.9 %
10 SYRINGE (ML) INJECTION AS NEEDED
Status: DISCONTINUED | OUTPATIENT
Start: 2025-04-17 | End: 2025-04-19 | Stop reason: HOSPADM

## 2025-04-17 RX ORDER — CETIRIZINE HYDROCHLORIDE 10 MG/1
10 TABLET ORAL ONCE
Status: COMPLETED | OUTPATIENT
Start: 2025-04-17 | End: 2025-04-17

## 2025-04-17 RX ORDER — DIPHENHYDRAMINE HYDROCHLORIDE 50 MG/ML
50 INJECTION, SOLUTION INTRAMUSCULAR; INTRAVENOUS AS NEEDED
Status: COMPLETED | OUTPATIENT
Start: 2025-04-17 | End: 2025-04-17

## 2025-04-17 RX ORDER — EPINEPHRINE 1 MG/ML
INJECTION, SOLUTION INTRAMUSCULAR; SUBCUTANEOUS
Status: COMPLETED
Start: 2025-04-17 | End: 2025-04-17

## 2025-04-17 RX ORDER — HYDROCORTISONE SODIUM SUCCINATE 100 MG/2ML
100 INJECTION INTRAMUSCULAR; INTRAVENOUS AS NEEDED
Status: DISCONTINUED | OUTPATIENT
Start: 2025-04-17 | End: 2025-04-18 | Stop reason: HOSPADM

## 2025-04-17 RX ORDER — ONDANSETRON 2 MG/ML
8 INJECTION INTRAMUSCULAR; INTRAVENOUS ONCE
Status: COMPLETED | OUTPATIENT
Start: 2025-04-17 | End: 2025-04-17

## 2025-04-17 RX ORDER — ENOXAPARIN SODIUM 100 MG/ML
1 INJECTION SUBCUTANEOUS EVERY 12 HOURS
Status: DISCONTINUED | OUTPATIENT
Start: 2025-04-17 | End: 2025-04-19 | Stop reason: HOSPADM

## 2025-04-17 RX ORDER — DIPHENHYDRAMINE HYDROCHLORIDE 50 MG/ML
50 INJECTION, SOLUTION INTRAMUSCULAR; INTRAVENOUS EVERY 6 HOURS PRN
Status: DISCONTINUED | OUTPATIENT
Start: 2025-04-17 | End: 2025-04-19 | Stop reason: HOSPADM

## 2025-04-17 RX ORDER — FAMOTIDINE 10 MG/ML
20 INJECTION, SOLUTION INTRAVENOUS AS NEEDED
Status: COMPLETED | OUTPATIENT
Start: 2025-04-17 | End: 2025-04-17

## 2025-04-17 RX ORDER — FAMOTIDINE 20 MG/1
40 TABLET, FILM COATED ORAL NIGHTLY
Status: DISCONTINUED | OUTPATIENT
Start: 2025-04-17 | End: 2025-04-17

## 2025-04-17 RX ORDER — LEVOTHYROXINE SODIUM 75 UG/1
75 TABLET ORAL
Status: DISCONTINUED | OUTPATIENT
Start: 2025-04-18 | End: 2025-04-19 | Stop reason: HOSPADM

## 2025-04-17 RX ORDER — HYDROCORTISONE SODIUM SUCCINATE 100 MG/2ML
100 INJECTION INTRAMUSCULAR; INTRAVENOUS ONCE
Status: COMPLETED | OUTPATIENT
Start: 2025-04-17 | End: 2025-04-17

## 2025-04-17 RX ADMIN — HYDROCORTISONE SODIUM SUCCINATE 100 MG: 100 INJECTION, POWDER, FOR SOLUTION INTRAMUSCULAR; INTRAVENOUS at 11:40

## 2025-04-17 RX ADMIN — ONDANSETRON 8 MG: 2 INJECTION INTRAMUSCULAR; INTRAVENOUS at 11:11

## 2025-04-17 RX ADMIN — CETIRIZINE HYDROCHLORIDE 10 MG: 10 TABLET, FILM COATED ORAL at 08:47

## 2025-04-17 RX ADMIN — SODIUM CHLORIDE 20 ML/HR: 9 INJECTION, SOLUTION INTRAVENOUS at 09:31

## 2025-04-17 RX ADMIN — HYDROCORTISONE SODIUM SUCCINATE 100 MG: 100 INJECTION INTRAMUSCULAR; INTRAVENOUS at 11:40

## 2025-04-17 RX ADMIN — SODIUM CHLORIDE 2000 ML: 9 INJECTION, SOLUTION INTRAVENOUS at 11:57

## 2025-04-17 RX ADMIN — EPINEPHRINE 0.3 MG: 1 INJECTION INTRAMUSCULAR; INTRAVENOUS; SUBCUTANEOUS at 11:37

## 2025-04-17 RX ADMIN — SODIUM CHLORIDE 1000 ML: 9 INJECTION, SOLUTION INTRAVENOUS at 13:02

## 2025-04-17 RX ADMIN — FAMOTIDINE 20 MG: 10 INJECTION, SOLUTION INTRAVENOUS at 11:05

## 2025-04-17 RX ADMIN — EPINEPHRINE 0.3 MG: 1 INJECTION INTRAMUSCULAR; INTRAVENOUS; SUBCUTANEOUS at 11:47

## 2025-04-17 RX ADMIN — FAMOTIDINE 40 MG: 20 TABLET, FILM COATED ORAL at 18:23

## 2025-04-17 RX ADMIN — ENOXAPARIN SODIUM 70 MG: 80 INJECTION SUBCUTANEOUS at 18:23

## 2025-04-17 RX ADMIN — SODIUM CHLORIDE 125 MG: 9 INJECTION, SOLUTION INTRAVENOUS at 09:35

## 2025-04-17 RX ADMIN — DIPHENHYDRAMINE HYDROCHLORIDE 50 MG: 50 INJECTION INTRAMUSCULAR; INTRAVENOUS at 11:06

## 2025-04-17 RX ADMIN — ACETAMINOPHEN 650 MG: 325 TABLET, FILM COATED ORAL at 08:47

## 2025-04-17 NOTE — NURSING NOTE
Patient received fifth dose of Ferrlecit today in outpatient infusion. Within a couple minutes of completion of ferrlecit patient became notably restless, anxious, and reported tongue numbness, nausea and lightheadedness. Patient stated she felt like she was going to pass out. Patient increasingly became hypotensive and displayed signs of hypoxia with oxygen saturation in the 80s which improved with NC O2. O2 3L/NC. NS infusion bolus started. Rapid Response called as patient continued to report tightness in throat and difficulty breathing. Emergency meds given as ordered prn including IV benadryl and pepcid. Solumedrol held due to documented allergy causing anaphylaxis. Dr. Johnson at chairside and orders given to Mila Arguello, RN and Todd Mcneal, Pharmacist to give patient dose of Epinephrine IV due to patient having respiratory distress. IV Zofran given for nausea. Total of 2 doses of IV Epinephrine given to patient. Patient was then taken via stretcher to the ER. Spouse present and supportive. Staff RN accompanied the RRT and verbal report given to ER NEYMAR Casanova.

## 2025-04-17 NOTE — SIGNIFICANT NOTE
Patient's troponin levels came back elevated 116. Repeat troponin 770. Pt currently asymptomatic.  Patient recently had cardiac workup including coronary angiogram which did not reveal any significant epicardial coronary disease.  Patient had moderate plaque burden.  EF was normal.  Repeat EKG does not show any acute ST changes.  At this point given significant troponin elevation will treated as non-ST elevation MI.  Will place her on Lovenox full dose.  I discussed with her about her allergy to NSAIDs and apparently she gets angioedema.  Previously her cardiologist has not wanted her on aspirin.  We will hold off that.  She is severely allergic to statins as well.  She does take Praluent injection.  Will get echocardiogram in the morning.  Will have cardiology team evaluated the patient as well for risk stratification.  Reviewed all that in detail with the patient.  Questions answered.

## 2025-04-17 NOTE — ED PROVIDER NOTES
Subjective   History of Present Illness  64-year-old female who was brought from infusion clinic for evaluation of suspected allergic reaction.  The patient received her fifth round of iron infusion today.  She has a history of scleroderma and actually received an IVIG infusion yesterday at [place].  She denied any reactions or symptoms yesterday into the day.  The symptoms began relatively soon after the iron infusion today.  After the infusion the patient began to appear pale, expressed shortness of breath, had a low blood pressure with systolics into the 60s, and felt like her airway was closing off and she was having difficulty breathing.  Upon arrival here to the ER she is somnolent but can talk and answer questions.  She is supporting her airway.  Ox saturations are normal.  She was observed to take 2 or 3 regular, but relatively shallow breaths before she would take a deep breath with somewhat of a gagging sound in the posterior throat.  The  himself reports that he has heard that sound before when she appears to be choking while eating.  This is not uncommon for her.  However she does not have a history of sleep apnea or other breathing/respiratory issues.  She did receive 2 doses of IV epinephrine prior to arrival to ER.  Upon arrival here the ER we gave her 2 doses of IM epinephrine and 2 L of IV fluid.  After roughly an hour observation her blood pressure had improved greater than 100 systolic and her appearance and mentation had likewise improved.  The airway continued to be stable and well protected throughout the ER course.    No recent reported fever or infectious symptoms.  No complaints of chest pain abdominal pain or change in bowel or urinary function.      Review of Systems   Constitutional:  Positive for activity change and fatigue. Negative for chills and fever.   HENT:  Negative for congestion, ear pain, postnasal drip, sinus pressure and sore throat.    Eyes:  Negative for pain,  redness and visual disturbance.   Respiratory:  Positive for shortness of breath and stridor. Negative for cough and chest tightness.    Cardiovascular:  Negative for chest pain, palpitations and leg swelling.   Gastrointestinal:  Negative for abdominal pain, anal bleeding, blood in stool, diarrhea, nausea and vomiting.   Endocrine: Negative for polydipsia and polyuria.   Genitourinary:  Negative for difficulty urinating, dysuria, frequency and urgency.   Musculoskeletal:  Negative for arthralgias, back pain and neck pain.   Skin:  Positive for pallor. Negative for rash.   Allergic/Immunologic: Negative for environmental allergies and immunocompromised state.   Neurological:  Positive for light-headedness. Negative for dizziness, weakness and headaches.   Hematological:  Negative for adenopathy.   Psychiatric/Behavioral:  Positive for confusion and decreased concentration. Negative for self-injury and suicidal ideas. The patient is not nervous/anxious.    All other systems reviewed and are negative.      Past Medical History:   Diagnosis Date    Anemia 2021    Autoimmune disease     Cholelithiasis Removed 2008    Colon polyp 2022    Coronary artery disease involving native coronary artery of native heart 4/18/2025    CT coronary angiogram (3/12/2025): Total calcium score 109.  Mild nonobstructive CAD  Cardiac cath for NSTEMI (4/18/2025): No flow limiting coronary disease.  30-40% RCA, OM1 and LAD disease.  Findings consistent with Takotsubo/stress cardiomyopathy    COVID-19     Cutaneous lupus erythematosus     Diverticulosis 2010?    Fibrocystic breast changes, bilateral     GERD (gastroesophageal reflux disease)     not currently taking medication    Gestational diabetes     Glaucoma     Hyperlipidemia     Hypertension     Hypothyroidism     Lower extremity edema     Neuromuscular disorder 2020    Non-STEMI (non-ST elevated myocardial infarction) 4/17/2025    OAB (overactive bladder)     Osteoarthritis      "Palpitations     Post-menopause on HRT (hormone replacement therapy)     Raynaud's disease     Scleroderma     SINE scleroderma    Sjogren's disease     Urinary tract infection        Allergies   Allergen Reactions    Cefaclor Shortness Of Breath     hives    Ibuprofen Angioedema     Tongue swelling    Naproxen Sodium Angioedema     tongue swelling    Nsaids Angioedema    Prednisone Anaphylaxis and Other (See Comments)     Allergic to all steroids!  Eye pressure shoots up to dangerous levels. Also had some throat swelling.    Alphagan [Brimonidine] Other (See Comments)     Eyes itching, red, swollen    Atenolol Other (See Comments)     Fatigue and acid reflux    Doxycycline Nausea And Vomiting    E-Mycin [Erythromycin] Nausea And Vomiting     Abdominal cramping    Livalo [Pitavastatin] Myalgia    Statins Myalgia    Dorzolamide Hcl Other (See Comments)     Eyes burning, discharge.    Adhesive Tape Rash     \"burns, eats through my skin\"       Past Surgical History:   Procedure Laterality Date    CHOLECYSTECTOMY      COLONOSCOPY  2019 and 2022    ESOPHAGOSCOPY / EGD      esophageal stretching/dilation and gastric polypectomy, esophageal biopsies    EYE SURGERY  2012    Cataracts removed - lens replacement    LAPAROSCOPIC ASSISTED VAGINAL HYSTERECTOMY SALPINGO OOPHORECTOMY Bilateral     LAPAROSCOPIC DIAZ PROCEDURE      OOPHORECTOMY      OTHER SURGICAL HISTORY      Lap for endometriosis    TONSILLECTOMY      UPPER GASTROINTESTINAL ENDOSCOPY  ?       Family History   Problem Relation Age of Onset    Other Mother         has a pacemaker and is followed by Dr. Brooks    Heart disease Mother     Heart failure Mother     Hypertension Mother     Diabetes Mother     Vision loss Mother         Glaucoma/macular degeneration    Arthritis Mother     Glaucoma Father     Other Father          likely a cancer-related death;    Heart disease Father     Hypertension Father     Cancer Father         Prostate    Vision loss Father     "     Glaucoma/macular degeneration    Asthma Brother     Hypertension Brother     Heart attack Maternal Grandfather     Heart disease Maternal Grandfather     Heart failure Maternal Grandfather     Heart disease Maternal Grandmother     Hypertension Maternal Grandmother     Hypertension Paternal Grandmother     Colon cancer Paternal Grandfather     Cancer Paternal Grandfather         Prostate    Breast cancer Neg Hx     Ovarian cancer Neg Hx        Social History     Socioeconomic History    Marital status:    Tobacco Use    Smoking status: Never     Passive exposure: Never    Smokeless tobacco: Never   Vaping Use    Vaping status: Never Used   Substance and Sexual Activity    Alcohol use: No    Drug use: No    Sexual activity: Yes     Partners: Male     Birth control/protection: Post-menopausal, Hysterectomy           Objective   Physical Exam  Vitals and nursing note reviewed.   Constitutional:       General: She is in acute distress.      Appearance: Normal appearance. She is ill-appearing, toxic-appearing and diaphoretic.   HENT:      Head: Normocephalic and atraumatic.      Right Ear: External ear normal.      Left Ear: External ear normal.      Nose: Nose normal.   Eyes:      General: Lids are normal.      Pupils: Pupils are equal, round, and reactive to light.   Neck:      Trachea: No tracheal deviation.   Cardiovascular:      Rate and Rhythm: Normal rate and regular rhythm.      Pulses: No decreased pulses.      Heart sounds: Normal heart sounds. No murmur heard.     No friction rub. No gallop.   Pulmonary:      Effort: Pulmonary effort is normal. No respiratory distress.      Breath sounds: Normal breath sounds. No decreased breath sounds, wheezing, rhonchi or rales.   Abdominal:      General: Bowel sounds are normal.      Palpations: Abdomen is soft.      Tenderness: There is no abdominal tenderness. There is no guarding or rebound.   Musculoskeletal:         General: No deformity. Normal range of  motion.      Cervical back: Normal range of motion and neck supple.   Lymphadenopathy:      Cervical: No cervical adenopathy.   Skin:     General: Skin is cool and dry.      Coloration: Skin is pale.      Findings: No rash.   Neurological:      Mental Status: She is lethargic, disoriented and confused.      GCS: GCS eye subscore is 4. GCS verbal subscore is 4. GCS motor subscore is 6.      Cranial Nerves: No cranial nerve deficit.      Sensory: No sensory deficit.   Psychiatric:         Speech: Speech normal.         Behavior: Behavior normal.         Thought Content: Thought content normal.         Judgment: Judgment normal.         Critical Care    Performed by: Naila Redmond MD  Authorized by: Naila Redmond MD    Critical care provider statement:     Critical care time (minutes):  60    Critical care time was exclusive of:  Separately billable procedures and treating other patients    Critical care was necessary to treat or prevent imminent or life-threatening deterioration of the following conditions:  Respiratory failure, CNS failure or compromise and shock    Critical care was time spent personally by me on the following activities:  Development of treatment plan with patient or surrogate, discussions with consultants, evaluation of patient's response to treatment, blood draw for specimens, examination of patient, obtaining history from patient or surrogate, ordering and performing treatments and interventions, ordering and review of laboratory studies, ordering and review of radiographic studies, pulse oximetry, re-evaluation of patient's condition and review of old charts    I assumed direction of critical care for this patient from another provider in my specialty: no      Care discussed with: admitting provider               ED Course                                                         Medical Decision Making  Differential diagnosis includes allergic reaction, anaphylaxis, acute  infectious process, other unspecified etiology.    The patient presents with significant hypotension altered mental status and increase work of breathing and concern for upper airway compromise after receiving IV infusion.    Labs show normal H&H, decreased white blood cell count, negative viral respiratory panel.  Urine that does not appear consistent with infection.  Lactic acid level was elevated, and troponin was elevated on initial evaluation.    The patient was noted to be significantly hypotensive with systolics in the 50s and 60s.  Her systolics remained in the upper 70s into the 80s.  Mean pressures remain between 65-75.    The patient was pale, and initially expressed difficulty breathing.  As the blood pressure improved the patient's color improved and the patient's respiratory status improved.  Oxygen saturations remained normal.    Chest x-ray interpreted by myself shows normal heart size and clear lungs.    The patient was discussed with the intensivist who will consult for admission.      Problems Addressed:  Allergic reaction, initial encounter: complicated acute illness or injury with systemic symptoms that poses a threat to life or bodily functions  Anaphylaxis, initial encounter: complicated acute illness or injury with systemic symptoms that poses a threat to life or bodily functions  Hypotension, unspecified hypotension type: complicated acute illness or injury with systemic symptoms that poses a threat to life or bodily functions    Amount and/or Complexity of Data Reviewed  Independent Historian: spouse     Details:  and staff nurse from infusion clinic helps provide history.  External Data Reviewed: labs, radiology, ECG and notes.  Labs: ordered. Decision-making details documented in ED Course.  Radiology: ordered and independent interpretation performed. Decision-making details documented in ED Course.  ECG/medicine tests: ordered and independent interpretation performed.  Decision-making details documented in ED Course.     Details: EKG performed 4/17/2025 at 1204 interpreted by myself shows sinus rhythm, mild diffuse ST elevation with ST depression in aVR which be concerning for pericarditis.    Risk  Prescription drug management.  Decision regarding hospitalization.        Final diagnoses:   Allergic reaction, initial encounter   Hypotension, unspecified hypotension type   Anaphylaxis, initial encounter       ED Disposition  ED Disposition       ED Disposition   Decision to Admit    Condition   --    Comment   Level of Care: Critical Care [6]   Diagnosis: Hypotension [027533]   Certification: I Certify That Inpatient Hospital Services Are Medically Necessary For Greater Than 2 Midnights                 Dinorah Oates,   [address] [facility] [address]  [phone]      1 week         Medication List        PAUSE taking these medications      amLODIPine 2.5 MG tablet  Wait to take this until your doctor or other care provider tells you to start again.  Commonly known as: NORVASC     NON FORMULARY  Wait to take this until your doctor or other care provider tells you to start again.     telmisartan 80 MG tablet  Wait to take this until your doctor or other care provider tells you to start again.  Commonly known as: MICARDIS  TAKE 1 TABLET BY MOUTH EVERY DAY            New Prescriptions      amoxicillin-clavulanate 875-125 MG per tablet  Commonly known as: AUGMENTIN  Take 1 tablet by mouth Every 12 (Twelve) Hours for 5 doses. Indications: UNCOMPLICATED CYSTITIS            Changed      famotidine 40 MG tablet  Commonly known as: PEPCID  TAKE 1 TABLET BY MOUTH EVERY DAY AT NIGHT AS NEEDED FOR HEARTBURN  What changed: See the new instructions.     fluocinonide 0.05 % gel  Commonly known as: LIDEX  What changed: See the new instructions.     levothyroxine 75 MCG tablet  Commonly known as: SYNTHROID, LEVOTHROID  Take 1 tablet by mouth Every Other Day.  What changed: Another  medication with the same name was removed. Continue taking this medication, and follow the directions you see here.     mycophenolate 500 MG tablet  Commonly known as: CELLCEPT  What changed: See the new instructions.     Vitamin D3 50 MCG (2000 UT) tablet  What changed: how much to take               Where to Get Your Medications        These medications were sent to [id number] - [place], [place] - [address] - [phone] [facility] [phone]   [address] [address]      Phone: [phone]   amoxicillin-clavulanate 875-125 MG per tablet  levothyroxine 75 MCG tablet            Naila Redmond MD  04/19/25 [id number]

## 2025-04-17 NOTE — H&P
Intensive Care Admission Note     Hypotension    History of Present Illness     Pleasant 64-year-old female presented emergency room from infusion center where she was getting IV iron infusion.  Patient has previous diagnosis of iron deficiency anemia for which she gets iron infusions.  She was post to get 6 infusions and this was her fifth infusion.  She gets ferric gluconate infusion.  Previously for infusions she has tolerated well.  She also has autoimmune disease with limited scleroderma as well as Sjogren syndrome, hypertension, dyslipidemia, GERD, hypothyroidism.  She also is seen at [facility] rheumatology where she gets Gammagard infusion every 2 weeks apparently to preserve her muscle function.  She received that infusion yesterday and she had no problems with that infusion.  She was in usual state of health up until she came in today morning for the infusion.  During the infusion she broke out in cold sweat and she started feeling numbness and tingling on her face as well as tongue and felt like her throat is going to close up.  She also felt chest discomfort without any radiation.  She was also starting to feel short of breath.  She denies any skin rash or hives.  She felt nauseous and felt that she threw up but nothing came up.  She became very weak and lethargic and was hypotensive.  She was transferred to ER where she was given epinephrine IV x 2, epinephrine IM x 2, hydrocortisone 100 mg along with Benadryl.  Patient was given 3 L of fluid boluses due to ongoing hypotension.  Despite this fluid boluses whenever the fluid is turned off her blood pressure drops into low 80s again.  Due to concern for ongoing hypotension we were asked to admit patient to intensive care unit.    Patient was seen at bedside.  Currently her blood pressure is 82/60.  She is mentating well.  She feels that she is starting to feel better.  She denies any shortness of breath currently.  Denies any chest pain.   Denies any shortness of breath currently.   is at bedside and states that patient looks better now and less lethargic as compared to before.    Problem List, Surgical History, Family, Social History, and ROS     Past Medical History:   Diagnosis Date    Anemia 2021    Autoimmune disease     Cholelithiasis Removed 2008    Colon polyp 2022    COVID-19     Cutaneous lupus erythematosus     Diverticulosis 2010?    Fibrocystic breast changes, bilateral     GERD (gastroesophageal reflux disease)     not currently taking medication    Gestational diabetes     Glaucoma     Hyperlipidemia     Hypertension     Hypothyroidism     Lower extremity edema     Neuromuscular disorder 2020    OAB (overactive bladder)     Osteoarthritis     Palpitations     Post-menopause on HRT (hormone replacement therapy)     Raynaud's disease     Scleroderma     SINE scleroderma    Sjogren's disease     Urinary tract infection       Past Surgical History:   Procedure Laterality Date    CHOLECYSTECTOMY      COLONOSCOPY  2019 and 2022    ESOPHAGOSCOPY / EGD      esophageal stretching/dilation and gastric polypectomy, esophageal biopsies    EYE SURGERY  2012    Cataracts removed - lens replacement    LAPAROSCOPIC ASSISTED VAGINAL HYSTERECTOMY SALPINGO OOPHORECTOMY Bilateral     LAPAROSCOPIC DIAZ PROCEDURE      OOPHORECTOMY      OTHER SURGICAL HISTORY      Lap for endometriosis    TONSILLECTOMY      UPPER GASTROINTESTINAL ENDOSCOPY  ?       Allergies   Allergen Reactions    Cefaclor Shortness Of Breath     hives    Naproxen Sodium Other (See Comments)     tongue swelling    Nsaids Angioedema    Prednisone Anaphylaxis and Other (See Comments)     Allergic to all steroids!  Eye pressure shoots up to dangerous levels.    Alphagan [Brimonidine] Other (See Comments)     Eyes itching, red, swollen    Atenolol Other (See Comments)     fatigue    Livalo [Pitavastatin] Myalgia    Statins Myalgia    Dorzolamide Hcl Other (See Comments)     Eyes burning,  "discharge.    Dorzolamide Hcl-Timolol Mal Unknown - High Severity    Doxycycline Unknown - High Severity    Ibuprofen Swelling    Macrolides And Ketolides Provider Review Needed    Adhesive Tape Rash     \"burns, eats through my skin\"    E-Mycin [Erythromycin] Nausea And Vomiting    Levofloxacin Unknown (See Comments)     Current Facility-Administered Medications on File Prior to Encounter   Medication    [COMPLETED] acetaminophen (TYLENOL) tablet 650 mg    [COMPLETED] cetirizine (zyrTEC) tablet 10 mg    [COMPLETED] diphenhydrAMINE (BENADRYL) injection 50 mg    [COMPLETED] famotidine (PEPCID) injection 20 mg    [COMPLETED] ferric gluconate (FERRLECIT)125 MG in sodium chloride 0.9 % 100 mL IVPB    Hydrocortisone Sod Suc (PF) (Solu-CORTEF) injection 100 mg    [COMPLETED] ondansetron (ZOFRAN) injection 8 mg    [COMPLETED] sodium chloride 0.9 % infusion     Current Outpatient Medications on File Prior to Encounter   Medication Sig    Alirocumab (PRALUENT) 150 MG/ML injection pen Inject 1 mL under the skin into the appropriate area as directed Every 14 (Fourteen) Days.    amLODIPine (NORVASC) 2.5 MG tablet Take 1 tablet by mouth Daily.    Cholecalciferol (Vitamin D3) 50 MCG (2000 UT) tablet Take  by mouth Daily.    dexlansoprazole (DEXILANT) 30 MG capsule TAKE 1 CAPSULE BY MOUTH EVERY DAY    estradiol 1 MG/GM gel Place 1 Application on the skin as directed by provider Daily.    famotidine (PEPCID) 40 MG tablet TAKE 1 TABLET BY MOUTH EVERY DAY AT NIGHT AS NEEDED FOR HEARTBURN    fluocinonide (LIDEX) 0.05 % gel Apply to affected area sparingly twice a day for 7-10 days. NPO 30 min (Patient taking differently: As needed)    hydrALAZINE (APRESOLINE) 10 MG tablet Take 1 tablet by mouth As Needed (Blood pressure sustained >160mmHg).    Immune Globulin, Human, (GAMMAGARD IV) Infuse 1 dose into a venous catheter Every 14 (Fourteen) Days.    levothyroxine (SYNTHROID, LEVOTHROID) 75 MCG tablet TAKE 1 TABLET BY MOUTH EVERY OTHER DAY "    levothyroxine (SYNTHROID, LEVOTHROID) 88 MCG tablet TAKE 1 TABLET BY MOUTH EVERY OTHER DAY    mupirocin (BACTROBAN) 2 % ointment Apply  topically to the appropriate area as directed 2 (Two) Times a Day. (Patient taking differently: Apply  topically to the appropriate area as directed 2 (Two) Times a Day. As needed)    mycophenolate (CELLCEPT) 500 MG tablet TAKE 1 TABLET BY MOUTH 3 TIMES A DAY AND TAKE 1 TABLET AT NIGHT    NON FORMULARY Iron IV    polyethylene glycol (MIRALAX) powder As Needed.    Roflumilast (Zoryve) 0.3 % cream Apply 1 application  topically to the appropriate area as directed Daily. (Patient taking differently: Apply 1 application  topically to the appropriate area as directed Daily. As needed)    telmisartan (MICARDIS) 80 MG tablet TAKE 1 TABLET BY MOUTH EVERY DAY    timolol (TIMOPTIC) 0.5 % ophthalmic solution Administer 1 drop to both eyes 2 (Two) Times a Day.    travoprost, BAK free, (TRAVATAN) 0.004 % solution ophthalmic solution 1 drop Every Evening. in affected eye(s)    Zinc 50 MG tablet Take 50 mcg by mouth Daily.     MEDICATION LIST AND ALLERGIES REVIEWED.    Family History   Problem Relation Age of Onset    Other Mother         has a pacemaker and is followed by Dr. Brooks    Heart disease Mother     Heart failure Mother     Hypertension Mother     Diabetes Mother     Vision loss Mother         Glaucoma/macular degeneration    Arthritis Mother     Glaucoma Father     Other Father          likely a cancer-related death;    Heart disease Father     Hypertension Father     Cancer Father         Prostate    Vision loss Father         Glaucoma/macular degeneration    Asthma Brother     Hypertension Brother     Heart attack Maternal Grandfather     Heart disease Maternal Grandfather     Heart failure Maternal Grandfather     Heart disease Maternal Grandmother     Hypertension Maternal Grandmother     Hypertension Paternal Grandmother     Colon cancer Paternal Grandfather     Cancer  "Paternal Grandfather         Prostate    Breast cancer Neg Hx     Ovarian cancer Neg Hx      Social History     Tobacco Use    Smoking status: Never     Passive exposure: Never    Smokeless tobacco: Never   Vaping Use    Vaping status: Never Used   Substance Use Topics    Alcohol use: No    Drug use: No     Social History     Social History Narrative    Not on file     FAMILY AND SOCIAL HISTORY REVIEWED.    Review of Systems  ALL OTHER SYSTEMS REVIEWED AND ARE NEGATIVE.    Physical Exam and Clinical Information   BP (!) 88/62   Pulse 64   Temp 97.6 °F (36.4 °C) (Oral)   Resp 20   Ht 157.5 cm (62\")   Wt 66.7 kg (147 lb)   LMP  (LMP Unknown)   SpO2 100%   BMI 26.89 kg/m²   Physical Exam  General Appearance: Awake, alert, in no acute distress  Head:    Atraumatic, Normocephalic, without obvious abnormality, Pupils reactive & symmetrical B/L.  Neck:   Supple   Lungs:   B/L Breath sounds present with decreased breath sounds on bases, no wheezing heard, no crackles.   Heart: S1 and S2 present, no murmur  Abdomen: Soft, nontender, no guarding or rigidity, bowel sounds positive.  Extremities:  no edema, warm to touch. No skin rash noted.   Pulses: Positive and symmetric.  Neurologic:  Moving all four extremities. Good strength bilaterally. Not able to participate in full neurological exam  Psychological: Normal affect, Cooperative    Results from last 7 days   Lab Units 04/17/25  1209   WBC 10*3/mm3 2.67*   HEMOGLOBIN g/dL 12.7   PLATELETS 10*3/mm3 281     Results from last 7 days   Lab Units 04/17/25  1209   SODIUM mmol/L 130*   POTASSIUM mmol/L 3.9   CO2 mmol/L 19.0*   BUN mg/dL 9   CREATININE mg/dL 0.72   GLUCOSE mg/dL 120*     Estimated Creatinine Clearance: 70.7 mL/min (by C-G formula based on SCr of 0.72 mg/dL).      Results from last 7 days   Lab Units 04/17/25  1214   PH, ARTERIAL pH units 7.273*   PCO2, ARTERIAL mm Hg 47.3*   PO2 ART mm Hg 22.9*     Lab Results   Component Value Date    LACTATE 3.0 (C) " 04/17/2025        Images: EKG reviewed and showed normal sinus rhythm.  Mild ST elevation noted in lead I, 2, V5 and 6 without any concomitant ST depressions.  Possible early repolarization.    Chest x-ray reviewed personally and I do not see any active infiltrate.  No pulmonary vascular congestion.  No pleural effusions.    I reviewed the patient's results and images.     Impression     Hypotension    Glaucoma    Hypothyroidism    GERD (gastroesophageal reflux disease)    Sjogren syndrome with myopathy    Scleroderma    Atypical chest pain    Hyponatremia    Chronic anemia    Iron deficiency anemia    Plan/Recommendations     1.  Patient presented here with hypotension while getting IV iron infusion.  Patient has had these infusions before.  Unclear etiology but likely anaphylactic reaction from the infusion.  Denies any ongoing complaints prior to coming in for infusion.  States that normally her blood pressure runs high and recently her blood pressure medicines had been adjusted.  Received IVIG infusion yesterday at [facility] but she did not have any issues with that.  Patient has been given Benadryl, steroids, epinephrine.  Clinically looking little better.  I do not see any skin hives or any evidence of angioedema.  Will clinically monitor for now.  2.  Patient has been given treatment with epinephrine, Benadryl, steroids.  Since patient's symptoms are improving I will clinically monitor for now.  3.  Patient has been given multiple fluid boluses for hypotension.  Mean arterial pressure is greater than 65.  Patient is mentating well.  Will clinically monitor and if pressure drops below will consider further fluid administration or epinephrine infusion.  If she becomes hypotensive again then will have low threshold of starting scheduled steroid therapy otherwise we will hold for now.  4.  As needed Benadryl.  Pepcid 40 mg twice daily.  5.  Mild lactic acidosis likely secondary to hypotension from Anaphylaxis  rather than sepsis picture at this point.  Blood cultures have been obtained by ER.  Will follow.  6.  EKG suggestive of early repolarization abnormality.  Due to chest discomfort and pain I will check troponin level to make sure there is no cardiac injury.  7.  For hypothyroidism continue levothyroxine.  8.  Keep n.p.o. for now.  If she does well and improves further in next few hours will start on oral diet.  9.  Hold other home medications tonight.    Thank you for allowing me to participate in the care of this patient.  I will follow her closely.      Time spent Critical care 30 min (exclusive of procedure time)  including high complexity decision making to assess, manipulate, and support vital organ system failure in this individual who has impairment of one or more vital organ systems such that there is a high probability of imminent or life threatening deterioration in the patient’s condition.      Tim Chu MD, [facility]  Pulmonary and Critical Care Medicine  04/17/25 14:26 EDT     CC: Dinorah Oates DO

## 2025-04-18 ENCOUNTER — APPOINTMENT (OUTPATIENT)
Dept: CARDIOLOGY | Facility: HOSPITAL | Age: 65
DRG: 916 | End: 2025-04-18
Payer: COMMERCIAL

## 2025-04-18 PROBLEM — I21.4 NON-STEMI (NON-ST ELEVATED MYOCARDIAL INFARCTION): Status: RESOLVED | Noted: 2025-04-17 | Resolved: 2025-04-18

## 2025-04-18 PROBLEM — R07.9 CHEST PAIN OF UNCERTAIN ETIOLOGY: Status: RESOLVED | Noted: 2018-07-20 | Resolved: 2025-04-18

## 2025-04-18 PROBLEM — I24.89 DEMAND ISCHEMIA: Status: ACTIVE | Noted: 2025-04-18

## 2025-04-18 PROBLEM — M25.569 ARTHRALGIA OF KNEE: Status: RESOLVED | Noted: 2022-04-26 | Resolved: 2025-04-18

## 2025-04-18 PROBLEM — I25.10 CORONARY ARTERY DISEASE INVOLVING NATIVE CORONARY ARTERY OF NATIVE HEART: Status: ACTIVE | Noted: 2025-04-18

## 2025-04-18 PROBLEM — M34.82: Status: RESOLVED | Noted: 2022-04-26 | Resolved: 2025-04-18

## 2025-04-18 PROBLEM — D72.819 LEUKOPENIA: Status: RESOLVED | Noted: 2025-02-28 | Resolved: 2025-04-18

## 2025-04-18 PROBLEM — I51.81 STRESS-INDUCED CARDIOMYOPATHY: Status: ACTIVE | Noted: 2025-04-18

## 2025-04-18 PROBLEM — D64.9 CHRONIC ANEMIA: Status: RESOLVED | Noted: 2023-09-06 | Resolved: 2025-04-18

## 2025-04-18 PROBLEM — T78.2XXA ANAPHYLACTIC REACTION: Status: ACTIVE | Noted: 2025-04-18

## 2025-04-18 PROBLEM — R53.82 CHRONIC FATIGUE: Status: RESOLVED | Noted: 2019-06-13 | Resolved: 2025-04-18

## 2025-04-18 PROBLEM — R06.09 DOE (DYSPNEA ON EXERTION): Status: RESOLVED | Noted: 2018-07-20 | Resolved: 2025-04-18

## 2025-04-18 PROBLEM — R07.89 ATYPICAL CHEST PAIN: Status: RESOLVED | Noted: 2023-09-06 | Resolved: 2025-04-18

## 2025-04-18 PROBLEM — R55 NEAR SYNCOPE: Status: RESOLVED | Noted: 2023-09-06 | Resolved: 2025-04-18

## 2025-04-18 PROBLEM — R55 SYNCOPE: Status: RESOLVED | Noted: 2023-09-06 | Resolved: 2025-04-18

## 2025-04-18 PROBLEM — I21.4 NON-STEMI (NON-ST ELEVATED MYOCARDIAL INFARCTION): Status: ACTIVE | Noted: 2025-04-17

## 2025-04-18 LAB
ANION GAP SERPL CALCULATED.3IONS-SCNC: 9 MMOL/L (ref 5–15)
ASCENDING AORTA: 3 CM
BASOPHILS # BLD AUTO: 0.01 10*3/MM3 (ref 0–0.2)
BASOPHILS NFR BLD AUTO: 0.2 % (ref 0–1.5)
BH CV ECHO MEAS - 2D AUTO EF SIEMENS: 58.9 %
BH CV ECHO MEAS - AI P1/2T: 543 MSEC
BH CV ECHO MEAS - AO ROOT DIAM: 3 CM
BH CV ECHO MEAS - IVS/LVPW: 0.73 CM
BH CV ECHO MEAS - IVSD: 0.8 CM
BH CV ECHO MEAS - LA DIMENSION: 3.6 CM
BH CV ECHO MEAS - LAT PEAK E' VEL: 14 CM/SEC
BH CV ECHO MEAS - LV MAX PG: 4.5 MMHG
BH CV ECHO MEAS - LV MEAN PG: 2.05 MMHG
BH CV ECHO MEAS - LV V1 MAX: 105.9 CM/SEC
BH CV ECHO MEAS - LV V1 VTI: 21.3 CM
BH CV ECHO MEAS - LVIDD: 5 CM
BH CV ECHO MEAS - LVIDS: 3.1 CM
BH CV ECHO MEAS - LVOT AREA: 3.1 CM2
BH CV ECHO MEAS - LVOT DIAM: 2 CM
BH CV ECHO MEAS - LVPWD: 1.1 CM
BH CV ECHO MEAS - MED PEAK E' VEL: 11.8 CM/SEC
BH CV ECHO MEAS - PA ACC TIME: 0.14 SEC
BH CV ECHO MEAS - PI END-D VEL: 97 CM/SEC
BH CV ECHO MEAS - SV(LVOT): 66.9 ML
BH CV ECHO MEAS - SVI(LVOT): 40 ML/M2
BH CV ECHO MEAS - TAPSE (>1.6): 2.07 CM
BH CV ECHO MEAS - TR MAX PG: 19.8 MMHG
BH CV ECHO MEAS - TR MAX VEL: 221.6 CM/SEC
BH CV VAS BP RIGHT ARM: NORMAL MMHG
BH CV XLRA - RV BASE: 2.9 CM
BH CV XLRA - RV LENGTH: 5.7 CM
BH CV XLRA - RV MID: 3.4 CM
BH CV XLRA - TDI S': 12.7 CM/SEC
BUN SERPL-MCNC: 8 MG/DL (ref 8–23)
BUN/CREAT SERPL: 12.5 (ref 7–25)
CALCIUM SPEC-SCNC: 8 MG/DL (ref 8.6–10.5)
CHLORIDE SERPL-SCNC: 105 MMOL/L (ref 98–107)
CO2 SERPL-SCNC: 19 MMOL/L (ref 22–29)
CREAT SERPL-MCNC: 0.64 MG/DL (ref 0.57–1)
DEPRECATED RDW RBC AUTO: 48.9 FL (ref 37–54)
EGFRCR SERPLBLD CKD-EPI 2021: 98.8 ML/MIN/1.73
EOSINOPHIL # BLD AUTO: 0 10*3/MM3 (ref 0–0.4)
EOSINOPHIL NFR BLD AUTO: 0 % (ref 0.3–6.2)
ERYTHROCYTE [DISTWIDTH] IN BLOOD BY AUTOMATED COUNT: 14.9 % (ref 12.3–15.4)
GLUCOSE SERPL-MCNC: 86 MG/DL (ref 65–99)
HCT VFR BLD AUTO: 28.4 % (ref 34–46.6)
HGB BLD-MCNC: 9.5 G/DL (ref 12–15.9)
IMM GRANULOCYTES # BLD AUTO: 0.01 10*3/MM3 (ref 0–0.05)
IMM GRANULOCYTES NFR BLD AUTO: 0.2 % (ref 0–0.5)
IVRT: 91 MS
LEFT ATRIUM VOLUME INDEX: 38.7 ML/M2
LV EF 2D ECHO EST: 60 %
LV EF ANGIOGRAM EST: 55 %
LYMPHOCYTES # BLD AUTO: 0.79 10*3/MM3 (ref 0.7–3.1)
LYMPHOCYTES NFR BLD AUTO: 18 % (ref 19.6–45.3)
MAGNESIUM SERPL-MCNC: 2 MG/DL (ref 1.6–2.4)
MCH RBC QN AUTO: 30.4 PG (ref 26.6–33)
MCHC RBC AUTO-ENTMCNC: 33.5 G/DL (ref 31.5–35.7)
MCV RBC AUTO: 90.7 FL (ref 79–97)
MONOCYTES # BLD AUTO: 0.55 10*3/MM3 (ref 0.1–0.9)
MONOCYTES NFR BLD AUTO: 12.5 % (ref 5–12)
NEUTROPHILS NFR BLD AUTO: 3.03 10*3/MM3 (ref 1.7–7)
NEUTROPHILS NFR BLD AUTO: 69.1 % (ref 42.7–76)
NRBC BLD AUTO-RTO: 0 /100 WBC (ref 0–0.2)
PHOSPHATE SERPL-MCNC: 3.1 MG/DL (ref 2.5–4.5)
PLATELET # BLD AUTO: 208 10*3/MM3 (ref 140–450)
PMV BLD AUTO: 11.5 FL (ref 6–12)
POTASSIUM SERPL-SCNC: 3.9 MMOL/L (ref 3.5–5.2)
RBC # BLD AUTO: 3.13 10*6/MM3 (ref 3.77–5.28)
SODIUM SERPL-SCNC: 133 MMOL/L (ref 136–145)
TROPONIN T SERPL HS-MCNC: 299 NG/L
WBC NRBC COR # BLD AUTO: 4.39 10*3/MM3 (ref 3.4–10.8)

## 2025-04-18 PROCEDURE — 93306 TTE W/DOPPLER COMPLETE: CPT

## 2025-04-18 PROCEDURE — 83735 ASSAY OF MAGNESIUM: CPT | Performed by: INTERNAL MEDICINE

## 2025-04-18 PROCEDURE — 4A023N7 MEASUREMENT OF CARDIAC SAMPLING AND PRESSURE, LEFT HEART, PERCUTANEOUS APPROACH: ICD-10-PCS | Performed by: INTERNAL MEDICINE

## 2025-04-18 PROCEDURE — 25010000002 NICARDIPINE 2.5 MG/ML SOLUTION: Performed by: INTERNAL MEDICINE

## 2025-04-18 PROCEDURE — C1894 INTRO/SHEATH, NON-LASER: HCPCS | Performed by: INTERNAL MEDICINE

## 2025-04-18 PROCEDURE — 84100 ASSAY OF PHOSPHORUS: CPT | Performed by: INTERNAL MEDICINE

## 2025-04-18 PROCEDURE — 25010000002 ENOXAPARIN PER 10 MG

## 2025-04-18 PROCEDURE — 84484 ASSAY OF TROPONIN QUANT: CPT | Performed by: INTERNAL MEDICINE

## 2025-04-18 PROCEDURE — 25510000001 IOPAMIDOL PER 1 ML: Performed by: INTERNAL MEDICINE

## 2025-04-18 PROCEDURE — 85025 COMPLETE CBC W/AUTO DIFF WBC: CPT | Performed by: INTERNAL MEDICINE

## 2025-04-18 PROCEDURE — 93306 TTE W/DOPPLER COMPLETE: CPT | Performed by: INTERNAL MEDICINE

## 2025-04-18 PROCEDURE — 80048 BASIC METABOLIC PNL TOTAL CA: CPT | Performed by: INTERNAL MEDICINE

## 2025-04-18 PROCEDURE — 99214 OFFICE O/P EST MOD 30 MIN: CPT | Performed by: INTERNAL MEDICINE

## 2025-04-18 PROCEDURE — 25010000002 FENTANYL CITRATE (PF) 50 MCG/ML SOLUTION: Performed by: INTERNAL MEDICINE

## 2025-04-18 PROCEDURE — 93458 L HRT ARTERY/VENTRICLE ANGIO: CPT | Performed by: INTERNAL MEDICINE

## 2025-04-18 PROCEDURE — B2151ZZ FLUOROSCOPY OF LEFT HEART USING LOW OSMOLAR CONTRAST: ICD-10-PCS | Performed by: INTERNAL MEDICINE

## 2025-04-18 PROCEDURE — 25010000002 MIDAZOLAM PER 1 MG: Performed by: INTERNAL MEDICINE

## 2025-04-18 PROCEDURE — 25010000002 LIDOCAINE PF 1% 1 % SOLUTION: Performed by: INTERNAL MEDICINE

## 2025-04-18 PROCEDURE — B2111ZZ FLUOROSCOPY OF MULTIPLE CORONARY ARTERIES USING LOW OSMOLAR CONTRAST: ICD-10-PCS | Performed by: INTERNAL MEDICINE

## 2025-04-18 PROCEDURE — C1769 GUIDE WIRE: HCPCS | Performed by: INTERNAL MEDICINE

## 2025-04-18 PROCEDURE — 99232 SBSQ HOSP IP/OBS MODERATE 35: CPT | Performed by: INTERNAL MEDICINE

## 2025-04-18 PROCEDURE — 25010000002 HEPARIN (PORCINE) PER 1000 UNITS: Performed by: INTERNAL MEDICINE

## 2025-04-18 PROCEDURE — 25810000003 SODIUM CHLORIDE 0.9 % SOLUTION: Performed by: INTERNAL MEDICINE

## 2025-04-18 RX ORDER — ALPRAZOLAM 0.25 MG
0.25 TABLET ORAL 3 TIMES DAILY PRN
Status: DISCONTINUED | OUTPATIENT
Start: 2025-04-18 | End: 2025-04-19 | Stop reason: HOSPADM

## 2025-04-18 RX ORDER — TRAVOPROST OPHTHALMIC SOLUTION 0.04 MG/ML
1 SOLUTION OPHTHALMIC EVERY EVENING
Status: DISCONTINUED | OUTPATIENT
Start: 2025-04-18 | End: 2025-04-19 | Stop reason: HOSPADM

## 2025-04-18 RX ORDER — MYCOPHENOLATE MOFETIL 250 MG/1
1000 CAPSULE ORAL EVERY 12 HOURS SCHEDULED
Status: DISCONTINUED | OUTPATIENT
Start: 2025-04-18 | End: 2025-04-19 | Stop reason: HOSPADM

## 2025-04-18 RX ORDER — ACETAMINOPHEN 325 MG/1
650 TABLET ORAL EVERY 4 HOURS PRN
Status: DISCONTINUED | OUTPATIENT
Start: 2025-04-18 | End: 2025-04-19 | Stop reason: HOSPADM

## 2025-04-18 RX ORDER — HEPARIN SODIUM 1000 [USP'U]/ML
INJECTION, SOLUTION INTRAVENOUS; SUBCUTANEOUS
Status: DISCONTINUED | OUTPATIENT
Start: 2025-04-18 | End: 2025-04-18 | Stop reason: HOSPADM

## 2025-04-18 RX ORDER — TRAVOPROST OPHTHALMIC SOLUTION 0.04 MG/ML
1 SOLUTION OPHTHALMIC EVERY EVENING
Status: DISCONTINUED | OUTPATIENT
Start: 2025-04-18 | End: 2025-04-18

## 2025-04-18 RX ORDER — IOPAMIDOL 755 MG/ML
INJECTION, SOLUTION INTRAVASCULAR
Status: DISCONTINUED | OUTPATIENT
Start: 2025-04-18 | End: 2025-04-18 | Stop reason: HOSPADM

## 2025-04-18 RX ORDER — DEXLANSOPRAZOLE 30 MG/1
30 CAPSULE, DELAYED RELEASE ORAL DAILY
Status: DISCONTINUED | OUTPATIENT
Start: 2025-04-18 | End: 2025-04-19 | Stop reason: HOSPADM

## 2025-04-18 RX ORDER — TIMOLOL MALEATE 5 MG/ML
1 SOLUTION/ DROPS OPHTHALMIC DAILY
Status: DISCONTINUED | OUTPATIENT
Start: 2025-04-18 | End: 2025-04-19 | Stop reason: HOSPADM

## 2025-04-18 RX ORDER — PHENAZOPYRIDINE HYDROCHLORIDE 100 MG/1
100 TABLET, FILM COATED ORAL
Status: DISCONTINUED | OUTPATIENT
Start: 2025-04-18 | End: 2025-04-19 | Stop reason: HOSPADM

## 2025-04-18 RX ORDER — LEVOTHYROXINE SODIUM 75 UG/1
75 TABLET ORAL
Qty: 45 TABLET | Refills: 2 | Status: SHIPPED | OUTPATIENT
Start: 2025-04-18

## 2025-04-18 RX ORDER — FENTANYL CITRATE 50 UG/ML
INJECTION, SOLUTION INTRAMUSCULAR; INTRAVENOUS
Status: DISCONTINUED | OUTPATIENT
Start: 2025-04-18 | End: 2025-04-18 | Stop reason: HOSPADM

## 2025-04-18 RX ORDER — LIDOCAINE HYDROCHLORIDE 10 MG/ML
INJECTION, SOLUTION EPIDURAL; INFILTRATION; INTRACAUDAL; PERINEURAL
Status: DISCONTINUED | OUTPATIENT
Start: 2025-04-18 | End: 2025-04-18 | Stop reason: HOSPADM

## 2025-04-18 RX ORDER — MIDAZOLAM HYDROCHLORIDE 1 MG/ML
INJECTION, SOLUTION INTRAMUSCULAR; INTRAVENOUS
Status: DISCONTINUED | OUTPATIENT
Start: 2025-04-18 | End: 2025-04-18 | Stop reason: HOSPADM

## 2025-04-18 RX ORDER — NITROGLYCERIN 0.4 MG/1
0.4 TABLET SUBLINGUAL
Status: DISCONTINUED | OUTPATIENT
Start: 2025-04-18 | End: 2025-04-19 | Stop reason: HOSPADM

## 2025-04-18 RX ADMIN — DEXLANSOPRAZOLE 30 MG: 30 CAPSULE, DELAYED RELEASE ORAL at 13:00

## 2025-04-18 RX ADMIN — AMOXICILLIN AND CLAVULANATE POTASSIUM 1 TABLET: 875; 125 TABLET, FILM COATED ORAL at 17:35

## 2025-04-18 RX ADMIN — MUPIROCIN 1 APPLICATION: 20 OINTMENT TOPICAL at 09:07

## 2025-04-18 RX ADMIN — TRAVOPROST OPHTHALMIC SOLUTION 1 DROP: 0.04 SOLUTION OPHTHALMIC at 20:53

## 2025-04-18 RX ADMIN — FAMOTIDINE 40 MG: 20 TABLET, FILM COATED ORAL at 09:49

## 2025-04-18 RX ADMIN — MUPIROCIN 1 APPLICATION: 20 OINTMENT TOPICAL at 20:52

## 2025-04-18 RX ADMIN — PHENAZOPYRIDINE 100 MG: 100 TABLET ORAL at 14:51

## 2025-04-18 RX ADMIN — ENOXAPARIN SODIUM 70 MG: 80 INJECTION SUBCUTANEOUS at 05:35

## 2025-04-18 RX ADMIN — PHENAZOPYRIDINE 100 MG: 100 TABLET ORAL at 17:35

## 2025-04-18 RX ADMIN — TIMOLOL MALEATE 1 DROP: 5 SOLUTION/ DROPS OPHTHALMIC at 14:52

## 2025-04-18 RX ADMIN — LEVOTHYROXINE SODIUM 75 MCG: 0.07 TABLET ORAL at 05:35

## 2025-04-18 NOTE — CASE MANAGEMENT/SOCIAL WORK
Discharge Planning Assessment  [facility]     Patient Name: Rupa Finn  MRN: [id number]  Today's Date: 4/18/2025    Admit Date: 4/17/2025    Plan: Home with Family   Discharge Needs Assessment       Row Name 04/18/25 1525       Living Environment    People in Home spouse    Name(s) of People in Home Reynaldo Finn- amy    Current Living Arrangements home    Potentially Unsafe Housing Conditions none    Primary Care Provided by self    Provides Primary Care For no one    Family Caregiver if Needed spouse    Family Caregiver Names Reynaldo Finn- amy    Quality of Family Relationships helpful;involved;supportive    Able to Return to Prior Arrangements yes       Transition Planning    Patient/Family Anticipates Transition to home with family    Patient/Family Anticipated Services at Transition none    Transportation Anticipated family or friend will provide       Discharge Needs Assessment    Readmission Within the Last 30 Days no previous admission in last 30 days    Equipment Currently Used at Home none    Concerns to be Addressed discharge planning    Anticipated Changes Related to Illness none    Current Discharge Risk chronically ill                   Discharge Plan       Row Name 04/18/25 1526       Plan    Plan Home with Family    Patient/Family in Agreement with Plan yes    Plan Comments Mrs. Finn was off the unit for a procedure when I stopped by the bedside earlier today.  I have spoken to her , Reynaldo by phone regarding the discharge plan.  He states that he and his wife share a home together in [facility].  He reports that she is independent with activities of daily living and mobility.  He denies her use of DME and current receipt of home health/OP services.  I have confirmed that her PCP is Dinorah Otaes MD and her insurance is [employer].  He anticipates no discharge needs.  Reynaldo states that he will provide transportation and assistance as needed.  CM will  cont to follow plan of care and provide assistance with discharge needs as recommendations become avialable.    Final Discharge Disposition Code 01 - home or self-care                       Demographic Summary       Row Name 04/18/25 1524       General Information    Admission Type inpatient    Arrived From home    Referral Source admission list    Reason for Consult discharge planning    Preferred Language English       Contact Information    Permission Granted to Share Info With                    Functional Status       Row Name 04/18/25 1524       Functional Status, IADL    Medications independent    Meal Preparation independent    Housekeeping independent    Laundry independent    Shopping independent       Employment/    Employment Status retired               Kay Rao RN

## 2025-04-18 NOTE — PROGRESS NOTES
"Critical Care Note     LOS: 1 day   Patient Care Team:  Dinorah Oates DO as PCP - General (Internal Medicine)  Aspen Plata APRN as Nurse Practitioner (Cardiology)  Mariana Martinez APRN (Inactive) as Nurse Practitioner (Obstetrics and Gynecology)    Chief Complaint/Reason for visit:    Chief Complaint   Patient presents with    Shortness of Breath   Anaphylaxis to iron infusion  Chest pain with elevated troponin  Sjogren's syndrome/scleroderma on mycophenolate    Subjective     Interval History:     Feels much better this morning.  Tolerating a p.o. diet.  Still has a little vague substernal chest pain.  Peaked at 800 but is down to 200.    Review of Systems:    All systems were reviewed and negative except as noted in subjective.    Medical history, surgical history, social history, family history reviewed    Objective     Intake/Output:    Intake/Output Summary (Last 24 hours) at 4/18/2025 1106  Last data filed at 4/18/2025 0600  Gross per 24 hour   Intake 620 ml   Output 0 ml   Net 620 ml       Nutrition:  NPO Diet NPO Type: Sips with Meds    Infusions:       Mechanical Ventilator Settings:                                                Telemetry: Sinus rhythm             Vital Signs  Blood pressure 114/63, pulse 71, temperature 98.7 °F (37.1 °C), temperature source Oral, resp. rate 14, height 157.5 cm (62\"), weight 66.7 kg (147 lb), SpO2 96%, not currently breastfeeding.    Physical Exam:  General Appearance:  Middle-aged woman sitting upright in the chair eating breakfast in no distress   Head:  Atraumatic   Eyes:          Pupils equal and reactive   Ears:     Throat: No edema   Neck: Supple   Back:      Lungs:   Clear to auscultation    Heart:  Regular rhythm, S1, S2 auscultated   Abdomen:   Nondistended, bowel sounds present   Rectal:   Deferred   Extremities: No edema   Pulses: Palpable pulses   Skin: Warm and dry without rash   Lymph nodes:    Neurologic: Alert, oriented, speech fluent, " "face symmetric      Results Review:     I reviewed the patient's new clinical results.   Results from last 7 days   Lab Units 04/18/25  0426 04/17/25  1209   SODIUM mmol/L 133* 130*   POTASSIUM mmol/L 3.9 3.9   CHLORIDE mmol/L 105 98   CO2 mmol/L 19.0* 19.0*   BUN mg/dL 8 9   CREATININE mg/dL 0.64 0.72   CALCIUM mg/dL 8.0* 8.1*   BILIRUBIN mg/dL  --  0.3   ALK PHOS U/L  --  63   ALT (SGPT) U/L  --  16   AST (SGOT) U/L  --  29   GLUCOSE mg/dL 86 120*     Results from last 7 days   Lab Units 04/18/25  0426 04/17/25  1209   WBC 10*3/mm3 4.39 2.67*   HEMOGLOBIN g/dL 9.5* 12.7   HEMATOCRIT % 28.4* 39.0   PLATELETS 10*3/mm3 208 281     Results from last 7 days   Lab Units 04/17/25  1214   PH, ARTERIAL pH units 7.273*   PO2 ART mm Hg 22.9*   PCO2, ARTERIAL mm Hg 47.3*   HCO3 ART mmol/L 21.9     No results found for: \"BLOODCX\"  Lab Results   Component Value Date    URINECX No growth 02/10/2025       I reviewed the patient's new imaging including images and reports.    XR CHEST 1 VW    Date of Exam: 4/17/2025 11:55 AM EDT    Indication: dyspnea/altered mental status    Comparison: 2/22/2023    Findings:  Heart size and pulmonary vasculature are within normal limits. Lungs clear. Costophrenic angle sharp   Impression:     Impression:  No active cardiopulmonary disease      Electronically Signed: Red Jones   4/17/2025 12:36 PM EDT       All medications reviewed.   amoxicillin-clavulanate, 1 tablet, Oral, Q12H  dexlansoprazole, 30 mg, Oral, Daily  enoxaparin sodium, 1 mg/kg, Subcutaneous, Q12H  famotidine, 40 mg, Oral, BID AC  levothyroxine, 75 mcg, Oral, Q AM  mupirocin, 1 Application, Each Nare, BID  mycophenolate, 1,000 mg, Oral, Q12H  phenazopyridine, 100 mg, Oral, TID With Meals  timolol, 1 drop, Both Eyes, Daily  travoprost (KATLYN free), 1 drop, Both Eyes, Q PM          Assessment & Plan       Hypotension    Glaucoma    Hypothyroidism    GERD (gastroesophageal reflux disease)    Sjogren syndrome with myopathy    " Scleroderma    Atypical chest pain    Hyponatremia    Chronic anemia    Iron deficiency anemia    Non-STEMI (non-ST elevated myocardial infarction)    64-year-old woman with Sjogren's syndrome, scleroderma, hypertension, GERD, hypothyroid, iron deficiency who received an iron infusion yesterday.  She had an anaphylactic reaction with severe hypotension and received epinephrine x 2, hydrocortisone 100 mg and Benadryl as well as a fluid bolus.  He did have some throat swelling.  During that event she developed chest pain described as a pressure with associated troponin bump.  She was placed on full dose Lovenox.  Troponin peaked at 770 and is now 299.  She had an In-N-Out catheterization and is complaining of dysuria.  Urine had trace bacteria.      PLAN:    Cardiology evaluated and are planning left heart catheterization today  Full dose Lovenox  Resume her mycophenolate  Initiate Augmentin Pyridium for dysuria  Thyroid replacement  Benadryl  Eyedrops for glaucoma  Pepcid 40 mg twice daily    VTE Prophylaxis: will dose Lovenox    Stress Ulcer Prophylaxis: Dexilant    Flory Oneil MD  04/18/25  11:06 EDT      Time: Critical care 20 min  I personally provided care to this critically ill patient as documented above.  Critical care time does not include time spent on separately billed procedures.  None of my critical care time was concurrent with other critical care providers.

## 2025-04-18 NOTE — CONSULTS
[facility] at [facility]  Cardiovascular Consultation Note    Reason for consultation: Acute chest pain following IV iron infusion with hypotension with subsequent elevated cardiac troponins    History of Present Illness:  64-year-old female with history of scleroderma, iron deficiency anemia, hyperlipidemia, hypothyroidism, hypertension, Raynaud's who has been seen by cardiology multiple times with multiple Holter monitors as well as echoes.  The patient was seen in January 2025 by her nurse practitioner and was having some chest pain.  She underwent coronary CTA which showed moderate plaque and some calcification of the vessels.  The patient has had noted iron deficiency anemia.  She has undergone 4 iron infusions without difficulty yesterday however with iron infusion she became hypotensive diaphoretic and had chest pain.  She also had mental status changes.  She was sent to the emergency room where she had significant hypotension and received fluid boluses, epinephrine, steroids.  She was then placed in the ICU.  It was felt that the patient had anaphylactic reaction to IV iron.  Of note she had significant bump in her cardiac enzymes.  Initial troponin was 116 peaked at 770 is now back down to 299.  Patient states with her first known infusion she had no issues but over the subsequent iron infusions she had some fatigue.  She states she has not been real active at times because of fatigue.  She denies dyspnea on exertion.  She has occasional chest pains that last just a few seconds and sounds noncardiac.  Yesterday however after the iron she broke out in a clammy sweat became very pale had severe tightness in her chest radiating through to her shoulder blade on the left.  She also had difficulty breathing and felt like she could not swallow.    Cardiac risk factors: #1 hypertension.  2 hyperlipidemia #3 mild CAD had documented by coronary CTA    Past medical and surgical history, social  and family history reviewed in EMR.    REVIEW OF SYSTEMS:     Past Medical History:   Diagnosis Date    Anemia 2021    Autoimmune disease     Cholelithiasis Removed 2008    Colon polyp 2022    COVID-19     Cutaneous lupus erythematosus     Diverticulosis 2010?    Fibrocystic breast changes, bilateral     GERD (gastroesophageal reflux disease)     not currently taking medication    Gestational diabetes     Glaucoma     Hyperlipidemia     Hypertension     Hypothyroidism     Lower extremity edema     Neuromuscular disorder 2020    OAB (overactive bladder)     Osteoarthritis     Palpitations     Post-menopause on HRT (hormone replacement therapy)     Raynaud's disease     Scleroderma     SINE scleroderma    Sjogren's disease     Urinary tract infection      Past Surgical History:   Procedure Laterality Date    CHOLECYSTECTOMY      COLONOSCOPY  2019 and 2022    ESOPHAGOSCOPY / EGD      esophageal stretching/dilation and gastric polypectomy, esophageal biopsies    EYE SURGERY  2012    Cataracts removed - lens replacement    LAPAROSCOPIC ASSISTED VAGINAL HYSTERECTOMY SALPINGO OOPHORECTOMY Bilateral     LAPAROSCOPIC DIAZ PROCEDURE      OOPHORECTOMY      OTHER SURGICAL HISTORY      Lap for endometriosis    TONSILLECTOMY      UPPER GASTROINTESTINAL ENDOSCOPY  ?     Social History     Socioeconomic History    Marital status:    Tobacco Use    Smoking status: Never     Passive exposure: Never    Smokeless tobacco: Never   Vaping Use    Vaping status: Never Used   Substance and Sexual Activity    Alcohol use: No    Drug use: No    Sexual activity: Yes     Partners: Male     Birth control/protection: Post-menopausal, Hysterectomy     Family History   Problem Relation Age of Onset    Other Mother         has a pacemaker and is followed by Dr. Brooks    Heart disease Mother     Heart failure Mother     Hypertension Mother     Diabetes Mother     Vision loss Mother         Glaucoma/macular degeneration    Arthritis Mother  "    Glaucoma Father     Other Father          likely a cancer-related death;    Heart disease Father     Hypertension Father     Cancer Father         Prostate    Vision loss Father         Glaucoma/macular degeneration    Asthma Brother     Hypertension Brother     Heart attack Maternal Grandfather     Heart disease Maternal Grandfather     Heart failure Maternal Grandfather     Heart disease Maternal Grandmother     Hypertension Maternal Grandmother     Hypertension Paternal Grandmother     Colon cancer Paternal Grandfather     Cancer Paternal Grandfather         Prostate    Breast cancer Neg Hx     Ovarian cancer Neg Hx        H&P ROS reviewed and pertinent CV ROS as noted in HPI.    Cardiac: Patient has a history of multiple Holter's which showed no A-fib.  No significant ventricular arrhythmias.  Her EF is always been normal by echocardiogram with no significant valvular disease.  She had a negative stress PET in 2023 and a recent coronary CTA which showed moderate plaque and some coronary calcification  Respiratory: Patient felt short of breath yesterday with the iron infusion.  Prior to this she has had no dyspnea  Lower Extremities: No complaints of edema.  She did feel numbness in her arms and legs yesterday  GI: Patient had nausea and vomiting yesterday as well  Neuro: Patient had mental status changes yesterday following reaction to IV iron  Hematology: Has iron deficiency anemia  Renal: No history of CK  Endocrine: Has hypothyroidism  Constitutional: No fever chills or weight loss  Psych: No depression or stress ideation      Physical Exam: General Pleasant well-developed female sitting in recliner not dyspneic not tachypneic stable hemodynamics and vital signs       HEENT: No JVP or bruit.  No icterus.  Dentition good       Respiratory: Equal bilateral symmetrical expansion\" bilaterally       Cardiovascular: Regular rate and rhythm without murmur gallop or click and no edema to palpation       GI: Soft " and flat       Lower Extremities: No lesions       Neuro: Facial expression symmetrical moves all 4 extremities       Skin: Warm and dry no edema palpation       Psych: Pleasant affect oriented x 3    Results Review: EKG shows sinus rhythm some ST elevation inferolateral leads with T wave flattening V1 through V3.  No obvious reciprocal ST segment depression.  Review of prior EKG show completely normal EKG with no ischemia.  Hemoglobin is 9.5.  Troponins went from 116-170 then 422 then down to 299.  Heart rates down the 60s blood pressures mostly in the low 100s now           Vital Sign Min/Max for last 24 hours  Temp  Min: 97.4 °F (36.3 °C)  Max: 97.9 °F (36.6 °C)   BP  Min: 65/40  Max: 132/80   Pulse  Min: 51  Max: 100   Resp  Min: 14  Max: 20   SpO2  Min: 80 %  Max: 100 %   No data recorded      Intake/Output Summary (Last 24 hours) at 4/18/2025 0857  Last data filed at 4/18/2025 0600  Gross per 24 hour   Intake 620 ml   Output 0 ml   Net 620 ml             Current Facility-Administered Medications:     diphenhydrAMINE (BENADRYL) injection 50 mg, 50 mg, Intravenous, Q6H PRN, Tim Chu MD    enoxaparin sodium (LOVENOX) syringe 70 mg, 1 mg/kg, Subcutaneous, Q12H, Kulwinder Alvarenga, PharmD, 70 mg at 04/18/25 0535    EPINEPHrine (ADRENALIN) injection 0.3 mg, 0.3 mg, Intramuscular, Q5 Min PRN, Naila Redmond MD, 0.3 mg at 04/17/25 1147    famotidine (PEPCID) tablet 40 mg, 40 mg, Oral, BID AC, Tim Chu MD, 40 mg at 04/17/25 1823    levothyroxine (SYNTHROID, LEVOTHROID) tablet 75 mcg, 75 mcg, Oral, Q AM, Tim Chu MD, 75 mcg at 04/18/25 0535    mupirocin (BACTROBAN) 2 % nasal ointment 1 Application, 1 Application, Each Nare, BID, Flory Oneil MD    Pharmacy to Dose enoxaparin (LOVENOX), , Not Applicable, Continuous PRN, Melony Caldwell, DNP, APRN    [COMPLETED] Insert Peripheral IV, , , Once **AND** sodium chloride 0.9 % flush 10 mL, 10 mL, Intravenous, PRN, Naila Redmond,  MD    Lab Review:   Results from last 7 days   Lab Units 04/18/25  0426 04/17/25  1209   WBC 10*3/mm3 4.39 2.67*   HEMOGLOBIN g/dL 9.5* 12.7   PLATELETS 10*3/mm3 208 281     Results from last 7 days   Lab Units 04/18/25  0426 04/17/25  1209   SODIUM mmol/L 133* 130*   POTASSIUM mmol/L 3.9 3.9   CO2 mmol/L 19.0* 19.0*   BUN mg/dL 8 9   CREATININE mg/dL 0.64 0.72   MAGNESIUM mg/dL 2.0  --    PHOSPHORUS mg/dL 3.1  --    GLUCOSE mg/dL 86 120*     Estimated Creatinine Clearance: 79.5 mL/min (by C-G formula based on SCr of 0.64 mg/dL).        .lipd  Lab Results   Component Value Date    CHLPL 249 (H) 01/27/2020    TRIG 46 08/09/2024    HDL 81 (H) 08/09/2024    AST 29 04/17/2025    ALT 16 04/17/2025       Radiology Reports:  Imaging Results (Last 72 Hours)       Procedure Component Value Units Date/Time    XR Chest 1 View [[id number]] Collected: 04/17/25 1235     Updated: 04/17/25 1239    Narrative:      XR CHEST 1 VW    Date of Exam: 4/17/2025 11:55 AM EDT    Indication: dyspnea/altered mental status    Comparison: 2/22/2023    Findings:  Heart size and pulmonary vasculature are within normal limits. Lungs clear. Costophrenic angle sharp      Impression:      Impression:  No active cardiopulmonary disease      Electronically Signed: Red Jones    4/17/2025 12:36 PM EDT    Workstation ID: [id number]            Assessment/Plan: This patient more likely had an anaphylactic reaction to IV iron.  She was hypotensive diaphoretic and had chest pain.  Troponins had a significant rise and fall and her EKG is abnormal.   Echocardiogram is pending.  I recommend the patient undergo cardiac catheterization today.  This will be performed by Dr. Toledo.  Further recommendations to follow    This is an addendum to the above note.  The patient's cardiac cath showed only mild coronary artery disease.  The LV gram showed a mild case of Tako Tsubo mid segment.  EF well-preserved.  The patient's case represents Tako Tsubo from the  anaphylactic reaction here from IV infusion.  She did not have a non-STEMI  The diagnosis non-STEMI needs to be removed from her chart      Igor Yung MD  04/18/25  08:57 EDT

## 2025-04-18 NOTE — SIGNIFICANT NOTE
Patient returned from cath lab with a TR band on the R radial artery. Site was clean and dry with no bruising, bleeding, or hematoma present.    After TR band removed per protocol, the site was clean and dry. Dressing applied, no drainage noted.    Patient went through discharge teaching and was removed from telemetry to get dressed to leave. As the patient was dressing, she called out that she was bleeding. The arterial site had reopened. Manual pressure was held for 10 minutes. Dr. Stacy was paged, he instructed me to put the TR band back on and restart the TR band protocol. HOWARD Caldwell was called and at the bedside.    TR band is in place at this time. Bruising noted, no hematoma.

## 2025-04-18 NOTE — PLAN OF CARE
Problem: Adult Inpatient Plan of Care  Goal: Plan of Care Review  Outcome: Progressing  Flowsheets (Taken 4/18/2025 0329)  Progress: improving  Plan of Care Reviewed With: patient  Goal: Patient-Specific Goal (Individualized)  Outcome: Progressing  Goal: Absence of Hospital-Acquired Illness or Injury  Outcome: Progressing  Intervention: Identify and Manage Fall Risk  Recent Flowsheet Documentation  Taken 4/18/2025 0200 by Adarsh Everett RN  Safety Promotion/Fall Prevention: safety round/check completed  Taken 4/18/2025 0000 by Adarsh Everett RN  Safety Promotion/Fall Prevention:   clutter free environment maintained   activity supervised   fall prevention program maintained   lighting adjusted   nonskid shoes/slippers when out of bed   room organization consistent   safety round/check completed   toileting scheduled  Taken 4/17/2025 2200 by Adarsh Everett RN  Safety Promotion/Fall Prevention:   toileting scheduled   lighting adjusted  Taken 4/17/2025 2000 by Adarsh Everett RN  Safety Promotion/Fall Prevention:   safety round/check completed   lighting adjusted   assistive device/personal items within reach   activity supervised   clutter free environment maintained   fall prevention program maintained   nonskid shoes/slippers when out of bed   room organization consistent   toileting scheduled  Intervention: Prevent Skin Injury  Recent Flowsheet Documentation  Taken 4/18/2025 0200 by Adarsh Everett RN  Body Position: position changed independently  Skin Protection:   incontinence pads utilized   protective footwear used   silicone foam dressing in place   transparent dressing maintained  Taken 4/18/2025 0000 by Adarsh Everett RN  Body Position: position changed independently  Skin Protection:   incontinence pads utilized   protective footwear used   silicone foam dressing in place   transparent dressing maintained  Taken 4/17/2025 2200 by Adarsh Everett RN  Body Position: position changed  independently  Skin Protection:   incontinence pads utilized   protective footwear used   silicone foam dressing in place   transparent dressing maintained   pulse oximeter probe site changed  Taken 4/17/2025 2000 by Adarsh Everett RN  Body Position:   position changed independently   sitting up in bed  Skin Protection:   incontinence pads utilized   protective footwear used   silicone foam dressing in place   transparent dressing maintained  Intervention: Prevent and Manage VTE (Venous Thromboembolism) Risk  Recent Flowsheet Documentation  Taken 4/18/2025 0200 by Adarsh Everett RN  VTE Prevention/Management:   bilateral   SCDs (sequential compression devices) off  Taken 4/18/2025 0000 by Adarsh Everett RN  VTE Prevention/Management:   bilateral   SCDs (sequential compression devices) on  Taken 4/17/2025 2200 by Adarsh Everett RN  VTE Prevention/Management:   bilateral   SCDs (sequential compression devices) on  Intervention: Prevent Infection  Recent Flowsheet Documentation  Taken 4/18/2025 0200 by Adarsh Everett RN  Infection Prevention:   environmental surveillance performed   equipment surfaces disinfected   hand hygiene promoted   personal protective equipment utilized   rest/sleep promoted   single patient room provided   visitors restricted/screened  Taken 4/18/2025 0000 by Adarsh Everett RN  Infection Prevention:   environmental surveillance performed   hand hygiene promoted   equipment surfaces disinfected   personal protective equipment utilized   rest/sleep promoted   single patient room provided   visitors restricted/screened  Taken 4/17/2025 2200 by Adarsh Everett RN  Infection Prevention:   environmental surveillance performed   equipment surfaces disinfected   hand hygiene promoted   personal protective equipment utilized   rest/sleep promoted   single patient room provided   visitors restricted/screened  Taken 4/17/2025 2000 by Aadrsh Everett RN  Infection Prevention:   environmental  surveillance performed   equipment surfaces disinfected   hand hygiene promoted   personal protective equipment utilized   rest/sleep promoted   single patient room provided   visitors restricted/screened  Goal: Optimal Comfort and Wellbeing  Outcome: Progressing  Intervention: Monitor Pain and Promote Comfort  Recent Flowsheet Documentation  Taken 4/17/2025 2000 by Adarsh Everett RN  Pain Management Interventions:   diversional activity provided   breathing exercises   position adjusted   pillow support provided  Intervention: Provide Person-Centered Care  Recent Flowsheet Documentation  Taken 4/18/2025 0200 by Adarsh Everett RN  Trust Relationship/Rapport:   care explained   questions answered  Taken 4/18/2025 0000 by Adarsh Everett RN  Trust Relationship/Rapport:   care explained   choices provided   emotional support provided   empathic listening provided   questions answered   questions encouraged   reassurance provided   thoughts/feelings acknowledged  Taken 4/17/2025 2200 by Adarsh Everett RN  Trust Relationship/Rapport:   care explained   choices provided   emotional support provided   empathic listening provided   questions answered   questions encouraged   reassurance provided   thoughts/feelings acknowledged  Taken 4/17/2025 2000 by Adarsh Everett RN  Trust Relationship/Rapport:   care explained   choices provided   emotional support provided   questions answered   empathic listening provided   questions encouraged   thoughts/feelings acknowledged   reassurance provided  Goal: Readiness for Transition of Care  Outcome: Progressing  Intervention: Mutually Develop Transition Plan  Recent Flowsheet Documentation  Taken 4/17/2025 2116 by Adarsh Everett RN  Transportation Anticipated: family or friend will provide  Patient/Family Anticipated Services at Transition: none  Patient/Family Anticipates Transition to: home with family     Problem: Comorbidity Management  Goal: Blood Glucose Level Within  Target Range  Outcome: Progressing  Intervention: Monitor and Manage Glycemia  Recent Flowsheet Documentation  Taken 4/18/2025 0200 by Adarsh Everett RN  Medication Review/Management:   medications reviewed   high-risk medications identified  Taken 4/18/2025 0000 by Adarsh Everett RN  Medication Review/Management:   medications reviewed   high-risk medications identified  Taken 4/17/2025 2200 by Adarsh Everett RN  Medication Review/Management:   medications reviewed   high-risk medications identified   provider consulted  Taken 4/17/2025 2000 by Adarsh Everett RN  Medication Review/Management:   medications reviewed   high-risk medications identified   provider consulted  Goal: Maintenance of Heart Failure Symptom Control  Outcome: Progressing  Intervention: Maintain Heart Failure Management  Recent Flowsheet Documentation  Taken 4/18/2025 0200 by Adarsh Everett RN  Medication Review/Management:   medications reviewed   high-risk medications identified  Taken 4/18/2025 0000 by Adarsh Everett RN  Medication Review/Management:   medications reviewed   high-risk medications identified  Taken 4/17/2025 2200 by Adarsh Everett RN  Medication Review/Management:   medications reviewed   high-risk medications identified   provider consulted  Taken 4/17/2025 2000 by Adarsh Everett RN  Medication Review/Management:   medications reviewed   high-risk medications identified   provider consulted  Goal: Blood Pressure in Desired Range  Outcome: Progressing  Intervention: Maintain Blood Pressure Management  Recent Flowsheet Documentation  Taken 4/18/2025 0200 by Adarsh Everett RN  Medication Review/Management:   medications reviewed   high-risk medications identified  Taken 4/18/2025 0000 by Adarsh Everett RN  Medication Review/Management:   medications reviewed   high-risk medications identified  Taken 4/17/2025 2200 by Adarsh Everett RN  Medication Review/Management:   medications reviewed   high-risk  medications identified   provider consulted  Taken 4/17/2025 2000 by Adarsh Everett RN  Medication Review/Management:   medications reviewed   high-risk medications identified   provider consulted     Problem: Chest Pain  Goal: Resolution of Chest Pain Symptoms  Outcome: Progressing     Problem: Anxiety Signs/Symptoms  Goal: Optimized Energy Level (Anxiety Signs/Symptoms)  Outcome: Progressing  Goal: Optimized Cognitive Function (Anxiety Signs/Symptoms)  Outcome: Progressing  Goal: Improved Mood Symptoms (Anxiety Signs/Symptoms)  Outcome: Progressing  Goal: Improved Sleep (Anxiety Signs/Symptoms)  Outcome: Progressing  Goal: Enhanced Social, Occupational or Functional Skills (Anxiety Signs/Symptoms)  Outcome: Progressing  Intervention: Promote Social, Occupational and Functional Ability  Recent Flowsheet Documentation  Taken 4/18/2025 0200 by Adarsh Everett RN  Trust Relationship/Rapport:   care explained   questions answered  Taken 4/18/2025 0000 by Adarsh Everett RN  Trust Relationship/Rapport:   care explained   choices provided   emotional support provided   empathic listening provided   questions answered   questions encouraged   reassurance provided   thoughts/feelings acknowledged  Taken 4/17/2025 2200 by Adarsh Everett RN  Trust Relationship/Rapport:   care explained   choices provided   emotional support provided   empathic listening provided   questions answered   questions encouraged   reassurance provided   thoughts/feelings acknowledged  Taken 4/17/2025 2000 by Adarsh Everett RN  Trust Relationship/Rapport:   care explained   choices provided   emotional support provided   questions answered   empathic listening provided   questions encouraged   thoughts/feelings acknowledged   reassurance provided  Goal: Improved Somatic Symptoms (Anxiety Signs/Symptoms)  Outcome: Progressing   Goal Outcome Evaluation:  Plan of Care Reviewed With: patient        Progress: improving

## 2025-04-18 NOTE — DISCHARGE SUMMARY
DISCHARGE SUMMARY       Patient name: Rupa Finn  CSN: [id number]  MRN: [id number]  : 1960    Date of Admission: 2025  Date of Discharge:  2025    Admitting Physician: Tim Chu MD   Primary Care Provider: Dinorah Oates DO  Consultations:   Igor Yung MD Cardiology     Admission Diagnosis: Hypotension      Discharge Diagnoses:     Anaphylactic reaction    Hypertension    Glaucoma    Hypothyroidism    GERD (gastroesophageal reflux disease)    Raynaud's disease    Sjogren syndrome with myopathy    Scleroderma    Hyperlipidemia LDL goal <70    Hyponatremia    Iron deficiency anemia    Hypotension    Coronary artery disease involving native coronary artery of native heart    Stress-induced cardiomyopathy    Demand ischemia    Procedures:  25: Left heart catheterization     Imaging:  XR Chest 1 View  Result Date: 2025  Impression: No active cardiopulmonary disease Electronically Signed: Red Jones  2025 12:36 PM EDT  Workstation ID: [id number]    History of Present Illness (copied from H&P note on 25):  Pleasant 64-year-old female presented emergency room from infusion center where she was getting IV iron infusion.  Patient has previous diagnosis of iron deficiency anemia for which she gets iron infusions.  She was post to get 6 infusions and this was her fifth infusion.  She gets ferric gluconate infusion.  Previously for infusions she has tolerated well.  She also has autoimmune disease with limited scleroderma as well as Sjogren syndrome, hypertension, dyslipidemia, GERD, hypothyroidism.  She also is seen at [facility] rheumatology where she gets Gammagard infusion every 2 weeks apparently to preserve her muscle function.  She received that infusion yesterday and she had no problems with that infusion.  She was in usual state of health up until she came in today morning for the infusion.  During the infusion she broke out in cold sweat  and she started feeling numbness and tingling on her face as well as tongue and felt like her throat is going to close up.  She also felt chest discomfort without any radiation.  She was also starting to feel short of breath.  She denies any skin rash or hives.  She felt nauseous and felt that she threw up but nothing came up.  She became very weak and lethargic and was hypotensive.  She was transferred to ER where she was given epinephrine IV x 2, epinephrine IM x 2, hydrocortisone 100 mg along with Benadryl.  Patient was given 3 L of fluid boluses due to ongoing hypotension.  Despite this fluid boluses whenever the fluid is turned off her blood pressure drops into low 80s again.  Due to concern for ongoing hypotension we were asked to admit patient to intensive care unit.     Patient was seen at bedside.  Currently her blood pressure is 82/60.  She is mentating well.  She feels that she is starting to feel better.  She denies any shortness of breath currently.  Denies any chest pain.  Denies any shortness of breath currently.   is at bedside and states that patient looks better now and less lethargic as compared to before (end of copied text).    Hospital Course:  Patient's symptoms improved upon ICU arrival and she ultimately did not require further antihistamines or steroids nor further IVF boluses to support hemodynamics.     Due to reports of chest discomfort EKG obtained (suggestive of early repolarization abnormality) and serial troponins checked which were elevated. Cardiology was consulted and patient underwent further workup including TTE showing LVEF 60% with no structural or valvular abnormalities and LHC with mild CAD and circumferential mid LV mild hypokinesis consistent with Takotsubo / stress-induced cardiomyopathy.      Of note, patient had additional complaints of dysuria on admission with UA showing trace bacteria and Dr. Oneil placed on a course of Augmentin.     Patient is felt to  "have reached maximum benefit from this hospitalization and has been deemed appropriate for discharge home. She is feeling well, is tolerating a PO diet, and is ambulating without difficulty. Discharge medications and recommendations are listed below:     Vitals:  /60   Pulse 57   Temp 98.6 °F (37 °C) (Oral)   Resp 14   Ht 157.5 cm (62\")   Wt 66.7 kg (147 lb)   LMP  (LMP Unknown)   SpO2 96%   BMI 26.89 kg/m²     Physical Exam:  General Appearance:  Middle-aged woman sitting upright in the chair eating breakfast in no distress   Head:  Atraumatic   Eyes:          Pupils equal and reactive   Throat: No edema   Neck: Supple   Lungs:   Clear to auscultation    Heart:  Regular rhythm, S1, S2 auscultated   Abdomen:   Nondistended, bowel sounds present   Rectal:   Deferred   Extremities: No edema   Pulses: Palpable pulses   Skin: Warm and dry without rash   Neurologic: Alert, oriented, speech fluent, face symmetric     Labs:  Results from last 7 days   Lab Units 04/18/25  0426   WBC 10*3/mm3 4.39   HEMOGLOBIN g/dL 9.5*   HEMATOCRIT % 28.4*   PLATELETS 10*3/mm3 208     Results from last 7 days   Lab Units 04/18/25  0426 04/17/25  1209   SODIUM mmol/L 133* 130*   POTASSIUM mmol/L 3.9 3.9   CHLORIDE mmol/L 105 98   CO2 mmol/L 19.0* 19.0*   BUN mg/dL 8 9   CREATININE mg/dL 0.64 0.72   CALCIUM mg/dL 8.0* 8.1*   BILIRUBIN mg/dL  --  0.3   ALK PHOS U/L  --  63   ALT (SGPT) U/L  --  16   AST (SGOT) U/L  --  29   GLUCOSE mg/dL 86 120*         Magnesium   Date Value Ref Range Status   04/18/2025 2.0 1.6 - 2.4 mg/dL Final     Phosphorus   Date Value Ref Range Status   04/18/2025 3.1 2.5 - 4.5 mg/dL Final           Discharge Medications:     Discharge Medications        PAUSE taking these medications        Instructions Start Date   amLODIPine 2.5 MG tablet  Wait to take this until your doctor or other care provider tells you to start again.  Commonly known as: NORVASC   1 tablet, Daily      NON FORMULARY  Wait to take " this until your doctor or other care provider tells you to start again.   Iron IV      telmisartan 80 MG tablet  Wait to take this until your doctor or other care provider tells you to start again.  Commonly known as: MICARDIS   80 mg, Oral, Daily             New Medications        Instructions Start Date   amoxicillin-clavulanate 875-125 MG per tablet  Commonly known as: AUGMENTIN   1 tablet, Oral, Every 12 Hours Scheduled             Changes to Medications        Instructions Start Date   famotidine 40 MG tablet  Commonly known as: PEPCID  What changed: See the new instructions.   TAKE 1 TABLET BY MOUTH EVERY DAY AT NIGHT AS NEEDED FOR HEARTBURN      fluocinonide 0.05 % gel  Commonly known as: LIDEX  What changed: See the new instructions.   Apply to affected area sparingly twice a day for 7-10 days. NPO 30 min      levothyroxine 75 MCG tablet  Commonly known as: SYNTHROID, LEVOTHROID  What changed: Another medication with the same name was removed. Continue taking this medication, and follow the directions you see here.   75 mcg, Oral, Every 48 Hours Scheduled      mycophenolate 500 MG tablet  Commonly known as: CELLCEPT  What changed: See the new instructions.   TAKE 1 TABLET BY MOUTH 3 TIMES A DAY AND TAKE 1 TABLET AT NIGHT      Vitamin D3 50 MCG (2000 UT) tablet  What changed: how much to take   Oral, Daily             Continue These Medications        Instructions Start Date   Alirocumab 150 MG/ML injection pen  Commonly known as: PRALUENT   150 mg, Subcutaneous, Every 14 Days      dexlansoprazole 30 MG capsule  Commonly known as: DEXILANT   30 mg, Oral, Daily      estradiol 1 MG/GM gel   1 Application, Transdermal, Daily      GAMMAGARD IV   1 dose, Every 14 Days      hydrALAZINE 10 MG tablet  Commonly known as: APRESOLINE   10 mg, Oral, As Needed      mupirocin 2 % ointment  Commonly known as: BACTROBAN   2 Times Daily      polyethylene glycol 17 GM/SCOOP powder  Commonly known as: MIRALAX   As Needed       timolol 0.5 % ophthalmic solution  Commonly known as: TIMOPTIC   1 drop, Both Eyes, Daily      travoprost (KATLYN free) 0.004 % solution ophthalmic solution  Commonly known as: TRAVATAN   1 drop, Every Evening      Zinc 50 MG tablet   50 mcg, Daily      Zoryve 0.3 % cream  Generic drug: Roflumilast   Daily             Discharge Diet:   Diet Instructions       Diet: Regular/House Diet, Cardiac Diets; Healthy Heart (2-3 Na+); Regular (IDDSI 7); Thin (IDDSI 0)      Discharge Diet:  Regular/House Diet  Cardiac Diets       Cardiac Diet: Healthy Heart (2-3 Na+)    Texture: Regular (IDDSI 7)    Fluid Consistency: Thin (IDDSI 0)          Activity at Discharge:    Activity Instructions       Activity as Tolerated            Follow-up Appointments  Future Appointments   Date Time Provider Department Center   4/24/2025  1:00 PM CHAIR 20  [facility]   7/16/2025 10:40 AM Aspen Plata APRN [facility]   4/3/2026 10:10 AM Dixie Marin MD [facility]     Additional Instructions for the Follow-ups that You Need to Schedule       Discharge Follow-up with PCP   As directed       Currently Documented PCP:    Dinorah Otaes DO    PCP Phone Number:    [phone]     Follow Up Details: 1 week        Discharge Follow-up with Specified Provider: Aspen Plata APRN (Cardiology)   As directed      To: Aspen Plata APRN (Cardiology)   Follow Up Details: Keep previously scheduled appointment              Discharge Instructions:  Okay to discharge home   Medications as above   Follow up as above     Current Code Status       Date Active Code Status Order ID Comments User Context       4/17/2025 2119 CPR (Attempt to Resuscitate) [id number]  Tim Chu MD Inpatient        Question Answer    Code Status (Patient has no pulse and is not breathing) CPR (Attempt to Resuscitate)    Medical Interventions (Patient has pulse or is breathing) Full Support             Melony Caldwell, DNP, APRN, AGACNP-BC  Pulmonary and  Critical Care Medicine  04/18/25     Time: Discharge 15 min    CC: Dinorah Oates DO    Please note that portions of this note were completed with a voice recognition program.

## 2025-04-19 ENCOUNTER — READMISSION MANAGEMENT (OUTPATIENT)
Dept: CALL CENTER | Facility: HOSPITAL | Age: 65
End: 2025-04-19
Payer: COMMERCIAL

## 2025-04-19 VITALS
HEART RATE: 80 BPM | OXYGEN SATURATION: 96 % | WEIGHT: 147 LBS | RESPIRATION RATE: 16 BRPM | DIASTOLIC BLOOD PRESSURE: 69 MMHG | HEIGHT: 62 IN | TEMPERATURE: 97.4 F | SYSTOLIC BLOOD PRESSURE: 139 MMHG | BODY MASS INDEX: 27.05 KG/M2

## 2025-04-19 LAB
ANION GAP SERPL CALCULATED.3IONS-SCNC: 9 MMOL/L (ref 5–15)
BUN SERPL-MCNC: 5 MG/DL (ref 8–23)
BUN/CREAT SERPL: 11.4 (ref 7–25)
CALCIUM SPEC-SCNC: 8.2 MG/DL (ref 8.6–10.5)
CHLORIDE SERPL-SCNC: 101 MMOL/L (ref 98–107)
CHOLEST SERPL-MCNC: 135 MG/DL (ref 0–200)
CO2 SERPL-SCNC: 23 MMOL/L (ref 22–29)
CREAT SERPL-MCNC: 0.44 MG/DL (ref 0.57–1)
DEPRECATED RDW RBC AUTO: 50.4 FL (ref 37–54)
EGFRCR SERPLBLD CKD-EPI 2021: 108.2 ML/MIN/1.73
ERYTHROCYTE [DISTWIDTH] IN BLOOD BY AUTOMATED COUNT: 15.2 % (ref 12.3–15.4)
GLUCOSE SERPL-MCNC: 81 MG/DL (ref 65–99)
HCT VFR BLD AUTO: 26.4 % (ref 34–46.6)
HDLC SERPL-MCNC: 45 MG/DL (ref 40–60)
HGB BLD-MCNC: 8.6 G/DL (ref 12–15.9)
LDLC SERPL CALC-MCNC: 73 MG/DL (ref 0–100)
LDLC/HDLC SERPL: 1.61 {RATIO}
MCH RBC QN AUTO: 29.9 PG (ref 26.6–33)
MCHC RBC AUTO-ENTMCNC: 32.6 G/DL (ref 31.5–35.7)
MCV RBC AUTO: 91.7 FL (ref 79–97)
PLATELET # BLD AUTO: 188 10*3/MM3 (ref 140–450)
PMV BLD AUTO: 11.2 FL (ref 6–12)
POTASSIUM SERPL-SCNC: 3.6 MMOL/L (ref 3.5–5.2)
QT INTERVAL: 434 MS
QTC INTERVAL: 471 MS
RBC # BLD AUTO: 2.88 10*6/MM3 (ref 3.77–5.28)
SODIUM SERPL-SCNC: 133 MMOL/L (ref 136–145)
TRIGL SERPL-MCNC: 88 MG/DL (ref 0–150)
VLDLC SERPL-MCNC: 17 MG/DL (ref 5–40)
WBC NRBC COR # BLD AUTO: 3.21 10*3/MM3 (ref 3.4–10.8)

## 2025-04-19 PROCEDURE — 99239 HOSP IP/OBS DSCHRG MGMT >30: CPT | Performed by: PHYSICIAN ASSISTANT

## 2025-04-19 PROCEDURE — 80061 LIPID PANEL: CPT | Performed by: INTERNAL MEDICINE

## 2025-04-19 PROCEDURE — 80048 BASIC METABOLIC PNL TOTAL CA: CPT | Performed by: INTERNAL MEDICINE

## 2025-04-19 PROCEDURE — 85027 COMPLETE CBC AUTOMATED: CPT | Performed by: INTERNAL MEDICINE

## 2025-04-19 RX ADMIN — PHENAZOPYRIDINE 100 MG: 100 TABLET ORAL at 08:06

## 2025-04-19 RX ADMIN — AMOXICILLIN AND CLAVULANATE POTASSIUM 1 TABLET: 875; 125 TABLET, FILM COATED ORAL at 08:06

## 2025-04-19 RX ADMIN — LEVOTHYROXINE SODIUM 75 MCG: 0.07 TABLET ORAL at 07:24

## 2025-04-19 RX ADMIN — MUPIROCIN 1 APPLICATION: 20 OINTMENT TOPICAL at 08:07

## 2025-04-19 RX ADMIN — DEXLANSOPRAZOLE 30 MG: 30 CAPSULE, DELAYED RELEASE ORAL at 08:08

## 2025-04-19 RX ADMIN — TIMOLOL MALEATE 1 DROP: 5 SOLUTION/ DROPS OPHTHALMIC at 08:07

## 2025-04-19 NOTE — PLAN OF CARE
Problem: Comorbidity Management  Goal: Blood Pressure in Desired Range  Outcome: Met  Intervention: Maintain Blood Pressure Management  Recent Flowsheet Documentation  Taken 4/19/2025 0000 by Adarsh Everett RN  Medication Review/Management: medications reviewed  Taken 4/18/2025 2200 by Adarsh Everett RN  Medication Review/Management: medications reviewed  Taken 4/18/2025 2000 by Adarsh Everett RN  Medication Review/Management: medications reviewed     Problem: Fall Injury Risk  Goal: Absence of Fall and Fall-Related Injury  Outcome: Met  Intervention: Identify and Manage Contributors  Recent Flowsheet Documentation  Taken 4/19/2025 0000 by Adarsh Everett RN  Medication Review/Management: medications reviewed  Taken 4/18/2025 2200 by Adarsh Everett RN  Medication Review/Management: medications reviewed  Taken 4/18/2025 2000 by Adarsh Everett RN  Medication Review/Management: medications reviewed  Self-Care Promotion: independence encouraged  Intervention: Promote Injury-Free Environment  Recent Flowsheet Documentation  Taken 4/19/2025 0200 by Adarsh Evreett RN  Safety Promotion/Fall Prevention: safety round/check completed  Taken 4/19/2025 0000 by Adarsh Everett RN  Safety Promotion/Fall Prevention:   nonskid shoes/slippers when out of bed   activity supervised   assistive device/personal items within reach   clutter free environment maintained   fall prevention program maintained   lighting adjusted   room organization consistent   safety round/check completed   toileting scheduled  Taken 4/18/2025 2200 by Adarsh Everett RN  Safety Promotion/Fall Prevention:   safety round/check completed   assistive device/personal items within reach   clutter free environment maintained   fall prevention program maintained   lighting adjusted   nonskid shoes/slippers when out of bed   room organization consistent   toileting scheduled  Taken 4/18/2025 2000 by Adarsh Everett RN  Safety Promotion/Fall  Prevention: assistive device/personal items within reach   Goal Outcome Evaluation:  Plan of Care Reviewed With: patient        Progress: improving

## 2025-04-19 NOTE — PLAN OF CARE
Patient discharging home with . All questions/concerns addressed. DC education completed. All belongings with patient. Reached out to Dr. Yung regarding restarting telmisartan per pt's request, MD okay with restarting med. Patient will be restarting at home. VSS.

## 2025-04-19 NOTE — DISCHARGE SUMMARY
DISCHARGE SUMMARY       Patient name: Rupa Finn  CSN: [id number]  MRN: [id number]  : 1960    Date of Admission: 2025  Date of Discharge:  2025    Admitting Physician: Tim Chu MD   Primary Care Provider: Dinorah Oates DO  Consultations:   Igor Yung MD Cardiology     Admission Diagnosis: Hypotension      Discharge Diagnoses:     Anaphylactic reaction    Hypertension    Glaucoma    Hypothyroidism    GERD (gastroesophageal reflux disease)    Raynaud's disease    Sjogren syndrome with myopathy    Scleroderma    Hyperlipidemia LDL goal <70    Hyponatremia    Iron deficiency anemia    Hypotension    Coronary artery disease involving native coronary artery of native heart    Stress-induced cardiomyopathy    Demand ischemia    Procedures:  25: Left heart catheterization     Imaging:  XR Chest 1 View  Result Date: 2025  Impression: No active cardiopulmonary disease Electronically Signed: Red Jones  2025 12:36 PM EDT  Workstation ID: [id number]    History of Present Illness (copied from H&P note on 25):  Pleasant 64-year-old female presented emergency room from infusion center where she was getting IV iron infusion.  Patient has previous diagnosis of iron deficiency anemia for which she gets iron infusions.  She was post to get 6 infusions and this was her fifth infusion.  She gets ferric gluconate infusion.  Previously for infusions she has tolerated well.  She also has autoimmune disease with limited scleroderma as well as Sjogren syndrome, hypertension, dyslipidemia, GERD, hypothyroidism.  She also is seen at [facility] rheumatology where she gets Gammagard infusion every 2 weeks apparently to preserve her muscle function.  She received that infusion yesterday and she had no problems with that infusion.  She was in usual state of health up until she came in today morning for the infusion.  During the infusion she broke out in cold sweat  and she started feeling numbness and tingling on her face as well as tongue and felt like her throat is going to close up.  She also felt chest discomfort without any radiation.  She was also starting to feel short of breath.  She denies any skin rash or hives.  She felt nauseous and felt that she threw up but nothing came up.  She became very weak and lethargic and was hypotensive.  She was transferred to ER where she was given epinephrine IV x 2, epinephrine IM x 2, hydrocortisone 100 mg along with Benadryl.  Patient was given 3 L of fluid boluses due to ongoing hypotension.  Despite this fluid boluses whenever the fluid is turned off her blood pressure drops into low 80s again.  Due to concern for ongoing hypotension we were asked to admit patient to intensive care unit.     Patient was seen at bedside.  Currently her blood pressure is 82/60.  She is mentating well.  She feels that she is starting to feel better.  She denies any shortness of breath currently.  Denies any chest pain.  Denies any shortness of breath currently.   is at bedside and states that patient looks better now and less lethargic as compared to before (end of copied text).    Hospital Course:  Patient's symptoms improved upon ICU arrival and she ultimately did not require further antihistamines or steroids nor further IVF boluses to support hemodynamics.     Due to reports of chest discomfort EKG obtained (suggestive of early repolarization abnormality) and serial troponins checked which were elevated. Cardiology was consulted and patient underwent further workup including TTE showing LVEF 60% with no structural or valvular abnormalities and LHC with mild CAD and circumferential mid LV mild hypokinesis consistent with Takotsubo / stress-induced cardiomyopathy.      Of note, patient had additional complaints of dysuria on admission with UA showing trace bacteria and Dr. Oneil placed on a course of Augmentin.     Patient is felt to  "have reached maximum benefit from this hospitalization and has been deemed appropriate for discharge home. She is feeling well, is tolerating a PO diet, and is ambulating without difficulty. Discharge medications and recommendations are listed below:     Yesterday, patient's TR band was removed per protocol. Dressing was applied. After discharge was completed, patient's arterial site reopened while she was getting dressed. Dr. Stacy instructed RN to replace the TR band and restart TR band protocol. Due to risk for further bleeding, patient remained in hospital overnight. This morning, patient is appropriate for discharge home.  Medications and follow up instructions below.      Vitals:  /69 (BP Location: Left arm, Patient Position: Sitting)   Pulse 80   Temp 97.4 °F (36.3 °C) (Oral)   Resp 16   Ht 157.5 cm (62\")   Wt 66.7 kg (147 lb)   LMP  (LMP Unknown)   SpO2 96%   BMI 26.89 kg/m²     Physical Exam:  General Appearance:  Middle-aged woman sitting upright in the chair eating breakfast in no distress   Head:  Atraumatic   Eyes:          Pupils equal and reactive   Throat: No edema   Neck: Supple   Lungs:   Clear to auscultation    Heart:  Regular rhythm, S1, S2 auscultated   Abdomen:   Nondistended, bowel sounds present   Rectal:   Deferred   Extremities: No edema   Pulses: Palpable pulses   Skin: Warm and dry without rash   Neurologic: Alert, oriented, speech fluent, face symmetric     Labs:  Results from last 7 days   Lab Units 04/19/25  0530   WBC 10*3/mm3 3.21*   HEMOGLOBIN g/dL 8.6*   HEMATOCRIT % 26.4*   PLATELETS 10*3/mm3 188     Results from last 7 days   Lab Units 04/19/25  0530 04/18/25  0426 04/17/25  1209   SODIUM mmol/L 133*   < > 130*   POTASSIUM mmol/L 3.6   < > 3.9   CHLORIDE mmol/L 101   < > 98   CO2 mmol/L 23.0   < > 19.0*   BUN mg/dL 5*   < > 9   CREATININE mg/dL 0.44*   < > 0.72   CALCIUM mg/dL 8.2*   < > 8.1*   BILIRUBIN mg/dL  --   --  0.3   ALK PHOS U/L  --   --  63   ALT " (SGPT) U/L  --   --  16   AST (SGOT) U/L  --   --  29   GLUCOSE mg/dL 81   < > 120*    < > = values in this interval not displayed.         Magnesium   Date Value Ref Range Status   04/18/2025 2.0 1.6 - 2.4 mg/dL Final     Phosphorus   Date Value Ref Range Status   04/18/2025 3.1 2.5 - 4.5 mg/dL Final           Discharge Medications:     Discharge Medications        PAUSE taking these medications        Instructions Start Date   amLODIPine 2.5 MG tablet  Wait to take this until your doctor or other care provider tells you to start again.  Commonly known as: NORVASC   1 tablet, Daily      NON FORMULARY  Wait to take this until your doctor or other care provider tells you to start again.   Iron IV      telmisartan 80 MG tablet  Wait to take this until your doctor or other care provider tells you to start again.  Commonly known as: MICARDIS   80 mg, Oral, Daily             New Medications        Instructions Start Date   amoxicillin-clavulanate 875-125 MG per tablet  Commonly known as: AUGMENTIN   1 tablet, Oral, Every 12 Hours Scheduled             Changes to Medications        Instructions Start Date   famotidine 40 MG tablet  Commonly known as: PEPCID  What changed: See the new instructions.   TAKE 1 TABLET BY MOUTH EVERY DAY AT NIGHT AS NEEDED FOR HEARTBURN      fluocinonide 0.05 % gel  Commonly known as: LIDEX  What changed: See the new instructions.   Apply to affected area sparingly twice a day for 7-10 days. NPO 30 min      levothyroxine 75 MCG tablet  Commonly known as: SYNTHROID, LEVOTHROID  What changed: Another medication with the same name was removed. Continue taking this medication, and follow the directions you see here.   75 mcg, Oral, Every 48 Hours Scheduled      mycophenolate 500 MG tablet  Commonly known as: CELLCEPT  What changed: See the new instructions.   TAKE 1 TABLET BY MOUTH 3 TIMES A DAY AND TAKE 1 TABLET AT NIGHT      Vitamin D3 50 MCG (2000 UT) tablet  What changed: how much to take    Oral, Daily             Continue These Medications        Instructions Start Date   Alirocumab 150 MG/ML injection pen  Commonly known as: PRALUENT   150 mg, Subcutaneous, Every 14 Days      dexlansoprazole 30 MG capsule  Commonly known as: DEXILANT   30 mg, Oral, Daily      estradiol 1 MG/GM gel   1 Application, Transdermal, Daily      GAMMAGARD IV   1 dose, Every 14 Days      hydrALAZINE 10 MG tablet  Commonly known as: APRESOLINE   10 mg, Oral, As Needed      mupirocin 2 % ointment  Commonly known as: BACTROBAN   2 Times Daily      polyethylene glycol 17 GM/SCOOP powder  Commonly known as: MIRALAX   As Needed      timolol 0.5 % ophthalmic solution  Commonly known as: TIMOPTIC   1 drop, Both Eyes, Daily      travoprost (BAK free) 0.004 % solution ophthalmic solution  Commonly known as: TRAVATAN   1 drop, Every Evening      Zinc 50 MG tablet   50 mcg, Daily      Zoryve 0.3 % cream  Generic drug: Roflumilast   Daily             Discharge Diet:   Diet Instructions       Diet: Regular/House Diet, Cardiac Diets; Healthy Heart (2-3 Na+); Regular (IDDSI 7); Thin (IDDSI 0)      Discharge Diet:  Regular/House Diet  Cardiac Diets       Cardiac Diet: Healthy Heart (2-3 Na+)    Texture: Regular (IDDSI 7)    Fluid Consistency: Thin (IDDSI 0)          Activity at Discharge:    Activity Instructions       Activity as Tolerated            Follow-up Appointments  Future Appointments   Date Time Provider Department Center   4/24/2025  1:00 PM CHAIR 20  [facility]   7/16/2025 10:40 AM Aspen Plata APRN [facility]   4/3/2026 10:10 AM Dixie Marin MD [facility]     Additional Instructions for the Follow-ups that You Need to Schedule       Discharge Follow-up with PCP   As directed       Currently Documented PCP:    Dinorah Oates DO    PCP Phone Number:    [phone]     Follow Up Details: 1 week        Discharge Follow-up with Specified Provider: Aspen Plata APRN (Cardiology)   As directed      To:  Aspen Plata APRN (Cardiology)   Follow Up Details: Keep previously scheduled appointment              Discharge Instructions:  Okay to discharge home   Medications as above   Follow up as above   If bleeding reoccurs at radial site, return to hospital    Current Code Status       Date Active Code Status Order ID Comments User Context       4/17/2025 2119 CPR (Attempt to Resuscitate) [id number]  Tim Chu MD Inpatient        Question Answer    Code Status (Patient has no pulse and is not breathing) CPR (Attempt to Resuscitate)    Medical Interventions (Patient has pulse or is breathing) Full Support             Melania Liriano PA-C   Pulmonary and Critical Care Medicine  04/19/25   Electronically signed by Melania Liriano PA-C, 04/19/25, 12:08 PM EDT.     Time: 31 minutes     CC: Dinorah Oates DO    Please note that portions of this note were completed with a voice recognition program.

## 2025-04-19 NOTE — OUTREACH NOTE
Prep Survey      Flowsheet Row Responses   [facility] facility patient discharged from? [facility]   Is LACE score < 7 ? No   Eligibility [facility]   Date of Admission 04/17/25   Date of Discharge 04/19/25   Discharge Disposition Home or Self Care   Discharge diagnosis Anaphylactic reaction-Coronary angiography this visit   Does the patient have one of the following disease processes/diagnoses(primary or secondary)? Other   Does the patient have Home health ordered? No   Is there a DME ordered? No   Prep survey completed? Yes            ENRICO HERNANDEZ - Registered Nurse

## 2025-04-20 ENCOUNTER — TRANSITIONAL CARE MANAGEMENT TELEPHONE ENCOUNTER (OUTPATIENT)
Dept: CALL CENTER | Facility: HOSPITAL | Age: 65
End: 2025-04-20
Payer: COMMERCIAL

## 2025-04-20 NOTE — DISCHARGE SUMMARY
Melania Liriano PA-C  Physician Assistant  Intensivist     Discharge Summary      Cosign Needed     Date of Service: 25 0941  Creation Time: 25 1201     Cosign Needed         DISCHARGE SUMMARY         Patient name: Rupa Finn  CSN: [id number]  MRN: [id number]  : 1960     Date of Admission: 2025  Date of Discharge:  2025     Admitting Physician: Tim Chu MD   Primary Care Provider: Dinorah Oates DO  Consultations:   Igor Yung MD       Cardiology      Admission Diagnosis: Hypotension      Discharge Diagnoses:     Anaphylactic reaction    Hypertension    Glaucoma    Hypothyroidism    GERD (gastroesophageal reflux disease)    Raynaud's disease    Sjogren syndrome with myopathy    Scleroderma    Hyperlipidemia LDL goal <70    Hyponatremia    Iron deficiency anemia    Hypotension    Coronary artery disease involving native coronary artery of native heart    Stress-induced cardiomyopathy    Demand ischemia     Procedures:  25: Left heart catheterization      Imaging:  XR Chest 1 View  Result Date: 2025  Impression: No active cardiopulmonary disease Electronically Signed: Red Jones  2025 12:36 PM EDT  Workstation ID: [id number]     History of Present Illness (copied from H&P note on 25):  Pleasant 64-year-old female presented emergency room from infusion center where she was getting IV iron infusion.  Patient has previous diagnosis of iron deficiency anemia for which she gets iron infusions.  She was post to get 6 infusions and this was her fifth infusion.  She gets ferric gluconate infusion.  Previously for infusions she has tolerated well.  She also has autoimmune disease with limited scleroderma as well as Sjogren syndrome, hypertension, dyslipidemia, GERD, hypothyroidism.  She also is seen at [facility] rheumatology where she gets Gammagard infusion every 2 weeks apparently to preserve her muscle function.  She received  that infusion yesterday and she had no problems with that infusion.  She was in usual state of health up until she came in today morning for the infusion.  During the infusion she broke out in cold sweat and she started feeling numbness and tingling on her face as well as tongue and felt like her throat is going to close up.  She also felt chest discomfort without any radiation.  She was also starting to feel short of breath.  She denies any skin rash or hives.  She felt nauseous and felt that she threw up but nothing came up.  She became very weak and lethargic and was hypotensive.  She was transferred to ER where she was given epinephrine IV x 2, epinephrine IM x 2, hydrocortisone 100 mg along with Benadryl.  Patient was given 3 L of fluid boluses due to ongoing hypotension.  Despite this fluid boluses whenever the fluid is turned off her blood pressure drops into low 80s again.  Due to concern for ongoing hypotension we were asked to admit patient to intensive care unit.     Patient was seen at bedside.  Currently her blood pressure is 82/60.  She is mentating well.  She feels that she is starting to feel better.  She denies any shortness of breath currently.  Denies any chest pain.  Denies any shortness of breath currently.   is at bedside and states that patient looks better now and less lethargic as compared to before (end of copied text).     Hospital Course:  Patient's symptoms improved upon ICU arrival and she ultimately did not require further antihistamines or steroids nor further IVF boluses to support hemodynamics.      Due to reports of chest discomfort EKG obtained (suggestive of early repolarization abnormality) and serial troponins checked which were elevated. Cardiology was consulted and patient underwent further workup including TTE showing LVEF 60% with no structural or valvular abnormalities and LHC with mild CAD and circumferential mid LV mild hypokinesis consistent with Takotsubo /  "stress-induced cardiomyopathy.       Of note, patient had additional complaints of dysuria on admission with UA showing trace bacteria and Dr. Oneil placed on a course of Augmentin.      Patient is felt to have reached maximum benefit from this hospitalization and has been deemed appropriate for discharge home. She is feeling well, is tolerating a PO diet, and is ambulating without difficulty. Discharge medications and recommendations are listed below:      Yesterday, patient's TR band was removed per protocol. Dressing was applied. After discharge was completed, patient's arterial site reopened while she was getting dressed. Dr. Stacy instructed RN to replace the TR band and restart TR band protocol. Due to risk for further bleeding, patient remained in hospital overnight. This morning, patient is appropriate for discharge home.  Medications and follow up instructions below.       Vitals:  /69 (BP Location: Left arm, Patient Position: Sitting)   Pulse 80   Temp 97.4 °F (36.3 °C) (Oral)   Resp 16   Ht 157.5 cm (62\")   Wt 66.7 kg (147 lb)   LMP  (LMP Unknown)   SpO2 96%   BMI 26.89 kg/m²      Physical Exam:  General Appearance:  Middle-aged woman sitting upright in the chair eating breakfast in no distress   Head:  Atraumatic   Eyes:          Pupils equal and reactive   Throat: No edema   Neck: Supple   Lungs:   Clear to auscultation    Heart:  Regular rhythm, S1, S2 auscultated   Abdomen:   Nondistended, bowel sounds present   Rectal:   Deferred   Extremities: No edema   Pulses: Palpable pulses   Skin: Warm and dry without rash   Neurologic: Alert, oriented, speech fluent, face symmetric      Labs:       Results from last 7 days   Lab Units 04/19/25  0530   WBC 10*3/mm3 3.21*   HEMOGLOBIN g/dL 8.6*   HEMATOCRIT % 26.4*   PLATELETS 10*3/mm3 188             Results from last 7 days   Lab Units 04/19/25  0530 04/18/25  0426 04/17/25  1209   SODIUM mmol/L 133*   < > 130*   POTASSIUM mmol/L 3.6   " < > 3.9   CHLORIDE mmol/L 101   < > 98   CO2 mmol/L 23.0   < > 19.0*   BUN mg/dL 5*   < > 9   CREATININE mg/dL 0.44*   < > 0.72   CALCIUM mg/dL 8.2*   < > 8.1*   BILIRUBIN mg/dL  --   --  0.3   ALK PHOS U/L  --   --  63   ALT (SGPT) U/L  --   --  16   AST (SGOT) U/L  --   --  29   GLUCOSE mg/dL 81   < > 120*    < > = values in this interval not displayed.                Magnesium   Date Value Ref Range Status   04/18/2025 2.0 1.6 - 2.4 mg/dL Final            Phosphorus   Date Value Ref Range Status   04/18/2025 3.1 2.5 - 4.5 mg/dL Final            Discharge Medications:      Discharge Medications          PAUSE taking these medications         Instructions Start Date   amLODIPine 2.5 MG tablet  Wait to take this until your doctor or other care provider tells you to start again.  Commonly known as: NORVASC    1 tablet, Daily        NON FORMULARY  Wait to take this until your doctor or other care provider tells you to start again.    Iron IV        telmisartan 80 MG tablet  Wait to take this until your doctor or other care provider tells you to start again.  Commonly known as: MICARDIS    80 mg, Oral, Daily                  New Medications         Instructions Start Date   amoxicillin-clavulanate 875-125 MG per tablet  Commonly known as: AUGMENTIN    1 tablet, Oral, Every 12 Hours Scheduled                  Changes to Medications         Instructions Start Date   famotidine 40 MG tablet  Commonly known as: PEPCID  What changed: See the new instructions.    TAKE 1 TABLET BY MOUTH EVERY DAY AT NIGHT AS NEEDED FOR HEARTBURN        fluocinonide 0.05 % gel  Commonly known as: LIDEX  What changed: See the new instructions.    Apply to affected area sparingly twice a day for 7-10 days. NPO 30 min        levothyroxine 75 MCG tablet  Commonly known as: SYNTHROID, LEVOTHROID  What changed: Another medication with the same name was removed. Continue taking this medication, and follow the directions you see here.    75 mcg, Oral,  Every 48 Hours Scheduled        mycophenolate 500 MG tablet  Commonly known as: CELLCEPT  What changed: See the new instructions.    TAKE 1 TABLET BY MOUTH 3 TIMES A DAY AND TAKE 1 TABLET AT NIGHT        Vitamin D3 50 MCG (2000 UT) tablet  What changed: how much to take    Oral, Daily                  Continue These Medications         Instructions Start Date   Alirocumab 150 MG/ML injection pen  Commonly known as: PRALUENT    150 mg, Subcutaneous, Every 14 Days        dexlansoprazole 30 MG capsule  Commonly known as: DEXILANT    30 mg, Oral, Daily        estradiol 1 MG/GM gel    1 Application, Transdermal, Daily        GAMMAGARD IV    1 dose, Every 14 Days        hydrALAZINE 10 MG tablet  Commonly known as: APRESOLINE    10 mg, Oral, As Needed        mupirocin 2 % ointment  Commonly known as: BACTROBAN    2 Times Daily        polyethylene glycol 17 GM/SCOOP powder  Commonly known as: MIRALAX    As Needed        timolol 0.5 % ophthalmic solution  Commonly known as: TIMOPTIC    1 drop, Both Eyes, Daily        travoprost (BAK free) 0.004 % solution ophthalmic solution  Commonly known as: TRAVATAN    1 drop, Every Evening        Zinc 50 MG tablet    50 mcg, Daily        Zoryve 0.3 % cream  Generic drug: Roflumilast    Daily                  Discharge Diet:   Diet Instructions         Diet: Regular/House Diet, Cardiac Diets; Healthy Heart (2-3 Na+); Regular (IDDSI 7); Thin (IDDSI 0)       Discharge Diet:  Regular/House Diet  Cardiac Diets        Cardiac Diet: Healthy Heart (2-3 Na+)     Texture: Regular (IDDSI 7)     Fluid Consistency: Thin (IDDSI 0)             Activity at Discharge:    Activity Instructions         Activity as Tolerated               Follow-up Appointments         Future Appointments   Date Time Provider Department Center   4/24/2025  1:00 PM CHAIR 20  [facility]   7/16/2025 10:40 AM Aspen Plata APRN [facility]   4/3/2026 10:10 AM Dixie Marin MD [facility]      Additional  Instructions for the Follow-ups that You Need to Schedule         Discharge Follow-up with PCP   As directed         Currently Documented PCP:    Dinorah Oates DO    PCP Phone Number:    [phone]      Follow Up Details: 1 week           Discharge Follow-up with Specified Provider: Aspen Plata APRN (Cardiology)   As directed        To: Aspen Plata APRN (Cardiology)   Follow Up Details: Keep previously scheduled appointment                   Discharge Instructions:  Okay to discharge home   Medications as above   Follow up as above   If bleeding reoccurs at radial site, return to hospital     Current Code Status         Date Active Code Status Order ID Comments User Context          4/17/2025 2119 CPR (Attempt to Resuscitate) [id number]   Tim Chu MD Inpatient            Question Answer     Code Status (Patient has no pulse and is not breathing) CPR (Attempt to Resuscitate)     Medical Interventions (Patient has pulse or is breathing) Full Support                Melania Liriano PA-C   Pulmonary and Critical Care Medicine  04/19/25   Electronically signed by Melania Liriano PA-C, 04/19/25, 12:08 PM EDT.      Time: 31 minutes      CC: Dinorah Oates DO     Please note that portions of this note were completed with a voice recognition program.               Revision History

## 2025-04-20 NOTE — OUTREACH NOTE
Call Center TCM Note      Flowsheet Row Responses   [facility] patient discharged from? [facility]   Does the patient have one of the following disease processes/diagnoses(primary or secondary)? Other   TCM attempt successful? Yes   Call start time 1802   Call end time 1813   Meds reviewed with patient/caregiver? Yes   Is the patient having any side effects they believe may be caused by any medication additions or changes? No   Does the patient have all medications ordered at discharge? Yes   Is the patient taking all medications as directed (includes completed medication regime)? Yes   Medication comments States took telmisartan today due to /82. States still holding amlodipine, and has sent message to her PCP through [employer].   Comments PCP hospital f/u appt on 04/25/25, within TCM time frame. Cardiology appt 07/16/25.   Does the patient have an appointment with their PCP within 7-14 days of discharge? Yes   Has home health visited the patient within 72 hours of discharge? N/A   Psychosocial issues? No   Did the patient receive a copy of their discharge instructions? Yes   Nursing interventions Reviewed instructions with patient   What is the patient's perception of their health status since discharge? Improving   Is the patient/caregiver able to teach back signs and symptoms related to disease process for when to call PCP? Yes   Is the patient/caregiver able to teach back signs and symptoms related to disease process for when to call 911? Yes   Is the patient/caregiver able to teach back the hierarchy of who to call/visit for symptoms/problems? PCP, Specialist, Home health nurse, Urgent Care, ED, 911 Yes   If the patient is a current smoker, are they able to teach back resources for cessation? Not a smoker   Additional teach back comments States is monitoring BP daily, and keeping record for appts.   TCM call completed? Yes   Wrap up additional comments Patient states is improving. States BP was  145/82, so took telmisartan this morning. [facility] has sent a message to her PCP through [employer]. [facility] continues to hold her amlodipine. [facility] still has some fatigue, but improving. Patient voiced a compliment-escalated to appropriate leadership. Denies any needs today. TCM complete.   Call end time 1813   Would this patient benefit from a Referral to [facility] Social Work? No   Is the patient interested in additional calls from an ambulatory ? No            Graciela SOTOMAYOR - Registered Nurse    4/20/2025, 18:18 EDT

## 2025-04-21 ENCOUNTER — TELEPHONE (OUTPATIENT)
Dept: OBSTETRICS AND GYNECOLOGY | Facility: CLINIC | Age: 65
End: 2025-04-21
Payer: COMMERCIAL

## 2025-04-21 ENCOUNTER — DOCUMENTATION (OUTPATIENT)
Dept: CARDIAC REHAB | Facility: HOSPITAL | Age: 65
End: 2025-04-21
Payer: COMMERCIAL

## 2025-04-21 DIAGNOSIS — E78.5 HYPERLIPIDEMIA LDL GOAL <100: Primary | ICD-10-CM

## 2025-04-21 RX ORDER — ESTRADIOL 0.1 MG/G
CREAM VAGINAL
Qty: 42.5 G | Refills: 2 | Status: SHIPPED | OUTPATIENT
Start: 2025-04-21 | End: 2025-04-23

## 2025-04-21 NOTE — TELEPHONE ENCOUNTER
Patient has been off of estradoil  cold turkey since Friday.   Not by choice, but due to a medical emergency.  She wonders if she is ok to just stay off of it.    Could she use an estrogen vaginal cream?  Could she get a prescription for it.

## 2025-04-21 NOTE — TELEPHONE ENCOUNTER
So she needs to stay off the estradiol gel due to her cardiac history. So yes, I recommend discontinuing. However she can still do vaginal estrogen cream, and I have sent that prescription to her listed pharmacy.     Dixie Marin MD

## 2025-04-22 ENCOUNTER — PATIENT MESSAGE (OUTPATIENT)
Dept: OBSTETRICS AND GYNECOLOGY | Facility: CLINIC | Age: 65
End: 2025-04-22
Payer: COMMERCIAL

## 2025-04-22 ENCOUNTER — PRIOR AUTHORIZATION (OUTPATIENT)
Age: 65
End: 2025-04-22
Payer: COMMERCIAL

## 2025-04-22 LAB
BACTERIA SPEC AEROBE CULT: NORMAL
BACTERIA SPEC AEROBE CULT: NORMAL

## 2025-04-22 NOTE — PAYOR COMM NOTE
"Ref# [id number]   Clinicals    4/17/25 admission  4/19/25 Discharged    MEGAN Stacy, RN  Utilization Review  Phone [phone]  Fax [phone]    [place] [address]             Candido Finn \"Jeannette\" (64 y.o. Female)       Date of Birth   1960    Social Security Number       Address   [address] [place], [place] [address]    Home Phone   [phone]    MRN   [id number]       [facility]    Marital Status                               Admission Date   4/17/2025    Admission Type   Emergency    Admitting Provider   Tim Chu MD    Attending Provider       Department, Room/Bed   [facility] 2B ICU, N237/1       Discharge Date   4/19/2025    Discharge Disposition   Home or Self Care    Discharge Destination                                 Attending Provider: (none)   Allergies: Cefaclor, Ibuprofen, Naproxen Sodium, Nsaids, Prednisone, Alphagan [Brimonidine], Atenolol, Doxycycline, E-mycin [Erythromycin], Livalo [Pitavastatin], Statins, Dorzolamide Hcl, Adhesive Tape    Isolation: None   Infection: None   Code Status: Prior    Ht: 157.5 cm (62\")   Wt: 66.7 kg (147 lb)    Admission Cmt: None   Principal Problem: Anaphylactic reaction [T78.2XXA]                   Active Insurance as of 4/17/2025       Primary Coverage       Payor Plan Insurance Group Employer/Plan Group    [facility] [email or web address] [facility] [id number]       Payor Plan Address Payor Plan Phone Number Payor Plan Fax Number Effective Dates    PO    1/1/2023 - None Entered    [facility] [id number]         Subscriber Name Subscriber Birth Date Member ID       CANDIDO FINN 1960 [id number]                     Emergency Contacts        (Rel.) Home Phone Work Phone Mobile Phone    KRIS FINN (Spouse) -- -- [phone]    JELLYROHINI (Daughter) [phone] -- [phone]              [place]: [facility] [id number] Tax ID " [id number]  Insurance Information                  [employer] Phone: --    Subscriber: Rupa Finn Subscriber#: [id number]    Group#: [id number] Precert#: [id number]    Authorization#: -- Effective Date: --             History & Physical        Tim Chu MD at 04/17/25 1421            Intensive Care Admission Note     Hypotension    History of Present Illness     Pleasant 64-year-old female presented emergency room from infusion center where she was getting IV iron infusion.  Patient has previous diagnosis of iron deficiency anemia for which she gets iron infusions.  She was post to get 6 infusions and this was her fifth infusion.  She gets ferric gluconate infusion.  Previously for infusions she has tolerated well.  She also has autoimmune disease with limited scleroderma as well as Sjogren syndrome, hypertension, dyslipidemia, GERD, hypothyroidism.  She also is seen at [facility] rheumatology where she gets Gammagard infusion every 2 weeks apparently to preserve her muscle function.  She received that infusion yesterday and she had no problems with that infusion.  She was in usual state of health up until she came in today morning for the infusion.  During the infusion she broke out in cold sweat and she started feeling numbness and tingling on her face as well as tongue and felt like her throat is going to close up.  She also felt chest discomfort without any radiation.  She was also starting to feel short of breath.  She denies any skin rash or hives.  She felt nauseous and felt that she threw up but nothing came up.  She became very weak and lethargic and was hypotensive.  She was transferred to ER where she was given epinephrine IV x 2, epinephrine IM x 2, hydrocortisone 100 mg along with Benadryl.  Patient was given 3 L of fluid boluses due to ongoing hypotension.  Despite this fluid boluses whenever the fluid is turned off her blood pressure drops into low 80s  again.  Due to concern for ongoing hypotension we were asked to admit patient to intensive care unit.    Patient was seen at bedside.  Currently her blood pressure is 82/60.  She is mentating well.  She feels that she is starting to feel better.  She denies any shortness of breath currently.  Denies any chest pain.  Denies any shortness of breath currently.   is at bedside and states that patient looks better now and less lethargic as compared to before.    Problem List, Surgical History, Family, Social History, and ROS     Past Medical History:   Diagnosis Date    Anemia 2021    Autoimmune disease     Cholelithiasis Removed 2008    Colon polyp 2022    COVID-19     Cutaneous lupus erythematosus     Diverticulosis 2010?    Fibrocystic breast changes, bilateral     GERD (gastroesophageal reflux disease)     not currently taking medication    Gestational diabetes     Glaucoma     Hyperlipidemia     Hypertension     Hypothyroidism     Lower extremity edema     Neuromuscular disorder 2020    OAB (overactive bladder)     Osteoarthritis     Palpitations     Post-menopause on HRT (hormone replacement therapy)     Raynaud's disease     Scleroderma     SINE scleroderma    Sjogren's disease     Urinary tract infection       Past Surgical History:   Procedure Laterality Date    CHOLECYSTECTOMY      COLONOSCOPY  2019 and 2022    ESOPHAGOSCOPY / EGD      esophageal stretching/dilation and gastric polypectomy, esophageal biopsies    EYE SURGERY  2012    Cataracts removed - lens replacement    LAPAROSCOPIC ASSISTED VAGINAL HYSTERECTOMY SALPINGO OOPHORECTOMY Bilateral     LAPAROSCOPIC DIAZ PROCEDURE      OOPHORECTOMY      OTHER SURGICAL HISTORY      Lap for endometriosis    TONSILLECTOMY      UPPER GASTROINTESTINAL ENDOSCOPY  ?       Allergies   Allergen Reactions    Cefaclor Shortness Of Breath     hives    Naproxen Sodium Other (See Comments)     tongue swelling    Nsaids Angioedema    Prednisone Anaphylaxis and Other (See  "Comments)     Allergic to all steroids!  Eye pressure shoots up to dangerous levels.    Alphagan [Brimonidine] Other (See Comments)     Eyes itching, red, swollen    Atenolol Other (See Comments)     fatigue    Livalo [Pitavastatin] Myalgia    Statins Myalgia    Dorzolamide Hcl Other (See Comments)     Eyes burning, discharge.    Dorzolamide Hcl-Timolol Mal Unknown - High Severity    Doxycycline Unknown - High Severity    Ibuprofen Swelling    Macrolides And Ketolides Provider Review Needed    Adhesive Tape Rash     \"burns, eats through my skin\"    E-Mycin [Erythromycin] Nausea And Vomiting    Levofloxacin Unknown (See Comments)     Current Facility-Administered Medications on File Prior to Encounter   Medication    [COMPLETED] acetaminophen (TYLENOL) tablet 650 mg    [COMPLETED] cetirizine (zyrTEC) tablet 10 mg    [COMPLETED] diphenhydrAMINE (BENADRYL) injection 50 mg    [COMPLETED] famotidine (PEPCID) injection 20 mg    [COMPLETED] ferric gluconate (FERRLECIT)125 MG in sodium chloride 0.9 % 100 mL IVPB    Hydrocortisone Sod Suc (PF) (Solu-CORTEF) injection 100 mg    [COMPLETED] ondansetron (ZOFRAN) injection 8 mg    [COMPLETED] sodium chloride 0.9 % infusion     Current Outpatient Medications on File Prior to Encounter   Medication Sig    Alirocumab (PRALUENT) 150 MG/ML injection pen Inject 1 mL under the skin into the appropriate area as directed Every 14 (Fourteen) Days.    amLODIPine (NORVASC) 2.5 MG tablet Take 1 tablet by mouth Daily.    Cholecalciferol (Vitamin D3) 50 MCG (2000 UT) tablet Take  by mouth Daily.    dexlansoprazole (DEXILANT) 30 MG capsule TAKE 1 CAPSULE BY MOUTH EVERY DAY    estradiol 1 MG/GM gel Place 1 Application on the skin as directed by provider Daily.    famotidine (PEPCID) 40 MG tablet TAKE 1 TABLET BY MOUTH EVERY DAY AT NIGHT AS NEEDED FOR HEARTBURN    fluocinonide (LIDEX) 0.05 % gel Apply to affected area sparingly twice a day for 7-10 days. NPO 30 min (Patient taking differently: As " needed)    hydrALAZINE (APRESOLINE) 10 MG tablet Take 1 tablet by mouth As Needed (Blood pressure sustained >160mmHg).    Immune Globulin, Human, (GAMMAGARD IV) Infuse 1 dose into a venous catheter Every 14 (Fourteen) Days.    levothyroxine (SYNTHROID, LEVOTHROID) 75 MCG tablet TAKE 1 TABLET BY MOUTH EVERY OTHER DAY    levothyroxine (SYNTHROID, LEVOTHROID) 88 MCG tablet TAKE 1 TABLET BY MOUTH EVERY OTHER DAY    mupirocin (BACTROBAN) 2 % ointment Apply  topically to the appropriate area as directed 2 (Two) Times a Day. (Patient taking differently: Apply  topically to the appropriate area as directed 2 (Two) Times a Day. As needed)    mycophenolate (CELLCEPT) 500 MG tablet TAKE 1 TABLET BY MOUTH 3 TIMES A DAY AND TAKE 1 TABLET AT NIGHT    NON FORMULARY Iron IV    polyethylene glycol (MIRALAX) powder As Needed.    Roflumilast (Zoryve) 0.3 % cream Apply 1 application  topically to the appropriate area as directed Daily. (Patient taking differently: Apply 1 application  topically to the appropriate area as directed Daily. As needed)    telmisartan (MICARDIS) 80 MG tablet TAKE 1 TABLET BY MOUTH EVERY DAY    timolol (TIMOPTIC) 0.5 % ophthalmic solution Administer 1 drop to both eyes 2 (Two) Times a Day.    travoprost, BAK free, (TRAVATAN) 0.004 % solution ophthalmic solution 1 drop Every Evening. in affected eye(s)    Zinc 50 MG tablet Take 50 mcg by mouth Daily.     MEDICATION LIST AND ALLERGIES REVIEWED.    Family History   Problem Relation Age of Onset    Other Mother         has a pacemaker and is followed by Dr. Brooks    Heart disease Mother     Heart failure Mother     Hypertension Mother     Diabetes Mother     Vision loss Mother         Glaucoma/macular degeneration    Arthritis Mother     Glaucoma Father     Other Father          likely a cancer-related death;    Heart disease Father     Hypertension Father     Cancer Father         Prostate    Vision loss Father         Glaucoma/macular degeneration    Asthma  "Brother     Hypertension Brother     Heart attack Maternal Grandfather     Heart disease Maternal Grandfather     Heart failure Maternal Grandfather     Heart disease Maternal Grandmother     Hypertension Maternal Grandmother     Hypertension Paternal Grandmother     Colon cancer Paternal Grandfather     Cancer Paternal Grandfather         Prostate    Breast cancer Neg Hx     Ovarian cancer Neg Hx      Social History     Tobacco Use    Smoking status: Never     Passive exposure: Never    Smokeless tobacco: Never   Vaping Use    Vaping status: Never Used   Substance Use Topics    Alcohol use: No    Drug use: No     Social History     Social History Narrative    Not on file     FAMILY AND SOCIAL HISTORY REVIEWED.    Review of Systems  ALL OTHER SYSTEMS REVIEWED AND ARE NEGATIVE.    Physical Exam and Clinical Information   BP (!) 88/62   Pulse 64   Temp 97.6 °F (36.4 °C) (Oral)   Resp 20   Ht 157.5 cm (62\")   Wt 66.7 kg (147 lb)   LMP  (LMP Unknown)   SpO2 100%   BMI 26.89 kg/m²   Physical Exam  General Appearance: Awake, alert, in no acute distress  Head:    Atraumatic, Normocephalic, without obvious abnormality, Pupils reactive & symmetrical B/L.  Neck:   Supple   Lungs:   B/L Breath sounds present with decreased breath sounds on bases, no wheezing heard, no crackles.   Heart: S1 and S2 present, no murmur  Abdomen: Soft, nontender, no guarding or rigidity, bowel sounds positive.  Extremities:  no edema, warm to touch. No skin rash noted.   Pulses: Positive and symmetric.  Neurologic:  Moving all four extremities. Good strength bilaterally. Not able to participate in full neurological exam  Psychological: Normal affect, Cooperative    Results from last 7 days   Lab Units 04/17/25  1209   WBC 10*3/mm3 2.67*   HEMOGLOBIN g/dL 12.7   PLATELETS 10*3/mm3 281     Results from last 7 days   Lab Units 04/17/25  1209   SODIUM mmol/L 130*   POTASSIUM mmol/L 3.9   CO2 mmol/L 19.0*   BUN mg/dL 9   CREATININE mg/dL 0.72 "   GLUCOSE mg/dL 120*     Estimated Creatinine Clearance: 70.7 mL/min (by C-G formula based on SCr of 0.72 mg/dL).      Results from last 7 days   Lab Units 04/17/25  1214   PH, ARTERIAL pH units 7.273*   PCO2, ARTERIAL mm Hg 47.3*   PO2 ART mm Hg 22.9*     Lab Results   Component Value Date    LACTATE 3.0 (C) 04/17/2025        Images: EKG reviewed and showed normal sinus rhythm.  Mild ST elevation noted in lead I, 2, V5 and 6 without any concomitant ST depressions.  Possible early repolarization.    Chest x-ray reviewed personally and I do not see any active infiltrate.  No pulmonary vascular congestion.  No pleural effusions.    I reviewed the patient's results and images.     Impression     Hypotension    Glaucoma    Hypothyroidism    GERD (gastroesophageal reflux disease)    Sjogren syndrome with myopathy    Scleroderma    Atypical chest pain    Hyponatremia    Chronic anemia    Iron deficiency anemia    Plan/Recommendations     1.  Patient presented here with hypotension while getting IV iron infusion.  Patient has had these infusions before.  Unclear etiology but likely anaphylactic reaction from the infusion.  Denies any ongoing complaints prior to coming in for infusion.  States that normally her blood pressure runs high and recently her blood pressure medicines had been adjusted.  Received IVIG infusion yesterday at [place] but she did not have any issues with that.  Patient has been given Benadryl, steroids, epinephrine.  Clinically looking little better.  I do not see any skin hives or any evidence of angioedema.  Will clinically monitor for now.  2.  Patient has been given treatment with epinephrine, Benadryl, steroids.  Since patient's symptoms are improving I will clinically monitor for now.  3.  Patient has been given multiple fluid boluses for hypotension.  Mean arterial pressure is greater than 65.  Patient is mentating well.  Will clinically monitor and if pressure drops below will consider  further fluid administration or epinephrine infusion.  If she becomes hypotensive again then will have low threshold of starting scheduled steroid therapy otherwise we will hold for now.  4.  As needed Benadryl.  Pepcid 40 mg twice daily.  5.  Mild lactic acidosis likely secondary to hypotension from Anaphylaxis rather than sepsis picture at this point.  Blood cultures have been obtained by ER.  Will follow.  6.  EKG suggestive of early repolarization abnormality.  Due to chest discomfort and pain I will check troponin level to make sure there is no cardiac injury.  7.  For hypothyroidism continue levothyroxine.  8.  Keep n.p.o. for now.  If she does well and improves further in next few hours will start on oral diet.  9.  Hold other home medications tonight.    Thank you for allowing me to participate in the care of this patient.  I will follow her closely.      Time spent Critical care 30 min (exclusive of procedure time)  including high complexity decision making to assess, manipulate, and support vital organ system failure in this individual who has impairment of one or more vital organ systems such that there is a high probability of imminent or life threatening deterioration in the patient’s condition.      Tim Chu MD, [facility]  Pulmonary and Critical Care Medicine  04/17/25 14:26 EDT     CC: Dinorah Oates DO      Electronically signed by Tim Chu MD at 04/17/25 1441          Emergency Department Notes        Naila Redmond MD at 04/17/25 1236        Procedure Orders    1. Critical Care [[id number]] ordered by Naila Redmond MD                 Subjective   History of Present Illness  64-year-old female who was brought from infusion clinic for evaluation of suspected allergic reaction.  The patient received her fifth round of iron infusion today.  She has a history of scleroderma and actually received an IVIG infusion yesterday at [place].  She denied any reactions or symptoms  yesterday into the day.  The symptoms began relatively soon after the iron infusion today.  After the infusion the patient began to appear pale, expressed shortness of breath, had a low blood pressure with systolics into the 60s, and felt like her airway was closing off and she was having difficulty breathing.  Upon arrival here to the ER she is somnolent but can talk and answer questions.  She is supporting her airway.  Ox saturations are normal.  She was observed to take 2 or 3 regular, but relatively shallow breaths before she would take a deep breath with somewhat of a gagging sound in the posterior throat.  The  himself reports that he has heard that sound before when she appears to be choking while eating.  This is not uncommon for her.  However she does not have a history of sleep apnea or other breathing/respiratory issues.  She did receive 2 doses of IV epinephrine prior to arrival to ER.  Upon arrival here the ER we gave her 2 doses of IM epinephrine and 2 L of IV fluid.  After roughly an hour observation her blood pressure had improved greater than 100 systolic and her appearance and mentation had likewise improved.  The airway continued to be stable and well protected throughout the ER course.    No recent reported fever or infectious symptoms.  No complaints of chest pain abdominal pain or change in bowel or urinary function.      Review of Systems   Constitutional:  Positive for activity change and fatigue. Negative for chills and fever.   HENT:  Negative for congestion, ear pain, postnasal drip, sinus pressure and sore throat.    Eyes:  Negative for pain, redness and visual disturbance.   Respiratory:  Positive for shortness of breath and stridor. Negative for cough and chest tightness.    Cardiovascular:  Negative for chest pain, palpitations and leg swelling.   Gastrointestinal:  Negative for abdominal pain, anal bleeding, blood in stool, diarrhea, nausea and vomiting.   Endocrine: Negative  for polydipsia and polyuria.   Genitourinary:  Negative for difficulty urinating, dysuria, frequency and urgency.   Musculoskeletal:  Negative for arthralgias, back pain and neck pain.   Skin:  Positive for pallor. Negative for rash.   Allergic/Immunologic: Negative for environmental allergies and immunocompromised state.   Neurological:  Positive for light-headedness. Negative for dizziness, weakness and headaches.   Hematological:  Negative for adenopathy.   Psychiatric/Behavioral:  Positive for confusion and decreased concentration. Negative for self-injury and suicidal ideas. The patient is not nervous/anxious.    All other systems reviewed and are negative.      Past Medical History:   Diagnosis Date    Anemia 2021    Autoimmune disease     Cholelithiasis Removed 2008    Colon polyp 2022    Coronary artery disease involving native coronary artery of native heart 4/18/2025    CT coronary angiogram (3/12/2025): Total calcium score 109.  Mild nonobstructive CAD  Cardiac cath for NSTEMI (4/18/2025): No flow limiting coronary disease.  30-40% RCA, OM1 and LAD disease.  Findings consistent with Takotsubo/stress cardiomyopathy    COVID-19     Cutaneous lupus erythematosus     Diverticulosis 2010?    Fibrocystic breast changes, bilateral     GERD (gastroesophageal reflux disease)     not currently taking medication    Gestational diabetes     Glaucoma     Hyperlipidemia     Hypertension     Hypothyroidism     Lower extremity edema     Neuromuscular disorder 2020    Non-STEMI (non-ST elevated myocardial infarction) 4/17/2025    OAB (overactive bladder)     Osteoarthritis     Palpitations     Post-menopause on HRT (hormone replacement therapy)     Raynaud's disease     Scleroderma     SINE scleroderma    Sjogren's disease     Urinary tract infection        Allergies   Allergen Reactions    Cefaclor Shortness Of Breath     hives    Ibuprofen Angioedema     Tongue swelling    Naproxen Sodium Angioedema     tongue swelling  "   Nsaids Angioedema    Prednisone Anaphylaxis and Other (See Comments)     Allergic to all steroids!  Eye pressure shoots up to dangerous levels. Also had some throat swelling.    Alphagan [Brimonidine] Other (See Comments)     Eyes itching, red, swollen    Atenolol Other (See Comments)     Fatigue and acid reflux    Doxycycline Nausea And Vomiting    E-Mycin [Erythromycin] Nausea And Vomiting     Abdominal cramping    Livalo [Pitavastatin] Myalgia    Statins Myalgia    Dorzolamide Hcl Other (See Comments)     Eyes burning, discharge.    Adhesive Tape Rash     \"burns, eats through my skin\"       Past Surgical History:   Procedure Laterality Date    CHOLECYSTECTOMY      COLONOSCOPY  2019 and 2022    ESOPHAGOSCOPY / EGD      esophageal stretching/dilation and gastric polypectomy, esophageal biopsies    EYE SURGERY  2012    Cataracts removed - lens replacement    LAPAROSCOPIC ASSISTED VAGINAL HYSTERECTOMY SALPINGO OOPHORECTOMY Bilateral     LAPAROSCOPIC DIAZ PROCEDURE      OOPHORECTOMY      OTHER SURGICAL HISTORY      Lap for endometriosis    TONSILLECTOMY      UPPER GASTROINTESTINAL ENDOSCOPY  ?       Family History   Problem Relation Age of Onset    Other Mother         has a pacemaker and is followed by Dr. Brooks    Heart disease Mother     Heart failure Mother     Hypertension Mother     Diabetes Mother     Vision loss Mother         Glaucoma/macular degeneration    Arthritis Mother     Glaucoma Father     Other Father          likely a cancer-related death;    Heart disease Father     Hypertension Father     Cancer Father         Prostate    Vision loss Father         Glaucoma/macular degeneration    Asthma Brother     Hypertension Brother     Heart attack Maternal Grandfather     Heart disease Maternal Grandfather     Heart failure Maternal Grandfather     Heart disease Maternal Grandmother     Hypertension Maternal Grandmother     Hypertension Paternal Grandmother     Colon cancer Paternal Grandfather     " Cancer Paternal Grandfather         Prostate    Breast cancer Neg Hx     Ovarian cancer Neg Hx        Social History     Socioeconomic History    Marital status:    Tobacco Use    Smoking status: Never     Passive exposure: Never    Smokeless tobacco: Never   Vaping Use    Vaping status: Never Used   Substance and Sexual Activity    Alcohol use: No    Drug use: No    Sexual activity: Yes     Partners: Male     Birth control/protection: Post-menopausal, Hysterectomy           Objective   Physical Exam  Vitals and nursing note reviewed.   Constitutional:       General: She is in acute distress.      Appearance: Normal appearance. She is ill-appearing, toxic-appearing and diaphoretic.   HENT:      Head: Normocephalic and atraumatic.      Right Ear: External ear normal.      Left Ear: External ear normal.      Nose: Nose normal.   Eyes:      General: Lids are normal.      Pupils: Pupils are equal, round, and reactive to light.   Neck:      Trachea: No tracheal deviation.   Cardiovascular:      Rate and Rhythm: Normal rate and regular rhythm.      Pulses: No decreased pulses.      Heart sounds: Normal heart sounds. No murmur heard.     No friction rub. No gallop.   Pulmonary:      Effort: Pulmonary effort is normal. No respiratory distress.      Breath sounds: Normal breath sounds. No decreased breath sounds, wheezing, rhonchi or rales.   Abdominal:      General: Bowel sounds are normal.      Palpations: Abdomen is soft.      Tenderness: There is no abdominal tenderness. There is no guarding or rebound.   Musculoskeletal:         General: No deformity. Normal range of motion.      Cervical back: Normal range of motion and neck supple.   Lymphadenopathy:      Cervical: No cervical adenopathy.   Skin:     General: Skin is cool and dry.      Coloration: Skin is pale.      Findings: No rash.   Neurological:      Mental Status: She is lethargic, disoriented and confused.      GCS: GCS eye subscore is 4. GCS verbal  subscore is 4. GCS motor subscore is 6.      Cranial Nerves: No cranial nerve deficit.      Sensory: No sensory deficit.   Psychiatric:         Speech: Speech normal.         Behavior: Behavior normal.         Thought Content: Thought content normal.         Judgment: Judgment normal.         Critical Care    Performed by: Naila Redmond MD  Authorized by: Naila Redmond MD    Critical care provider statement:     Critical care time (minutes):  60    Critical care time was exclusive of:  Separately billable procedures and treating other patients    Critical care was necessary to treat or prevent imminent or life-threatening deterioration of the following conditions:  Respiratory failure, CNS failure or compromise and shock    Critical care was time spent personally by me on the following activities:  Development of treatment plan with patient or surrogate, discussions with consultants, evaluation of patient's response to treatment, blood draw for specimens, examination of patient, obtaining history from patient or surrogate, ordering and performing treatments and interventions, ordering and review of laboratory studies, ordering and review of radiographic studies, pulse oximetry, re-evaluation of patient's condition and review of old charts    I assumed direction of critical care for this patient from another provider in my specialty: no      Care discussed with: admitting provider              ED Course                                                         Medical Decision Making  Differential diagnosis includes allergic reaction, anaphylaxis, acute infectious process, other unspecified etiology.    The patient presents with significant hypotension altered mental status and increase work of breathing and concern for upper airway compromise after receiving IV infusion.    Labs show normal H&H, decreased white blood cell count, negative viral respiratory panel.  Urine that does not appear consistent  with infection.  Lactic acid level was elevated, and troponin was elevated on initial evaluation.    The patient was noted to be significantly hypotensive with systolics in the 50s and 60s.  Her systolics remained in the upper 70s into the 80s.  Mean pressures remain between 65-75.    The patient was pale, and initially expressed difficulty breathing.  As the blood pressure improved the patient's color improved and the patient's respiratory status improved.  Oxygen saturations remained normal.    Chest x-ray interpreted by myself shows normal heart size and clear lungs.    The patient was discussed with the intensivist who will consult for admission.      Problems Addressed:  Allergic reaction, initial encounter: complicated acute illness or injury with systemic symptoms that poses a threat to life or bodily functions  Anaphylaxis, initial encounter: complicated acute illness or injury with systemic symptoms that poses a threat to life or bodily functions  Hypotension, unspecified hypotension type: complicated acute illness or injury with systemic symptoms that poses a threat to life or bodily functions    Amount and/or Complexity of Data Reviewed  Independent Historian: spouse     Details:  and staff nurse from infusion clinic helps provide history.  External Data Reviewed: labs, radiology, ECG and notes.  Labs: ordered. Decision-making details documented in ED Course.  Radiology: ordered and independent interpretation performed. Decision-making details documented in ED Course.  ECG/medicine tests: ordered and independent interpretation performed. Decision-making details documented in ED Course.     Details: EKG performed 4/17/2025 at 1204 interpreted by myself shows sinus rhythm, mild diffuse ST elevation with ST depression in aVR which be concerning for pericarditis.    Risk  Prescription drug management.  Decision regarding hospitalization.        Final diagnoses:   Allergic reaction, initial encounter    Hypotension, unspecified hypotension type   Anaphylaxis, initial encounter       ED Disposition  ED Disposition       ED Disposition   Decision to Admit    Condition   --    Comment   Level of Care: Critical Care [6]   Diagnosis: Hypotension [130736]   Certification: I Certify That Inpatient Hospital Services Are Medically Necessary For Greater Than 2 Midnights                 Dinorah Oates, DO  [address] [facility] [address]  [phone]      1 week         Medication List        PAUSE taking these medications      amLODIPine 2.5 MG tablet  Wait to take this until your doctor or other care provider tells you to start again.  Commonly known as: NORVASC     NON FORMULARY  Wait to take this until your doctor or other care provider tells you to start again.     telmisartan 80 MG tablet  Wait to take this until your doctor or other care provider tells you to start again.  Commonly known as: MICARDIS  TAKE 1 TABLET BY MOUTH EVERY DAY            New Prescriptions      amoxicillin-clavulanate 875-125 MG per tablet  Commonly known as: AUGMENTIN  Take 1 tablet by mouth Every 12 (Twelve) Hours for 5 doses. Indications: UNCOMPLICATED CYSTITIS            Changed      famotidine 40 MG tablet  Commonly known as: PEPCID  TAKE 1 TABLET BY MOUTH EVERY DAY AT NIGHT AS NEEDED FOR HEARTBURN  What changed: See the new instructions.     fluocinonide 0.05 % gel  Commonly known as: LIDEX  What changed: See the new instructions.     levothyroxine 75 MCG tablet  Commonly known as: SYNTHROID, LEVOTHROID  Take 1 tablet by mouth Every Other Day.  What changed: Another medication with the same name was removed. Continue taking this medication, and follow the directions you see here.     mycophenolate 500 MG tablet  Commonly known as: CELLCEPT  What changed: See the new instructions.     Vitamin D3 50 MCG (2000 UT) tablet  What changed: how much to take               Where to Get Your Medications        These medications were  sent to [id number] - [place], [place] - [address] - [phone]  - [phone] FX  [address], [facility] [address]      Phone: [phone]   amoxicillin-clavulanate 875-125 MG per tablet  levothyroxine 75 MCG tablet            Naila Redmond MD  04/19/25 0820      Electronically signed by Naila Redmond MD at 04/19/25 0820       Vital Signs (last 7 days) before discharge       Date/Time Temp Temp src Pulse Resp BP Patient Position SpO2    04/19/25 0800 -- -- 80 16 139/69 Sitting 96    04/19/25 0736 97.4 (36.3) Oral 68 -- -- -- --    04/19/25 0700 -- -- 66 -- 145/76 -- --    04/19/25 0600 -- -- 63 14 131/72 -- 96    04/19/25 0500 -- -- 62 14 120/60 -- 97    04/19/25 0400 97.4 (36.3) Oral 58 15 137/70 Lying 99    04/19/25 0300 -- -- 59 16 125/69 -- 96    04/19/25 0200 -- -- 58 14 125/72 -- 97    04/19/25 0000 97.9 (36.6) Oral 71 15 116/66 Lying 95    04/18/25 2300 -- -- 80 -- 140/72 -- 100    04/18/25 2230 -- -- 67 -- 124/63 -- 91    04/18/25 2200 -- -- 72 16 112/65 -- 97    04/18/25 2130 -- -- 73 -- 127/66 -- 97    04/18/25 2100 -- -- 66 16 114/69 -- 99    04/18/25 2030 -- -- 63 -- 118/68 -- 93    04/18/25 2000 98.2 (36.8) Oral 66 16 125/60 Lying 98    04/18/25 1900 -- -- 66 18 139/76 -- 93    04/18/25 1830 -- -- -- -- 154/97 -- --    04/18/25 1800 -- -- 64 -- 113/69 -- 91    04/18/25 1730 -- -- 64 -- 111/69 -- 100    04/18/25 1700 -- -- 70 -- 118/68 -- --    04/18/25 1630 -- -- 57 -- 110/60 -- 96    04/18/25 1615 -- -- 66 -- 98/59 -- 96    04/18/25 1600 -- -- 66 14 98/57 Lying 95    04/18/25 1545 -- -- 70 -- 100/58 -- 99    04/18/25 1530 -- -- 68 -- 112/61 -- 100    04/18/25 1515 -- -- 58 -- 124/70 -- 99    04/18/25 1500 98.6 (37) Oral 67 15 120/70 Lying 93    04/18/25 1455 -- -- 71 -- -- -- 100    04/18/25 1450 -- -- 66 -- -- -- 100    04/18/25 1445 -- -- 76 -- 131/71 -- 98    04/18/25 1440 -- -- 67 -- -- -- 100    04/18/25 1430 -- -- 92 -- 136/72 -- --     04/18/25 1421 97.4 (36.3) Oral 79 16 141/73 Lying 97    04/18/25 1405 -- -- 100 15 143/83 -- 100    04/18/25 13:42:14 -- -- 103 15 172/99 -- 98    04/18/25 1300 -- -- 65 -- 114/62 -- 96    04/18/25 1230 -- -- 64 -- 98/63 -- 92    04/18/25 1200 -- -- 69 16 112/68 Lying --    04/18/25 1145 -- -- -- -- -- -- 93    04/18/25 1130 -- -- 65 -- 107/65 -- --    04/18/25 1100 -- -- 68 -- 117/66 -- --    04/18/25 1030 -- -- 71 -- 114/63 -- 92    04/18/25 1029 -- -- -- -- 114/69 -- --    04/18/25 1000 -- -- 77 14 117/64 Lying 96    04/18/25 0930 -- -- 86 -- 114/69 -- 97    04/18/25 0900 -- -- 74 -- 129/70 -- 96    04/18/25 0800 98.7 (37.1) Oral 64 17 113/60 Lying 98    04/18/25 0730 -- -- 64 -- 113/67 -- 96    04/18/25 0700 -- -- 68 -- 107/63 -- 99    04/18/25 0630 -- -- 74 -- 111/62 -- 92    04/18/25 0600 -- -- 60 16 112/63 -- 96    04/18/25 0530 -- -- 65 -- 113/67 -- 97    04/18/25 0500 -- -- 59 14 113/69 -- 96    04/18/25 0430 -- -- 76 -- 131/77 -- 99    04/18/25 0400 97.4 (36.3) Oral 62 14 96/62 Lying 97    04/18/25 0330 -- -- 61 -- 103/63 -- 96    04/18/25 0300 -- -- 69 15 100/59 -- 96    04/18/25 0200 -- -- 72 18 101/56 -- 99    04/18/25 0130 -- -- 69 -- 99/53 -- 96    04/18/25 0100 -- -- 68 16 95/59 -- 95    04/18/25 0030 -- -- 70 -- 98/59 -- 92    04/18/25 0000 97.7 (36.5) Oral 67 15 99/62 Lying 95    04/17/25 2330 -- -- 70 -- 101/61 -- 97    04/17/25 2315 -- -- 73 -- 109/70 -- 97    04/17/25 2300 -- -- 76 -- 105/72 -- 96    04/17/25 2245 -- -- 74 -- 107/73 -- 92    04/17/25 2215 -- -- 82 -- 105/64 -- 92    04/17/25 2200 -- -- 73 16 111/69 -- 96    04/17/25 2145 -- -- 70 -- 111/68 -- 96    04/17/25 2130 -- -- 73 -- 107/70 -- 97    04/17/25 2115 -- -- 75 -- 112/75 -- 100    04/17/25 2100 -- -- 77 15 110/71 -- 98    04/17/25 2045 -- -- 80 -- 128/76 -- 97    04/17/25 2030 -- -- 76 -- 118/74 -- 92    04/17/25 2000 97.9 (36.6) Oral 89 16 123/84 Lying 94    04/17/25 1900 -- -- 73 -- 111/75 -- 99    04/17/25 1800 -- -- 71 --  107/76 -- 97    04/17/25 1745 -- -- 80 -- 120/78 -- 95    04/17/25 1730 -- -- 87 -- 118/79 -- 96    04/17/25 1715 -- -- 82 -- 116/84 -- 90    04/17/25 1700 -- -- 76 -- 102/60 -- 96    04/17/25 1645 -- -- 85 -- 110/74 -- 98    04/17/25 1630 -- -- 78 -- 102/77 -- 100    04/17/25 1615 -- -- 73 -- 101/70 -- 100    04/17/25 1600 -- -- 72 -- 96/68 -- 100    04/17/25 1545 -- -- 68 -- 99/68 -- 100    04/17/25 1530 97.4 (36.3) Oral 70 18 86/58 Lying 97    Comment rows:    OBSERV: patient awake in bed at 04/17/25 1530    04/17/25 1410 -- -- 64 -- 88/62 -- 100    04/17/25 1405 -- -- 68 -- 83/61 -- 100    04/17/25 1400 -- -- 70 -- 88/64 -- 99    04/17/25 1345 -- -- 64 -- 84/62 -- 100    04/17/25 1340 -- -- 65 -- 84/59 -- 100    04/17/25 1335 -- -- 66 -- 86/62 -- 100    04/17/25 1330 -- -- 69 -- 93/66 -- 100    04/17/25 1325 -- -- 67 -- 88/67 -- 100    04/17/25 1320 -- -- -- -- 94/66 -- --    04/17/25 1319 -- -- 75 -- -- -- 100    04/17/25 1316 -- -- 83 -- -- -- 100    04/17/25 1315 -- -- -- -- 106/65 -- --    04/17/25 1310 -- -- 84 -- 102/72 -- 98    04/17/25 1305 -- -- 72 -- 89/62 -- 100    04/17/25 1300 -- -- 65 -- 86/62 -- 100 04/17/25 1255 -- -- 70 -- 82/55 -- 99    04/17/25 1250 -- -- 63 -- 87/59 -- 100 04/17/25 1245 -- -- 65 -- 77/57 -- 100    04/17/25 1240 -- -- 72 -- 93/62 -- 100    04/17/25 1235 -- -- 80 -- 99/54 -- 100    04/17/25 1230 -- -- 75 -- -- -- 93    04/17/25 1225 -- -- 66 -- 125/94 -- 100    04/17/25 1220 -- -- 82 -- 130/94 -- 100 04/17/25 1216 97.6 (36.4) Oral -- -- -- -- --    04/17/25 1215 -- -- 78 -- 106/65 -- 100 04/17/25 1210 -- -- 78 -- 102/60 -- 100 04/17/25 1205 -- -- 72 -- 96/60 -- 100 04/17/25 1200 -- -- 71 -- 88/58 -- 100 04/17/25 1155 -- -- 68 -- 87/58 -- 100 04/17/25 1152 -- -- 79 -- 92/53 -- 100 04/17/25 1145 -- -- 81 -- 65/40 -- 100 04/17/25 1130 -- -- 80 -- 66/51 -- 100 04/17/25 1129 -- -- 80 20 79/53 Lying 100          Blood Administration Record (From  admission, onward)      None          Lab Results (all)       Procedure Component Value Units Date/Time    Blood Culture - Blood, Hand, Right [[id number]]  (Normal) Collected: 04/17/25 2013    Specimen: Blood from Hand, Right Updated: 04/20/25 2046     Blood Culture No growth at 3 days    Narrative:      Aerobic Bottle Only    Less than seven (7) mL's of blood was collected.  Insufficient quantity may yield false negative results.    Blood Culture - Blood, Arm, Right [[id number]]  (Normal) Collected: 04/17/25 2013    Specimen: Blood from Arm, Right Updated: 04/20/25 2046     Blood Culture No growth at 3 days    Narrative:      Aerobic Bottle Only    Less than seven (7) mL's of blood was collected.  Insufficient quantity may yield false negative results.    Basic Metabolic Panel [[id number]]  (Abnormal) Collected: 04/19/25 0530    Specimen: Blood Updated: 04/19/25 0631     Glucose 81 mg/dL      BUN 5 mg/dL      Creatinine 0.44 mg/dL      Sodium 133 mmol/L      Potassium 3.6 mmol/L      Chloride 101 mmol/L      CO2 23.0 mmol/L      Calcium 8.2 mg/dL      BUN/Creatinine Ratio 11.4     Anion Gap 9.0 mmol/L      eGFR 108.2 mL/min/1.73     Narrative:      GFR Categories in Chronic Kidney Disease (CKD)      GFR Category          GFR (mL/min/1.73)    Interpretation  G1                     90 or greater         Normal or high (1)  G2                      60-89                Mild decrease (1)  G3a                   45-59                Mild to moderate decrease  G3b                   30-44                Moderate to severe decrease  G4                    15-29                Severe decrease  G5                    14 or less           Kidney failure          (1)In the absence of evidence of kidney disease, neither GFR category G1 or G2 fulfill the criteria for CKD.    eGFR calculation 2021 CKD-EPI creatinine equation, which does not include race as a factor    Lipid Panel [[id number]] Collected: 04/19/25 0530    Specimen: Blood  Updated: 04/19/25 0631     Total Cholesterol 135 mg/dL      Triglycerides 88 mg/dL      HDL Cholesterol 45 mg/dL      LDL Cholesterol  73 mg/dL      VLDL Cholesterol 17 mg/dL      LDL/HDL Ratio 1.61    Narrative:      Cholesterol Reference Ranges  (U.S. Department of Health and Human Services ATP III Classifications)    Desirable          <200 mg/dL  Borderline High    200-239 mg/dL  High Risk          >240 mg/dL      Triglyceride Reference Ranges  (U.S. Department of Health and Human Services ATP III Classifications)    Normal           <150 mg/dL  Borderline High  150-199 mg/dL  High             200-499 mg/dL  Very High        >500 mg/dL    HDL Reference Ranges  (U.S. Department of Health and Human Services ATP III Classifications)    Low     <40 mg/dl (major risk factor for CHD)  High    >60 mg/dl ('negative' risk factor for CHD)        LDL Reference Ranges  (U.S. Department of Health and Human Services ATP III Classifications)    Optimal          <100 mg/dL  Near Optimal     100-129 mg/dL  Borderline High  130-159 mg/dL  High             160-189 mg/dL  Very High        >189 mg/dL    LDL is calculated using the NIH LDL-C calculation.      CBC (No Diff) [[id number]]  (Abnormal) Collected: 04/19/25 0530    Specimen: Blood Updated: 04/19/25 0557     WBC 3.21 10*3/mm3      RBC 2.88 10*6/mm3      Hemoglobin 8.6 g/dL      Hematocrit 26.4 %      MCV 91.7 fL      MCH 29.9 pg      MCHC 32.6 g/dL      RDW 15.2 %      RDW-SD 50.4 fl      MPV 11.2 fL      Platelets 188 10*3/mm3     High Sensitivity Troponin T [[id number]]  (Abnormal) Collected: 04/18/25 0426    Specimen: Blood Updated: 04/18/25 0604     HS Troponin T 299 ng/L     Narrative:      High Sensitive Troponin T Reference Range:  <14.0 ng/L- Negative Female for AMI  <22.0 ng/L- Negative Male for AMI  >=14 - Abnormal Female indicating possible myocardial injury.  >=22 - Abnormal Male indicating possible myocardial injury.   Clinicians would have to utilize clinical  acumen, EKG, Troponin, and serial changes to determine if it is an Acute Myocardial Infarction or myocardial injury due to an underlying chronic condition.         Basic Metabolic Panel [[id number]]  (Abnormal) Collected: 04/18/25 0426    Specimen: Blood Updated: 04/18/25 0603     Glucose 86 mg/dL      BUN 8 mg/dL      Creatinine 0.64 mg/dL      Sodium 133 mmol/L      Potassium 3.9 mmol/L      Comment: Slight hemolysis detected by analyzer. Result may be falsely elevated.        Chloride 105 mmol/L      CO2 19.0 mmol/L      Calcium 8.0 mg/dL      BUN/Creatinine Ratio 12.5     Anion Gap 9.0 mmol/L      eGFR 98.8 mL/min/1.73     Narrative:      GFR Categories in Chronic Kidney Disease (CKD)      GFR Category          GFR (mL/min/1.73)    Interpretation  G1                     90 or greater         Normal or high (1)  G2                      60-89                Mild decrease (1)  G3a                   45-59                Mild to moderate decrease  G3b                   30-44                Moderate to severe decrease  G4                    15-29                Severe decrease  G5                    14 or less           Kidney failure          (1)In the absence of evidence of kidney disease, neither GFR category G1 or G2 fulfill the criteria for CKD.    eGFR calculation 2021 CKD-EPI creatinine equation, which does not include race as a factor    Magnesium [[id number]]  (Normal) Collected: 04/18/25 0426    Specimen: Blood Updated: 04/18/25 0603     Magnesium 2.0 mg/dL     Phosphorus [[id number]]  (Normal) Collected: 04/18/25 0426    Specimen: Blood Updated: 04/18/25 0603     Phosphorus 3.1 mg/dL     CBC & Differential [[id number]]  (Abnormal) Collected: 04/18/25 0426    Specimen: Blood Updated: 04/18/25 0550    Narrative:      The following orders were created for panel order CBC & Differential.  Procedure                               Abnormality         Status                     ---------                                -----------         ------                     CBC Auto Differential[[id number]]        Abnormal            Final result                 Please view results for these tests on the individual orders.    CBC Auto Differential [[id number]]  (Abnormal) Collected: 04/18/25 0426    Specimen: Blood Updated: 04/18/25 0550     WBC 4.39 10*3/mm3      RBC 3.13 10*6/mm3      Hemoglobin 9.5 g/dL      Hematocrit 28.4 %      MCV 90.7 fL      MCH 30.4 pg      MCHC 33.5 g/dL      RDW 14.9 %      RDW-SD 48.9 fl      MPV 11.5 fL      Platelets 208 10*3/mm3      Neutrophil % 69.1 %      Lymphocyte % 18.0 %      Monocyte % 12.5 %      Eosinophil % 0.0 %      Basophil % 0.2 %      Immature Grans % 0.2 %      Neutrophils, Absolute 3.03 10*3/mm3      Lymphocytes, Absolute 0.79 10*3/mm3      Monocytes, Absolute 0.55 10*3/mm3      Eosinophils, Absolute 0.00 10*3/mm3      Basophils, Absolute 0.01 10*3/mm3      Immature Grans, Absolute 0.01 10*3/mm3      nRBC 0.0 /100 WBC     High Sensitivity Troponin T [[id number]]  (Abnormal) Collected: 04/17/25 2152    Specimen: Blood Updated: 04/17/25 2223     HS Troponin T 422 ng/L     Narrative:      High Sensitive Troponin T Reference Range:  <14.0 ng/L- Negative Female for AMI  <22.0 ng/L- Negative Male for AMI  >=14 - Abnormal Female indicating possible myocardial injury.  >=22 - Abnormal Male indicating possible myocardial injury.   Clinicians would have to utilize clinical acumen, EKG, Troponin, and serial changes to determine if it is an Acute Myocardial Infarction or myocardial injury due to an underlying chronic condition.         STAT Lactic Acid, Reflex [[id number]]  (Normal) Collected: 04/17/25 2013    Specimen: Blood Updated: 04/17/25 2111     Lactate 1.7 mmol/L      Comment: Falsely depressed results may occur on samples drawn from patients receiving N-Acetylcysteine (NAC) or Metamizole.       High Sensitivity Troponin T 1Hr [[id number]]  (Abnormal) Collected: 04/17/25 1607    Specimen: Blood  Updated: 04/17/25 1702     HS Troponin T 770 ng/L      Troponin T Numeric Delta 654 ng/L      Troponin T % Delta 564    Narrative:      High Sensitive Troponin T Reference Range:  <14.0 ng/L- Negative Female for AMI  <22.0 ng/L- Negative Male for AMI  >=14 - Abnormal Female indicating possible myocardial injury.  >=22 - Abnormal Male indicating possible myocardial injury.   Clinicians would have to utilize clinical acumen, EKG, Troponin, and serial changes to determine if it is an Acute Myocardial Infarction or myocardial injury due to an underlying chronic condition.         STAT Lactic Acid, Reflex [[id number]]  (Abnormal) Collected: 04/17/25 1607    Specimen: Blood Updated: 04/17/25 1702     Lactate 3.0 mmol/L      Comment: Falsely depressed results may occur on samples drawn from patients receiving N-Acetylcysteine (NAC) or Metamizole.       High Sensitivity Troponin T [[id number]]  (Abnormal) Collected: 04/17/25 1209    Specimen: Blood Updated: 04/17/25 1456     HS Troponin T 116 ng/L     Narrative:      High Sensitive Troponin T Reference Range:  <14.0 ng/L- Negative Female for AMI  <22.0 ng/L- Negative Male for AMI  >=14 - Abnormal Female indicating possible myocardial injury.  >=22 - Abnormal Male indicating possible myocardial injury.   Clinicians would have to utilize clinical acumen, EKG, Troponin, and serial changes to determine if it is an Acute Myocardial Infarction or myocardial injury due to an underlying chronic condition.         COVID PRE-OP / PRE-PROCEDURE SCREENING ORDER (NO ISOLATION) - Swab, Nasopharynx [[id number]]  (Normal) Collected: 04/17/25 1212    Specimen: Swab from Nasopharynx Updated: 04/17/25 1339    Narrative:      The following orders were created for panel order COVID PRE-OP / PRE-PROCEDURE SCREENING ORDER (NO ISOLATION) - Swab, Nasopharynx.  Procedure                               Abnormality         Status                     ---------                               -----------          ------                     Respiratory Panel PCR w/...[[id number]]  Normal              Final result                 Please view results for these tests on the individual orders.    Respiratory Panel PCR w/COVID-19(SARS-CoV-2) ANNA/CARLOS/SACHIN/PAD/COR/DONNELL In-House, NP Swab in UTM/VTM, 2 HR TAT - Swab, Nasopharynx [[id number]]  (Normal) Collected: 04/17/25 1212    Specimen: Swab from Nasopharynx Updated: 04/17/25 1339     ADENOVIRUS, PCR Not Detected     Coronavirus 229E Not Detected     Coronavirus HKU1 Not Detected     Coronavirus NL63 Not Detected     Coronavirus OC43 Not Detected     COVID19 Not Detected     Human Metapneumovirus Not Detected     Human Rhinovirus/Enterovirus Not Detected     Influenza A PCR Not Detected     Influenza B PCR Not Detected     Parainfluenza Virus 1 Not Detected     Parainfluenza Virus 2 Not Detected     Parainfluenza Virus 3 Not Detected     Parainfluenza Virus 4 Not Detected     RSV, PCR Not Detected     Bordetella pertussis pcr Not Detected     Bordetella parapertussis PCR Not Detected     Chlamydophila pneumoniae PCR Not Detected     Mycoplasma pneumo by PCR Not Detected    Narrative:      In the setting of a positive respiratory panel with a viral infection PLUS a negative procalcitonin without other underlying concern for bacterial infection, consider observing off antibiotics or discontinuation of antibiotics and continue supportive care. If the respiratory panel is positive for atypical bacterial infection (Bordetella pertussis, Chlamydophila pneumoniae, or Mycoplasma pneumoniae), consider antibiotic de-escalation to target atypical bacterial infection.    Urinalysis With Culture If Indicated - Straight Cath [[id number]]  (Abnormal) Collected: 04/17/25 1246    Specimen: Urine from Straight Cath Updated: 04/17/25 1329     Color, UA Yellow     Appearance, UA Clear     pH, UA 6.5     Specific Gravity, UA 1.016     Glucose, UA Negative     Ketones, UA Negative     Bilirubin, UA  Negative     Blood, UA Trace     Protein,  mg/dL (2+)     Leuk Esterase, UA Negative     Nitrite, UA Negative     Urobilinogen, UA 1.0 E.U./dL    Narrative:      In absence of clinical symptoms, the presence of pyuria, bacteria, and/or nitrites on the urinalysis result does not correlate with infection.    Urinalysis, Microscopic Only - Straight Cath [[id number]]  (Abnormal) Collected: 04/17/25 1246    Specimen: Urine from Straight Cath Updated: 04/17/25 1329     RBC, UA 6-10 /HPF      WBC, UA 0-2 /HPF      Comment: Urine culture not indicated.        Bacteria, UA Trace /HPF      Squamous Epithelial Cells, UA 3-6 /HPF      Hyaline Casts, UA 0-6 /LPF      Mucus, UA Trace /HPF      Methodology Manual Light Microscopy    Comprehensive Metabolic Panel [[id number]]  (Abnormal) Collected: 04/17/25 1209    Specimen: Blood Updated: 04/17/25 1302     Glucose 120 mg/dL      BUN 9 mg/dL      Creatinine 0.72 mg/dL      Sodium 130 mmol/L      Potassium 3.9 mmol/L      Comment: Slight hemolysis detected by analyzer. Result may be falsely elevated.        Chloride 98 mmol/L      CO2 19.0 mmol/L      Calcium 8.1 mg/dL      Total Protein 8.3 g/dL      Albumin 3.5 g/dL      ALT (SGPT) 16 U/L      AST (SGOT) 29 U/L      Alkaline Phosphatase 63 U/L      Total Bilirubin 0.3 mg/dL      Globulin 4.8 gm/dL      Comment: Calculated Result        A/G Ratio 0.7 g/dL      BUN/Creatinine Ratio 12.5     Anion Gap 13.0 mmol/L      eGFR 93.5 mL/min/1.73     Narrative:      GFR Categories in Chronic Kidney Disease (CKD)      GFR Category          GFR (mL/min/1.73)    Interpretation  G1                     90 or greater         Normal or high (1)  G2                      60-89                Mild decrease (1)  G3a                   45-59                Mild to moderate decrease  G3b                   30-44                Moderate to severe decrease  G4                    15-29                Severe decrease  G5                    14 or less            Kidney failure          (1)In the absence of evidence of kidney disease, neither GFR category G1 or G2 fulfill the criteria for CKD.    eGFR calculation 2021 CKD-EPI creatinine equation, which does not include race as a factor    Lactic Acid, Plasma [[id number]]  (Abnormal) Collected: 04/17/25 1209    Specimen: Blood Updated: 04/17/25 1256     Lactate 3.0 mmol/L      Comment: Falsely depressed results may occur on samples drawn from patients receiving N-Acetylcysteine (NAC) or Metamizole.       CBC & Differential [[id number]]  (Abnormal) Collected: 04/17/25 1209    Specimen: Blood Updated: 04/17/25 1230    Narrative:      The following orders were created for panel order CBC & Differential.  Procedure                               Abnormality         Status                     ---------                               -----------         ------                     CBC Auto Differential[[id number]]        Abnormal            Final result                 Please view results for these tests on the individual orders.    CBC Auto Differential [[id number]]  (Abnormal) Collected: 04/17/25 1209    Specimen: Blood Updated: 04/17/25 1230     WBC 2.67 10*3/mm3      RBC 4.18 10*6/mm3      Hemoglobin 12.7 g/dL      Hematocrit 39.0 %      MCV 93.3 fL      MCH 30.4 pg      MCHC 32.6 g/dL      RDW 14.7 %      RDW-SD 50.1 fl      MPV 10.9 fL      Platelets 281 10*3/mm3      Neutrophil % 59.3 %      Lymphocyte % 28.8 %      Monocyte % 10.1 %      Eosinophil % 1.1 %      Basophil % 0.7 %      Immature Grans % 0.0 %      Neutrophils, Absolute 1.58 10*3/mm3      Lymphocytes, Absolute 0.77 10*3/mm3      Monocytes, Absolute 0.27 10*3/mm3      Eosinophils, Absolute 0.03 10*3/mm3      Basophils, Absolute 0.02 10*3/mm3      Immature Grans, Absolute 0.00 10*3/mm3      nRBC 0.0 /100 WBC     Blood Gas, Arterial With Co-Ox [[id number]]  (Abnormal) Collected: 04/17/25 1214    Specimen: Arterial Blood Updated: 04/17/25 1214     Richard's Test N/A      pH, Arterial 7.273 pH units      Comment: 84 Value below reference range        pCO2, Arterial 47.3 mm Hg      pO2, Arterial 22.9 mm Hg      Comment: 84 Value below reference range        HCO3, Arterial 21.9 mmol/L      Base Excess, Arterial -5.1 mmol/L      Hemoglobin, Blood Gas 12.5 g/dL      Hematocrit, Blood Gas 38.4 %      Oxyhemoglobin 29.6 %      Comment: 84 Value below reference range        Methemoglobin 0.50 %      Carboxyhemoglobin 0.8 %      CO2 Content 23.3 mmol/L      Temperature 37.0     Barometric Pressure for Blood Gas --     Comment: N/A        Modality Nasal Cannula     FIO2 32 %      Rate 0 Breaths/minute      IPAP 0     Comment: Meter: [id number]     :  [id number]        EPAP 0     pH, Temp Corrected 7.273 pH Units      pCO2, Temperature Corrected 47.3 mm Hg      pO2, Temperature Corrected 22.9 mm Hg           Imaging Results (All)       Procedure Component Value Units Date/Time    XR Chest 1 View [[id number]] Collected: 04/17/25 1235     Updated: 04/17/25 1239    Narrative:      XR CHEST 1 VW    Date of Exam: 4/17/2025 11:55 AM EDT    Indication: dyspnea/altered mental status    Comparison: 2/22/2023    Findings:  Heart size and pulmonary vasculature are within normal limits. Lungs clear. Costophrenic angle sharp      Impression:      Impression:  No active cardiopulmonary disease      Electronically Signed: Red Jones    4/17/2025 12:36 PM EDT    Workstation ID: [id number]             Physician Progress Notes (all)        Flory Oneil MD at 04/18/25 1106          Critical Care Note     LOS: 1 day   Patient Care Team:  Dinorah Oates DO as PCP - General (Internal Medicine)  Aspen Plata APRN as Nurse Practitioner (Cardiology)  Mariana Martinez APRN (Inactive) as Nurse Practitioner (Obstetrics and Gynecology)    Chief Complaint/Reason for visit:    Chief Complaint   Patient presents with    Shortness of Breath   Anaphylaxis to iron infusion  Chest pain with  "elevated troponin  Sjogren's syndrome/scleroderma on mycophenolate    Subjective     Interval History:     Feels much better this morning.  Tolerating a p.o. diet.  Still has a little vague substernal chest pain.  Peaked at 800 but is down to 200.    Review of Systems:    All systems were reviewed and negative except as noted in subjective.    Medical history, surgical history, social history, family history reviewed    Objective     Intake/Output:    Intake/Output Summary (Last 24 hours) at 4/18/2025 1106  Last data filed at 4/18/2025 0600  Gross per 24 hour   Intake 620 ml   Output 0 ml   Net 620 ml       Nutrition:  NPO Diet NPO Type: Sips with Meds    Infusions:       Mechanical Ventilator Settings:                                                Telemetry: Sinus rhythm             Vital Signs  Blood pressure 114/63, pulse 71, temperature 98.7 °F (37.1 °C), temperature source Oral, resp. rate 14, height 157.5 cm (62\"), weight 66.7 kg (147 lb), SpO2 96%, not currently breastfeeding.    Physical Exam:  General Appearance:  Middle-aged woman sitting upright in the chair eating breakfast in no distress   Head:  Atraumatic   Eyes:          Pupils equal and reactive   Ears:     Throat: No edema   Neck: Supple   Back:      Lungs:   Clear to auscultation    Heart:  Regular rhythm, S1, S2 auscultated   Abdomen:   Nondistended, bowel sounds present   Rectal:   Deferred   Extremities: No edema   Pulses: Palpable pulses   Skin: Warm and dry without rash   Lymph nodes:    Neurologic: Alert, oriented, speech fluent, face symmetric      Results Review:     I reviewed the patient's new clinical results.   Results from last 7 days   Lab Units 04/18/25  0426 04/17/25  1209   SODIUM mmol/L 133* 130*   POTASSIUM mmol/L 3.9 3.9   CHLORIDE mmol/L 105 98   CO2 mmol/L 19.0* 19.0*   BUN mg/dL 8 9   CREATININE mg/dL 0.64 0.72   CALCIUM mg/dL 8.0* 8.1*   BILIRUBIN mg/dL  --  0.3   ALK PHOS U/L  --  63   ALT (SGPT) U/L  --  16   AST (SGOT) " "U/L  --  29   GLUCOSE mg/dL 86 120*     Results from last 7 days   Lab Units 04/18/25  0426 04/17/25  1209   WBC 10*3/mm3 4.39 2.67*   HEMOGLOBIN g/dL 9.5* 12.7   HEMATOCRIT % 28.4* 39.0   PLATELETS 10*3/mm3 208 281     Results from last 7 days   Lab Units 04/17/25  1214   PH, ARTERIAL pH units 7.273*   PO2 ART mm Hg 22.9*   PCO2, ARTERIAL mm Hg 47.3*   HCO3 ART mmol/L 21.9     No results found for: \"BLOODCX\"  Lab Results   Component Value Date    URINECX No growth 02/10/2025       I reviewed the patient's new imaging including images and reports.    XR CHEST 1 VW    Date of Exam: 4/17/2025 11:55 AM EDT    Indication: dyspnea/altered mental status    Comparison: 2/22/2023    Findings:  Heart size and pulmonary vasculature are within normal limits. Lungs clear. Costophrenic angle sharp   Impression:     Impression:  No active cardiopulmonary disease      Electronically Signed: Red Jones   4/17/2025 12:36 PM EDT       All medications reviewed.   amoxicillin-clavulanate, 1 tablet, Oral, Q12H  dexlansoprazole, 30 mg, Oral, Daily  enoxaparin sodium, 1 mg/kg, Subcutaneous, Q12H  famotidine, 40 mg, Oral, BID AC  levothyroxine, 75 mcg, Oral, Q AM  mupirocin, 1 Application, Each Nare, BID  mycophenolate, 1,000 mg, Oral, Q12H  phenazopyridine, 100 mg, Oral, TID With Meals  timolol, 1 drop, Both Eyes, Daily  travoprost (KATLYN free), 1 drop, Both Eyes, Q PM          Assessment & Plan       Hypotension    Glaucoma    Hypothyroidism    GERD (gastroesophageal reflux disease)    Sjogren syndrome with myopathy    Scleroderma    Atypical chest pain    Hyponatremia    Chronic anemia    Iron deficiency anemia    Non-STEMI (non-ST elevated myocardial infarction)    64-year-old woman with Sjogren's syndrome, scleroderma, hypertension, GERD, hypothyroid, iron deficiency who received an iron infusion yesterday.  She had an anaphylactic reaction with severe hypotension and received epinephrine x 2, hydrocortisone 100 mg and Benadryl as " well as a fluid bolus.  He did have some throat swelling.  During that event she developed chest pain described as a pressure with associated troponin bump.  She was placed on full dose Lovenox.  Troponin peaked at 770 and is now 299.  She had an In-N-Out catheterization and is complaining of dysuria.  Urine had trace bacteria.      PLAN:    Cardiology evaluated and are planning left heart catheterization today  Full dose Lovenox  Resume her mycophenolate  Initiate Augmentin Pyridium for dysuria  Thyroid replacement  Benadryl  Eyedrops for glaucoma  Pepcid 40 mg twice daily    VTE Prophylaxis: will dose Lovenox    Stress Ulcer Prophylaxis: Dexilant    Flory Oneil MD  04/18/25  11:06 EDT      Time: Critical care 20 min  I personally provided care to this critically ill patient as documented above.  Critical care time does not include time spent on separately billed procedures.  None of my critical care time was concurrent with other critical care providers.     Electronically signed by Flory Oneil MD at 04/18/25 1116          Consult Notes (all)        Igor Yung MD at 04/18/25 0857        Consult Orders    1. Inpatient Cardiology Consult [[id number]] ordered by Tim Chu MD at 04/17/25 1746                 [facility]  Cardiovascular Consultation Note    Reason for consultation: Acute chest pain following IV iron infusion with hypotension with subsequent elevated cardiac troponins    History of Present Illness:  64-year-old female with history of scleroderma, iron deficiency anemia, hyperlipidemia, hypothyroidism, hypertension, Raynaud's who has been seen by cardiology multiple times with multiple Holter monitors as well as echoes.  The patient was seen in January 2025 by her nurse practitioner and was having some chest pain.  She underwent coronary CTA which showed moderate plaque and some calcification of the vessels.  The patient has had  noted iron deficiency anemia.  She has undergone 4 iron infusions without difficulty yesterday however with iron infusion she became hypotensive diaphoretic and had chest pain.  She also had mental status changes.  She was sent to the emergency room where she had significant hypotension and received fluid boluses, epinephrine, steroids.  She was then placed in the ICU.  It was felt that the patient had anaphylactic reaction to IV iron.  Of note she had significant bump in her cardiac enzymes.  Initial troponin was 116 peaked at 770 is now back down to 299.  Patient states with her first known infusion she had no issues but over the subsequent iron infusions she had some fatigue.  She states she has not been real active at times because of fatigue.  She denies dyspnea on exertion.  She has occasional chest pains that last just a few seconds and sounds noncardiac.  Yesterday however after the iron she broke out in a clammy sweat became very pale had severe tightness in her chest radiating through to her shoulder blade on the left.  She also had difficulty breathing and felt like she could not swallow.    Cardiac risk factors: #1 hypertension.  2 hyperlipidemia #3 mild CAD had documented by coronary CTA    Past medical and surgical history, social and family history reviewed in EMR.    REVIEW OF SYSTEMS:     Past Medical History:   Diagnosis Date    Anemia 2021    Autoimmune disease     Cholelithiasis Removed 2008    Colon polyp 2022    COVID-19     Cutaneous lupus erythematosus     Diverticulosis 2010?    Fibrocystic breast changes, bilateral     GERD (gastroesophageal reflux disease)     not currently taking medication    Gestational diabetes     Glaucoma     Hyperlipidemia     Hypertension     Hypothyroidism     Lower extremity edema     Neuromuscular disorder 2020    OAB (overactive bladder)     Osteoarthritis     Palpitations     Post-menopause on HRT (hormone replacement therapy)     Raynaud's disease      Scleroderma     SINE scleroderma    Sjogren's disease     Urinary tract infection      Past Surgical History:   Procedure Laterality Date    CHOLECYSTECTOMY      COLONOSCOPY  2019 and 2022    ESOPHAGOSCOPY / EGD      esophageal stretching/dilation and gastric polypectomy, esophageal biopsies    EYE SURGERY  2012    Cataracts removed - lens replacement    LAPAROSCOPIC ASSISTED VAGINAL HYSTERECTOMY SALPINGO OOPHORECTOMY Bilateral     LAPAROSCOPIC DIAZ PROCEDURE      OOPHORECTOMY      OTHER SURGICAL HISTORY      Lap for endometriosis    TONSILLECTOMY      UPPER GASTROINTESTINAL ENDOSCOPY  ?     Social History     Socioeconomic History    Marital status:    Tobacco Use    Smoking status: Never     Passive exposure: Never    Smokeless tobacco: Never   Vaping Use    Vaping status: Never Used   Substance and Sexual Activity    Alcohol use: No    Drug use: No    Sexual activity: Yes     Partners: Male     Birth control/protection: Post-menopausal, Hysterectomy     Family History   Problem Relation Age of Onset    Other Mother         has a pacemaker and is followed by Dr. Brooks    Heart disease Mother     Heart failure Mother     Hypertension Mother     Diabetes Mother     Vision loss Mother         Glaucoma/macular degeneration    Arthritis Mother     Glaucoma Father     Other Father          likely a cancer-related death;    Heart disease Father     Hypertension Father     Cancer Father         Prostate    Vision loss Father         Glaucoma/macular degeneration    Asthma Brother     Hypertension Brother     Heart attack Maternal Grandfather     Heart disease Maternal Grandfather     Heart failure Maternal Grandfather     Heart disease Maternal Grandmother     Hypertension Maternal Grandmother     Hypertension Paternal Grandmother     Colon cancer Paternal Grandfather     Cancer Paternal Grandfather         Prostate    Breast cancer Neg Hx     Ovarian cancer Neg Hx        H&P ROS reviewed and pertinent CV ROS  "as noted in HPI.    Cardiac: Patient has a history of multiple Holter's which showed no A-fib.  No significant ventricular arrhythmias.  Her EF is always been normal by echocardiogram with no significant valvular disease.  She had a negative stress PET in 2023 and a recent coronary CTA which showed moderate plaque and some coronary calcification  Respiratory: Patient felt short of breath yesterday with the iron infusion.  Prior to this she has had no dyspnea  Lower Extremities: No complaints of edema.  She did feel numbness in her arms and legs yesterday  GI: Patient had nausea and vomiting yesterday as well  Neuro: Patient had mental status changes yesterday following reaction to IV iron  Hematology: Has iron deficiency anemia  Renal: No history of CK  Endocrine: Has hypothyroidism  Constitutional: No fever chills or weight loss  Psych: No depression or stress ideation      Physical Exam: General Pleasant well-developed female sitting in recliner not dyspneic not tachypneic stable hemodynamics and vital signs       HEENT: No JVP or bruit.  No icterus.  Dentition good       Respiratory: Equal bilateral symmetrical expansion\" bilaterally       Cardiovascular: Regular rate and rhythm without murmur gallop or click and no edema to palpation       GI: Soft and flat       Lower Extremities: No lesions       Neuro: Facial expression symmetrical moves all 4 extremities       Skin: Warm and dry no edema palpation       Psych: Pleasant affect oriented x 3    Results Review: EKG shows sinus rhythm some ST elevation inferolateral leads with T wave flattening V1 through V3.  No obvious reciprocal ST segment depression.  Review of prior EKG show completely normal EKG with no ischemia.  Hemoglobin is 9.5.  Troponins went from 116-170 then 422 then down to 299.  Heart rates down the 60s blood pressures mostly in the low 100s now      Objective     Vital Sign Min/Max for last 24 hours  Temp  Min: 97.4 °F (36.3 °C)  Max: 97.9 °F " (36.6 °C)   BP  Min: 65/40  Max: 132/80   Pulse  Min: 51  Max: 100   Resp  Min: 14  Max: 20   SpO2  Min: 80 %  Max: 100 %   No data recorded      Intake/Output Summary (Last 24 hours) at 4/18/2025 0857  Last data filed at 4/18/2025 0600  Gross per 24 hour   Intake 620 ml   Output 0 ml   Net 620 ml             Current Facility-Administered Medications:     diphenhydrAMINE (BENADRYL) injection 50 mg, 50 mg, Intravenous, Q6H PRN, Tim Chu MD    enoxaparin sodium (LOVENOX) syringe 70 mg, 1 mg/kg, Subcutaneous, Q12H, Kulwinder Alvarenga, PharmD, 70 mg at 04/18/25 0535    EPINEPHrine (ADRENALIN) injection 0.3 mg, 0.3 mg, Intramuscular, Q5 Min PRN, Naila Redmond MD, 0.3 mg at 04/17/25 1147    famotidine (PEPCID) tablet 40 mg, 40 mg, Oral, BID AC, Tim Chu MD, 40 mg at 04/17/25 1823    levothyroxine (SYNTHROID, LEVOTHROID) tablet 75 mcg, 75 mcg, Oral, Q AM, Tim Chu MD, 75 mcg at 04/18/25 0535    mupirocin (BACTROBAN) 2 % nasal ointment 1 Application, 1 Application, Each Nare, BID, Flory Oneil MD    Pharmacy to Dose enoxaparin (LOVENOX), , Not Applicable, Continuous PRN, Melony Caldwell, DNP, APRN    [COMPLETED] Insert Peripheral IV, , , Once **AND** sodium chloride 0.9 % flush 10 mL, 10 mL, Intravenous, PRN, Naila Redmond MD    Lab Review:   Results from last 7 days   Lab Units 04/18/25  0426 04/17/25  1209   WBC 10*3/mm3 4.39 2.67*   HEMOGLOBIN g/dL 9.5* 12.7   PLATELETS 10*3/mm3 208 281     Results from last 7 days   Lab Units 04/18/25  0426 04/17/25  1209   SODIUM mmol/L 133* 130*   POTASSIUM mmol/L 3.9 3.9   CO2 mmol/L 19.0* 19.0*   BUN mg/dL 8 9   CREATININE mg/dL 0.64 0.72   MAGNESIUM mg/dL 2.0  --    PHOSPHORUS mg/dL 3.1  --    GLUCOSE mg/dL 86 120*     Estimated Creatinine Clearance: 79.5 mL/min (by C-G formula based on SCr of 0.64 mg/dL).        .lipd  Lab Results   Component Value Date    CHLPL 249 (H) 01/27/2020    TRIG 46 08/09/2024    HDL 81 (H) 08/09/2024    AST  29 2025    ALT 16 2025       Radiology Reports:  Imaging Results (Last 72 Hours)       Procedure Component Value Units Date/Time    XR Chest 1 View [[id number]] Collected: 25 1235     Updated: 25 1239    Narrative:      XR CHEST 1 VW    Date of Exam: 2025 11:55 AM EDT    Indication: dyspnea/altered mental status    Comparison: 2023    Findings:  Heart size and pulmonary vasculature are within normal limits. Lungs clear. Costophrenic angle sharp      Impression:      Impression:  No active cardiopulmonary disease      Electronically Signed: Red Jones    2025 12:36 PM EDT    Workstation ID: [id number]            Assessment/Plan: This patient more likely had an anaphylactic reaction to IV iron.  She was hypotensive diaphoretic and had chest pain.  Troponins had a significant rise and fall and her EKG is abnormal.   Echocardiogram is pending.  I recommend the patient undergo cardiac catheterization today.  This will be performed by Dr. Toledo.  Further recommendations to follow    This is an addendum to the above note.  The patient's cardiac cath showed only mild coronary artery disease.  The LV gram showed a mild case of Tako Tsubo mid segment.  EF well-preserved.  The patient's case represents Tako Tsubo from the anaphylactic reaction here from IV infusion.  She did not have a non-STEMI  The diagnosis non-STEMI needs to be removed from her chart      Igor Yung MD  25  08:57 EDT      Electronically signed by Igor Yung MD at 25 1427          Discharge Summary        Mleony Caldwell, DNP, APRN at 25 1613            DISCHARGE SUMMARY       Patient name: Rupa Finn  CSN: [id number]  MRN: [id number]  : 1960    Date of Admission: 2025  Date of Discharge:  2025    Admitting Physician: Tim Chu MD   Primary Care Provider: Dinorah Oates DO  Consultations:   Igor Yung MD Cardiology     Admission  Diagnosis: Hypotension      Discharge Diagnoses:     Anaphylactic reaction    Hypertension    Glaucoma    Hypothyroidism    GERD (gastroesophageal reflux disease)    Raynaud's disease    Sjogren syndrome with myopathy    Scleroderma    Hyperlipidemia LDL goal <70    Hyponatremia    Iron deficiency anemia    Hypotension    Coronary artery disease involving native coronary artery of native heart    Stress-induced cardiomyopathy    Demand ischemia    Procedures:  4/18/25: Left heart catheterization     Imaging:  XR Chest 1 View  Result Date: 4/17/2025  Impression: No active cardiopulmonary disease Electronically Signed: Red Jones  4/17/2025 12:36 PM EDT  Workstation ID: [id number]    History of Present Illness (copied from H&P note on 4/17/25):  Pleasant 64-year-old female presented emergency room from infusion center where she was getting IV iron infusion.  Patient has previous diagnosis of iron deficiency anemia for which she gets iron infusions.  She was post to get 6 infusions and this was her fifth infusion.  She gets ferric gluconate infusion.  Previously for infusions she has tolerated well.  She also has autoimmune disease with limited scleroderma as well as Sjogren syndrome, hypertension, dyslipidemia, GERD, hypothyroidism.  She also is seen at [facility] rheumatology where she gets Gammagard infusion every 2 weeks apparently to preserve her muscle function.  She received that infusion yesterday and she had no problems with that infusion.  She was in usual state of health up until she came in today morning for the infusion.  During the infusion she broke out in cold sweat and she started feeling numbness and tingling on her face as well as tongue and felt like her throat is going to close up.  She also felt chest discomfort without any radiation.  She was also starting to feel short of breath.  She denies any skin rash or hives.  She felt nauseous and felt that she threw up but nothing came up.   She became very weak and lethargic and was hypotensive.  She was transferred to ER where she was given epinephrine IV x 2, epinephrine IM x 2, hydrocortisone 100 mg along with Benadryl.  Patient was given 3 L of fluid boluses due to ongoing hypotension.  Despite this fluid boluses whenever the fluid is turned off her blood pressure drops into low 80s again.  Due to concern for ongoing hypotension we were asked to admit patient to intensive care unit.     Patient was seen at bedside.  Currently her blood pressure is 82/60.  She is mentating well.  She feels that she is starting to feel better.  She denies any shortness of breath currently.  Denies any chest pain.  Denies any shortness of breath currently.   is at bedside and states that patient looks better now and less lethargic as compared to before (end of copied text).    Hospital Course:  Patient's symptoms improved upon ICU arrival and she ultimately did not require further antihistamines or steroids nor further IVF boluses to support hemodynamics.     Due to reports of chest discomfort EKG obtained (suggestive of early repolarization abnormality) and serial troponins checked which were elevated. Cardiology was consulted and patient underwent further workup including TTE showing LVEF 60% with no structural or valvular abnormalities and LHC with mild CAD and circumferential mid LV mild hypokinesis consistent with Takotsubo / stress-induced cardiomyopathy.      Of note, patient had additional complaints of dysuria on admission with UA showing trace bacteria and Dr. Oneil placed on a course of Augmentin.     Patient is felt to have reached maximum benefit from this hospitalization and has been deemed appropriate for discharge home. She is feeling well, is tolerating a PO diet, and is ambulating without difficulty. Discharge medications and recommendations are listed below:     Vitals:  /60   Pulse 57   Temp 98.6 °F (37 °C) (Oral)   Resp 14   " Ht 157.5 cm (62\")   Wt 66.7 kg (147 lb)   LMP  (LMP Unknown)   SpO2 96%   BMI 26.89 kg/m²     Physical Exam:  General Appearance:  Middle-aged woman sitting upright in the chair eating breakfast in no distress   Head:  Atraumatic   Eyes:          Pupils equal and reactive   Throat: No edema   Neck: Supple   Lungs:   Clear to auscultation    Heart:  Regular rhythm, S1, S2 auscultated   Abdomen:   Nondistended, bowel sounds present   Rectal:   Deferred   Extremities: No edema   Pulses: Palpable pulses   Skin: Warm and dry without rash   Neurologic: Alert, oriented, speech fluent, face symmetric     Labs:  Results from last 7 days   Lab Units 04/18/25  0426   WBC 10*3/mm3 4.39   HEMOGLOBIN g/dL 9.5*   HEMATOCRIT % 28.4*   PLATELETS 10*3/mm3 208     Results from last 7 days   Lab Units 04/18/25  0426 04/17/25  1209   SODIUM mmol/L 133* 130*   POTASSIUM mmol/L 3.9 3.9   CHLORIDE mmol/L 105 98   CO2 mmol/L 19.0* 19.0*   BUN mg/dL 8 9   CREATININE mg/dL 0.64 0.72   CALCIUM mg/dL 8.0* 8.1*   BILIRUBIN mg/dL  --  0.3   ALK PHOS U/L  --  63   ALT (SGPT) U/L  --  16   AST (SGOT) U/L  --  29   GLUCOSE mg/dL 86 120*         Magnesium   Date Value Ref Range Status   04/18/2025 2.0 1.6 - 2.4 mg/dL Final     Phosphorus   Date Value Ref Range Status   04/18/2025 3.1 2.5 - 4.5 mg/dL Final           Discharge Medications:     Discharge Medications        PAUSE taking these medications        Instructions Start Date   amLODIPine 2.5 MG tablet  Wait to take this until your doctor or other care provider tells you to start again.  Commonly known as: NORVASC   1 tablet, Daily      NON FORMULARY  Wait to take this until your doctor or other care provider tells you to start again.   Iron IV      telmisartan 80 MG tablet  Wait to take this until your doctor or other care provider tells you to start again.  Commonly known as: MICARDIS   80 mg, Oral, Daily             New Medications        Instructions Start Date "   amoxicillin-clavulanate 875-125 MG per tablet  Commonly known as: AUGMENTIN   1 tablet, Oral, Every 12 Hours Scheduled             Changes to Medications        Instructions Start Date   famotidine 40 MG tablet  Commonly known as: PEPCID  What changed: See the new instructions.   TAKE 1 TABLET BY MOUTH EVERY DAY AT NIGHT AS NEEDED FOR HEARTBURN      fluocinonide 0.05 % gel  Commonly known as: LIDEX  What changed: See the new instructions.   Apply to affected area sparingly twice a day for 7-10 days. NPO 30 min      levothyroxine 75 MCG tablet  Commonly known as: SYNTHROID, LEVOTHROID  What changed: Another medication with the same name was removed. Continue taking this medication, and follow the directions you see here.   75 mcg, Oral, Every 48 Hours Scheduled      mycophenolate 500 MG tablet  Commonly known as: CELLCEPT  What changed: See the new instructions.   TAKE 1 TABLET BY MOUTH 3 TIMES A DAY AND TAKE 1 TABLET AT NIGHT      Vitamin D3 50 MCG (2000 UT) tablet  What changed: how much to take   Oral, Daily             Continue These Medications        Instructions Start Date   Alirocumab 150 MG/ML injection pen  Commonly known as: PRALUENT   150 mg, Subcutaneous, Every 14 Days      dexlansoprazole 30 MG capsule  Commonly known as: DEXILANT   30 mg, Oral, Daily      estradiol 1 MG/GM gel   1 Application, Transdermal, Daily      GAMMAGARD IV   1 dose, Every 14 Days      hydrALAZINE 10 MG tablet  Commonly known as: APRESOLINE   10 mg, Oral, As Needed      mupirocin 2 % ointment  Commonly known as: BACTROBAN   2 Times Daily      polyethylene glycol 17 GM/SCOOP powder  Commonly known as: MIRALAX   As Needed      timolol 0.5 % ophthalmic solution  Commonly known as: TIMOPTIC   1 drop, Both Eyes, Daily      travoprost (BAK free) 0.004 % solution ophthalmic solution  Commonly known as: TRAVATAN   1 drop, Every Evening      Zinc 50 MG tablet   50 mcg, Daily      Zoryve 0.3 % cream  Generic drug: Roflumilast   Daily              Discharge Diet:   Diet Instructions       Diet: Regular/House Diet, Cardiac Diets; Healthy Heart (2-3 Na+); Regular (IDDSI 7); Thin (IDDSI 0)      Discharge Diet:  Regular/House Diet  Cardiac Diets       Cardiac Diet: Healthy Heart (2-3 Na+)    Texture: Regular (IDDSI 7)    Fluid Consistency: Thin (IDDSI 0)          Activity at Discharge:    Activity Instructions       Activity as Tolerated            Follow-up Appointments  Future Appointments   Date Time Provider Department Center   4/24/2025  1:00 PM CHAIR 20  [facility]   7/16/2025 10:40 AM Aspen Plata APRN [facility]   4/3/2026 10:10 AM Dixei Marin MD [facility]     Additional Instructions for the Follow-ups that You Need to Schedule       Discharge Follow-up with PCP   As directed       Currently Documented PCP:    Dinorah Oates DO    PCP Phone Number:    [phone]     Follow Up Details: 1 week        Discharge Follow-up with Specified Provider: Aspen Plata APRN (Cardiology)   As directed      To: Aspen Plata APRN (Cardiology)   Follow Up Details: Keep previously scheduled appointment              Discharge Instructions:  Okay to discharge home   Medications as above   Follow up as above     Current Code Status       Date Active Code Status Order ID Comments User Context       4/17/2025 2119 CPR (Attempt to Resuscitate) [id number]  Tim Chu MD Inpatient        Question Answer    Code Status (Patient has no pulse and is not breathing) CPR (Attempt to Resuscitate)    Medical Interventions (Patient has pulse or is breathing) Full Support             Melony Caldwell DNP, APRN, [facility]  Pulmonary and Critical Care Medicine  04/18/25     Time: Discharge 15 min    CC: Dinorah Oates DO    Please note that portions of this note were completed with a voice recognition program.     Electronically signed by Melony Caldwell DNP, APRN at 04/18/25 5284       Melania Liriano PA-C at 04/19/25 0913       Attestation  signed by Flory Oneil MD at 25 1521      I have reviewed this documentation and agree.  I did evaluate her at the morning of discharge.  Right wrist site was intact without active bleeding.  Right dorsum of her hand was slightly swollen.  Fingers were slightly white and a little bit cool to touch but fingertips have good capillary refill                        DISCHARGE SUMMARY       Patient name: Rupa Finn  CSN: [id number]  MRN: [id number]  : 1960    Date of Admission: 2025  Date of Discharge:  2025    Admitting Physician: Tim Chu MD   Primary Care Provider: Dinorah Oates DO  Consultations:   Igor Yung MD Cardiology     Admission Diagnosis: Hypotension      Discharge Diagnoses:     Anaphylactic reaction    Hypertension    Glaucoma    Hypothyroidism    GERD (gastroesophageal reflux disease)    Raynaud's disease    Sjogren syndrome with myopathy    Scleroderma    Hyperlipidemia LDL goal <70    Hyponatremia    Iron deficiency anemia    Hypotension    Coronary artery disease involving native coronary artery of native heart    Stress-induced cardiomyopathy    Demand ischemia    Procedures:  25: Left heart catheterization     Imaging:  XR Chest 1 View  Result Date: 2025  Impression: No active cardiopulmonary disease Electronically Signed: Red Jones  2025 12:36 PM EDT  Workstation ID: [id number]    History of Present Illness (copied from H&P note on 25):  Pleasant 64-year-old female presented emergency room from infusion center where she was getting IV iron infusion.  Patient has previous diagnosis of iron deficiency anemia for which she gets iron infusions.  She was post to get 6 infusions and this was her fifth infusion.  She gets ferric gluconate infusion.  Previously for infusions she has tolerated well.  She also has autoimmune disease with limited scleroderma as well as Sjogren syndrome, hypertension, dyslipidemia, GERD,  hypothyroidism.  She also is seen at [facility] rheumatology where she gets Gammagard infusion every 2 weeks apparently to preserve her muscle function.  She received that infusion yesterday and she had no problems with that infusion.  She was in usual state of health up until she came in today morning for the infusion.  During the infusion she broke out in cold sweat and she started feeling numbness and tingling on her face as well as tongue and felt like her throat is going to close up.  She also felt chest discomfort without any radiation.  She was also starting to feel short of breath.  She denies any skin rash or hives.  She felt nauseous and felt that she threw up but nothing came up.  She became very weak and lethargic and was hypotensive.  She was transferred to ER where she was given epinephrine IV x 2, epinephrine IM x 2, hydrocortisone 100 mg along with Benadryl.  Patient was given 3 L of fluid boluses due to ongoing hypotension.  Despite this fluid boluses whenever the fluid is turned off her blood pressure drops into low 80s again.  Due to concern for ongoing hypotension we were asked to admit patient to intensive care unit.     Patient was seen at bedside.  Currently her blood pressure is 82/60.  She is mentating well.  She feels that she is starting to feel better.  She denies any shortness of breath currently.  Denies any chest pain.  Denies any shortness of breath currently.   is at bedside and states that patient looks better now and less lethargic as compared to before (end of copied text).    Hospital Course:  Patient's symptoms improved upon ICU arrival and she ultimately did not require further antihistamines or steroids nor further IVF boluses to support hemodynamics.     Due to reports of chest discomfort EKG obtained (suggestive of early repolarization abnormality) and serial troponins checked which were elevated. Cardiology was consulted and patient underwent further  "workup including TTE showing LVEF 60% with no structural or valvular abnormalities and LHC with mild CAD and circumferential mid LV mild hypokinesis consistent with Takotsubo / stress-induced cardiomyopathy.      Of note, patient had additional complaints of dysuria on admission with UA showing trace bacteria and Dr. Oneil placed on a course of Augmentin.     Patient is felt to have reached maximum benefit from this hospitalization and has been deemed appropriate for discharge home. She is feeling well, is tolerating a PO diet, and is ambulating without difficulty. Discharge medications and recommendations are listed below:     Yesterday, patient's TR band was removed per protocol. Dressing was applied. After discharge was completed, patient's arterial site reopened while she was getting dressed. Dr. Stacy instructed RN to replace the TR band and restart TR band protocol. Due to risk for further bleeding, patient remained in hospital overnight. This morning, patient is appropriate for discharge home.  Medications and follow up instructions below.      Vitals:  /69 (BP Location: Left arm, Patient Position: Sitting)   Pulse 80   Temp 97.4 °F (36.3 °C) (Oral)   Resp 16   Ht 157.5 cm (62\")   Wt 66.7 kg (147 lb)   LMP  (LMP Unknown)   SpO2 96%   BMI 26.89 kg/m²     Physical Exam:  General Appearance:  Middle-aged woman sitting upright in the chair eating breakfast in no distress   Head:  Atraumatic   Eyes:          Pupils equal and reactive   Throat: No edema   Neck: Supple   Lungs:   Clear to auscultation    Heart:  Regular rhythm, S1, S2 auscultated   Abdomen:   Nondistended, bowel sounds present   Rectal:   Deferred   Extremities: No edema   Pulses: Palpable pulses   Skin: Warm and dry without rash   Neurologic: Alert, oriented, speech fluent, face symmetric     Labs:  Results from last 7 days   Lab Units 04/19/25  0530   WBC 10*3/mm3 3.21*   HEMOGLOBIN g/dL 8.6*   HEMATOCRIT % 26.4* "   PLATELETS 10*3/mm3 188     Results from last 7 days   Lab Units 04/19/25  0530 04/18/25  0426 04/17/25  1209   SODIUM mmol/L 133*   < > 130*   POTASSIUM mmol/L 3.6   < > 3.9   CHLORIDE mmol/L 101   < > 98   CO2 mmol/L 23.0   < > 19.0*   BUN mg/dL 5*   < > 9   CREATININE mg/dL 0.44*   < > 0.72   CALCIUM mg/dL 8.2*   < > 8.1*   BILIRUBIN mg/dL  --   --  0.3   ALK PHOS U/L  --   --  63   ALT (SGPT) U/L  --   --  16   AST (SGOT) U/L  --   --  29   GLUCOSE mg/dL 81   < > 120*    < > = values in this interval not displayed.         Magnesium   Date Value Ref Range Status   04/18/2025 2.0 1.6 - 2.4 mg/dL Final     Phosphorus   Date Value Ref Range Status   04/18/2025 3.1 2.5 - 4.5 mg/dL Final           Discharge Medications:     Discharge Medications        PAUSE taking these medications        Instructions Start Date   amLODIPine 2.5 MG tablet  Wait to take this until your doctor or other care provider tells you to start again.  Commonly known as: NORVASC   1 tablet, Daily      NON FORMULARY  Wait to take this until your doctor or other care provider tells you to start again.   Iron IV      telmisartan 80 MG tablet  Wait to take this until your doctor or other care provider tells you to start again.  Commonly known as: MICARDIS   80 mg, Oral, Daily             New Medications        Instructions Start Date   amoxicillin-clavulanate 875-125 MG per tablet  Commonly known as: AUGMENTIN   1 tablet, Oral, Every 12 Hours Scheduled             Changes to Medications        Instructions Start Date   famotidine 40 MG tablet  Commonly known as: PEPCID  What changed: See the new instructions.   TAKE 1 TABLET BY MOUTH EVERY DAY AT NIGHT AS NEEDED FOR HEARTBURN      fluocinonide 0.05 % gel  Commonly known as: LIDEX  What changed: See the new instructions.   Apply to affected area sparingly twice a day for 7-10 days. NPO 30 min      levothyroxine 75 MCG tablet  Commonly known as: SYNTHROID, LEVOTHROID  What changed: Another  medication with the same name was removed. Continue taking this medication, and follow the directions you see here.   75 mcg, Oral, Every 48 Hours Scheduled      mycophenolate 500 MG tablet  Commonly known as: CELLCEPT  What changed: See the new instructions.   TAKE 1 TABLET BY MOUTH 3 TIMES A DAY AND TAKE 1 TABLET AT NIGHT      Vitamin D3 50 MCG (2000 UT) tablet  What changed: how much to take   Oral, Daily             Continue These Medications        Instructions Start Date   Alirocumab 150 MG/ML injection pen  Commonly known as: PRALUENT   150 mg, Subcutaneous, Every 14 Days      dexlansoprazole 30 MG capsule  Commonly known as: DEXILANT   30 mg, Oral, Daily      estradiol 1 MG/GM gel   1 Application, Transdermal, Daily      GAMMAGARD IV   1 dose, Every 14 Days      hydrALAZINE 10 MG tablet  Commonly known as: APRESOLINE   10 mg, Oral, As Needed      mupirocin 2 % ointment  Commonly known as: BACTROBAN   2 Times Daily      polyethylene glycol 17 GM/SCOOP powder  Commonly known as: MIRALAX   As Needed      timolol 0.5 % ophthalmic solution  Commonly known as: TIMOPTIC   1 drop, Both Eyes, Daily      travoprost (BAK free) 0.004 % solution ophthalmic solution  Commonly known as: TRAVATAN   1 drop, Every Evening      Zinc 50 MG tablet   50 mcg, Daily      Zoryve 0.3 % cream  Generic drug: Roflumilast   Daily             Discharge Diet:   Diet Instructions       Diet: Regular/House Diet, Cardiac Diets; Healthy Heart (2-3 Na+); Regular (IDDSI 7); Thin (IDDSI 0)      Discharge Diet:  Regular/House Diet  Cardiac Diets       Cardiac Diet: Healthy Heart (2-3 Na+)    Texture: Regular (IDDSI 7)    Fluid Consistency: Thin (IDDSI 0)          Activity at Discharge:    Activity Instructions       Activity as Tolerated            Follow-up Appointments  Future Appointments   Date Time Provider Department Center   4/24/2025  1:00 PM CHAIR 20 [facility]   7/16/2025 10:40 AM Aspen Plata APRN [facility]   4/3/2026  10:10 AM Dixie Marin MD [facility]     Additional Instructions for the Follow-ups that You Need to Schedule       Discharge Follow-up with PCP   As directed       Currently Documented PCP:    Dinorah Oates DO    PCP Phone Number:    [phone]     Follow Up Details: 1 week        Discharge Follow-up with Specified Provider: Aspen Plata APRN (Cardiology)   As directed      To: Aspen Plata APRN (Cardiology)   Follow Up Details: Keep previously scheduled appointment              Discharge Instructions:  Okay to discharge home   Medications as above   Follow up as above   If bleeding reoccurs at radial site, return to hospital    Current Code Status       Date Active Code Status Order ID Comments User Context       4/17/2025 2119 CPR (Attempt to Resuscitate) [id number]  Tim Chu MD Inpatient        Question Answer    Code Status (Patient has no pulse and is not breathing) CPR (Attempt to Resuscitate)    Medical Interventions (Patient has pulse or is breathing) Full Support             Melania Liriano PA-C   Pulmonary and Critical Care Medicine  04/19/25   Electronically signed by Melania Liriano PA-C, 04/19/25, 12:08 PM EDT.     Time: 31 minutes     CC: Dinorah Oates DO    Please note that portions of this note were completed with a voice recognition program.     Electronically signed by Flory Oneil MD at 04/21/25 1521       Discharge Order (From admission, onward)       Start     Ordered    04/19/25 0856  Discharge patient  Once        Expected Discharge Date: 04/19/25   Discharge Disposition: Home or Self Care   Physician of Record for Attribution - Please select from Treatment Team: FLORY ONEIL [[id number]]   Review needed by CMO to determine Physician of Record: No      Question Answer Comment   Physician of Record for Attribution - Please select from Treatment Team FLORY ONEIL    Review needed by CMO to determine Physician of Record No        04/19/25 0856     04/18/25 1558  Discharge patient  Once,   Status:  Canceled        Expected Discharge Date: 04/18/25   Discharge Disposition: Home or Self Care   Physician of Record for Attribution - Please select from Treatment Team: MANDY RAPP [[id number]]   Review needed by CMO to determine Physician of Record: No      Question Answer Comment   Physician of Record for Attribution - Please select from Treatment Team MANDY RAPP    Review needed by CMO to determine Physician of Record No        04/18/25 6647

## 2025-04-23 RX ORDER — ESTRADIOL 10 UG/1
1 TABLET, FILM COATED VAGINAL 2 TIMES WEEKLY
Qty: 8 TABLET | Refills: 11 | Status: SHIPPED | OUTPATIENT
Start: 2025-04-23

## 2025-04-23 NOTE — TELEPHONE ENCOUNTER
Prior authorization was denied.  Coverage is provided when the member has had a trial of Estradiol 10 mcg vaginal tablets.  Coverage may also be provided when there is a medical reason the member is unable to use this medication.

## 2025-04-25 ENCOUNTER — LAB (OUTPATIENT)
Dept: LAB | Facility: HOSPITAL | Age: 65
End: 2025-04-25
Payer: COMMERCIAL

## 2025-04-25 ENCOUNTER — OFFICE VISIT (OUTPATIENT)
Dept: FAMILY MEDICINE CLINIC | Facility: CLINIC | Age: 65
End: 2025-04-25
Payer: COMMERCIAL

## 2025-04-25 VITALS
HEART RATE: 64 BPM | WEIGHT: 147.8 LBS | SYSTOLIC BLOOD PRESSURE: 148 MMHG | HEIGHT: 62 IN | DIASTOLIC BLOOD PRESSURE: 92 MMHG | BODY MASS INDEX: 27.2 KG/M2

## 2025-04-25 DIAGNOSIS — R30.0 DYSURIA: ICD-10-CM

## 2025-04-25 DIAGNOSIS — D50.9 IRON DEFICIENCY ANEMIA, UNSPECIFIED IRON DEFICIENCY ANEMIA TYPE: ICD-10-CM

## 2025-04-25 DIAGNOSIS — I10 PRIMARY HYPERTENSION: ICD-10-CM

## 2025-04-25 DIAGNOSIS — E78.5 HYPERLIPIDEMIA LDL GOAL <100: ICD-10-CM

## 2025-04-25 DIAGNOSIS — D72.819 LEUKOPENIA, UNSPECIFIED TYPE: ICD-10-CM

## 2025-04-25 DIAGNOSIS — Z09 HOSPITAL DISCHARGE FOLLOW-UP: Primary | ICD-10-CM

## 2025-04-25 DIAGNOSIS — E03.9 ACQUIRED HYPOTHYROIDISM: ICD-10-CM

## 2025-04-25 LAB
BACTERIA UR QL AUTO: ABNORMAL /HPF
BILIRUB UR QL STRIP: NEGATIVE
CHOLEST SERPL-MCNC: 228 MG/DL (ref 0–200)
CLARITY UR: CLEAR
COLOR UR: YELLOW
DEPRECATED RDW RBC AUTO: 50.3 FL (ref 37–54)
ERYTHROCYTE [DISTWIDTH] IN BLOOD BY AUTOMATED COUNT: 14.8 % (ref 12.3–15.4)
GLUCOSE UR STRIP-MCNC: NEGATIVE MG/DL
HCT VFR BLD AUTO: 35.6 % (ref 34–46.6)
HDLC SERPL-MCNC: 66 MG/DL (ref 40–60)
HGB BLD-MCNC: 11.3 G/DL (ref 12–15.9)
HGB UR QL STRIP.AUTO: NEGATIVE
HYALINE CASTS UR QL AUTO: ABNORMAL /LPF
KETONES UR QL STRIP: ABNORMAL
LDLC SERPL CALC-MCNC: 145 MG/DL (ref 0–100)
LDLC/HDLC SERPL: 2.16 {RATIO}
LEUKOCYTE ESTERASE UR QL STRIP.AUTO: NEGATIVE
MCH RBC QN AUTO: 29.7 PG (ref 26.6–33)
MCHC RBC AUTO-ENTMCNC: 31.7 G/DL (ref 31.5–35.7)
MCV RBC AUTO: 93.4 FL (ref 79–97)
NITRITE UR QL STRIP: NEGATIVE
PH UR STRIP.AUTO: 6.5 [PH] (ref 5–8)
PLATELET # BLD AUTO: 270 10*3/MM3 (ref 140–450)
PMV BLD AUTO: 11.1 FL (ref 6–12)
PROT UR QL STRIP: ABNORMAL
RBC # BLD AUTO: 3.81 10*6/MM3 (ref 3.77–5.28)
RBC # UR STRIP: ABNORMAL /HPF
REF LAB TEST METHOD: ABNORMAL
SP GR UR STRIP: 1.02 (ref 1–1.03)
SQUAMOUS #/AREA URNS HPF: ABNORMAL /HPF
TRIGL SERPL-MCNC: 98 MG/DL (ref 0–150)
UROBILINOGEN UR QL STRIP: ABNORMAL
VLDLC SERPL-MCNC: 17 MG/DL (ref 5–40)
WBC # UR STRIP: ABNORMAL /HPF
WBC NRBC COR # BLD AUTO: 3.1 10*3/MM3 (ref 3.4–10.8)

## 2025-04-25 PROCEDURE — 80061 LIPID PANEL: CPT

## 2025-04-25 PROCEDURE — 81001 URINALYSIS AUTO W/SCOPE: CPT

## 2025-04-25 PROCEDURE — 85027 COMPLETE CBC AUTOMATED: CPT

## 2025-04-25 PROCEDURE — 36415 COLL VENOUS BLD VENIPUNCTURE: CPT

## 2025-04-25 NOTE — PROGRESS NOTES
Transitional Care Follow Up Visit  Subjective     Rupablanche Finn is a 64 y.o. female  with hypothyroidism, HLD, HTN, post menopause on HRT, GERD, Sjogren syndrome, cutaneous lupus, scleroderma with myopathy and Raynauds who presents for a transitional care management visit.    Within 48 business hours after discharge our office contacted her via telephone to coordinate her care and needs.      I reviewed and discussed the details of that call along with the discharge summary, hospital problems, inpatient lab results, inpatient diagnostic studies, and consultation reports with Rupa.     Current outpatient and discharge medications have been reconciled for the patient.  Reviewed by: Dinorah Oates DO          4/19/2025     7:20 PM   Date of TCM Phone Call   Hospital [facility]   Date of Admission 4/17/2025   Date of Discharge 4/19/2025   Discharge Disposition Home or Self Care     Risk for Readmission (LACE) Score: 10 (4/19/2025  6:00 AM)      History of Present Illness   Course During Hospital Stay:  Patient was admitted to [facility] for anaphylaxis following IV iron infusion. She was in usual state of health up until she came in the morning for the infusion. She became very weak and lethargic and was hypotensive. She was transferred to ER where she was given epinephrine IV x 2, epinephrine IM x 2, hydrocortisone 100 mg along with Benadryl. Patient was given 3 L of fluid boluses due to ongoing hypotension. Admitted to ICU. Hospitalization complicated by Takotsubo / stress-induced cardiomyopathy.  Cardiology was consulted and patient underwent further workup including TTE showing LVEF 60% with no structural or valvular abnormalities and LHC with mild CAD and circumferential mid LV mild hypokinesis.    She was discharged with course of augmentin for UTI. She completed this course. No urinary symptoms but would like urine screened today.    She reports feeling generally weak but overall improving since  "hospitalization. No fevers, chest pain, palpitations, syncope, orthopnea, dyspnea.     She does have some lingering right arm pain related to LHC (right radial artery access). Bruising has improved.     Has been checking BP at home. Resumed her telmisartan but has been holding her almodipine      The following portions of the patient's history were reviewed and updated as appropriate: allergies, current medications, past family history, past medical history, past social history, past surgical history, and problem list.    Review of Systems as above    Objective   /92   Pulse 64   Ht 157.5 cm (62.01\")   Wt 67 kg (147 lb 12.8 oz)   BMI 27.03 kg/m²   Physical Exam  Vitals reviewed.   Constitutional:       Appearance: Normal appearance. She is not ill-appearing.   Cardiovascular:      Rate and Rhythm: Normal rate.   Pulmonary:      Effort: Pulmonary effort is normal. No respiratory distress.   Neurological:      Mental Status: She is alert.   Psychiatric:         Mood and Affect: Mood normal.         Behavior: Behavior normal.         Judgment: Judgment normal.         Assessment & Plan   Diagnoses and all orders for this visit:    1. Hospital discharge follow-up (Primary)    2. Dysuria  -     Urinalysis With Microscopic - Urine, Clean Catch; Future    3. Iron deficiency anemia, unspecified iron deficiency anemia type  -     CBC No Differential; Future    4. Leukopenia, unspecified type  -     CBC No Differential; Future    5. Primary hypertension    6. Acquired hypothyroidism  -     TSH Rfx On Abnormal To Free T4; Future               Reviewed all available hospital course, discharge summary, consult notes, labs, imaging, medication changes, follow up recommendations.  She has follow up scheduled with cardiology for her routine HTN , HLD, and now stress induced cardiomyopathy. BP elevated today - recommended resuming amlodipine. Continue Telmisartan 80mg daily.     Polymyositis, scleroderma with myopathy, " sjrogren syndrome - following with [facility], Rheumatology for IVIG treatment     HERMELINDO - unable to tolerate oral iron or IV iron. Following with hematology. Will monitor CBC today. Discussed/encouraged iron rich diet.      UTI symptoms absent today. She requests repeating UA today to confrim successful treatment since completing augmentin.

## 2025-04-29 ENCOUNTER — READMISSION MANAGEMENT (OUTPATIENT)
Dept: CALL CENTER | Facility: HOSPITAL | Age: 65
End: 2025-04-29
Payer: COMMERCIAL

## 2025-04-29 NOTE — OUTREACH NOTE
Medical Week 2 Survey      Flowsheet Row Responses   [facility] patient discharged from? [place]   Does the patient have one of the following disease processes/diagnoses(primary or secondary)? Other   Week 2 attempt successful? No   Unsuccessful attempts Attempt 1            Hali SOTOMAYOR - Registered Nurse

## 2025-05-19 DIAGNOSIS — E03.9 ACQUIRED HYPOTHYROIDISM: ICD-10-CM

## 2025-05-19 RX ORDER — LEVOTHYROXINE SODIUM 88 UG/1
88 TABLET ORAL
Qty: 45 TABLET | Refills: 0 | OUTPATIENT
Start: 2025-05-19

## 2025-05-21 ENCOUNTER — PATIENT MESSAGE (OUTPATIENT)
Dept: FAMILY MEDICINE CLINIC | Facility: CLINIC | Age: 65
End: 2025-05-21
Payer: COMMERCIAL

## 2025-05-21 DIAGNOSIS — E03.9 ACQUIRED HYPOTHYROIDISM: Primary | ICD-10-CM

## 2025-05-21 DIAGNOSIS — E03.9 ACQUIRED HYPOTHYROIDISM: ICD-10-CM

## 2025-05-22 RX ORDER — LEVOTHYROXINE SODIUM 88 UG/1
88 TABLET ORAL EVERY OTHER DAY
Qty: 45 TABLET | Refills: 1 | Status: SHIPPED | OUTPATIENT
Start: 2025-05-22

## 2025-05-22 RX ORDER — LEVOTHYROXINE SODIUM 88 UG/1
88 TABLET ORAL
Qty: 45 TABLET | Refills: 0 | OUTPATIENT
Start: 2025-05-22

## 2025-06-02 RX ORDER — TELMISARTAN 80 MG/1
80 TABLET ORAL DAILY
Qty: 90 TABLET | Refills: 2 | Status: SHIPPED | OUTPATIENT
Start: 2025-06-02

## 2025-06-12 ENCOUNTER — LAB (OUTPATIENT)
Dept: LAB | Facility: HOSPITAL | Age: 65
End: 2025-06-12
Payer: MEDICARE

## 2025-06-12 ENCOUNTER — OFFICE VISIT (OUTPATIENT)
Dept: FAMILY MEDICINE CLINIC | Facility: CLINIC | Age: 65
End: 2025-06-12
Payer: MEDICARE

## 2025-06-12 VITALS
BODY MASS INDEX: 25.91 KG/M2 | SYSTOLIC BLOOD PRESSURE: 128 MMHG | HEIGHT: 62 IN | DIASTOLIC BLOOD PRESSURE: 84 MMHG | WEIGHT: 140.8 LBS

## 2025-06-12 DIAGNOSIS — E03.9 ACQUIRED HYPOTHYROIDISM: ICD-10-CM

## 2025-06-12 DIAGNOSIS — D50.9 IRON DEFICIENCY ANEMIA, UNSPECIFIED IRON DEFICIENCY ANEMIA TYPE: ICD-10-CM

## 2025-06-12 DIAGNOSIS — E87.1 CHRONIC HYPONATREMIA: ICD-10-CM

## 2025-06-12 DIAGNOSIS — E87.1 CHRONIC HYPONATREMIA: Primary | ICD-10-CM

## 2025-06-12 DIAGNOSIS — I10 PRIMARY HYPERTENSION: ICD-10-CM

## 2025-06-12 LAB
ANION GAP SERPL CALCULATED.3IONS-SCNC: 11.8 MMOL/L (ref 5–15)
BUN SERPL-MCNC: 8 MG/DL (ref 8–23)
BUN/CREAT SERPL: 10.7 (ref 7–25)
CALCIUM SPEC-SCNC: 9.7 MG/DL (ref 8.6–10.5)
CHLORIDE SERPL-SCNC: 95 MMOL/L (ref 98–107)
CO2 SERPL-SCNC: 24.2 MMOL/L (ref 22–29)
CREAT SERPL-MCNC: 0.75 MG/DL (ref 0.57–1)
EGFRCR SERPLBLD CKD-EPI 2021: 88.5 ML/MIN/1.73
GLUCOSE SERPL-MCNC: 84 MG/DL (ref 65–99)
OSMOLALITY SERPL: 273 MOSM/KG (ref 280–301)
OSMOLALITY UR: 571 MOSM/KG
POTASSIUM SERPL-SCNC: 4.5 MMOL/L (ref 3.5–5.2)
SODIUM SERPL-SCNC: 131 MMOL/L (ref 136–145)
SODIUM UR-SCNC: 71 MMOL/L
TSH SERPL DL<=0.05 MIU/L-ACNC: 2.54 UIU/ML (ref 0.27–4.2)

## 2025-06-12 PROCEDURE — 99214 OFFICE O/P EST MOD 30 MIN: CPT | Performed by: STUDENT IN AN ORGANIZED HEALTH CARE EDUCATION/TRAINING PROGRAM

## 2025-06-12 PROCEDURE — 1126F AMNT PAIN NOTED NONE PRSNT: CPT | Performed by: STUDENT IN AN ORGANIZED HEALTH CARE EDUCATION/TRAINING PROGRAM

## 2025-06-12 PROCEDURE — 83930 ASSAY OF BLOOD OSMOLALITY: CPT

## 2025-06-12 PROCEDURE — 83935 ASSAY OF URINE OSMOLALITY: CPT

## 2025-06-12 PROCEDURE — 3079F DIAST BP 80-89 MM HG: CPT | Performed by: STUDENT IN AN ORGANIZED HEALTH CARE EDUCATION/TRAINING PROGRAM

## 2025-06-12 PROCEDURE — 84443 ASSAY THYROID STIM HORMONE: CPT

## 2025-06-12 PROCEDURE — 84300 ASSAY OF URINE SODIUM: CPT

## 2025-06-12 PROCEDURE — 80048 BASIC METABOLIC PNL TOTAL CA: CPT

## 2025-06-12 PROCEDURE — 3074F SYST BP LT 130 MM HG: CPT | Performed by: STUDENT IN AN ORGANIZED HEALTH CARE EDUCATION/TRAINING PROGRAM

## 2025-06-12 NOTE — PROGRESS NOTES
Established Office Visit      Patient Name: Rupa Finn  : 1960   MRN: [id number]   Care Team: Patient Care Team:  Dinorah Oates DO as PCP - General (Internal Medicine)  Aspen Plata APRN as Nurse Practitioner (Cardiology)  Mariana Martinez APRN (Inactive) as Nurse Practitioner (Obstetrics and Gynecology)    Chief Complaint:    Chief Complaint   Patient presents with    Labs Only     Went to Rheumatology had lab work done    Balance Issues       History of Present Illness: Rupa Finn is a 65 y.o. female with hypothyroidism, HLD, HTN, post menopause on HRT, GERD, Sjogren syndrome, cutaneous lupus, scleroderma with myopathy and Raynauds who is here today to follow up with abnormal lab results.    She  recently followed with Dr. Kim Mak, Rheumatology for her routine follow up. She expressed concerns for muscle cramps in her feet at night time. She has chronic hyponatremia but reports her recent labs this week have revealed worsening. Na 127. (Previously 133). She has felt more nauseated lately which she is unsure if it is related to sodium levels verses her baseline GERD, medication induced nausea.   Has felt a bit more uncoordinated lately, intermittent balance issues. She is worried about acutely worsening hyponatremia.            Subjective      Review of Systems:   Review of Systems - See HPI    I have reviewed and the following portions of the patient's history were updated as appropriate: past family history, past medical history, past social history, past surgical history and problem list.    Medications:     Current Outpatient Medications:     Alirocumab (PRALUENT) 150 MG/ML injection pen, Inject 1 mL under the skin into the appropriate area as directed Every 14 (Fourteen) Days., Disp: 2.24 mL, Rfl: 6    amLODIPine (NORVASC) 2.5 MG tablet, Take 1 tablet by mouth Daily., Disp: , Rfl:     Cholecalciferol (Vitamin D3) 50 MCG (2000) tablet, Take  by mouth Daily.,  Disp: , Rfl:     dexlansoprazole (DEXILANT) 30 MG capsule, TAKE 1 CAPSULE BY MOUTH EVERY DAY, Disp: 90 capsule, Rfl: 3    famotidine (PEPCID) 40 MG tablet, TAKE 1 TABLET BY MOUTH EVERY DAY AT NIGHT AS NEEDED FOR HEARTBURN (Patient taking differently: Take 1 tablet by mouth At Night As Needed for Heartburn.), Disp: 90 tablet, Rfl: 3    fluocinonide (LIDEX) 0.05 % gel, Apply to affected area sparingly twice a day for 7-10 days. NPO 30 min (Patient taking differently: Apply 1 Application topically to the appropriate area as directed Daily As Needed for Irritation (lips). As needed), Disp: , Rfl:     hydrALAZINE (APRESOLINE) 10 MG tablet, Take 1 tablet by mouth As Needed (Blood pressure sustained >160mmHg)., Disp: 30 tablet, Rfl: 0    Immune Globulin, Human, (GAMMAGARD IV), Infuse 1 dose into a venous catheter Every 14 (Fourteen) Days., Disp: , Rfl:     levothyroxine (Synthroid) 88 MCG tablet, Take 1 tablet by mouth Every Other Day., Disp: 45 tablet, Rfl: 1    levothyroxine (SYNTHROID, LEVOTHROID) 75 MCG tablet, Take 1 tablet by mouth Every Other Day., Disp: 45 tablet, Rfl: 2    mupirocin (BACTROBAN) 2 % ointment, Apply  topically to the appropriate area as directed 2 (Two) Times a Day. (Patient taking differently: Apply  topically to the appropriate area as directed 2 (Two) Times a Day. As needed), Disp: , Rfl:     mycophenolate (CELLCEPT) 500 MG tablet, TAKE 1 TABLET BY MOUTH 3 TIMES A DAY AND TAKE 1 TABLET AT NIGHT (Patient taking differently: Take 2 tablets by mouth. Takes 4 pills a day), Disp: , Rfl:     polyethylene glycol (MIRALAX) powder, As Needed., Disp: , Rfl:     Roflumilast (Zoryve) 0.3 % cream, Apply 1 application  topically to the appropriate area as directed Daily. (Patient taking differently: Apply 1 application  topically to the appropriate area as directed Daily. As needed), Disp: , Rfl:     telmisartan (MICARDIS) 80 MG tablet, Take 1 tablet by mouth Daily., Disp: 90 tablet, Rfl: 2    timolol  "(TIMOPTIC) 0.5 % ophthalmic solution, Administer 1 drop to both eyes Daily., Disp: , Rfl:     travoprost, KATLYN free, (TRAVATAN) 0.004 % solution ophthalmic solution, 1 drop Every Evening. in affected eye(s), Disp: , Rfl:     Zinc 50 MG tablet, Take 50 mcg by mouth Daily., Disp: , Rfl:     [Paused] NON FORMULARY, Iron IV, Disp: , Rfl:     Allergies:   Allergies   Allergen Reactions    Cefaclor Shortness Of Breath     hives    Ibuprofen Angioedema     Tongue swelling    Naproxen Sodium Angioedema     tongue swelling    Nsaids Angioedema    Prednisone Anaphylaxis and Other (See Comments)     Allergic to all steroids!  Eye pressure shoots up to dangerous levels. Also had some throat swelling.    Alphagan [Brimonidine] Other (See Comments)     Eyes itching, red, swollen    Atenolol Other (See Comments)     Fatigue and acid reflux    Doxycycline Nausea And Vomiting    E-Mycin [Erythromycin] Nausea And Vomiting     Abdominal cramping    Livalo [Pitavastatin] Myalgia    Statins Myalgia    Dorzolamide Hcl Other (See Comments)     Eyes burning, discharge.    Iron Unknown - High Severity    Adhesive Tape Rash     \"burns, eats through my skin\"       Objective     Physical Exam:  Vital Signs:   Vitals:    06/12/25 1058   BP: 128/84   Weight: 63.9 kg (140 lb 12.8 oz)   Height: 157.5 cm (62.01\")     Body mass index is 25.74 kg/m².     Physical Exam  Vitals reviewed.   Constitutional:       Appearance: Normal appearance.   Cardiovascular:      Rate and Rhythm: Normal rate.   Pulmonary:      Effort: Pulmonary effort is normal. No respiratory distress.   Neurological:      Mental Status: She is alert.   Psychiatric:         Mood and Affect: Mood normal.         Behavior: Behavior normal.         Judgment: Judgment normal.                   Assessment / Plan      Assessment/Plan:   Problems Addressed This Visit  Diagnoses and all orders for this visit:    1. Chronic hyponatremia (Primary)  -     Basic metabolic panel; Future  -     " Ambulatory Referral to Endocrinology  -     Sodium, Urine, Random - Urine, Clean Catch; Future  -     Osmolality, Urine - Urine, Clean Catch; Future  -     Osmolality, Serum; Future    2. Acquired hypothyroidism  -     TSH Rfx On Abnormal To Free T4; Future    3. Primary hypertension    4. Iron deficiency anemia, unspecified iron deficiency anemia type      Chronic hyponatremia - she brings labs from her rheumatologist visit revealing sodium of 127 4 days ago. Her baseline is 130-134 and so she has concerns about this acutely worsening. We will obtain labs today for further evaluation in addition to referring to endocrinology to establish care.     Hypothyroidism - TSH today, continue synthroid alternating 75mcg and 88mcg daily    GERD - continue dexilant, following with DAYANA REYES    HTN - well controlled with telmisartan and amlodipine     HLD - Praluent, managed by cardiology Aspen LAWRENCE      Polymyositis, scleroderma with myopathy, sjrogren syndrome - following with [facility], Rheumatology for IVIG treatment      HERMELINDO - unable to tolerate oral iron or IV iron. Following with hematology. Discussed/encouraged iron rich diet.              Results         Plan of care reviewed with patient at the conclusion of today's visit. Education was provided regarding diagnosis and management.  Patient verbalizes understanding of and agreement with management plan.    Follow Up:   No follow-ups on file.        DO ILIANA Garza RD  [facility]  [address]  [facility] [address]  Fax [phone]  Phone [phone]

## 2025-06-16 DIAGNOSIS — E87.1 CHRONIC HYPONATREMIA: Primary | ICD-10-CM

## 2025-07-07 NOTE — PROGRESS NOTES
Subjective:     Encounter Date:07/16/2025      Patient ID: Rupa Finn is a 65 y.o.  white female, retired [employer] , from [place], [place].     SELF-REFERRED  FORMER PHYSICIAN: Gena Tse MD  CURRENT PHYSICIAN: Frantz Lin MD  PREVIOUS CARDIOLOGIST: Glenna Melendez MD, [facility]  RHEUMATOLOGISTS:  Kim Douglas MD, Meghana Orta MD (), Marco Jasso MD ()  GYNECOLOGIST: Fabio Armenta MD  NEUROLOGIST: Naga Jarquin MD .  GASTROENTEROLOGIST: Fidel Magallanes MD/ Missy Rothman PA-C.    Chief Complaint:   Chief Complaint   Patient presents with    Palpitations     Problem List:  Palpitations:  Remote cardiac stress testing, performed by [facility], Dr. Hartley; data deficit, date deficit; negative for ischemia.   Echocardiogram,  03/10/2014:  Normal, EF 60%, Dr. aHrtley.  Holter monitor, 05/23/2014: Sinus katelyn to sinus rhythm with rates of 40 to 94, occasional PVCs, Dr. Hartley.   Cardiac event monitor, 11/10/2016-12/10/2016: sinus rhythm, sinus tach, sinus arrhythmia with rare PACs and PVCs.   Echocardiogram 2D Complete, February 2022: Left ventricular cavity and wall thickness normal. LVEF 55%-65%. Wall motion normal, with normal LV diastolic function. Mild AR and MR. Right ventricular systolic function normal by visual assessment, TAPSE: 1.8 cm  Echocardiogram 2/20/2023: EF 60%, mild AI, trace MR, trace TR, normal RVSP.  Normal Holter monitor February 2023  Holter monitor August 2023 rather benign, average heart rate 58 bpm, 1 episode of SVT for only 4 beats, no heart block, VT, atrial fibrillation/flutter, or pauses  Increased palpitations October 2023 with dizziness   Holter monitor October 2023 : relatively benign.  6 beats of atrial tachycardia and less than 1% PVC burden. 1 episode of ventricular tachycardia for only 3 beats. Recommendations for PRN acebutalol  Dyspnea on exertion and chest pain syndrome - possible  combined hypertensive and ischemic cardiovascular disease:  CCS class I-II chest discomfort/NYHA class II TERRAZAS, with normal EKG (July 2018).  CT cardiac calcium score 5.6 August 2018  Stress echocardiogram, 8/13/2018: RVSP 30 mmHg, mild TR, no chest pain at baseline and during exercise with baseline ECG normal, acceptable negative echocardiographic exercise stress test suggestive of low probability for significant focal obstructive coronary artery disease with preserved LV function following exercise to 122% predicted exercise capacity and 90% predicted maximum heart rate  CCS class I chest discomfort/NYHA class II dyspnea on exertion symptoms  Echocardiogram, April 2021: LVEF 0.60. Borderline pulmonary arterial hypertension. Mild aortic valve sclerosis. RVSP 36 mmHg.  Intermittent atypical nonexertional chest pain syndrome with normal electrocardiogram, September 2021.  Acceptable CT Chest without contrast, 3/3/2022: A few nonspecific sub-4 mm pulmonary nodules, likely benign were present.  Echocardiogram 2D Complete, February 2022: Left ventricular cavity and wall thickness normal. LVEF 55%-65%. Wall motion normal, with normal LV diastolic function. Mild AR and MR. Right ventricular systolic function normal by visual assessment, TAPSE: 1.8 cm  Elevated D-dimer with normal VQ scan and chest x-ray February 2023  Echocardiogram 2/20/2023: EF 60%, mild AI, trace MR, trace TR, normal RVSP.  Cardiac PET 3/13/2023: Normal myocardial perfusion study with no evidence of ischemia, rest EF 62%, rest EF 70%, no significant coronary artery calcification  [facility] ED 9/5/2023 for atypical chest pain, weakness, nausea, vomiting, felt to be GI in etiology  Normal ECG January 2025  CTA coronaries 3/12/2025: Total cardiac calcium score 109.9 (left main 41.6, LAD 12, left circumflex 55.3, RCA 1.1)  Echocardiogram 4/18/2025: LVEF 60%, cardiac valves anatomically and functionally normal  Left heart catheterization 4/18/2025: No  flow-limiting CAD, 30-40% RCA OM1 and LAD disease, LVEF 55% with circumferential mid LV mild hypokinesis consistent with Takotsubo/stress-induced cardiomyopathy  Hypertension, intolerant to amlodipine with bradycardia, hydrochlorothiazide.   Hyperlipidemia; ASCVD 10-year risk is 4.5%, 1.9% if treated, started on Livalo but she was intolerant July 2018, has had previous intolerances to statins as well, now on Praluent.   Lower extremity edema.   Glaucoma.  Autoimmune diseases:  Patient reports positive genetic markers for scleroderma.   Rheumatoid arthritis, positive YONY, RF factor.  Raynaud's   Sjogren's  Hypothyroidism, treated.  Constipation.  Gestational diabetes.  GERD, with abnormal EGD suggestive of scleroderma and abnormal esophageal motility, medium size hiatal hernia, erythematous mucosa in the stomach, started on Dexilant July 2022  COVID positive, October 2020, with nausea, myalgias, sore throat, and nasal congestion with residual shortness of breath and fatigue March 2021.,  COVID-positive May 2022 with same symptoms that lasted about a week and did not require hospitalization  Pulmonary nodules on CT chest March 2022  Scleroderma  [facility] 2-day hospitalization in April 2025 for anaphylactic reaction from the infusion center after having ferric gluconate infusion.  In the ER she received epinephrine x 4, hydrocortisone, Benadryl, 3 L saline boluses due to ongoing hypotension.Cardiology was consulted and patient underwent further workup including TTE showing LVEF 60% with no structural or valvular abnormalities and LHC with mild CAD and circumferential mid LV mild hypokinesis consistent with Takotsubo / stress-induced cardiomyopathy  Surgical history:    Hysterectomy.  Tonsillectomy.  Cholecystectomy.  Lap for endometriosis   Esophageal motility surgery    Allergies   Allergen Reactions    Cefaclor Shortness Of Breath     hives    Ibuprofen Angioedema     Tongue swelling    Naproxen Sodium Angioedema      "tongue swelling    Nsaids Angioedema    Prednisone Anaphylaxis and Other (See Comments)     Allergic to all steroids!  Eye pressure shoots up to dangerous levels. Also had some throat swelling.    Alphagan [Brimonidine] Other (See Comments)     Eyes itching, red, swollen    Atenolol Other (See Comments)     Fatigue and acid reflux    Doxycycline Nausea And Vomiting    E-Mycin [Erythromycin] Nausea And Vomiting     Abdominal cramping    Livalo [Pitavastatin] Myalgia    Statins Myalgia    Dorzolamide Hcl Other (See Comments)     Eyes burning, discharge.    Iron Unknown - High Severity    Adhesive Tape Rash     \"burns, eats through my skin\"       Current Outpatient Medications   Medication Instructions    Alirocumab (PRALUENT) 150 mg, Subcutaneous, Every 14 Days    amLODIPine (NORVASC) 2.5 MG tablet 1 tablet, Daily    Cholecalciferol (Vitamin D3) 50 MCG (2000 UT) tablet Daily    dexlansoprazole (DEXILANT) 30 mg, Oral, Daily    famotidine (PEPCID) 40 MG tablet TAKE 1 TABLET BY MOUTH EVERY DAY AT NIGHT AS NEEDED FOR HEARTBURN    fluocinonide (LIDEX) 0.05 % gel Apply to affected area sparingly twice a day for 7-10 days. NPO 30 min    hydrALAZINE (APRESOLINE) 10 mg, Oral, As Needed    Immune Globulin, Human, (GAMMAGARD IV) 1 dose, Every 14 Days    levothyroxine (SYNTHROID) 88 mcg, Oral, Every Other Day    levothyroxine (SYNTHROID, LEVOTHROID) 75 mcg, Oral, Every 48 Hours Scheduled    mupirocin (BACTROBAN) 2 % ointment 2 Times Daily    mycophenolate (CELLCEPT) 500 MG tablet TAKE 1 TABLET BY MOUTH 3 TIMES A DAY AND TAKE 1 TABLET AT NIGHT    polyethylene glycol (MIRALAX) powder As Needed    Roflumilast (Zoryve) 0.3 % cream Daily    telmisartan (MICARDIS) 80 mg, Oral, Daily    timolol (TIMOPTIC) 0.5 % ophthalmic solution 1 drop, Daily    travoprost, BAK free, (TRAVATAN) 0.004 % solution ophthalmic solution 1 drop, Every Evening    Zinc 50 mcg, Daily         HISTORY OF PRESENT ILLNESS:  Patient is here for 6-month follow-up.  " "In the interim patient's amlodipine was increased to 5 mg daily and she is currently taking this every other day.  Patient had [facility] 2-day hospitalization in April 2025 for anaphylactic reaction from the infusion center after having ferric gluconate infusion.  She says that she had had 4 prior infusions and had some issues with her blood pressure getting too low but it was on the fifth 1 that she had anaphylactic reaction.  In the ER she received epinephrine x 4, hydrocortisone, Benadryl, 3 L saline boluses due to ongoing hypotension.Cardiology was consulted and patient underwent further workup including TTE showing LVEF 60% with no structural or valvular abnormalities and LHC with mild CAD and circumferential mid LV mild hypokinesis consistent with Takotsubo / stress-induced cardiomyopathy.  Blood pressure readings in the hypertension gera June 2025 were around 115-122 over 60s-70s, heart rates in the 60s-70s.  Patient denies any chest pain, shortness of breath, increased palpitations, or dizziness, presyncope, or syncope.  If the patient is stressed out then her blood pressure will go up but otherwise it is normal.  She is requesting a note to have clearance so she can get her teeth cleaned.  Blood pressure right arm sitting manually in office today was 118/64.    ROS   All other systems reviewed and otherwise negative.    Procedures       Objective:       Vitals:    07/16/25 1025 07/16/25 1028 07/16/25 1055   BP:  158/90 118/64   BP Location:  Right arm Right arm   Patient Position:  Sitting Sitting   Cuff Size:  Adult    Weight: 63.5 kg (140 lb)     Height: 157.5 cm (62\")       Body mass index is 25.61 kg/m².  Wt Readings from Last 2 Encounters:   07/16/25 63.5 kg (140 lb)   06/12/25 63.9 kg (140 lb 12.8 oz)        Constitutional:       Appearance: Healthy appearance. Not in distress.   Neck:      Vascular: No JVR. JVD normal.   Pulmonary:      Effort: Pulmonary effort is normal.      Breath sounds: Normal breath " sounds. No wheezing. No rhonchi. No rales.   Chest:      Chest wall: Not tender to palpatation.   Cardiovascular:      PMI at left midclavicular line. Normal rate. Regular rhythm. Normal S1. Normal S2.       Murmurs: There is a grade 1/6 systolic murmur at the LLSB.      No gallop.  No click. No rub.   Pulses:     Intact distal pulses.   Edema:     Peripheral edema absent.   Abdominal:      General: Bowel sounds are normal.      Palpations: Abdomen is soft.      Tenderness: There is no abdominal tenderness.   Musculoskeletal: Normal range of motion.         General: No tenderness. Skin:     General: Skin is warm and dry.   Neurological:      General: No focal deficit present.      Mental Status: Alert and oriented to person, place and time.           Lab Review:   Lab Results   Component Value Date    GLUCOSE 84 06/12/2025    BUN 8.0 06/12/2025    CREATININE 0.75 06/12/2025    EGFRIFNONA 73 03/19/2021    BCR 10.7 06/12/2025    CO2 24.2 06/12/2025    CALCIUM 9.7 06/12/2025    ALBUMIN 3.5 04/17/2025    AST 29 04/17/2025    ALT 16 04/17/2025       Lab Results   Component Value Date    WBC 3.10 (L) 04/25/2025    HGB 11.3 (L) 04/25/2025    HCT 35.6 04/25/2025    MCV 93.4 04/25/2025     04/25/2025       Lab Results   Component Value Date    HGBA1C 5.40 09/06/2023       Lab Results   Component Value Date    TSH 2.540 06/12/2025       Lab Results   Component Value Date    CHOL 228 (H) 04/25/2025    CHOL 135 04/19/2025     Lab Results   Component Value Date    TRIG 98 04/25/2025    TRIG 88 04/19/2025     Lab Results   Component Value Date    HDL 66 (H) 04/25/2025    HDL 45 04/19/2025     Lab Results   Component Value Date     (H) 04/25/2025    LDL 73 04/19/2025             Results for orders placed during the hospital encounter of 04/17/25    Adult Transthoracic Echo Complete W/ Cont if Necessary Per Protocol 04/18/2025 12:15 PM    Interpretation Summary    Left ventricular systolic function is normal.  Estimated left ventricular EF = 60%    The cardiac valves are anatomically and functionally normal.            Advance Care Planning   ACP discussion was held with the patient during this visit. Patient has an advance directive (not in EMR), copy requested.      Assessment:     Patient had [facility] 2-day hospitalization in April 2025 for anaphylactic reaction from the infusion center after having ferric gluconate infusion.  In the ER she received epinephrine x 4, hydrocortisone, Benadryl, 3 L saline boluses due to ongoing hypotension.Cardiology was consulted and patient underwent further workup including TTE showing LVEF 60% with no structural or valvular abnormalities and LHC with mild CAD and circumferential mid LV mild hypokinesis consistent with Takotsubo / stress-induced cardiomyopathy.       Diagnosis Plan   1. Stress-induced cardiomyopathy  Echocardiogram April 2025 showed LVEF 60% with no structural or valvular abnormalities and LHC with mild CAD and circumferential mid LV mild hypokinesis consistent with Takotsubo / stress-induced cardiomyopathy.      2. Primary hypertension  Controlled, continue current cardiac medications      3. Hyperlipidemia LDL goal <70  Abnormal lipid panel April 2025, continue Praluent             Plan:         Patient to continue current medications and close follow up with the above providers.  Tentative cardiology follow up in January 2026 or patient may return sooner PRN.      Electronically signed by HOWARD Wilkerson, 07/16/25, 10:57 AM EDT.

## 2025-07-16 ENCOUNTER — OFFICE VISIT (OUTPATIENT)
Dept: CARDIOLOGY | Facility: CLINIC | Age: 65
End: 2025-07-16
Payer: MEDICARE

## 2025-07-16 VITALS
HEIGHT: 62 IN | SYSTOLIC BLOOD PRESSURE: 118 MMHG | BODY MASS INDEX: 25.76 KG/M2 | WEIGHT: 140 LBS | DIASTOLIC BLOOD PRESSURE: 64 MMHG

## 2025-07-16 DIAGNOSIS — I10 PRIMARY HYPERTENSION: ICD-10-CM

## 2025-07-16 DIAGNOSIS — I51.81 STRESS-INDUCED CARDIOMYOPATHY: Primary | ICD-10-CM

## 2025-07-16 DIAGNOSIS — E78.5 HYPERLIPIDEMIA LDL GOAL <70: ICD-10-CM

## 2025-07-23 ENCOUNTER — OFFICE VISIT (OUTPATIENT)
Dept: FAMILY MEDICINE CLINIC | Facility: CLINIC | Age: 65
End: 2025-07-23
Payer: MEDICARE

## 2025-07-23 VITALS
DIASTOLIC BLOOD PRESSURE: 84 MMHG | SYSTOLIC BLOOD PRESSURE: 132 MMHG | BODY MASS INDEX: 25.98 KG/M2 | HEIGHT: 62 IN | WEIGHT: 141.2 LBS

## 2025-07-23 DIAGNOSIS — Z00.00 MEDICARE WELCOME EXAM: Primary | ICD-10-CM

## 2025-07-23 DIAGNOSIS — E03.9 ACQUIRED HYPOTHYROIDISM: ICD-10-CM

## 2025-07-23 DIAGNOSIS — M35.1 CONNECTIVE TISSUE DISEASE OVERLAP SYNDROME: ICD-10-CM

## 2025-07-23 DIAGNOSIS — Z23 IMMUNIZATION DUE: ICD-10-CM

## 2025-07-23 DIAGNOSIS — M33.20 POLYMYOSITIS: ICD-10-CM

## 2025-07-23 DIAGNOSIS — I10 PRIMARY HYPERTENSION: ICD-10-CM

## 2025-07-23 DIAGNOSIS — Z91.81 AT MODERATE RISK FOR FALL: ICD-10-CM

## 2025-07-23 DIAGNOSIS — M35.03 SJOGREN SYNDROME WITH MYOPATHY: ICD-10-CM

## 2025-07-23 DIAGNOSIS — E87.1 CHRONIC HYPONATREMIA: ICD-10-CM

## 2025-07-23 NOTE — PATIENT INSTRUCTIONS
Advance Care Planning and Advance Directives     You make decisions on a daily basis - decisions about where you want to live, your career, your home, your life. Perhaps one of the most important decisions you face is your choice for future medical care. Take time to talk with your family and your healthcare team and start planning today.  Advance Care Planning is a process that can help you:  Understand possible future healthcare decisions in light of your own experiences  Reflect on those decision in light of your goals and values  Discuss your decisions with those closest to you and the healthcare professionals that care for you  Make a plan by creating a document that reflects your wishes    Surrogate Decision Maker  In the event of a medical emergency, which has left you unable to communicate or to make your own decisions, you would need someone to make decisions for you.  It is important to discuss your preferences for medical treatment with this person while you are in good health.     Qualities of a surrogate decision maker:  Willing to take on this role and responsibility  Knows what you want for future medical care  Willing to follow your wishes even if they don't agree with them  Able to make difficult medical decisions under stressful circumstances    Advance Directives  These are legal documents you can create that will guide your healthcare team and decision maker(s) when needed. These documents can be stored in the electronic medical record.    Living Will - a legal document to guide your care if you have a terminal condition or a serious illness and are unable to communicate. States vary by statute in document names/types, but most forms may include one or more of the following:        -  Directions regarding life-prolonging treatments        -  Directions regarding artificially provided nutrition/hydration        -  Choosing a healthcare decision maker        -  Direction regarding organ/tissue  donation    Durable Power of  for Healthcare - this document names an -in-fact to make medical decisions for you, but it may also allow this person to make personal and financial decisions for you. Please seek the advice of an  if you need this type of document.    **Advance Directives are not required and no one may discriminate against you if you do not sign one.    Medical Orders  Many states allow specific forms/orders signed by your physician to record your wishes for medical treatment in your current state of health. This form, signed in personal communication with your physician, addresses resuscitation and other medical interventions that you may or may not want.      For more information or to schedule a time with a [facility] Advance Care Planning Facilitator contact: [facility] or call [phone] and someone will contact you directly.  Fall Prevention in the Home, Adult  Falls can cause injuries and affect people of all ages. There are many simple things that you can do to make your home safe and to help prevent falls.  If you need it, ask for help making these changes.  What actions can I take to prevent falls?  General information  Use good lighting in all rooms. Make sure to:  Replace any light bulbs that burn out.  Turn on lights if it is dark and use night-lights.  Keep items that you use often in easy-to-reach places. Lower the shelves around your home if needed.  Move furniture so that there are clear paths around it.  Do not keep throw rugs or other things on the floor that can make you trip.  If any of your floors are uneven, fix them.  Add color or contrast paint or tape to clearly boyd and help you see:  Grab bars or handrails.  First and last steps of staircases.  Where the edge of each step is.  If you use a ladder or stepladder:  Make sure that it is fully opened. Do not climb a closed ladder.  Make sure the sides of the ladder are locked in  place.  Have someone hold the ladder while you use it.  Know where your pets are as you move through your home.  What can I do in the bathroom?         Keep the floor dry. Clean up any water that is on the floor right away.  Remove soap buildup in the bathtub or shower. Buildup makes bathtubs and showers slippery.  Use non-skid mats or decals on the floor of the bathtub or shower.  Attach bath mats securely with double-sided, non-slip rug tape.  If you need to sit down while you are in the shower, use a non-slip stool.  Install grab bars by the toilet and in the bathtub and shower. Do not use towel bars as grab bars.  What can I do in the bedroom?  Make sure that you have a light by your bed that is easy to reach.  Do not use any sheets or blankets on your bed that hang to the floor.  Have a firm bench or chair with side arms that you can use for support when you get dressed.  What can I do in the kitchen?  Clean up any spills right away.  If you need to reach something above you, use a sturdy step stool that has a grab bar.  Keep electrical cables out of the way.  Do not use floor polish or wax that makes floors slippery.  What can I do with my stairs?  Do not leave anything on the stairs.  Make sure that you have a light switch at the top and the bottom of the stairs. Have them installed if you do not have them.  Make sure that there are handrails on both sides of the stairs. Fix handrails that are broken or loose. Make sure that handrails are as long as the staircases.  Install non-slip stair treads on all stairs in your home if they do not have carpet.  Avoid having throw rugs at the top or bottom of stairs, or secure the rugs with carpet tape to prevent them from moving.  Choose a carpet design that does not hide the edge of steps on the stairs. Make sure that carpet is firmly attached to the stairs. Fix any carpet that is loose or worn.  What can I do on the outside of my home?  Use bright outdoor  lighting.  Repair the edges of walkways and driveways and fix any cracks. Clear paths of anything that can make you trip, such as tools or rocks.  Add color or contrast paint or tape to clearly boyd and help you see high doorway thresholds.  Trim any bushes or trees on the main path into your home.  Check that handrails are securely fastened and in good repair. Both sides of all steps should have handrails.  Install guardrails along the edges of any raised decks or porches.  Have leaves, snow, and ice cleared regularly. Use sand, salt, or ice melt on walkways during winter months if you live where there is ice and snow.  In the garage, clean up any spills right away, including grease or oil spills.  What other actions can I take?  Review your medicines with your health care provider. Some medicines can make you confused or feel dizzy. This can increase your chance of falling.  Wear closed-toe shoes that fit well and support your feet. Wear shoes that have rubber soles and low heels.  Use a cane, walker, scooter, or crutches that help you move around if needed.  Talk with your provider about other ways that you can decrease your risk of falls. This may include seeing a physical therapist to learn to do exercises to improve movement and strength.  Where to find more information  Centers for Disease Control and PreventionJANNETTE: cdc.gov  [place] [employer] [place]: subhash.nih.gov  [place] on [place]ging: subhash.nih.gov  Contact a health care provider if:  You are afraid of falling at home.  You feel weak, drowsy, or dizzy at home.  You fall at home.  Get help right away if you:  Lose consciousness or have trouble moving after a fall.  Have a fall that causes a head injury.  These symptoms may be an emergency. Get help right away. Call 911.  Do not wait to see if the symptoms will go away.  Do not drive yourself to the hospital.  This information is not intended to replace advice given to you by your health care  provider. Make sure you discuss any questions you have with your health care provider.  Document Revised: 08/21/2023 Document Reviewed: 08/21/2023  Elsevier Patient Education © 2024 [employer]  Sit-to-Stand Exercise    The sit-to-stand exercise (also known as the chair stand or chair rise exercise) strengthens your lower body and helps you maintain or improve your mobility and independence. The end goal is to do the sit-to-stand exercise without using your hands. This will be easier as you become stronger. You should always talk with your health care provider before starting any exercise program, especially if you have had recent surgery.  Do the exercise exactly as told by your health care provider and adjust it as directed. It is normal to feel mild stretching, pulling, tightness, or discomfort as you do this exercise, but you should stop right away if you feel sudden pain or your pain gets worse. Do not begin doing this exercise until told by your health care provider.  What the sit-to-stand exercise does  The sit-to-stand exercise helps to strengthen the muscles in your thighs and the muscles in the center of your body that give you stability (core muscles). This exercise is especially helpful if:  You have had knee or hip surgery.  You have trouble getting up from a chair, out of a car, or off the toilet due to muscle weakness.  How to do the sit-to-stand exercise  Sit toward the front edge of a sturdy chair without armrests. Your knees should be bent and your feet should be flat on the floor and shoulder-width apart and underneath your hips.  Place your hands lightly on each side of the seat. Keep your back and neck as straight as possible, with your chest slightly forward.  Breathe in slowly. Lean forward and slightly shift your weight to the front of your feet.  Breathe out as you slowly stand up. Try not to support any weight with your hands.  Stand and pause for a full breath in and out.  Breathe in as  you sit down slowly. Tighten your core and abdominal muscles to control your lowering as much as possible. You should lower yourself back to the chair slowly, not just drop back into the seat.  Breathe out slowly.  Do this exercise 10-15 times. If needed, do it fewer times until you build up strength.  Rest for 1 minute, then do another set of 10-15 repetitions.  To change the difficulty of the sit-to-stand exercise  If the exercise is too difficult, use a chair with sturdy armrests, and push off the armrests to help you come to the standing position. You can also use the armrests to help slowly lower yourself back to sitting. As this gets easier, try to use your arms less. You can also place a firm cushion or pillow on the chair to make the surface higher.  If this exercise is too easy, do not use your arms to help raise or lower yourself. You can also wear a weighted vest, use hand weights, increase your repetitions, or try a lower chair.  General tips  You may feel tired when starting an exercise routine. This is normal.  You may have muscle soreness that lasts a few days. This is normal. As you get stronger, you may not feel muscle soreness.  Use smooth, steady movements.  Do not  hold your breath during strength exercises. This can cause unsafe changes in your blood pressure.  Breathe in slowly through your nose, and breathe out slowly through your mouth.  Summary  Strengthening your lower body is an important step to help you move safely and independently.  The sit-to-stand exercise helps strengthen the muscles in your thighs and core.  You should always talk with your health care provider before starting any exercise program, especially if you have had recent surgery.  This information is not intended to replace advice given to you by your health care provider. Make sure you discuss any questions you have with your health care provider.  Document Revised: 04/10/2022 Document Reviewed: 04/10/2022  [employer]  Patient Education ©  [employer]  You are due for Shingrix vaccination series ( the newest shingles vaccine).  It is a two shot series spaced 2-6 months apart. Please get this vaccine series started at your earliest convenience at your local pharmacy to help avoid shingles outbreak. It is more effective than the old Zostavax vaccine and is recommended even if you have had the Zostavax vaccine in the past.  Once the Shingrix series is completed, it does not need to be repeated.   For more information, please look at the website below:  https://www.cdc.gov/vaccines/vpd/shingles/public/shingrix/index.html      Medicare Wellness  Personal Prevention Plan of Service     Date of Office Visit:    Encounter Provider:  Dinorah Oates DO  Place of Service:  [facility]  Patient Name: Rupa Finn  :  1960    As part of the Medicare Wellness portion of your visit today, we are providing you with this personalized preventive plan of services (PPPS). This plan is based upon recommendations of the [place] Preventive Services Task Force (USPSTF) and the Advisory Committee on Immunization Practices (ACIP).    This lists the preventive care services that should be considered, and provides dates of when you are due. Items listed as completed are up-to-date and do not require any further intervention.    Health Maintenance   Topic Date Due   • Pneumococcal Vaccine 50+ (1 of 2 - PCV) Never done   • ZOSTER VACCINE (1 of 2) Never done   • ANNUAL WELLNESS VISIT  Never done   • HEPATITIS C SCREENING  2025 (Originally 2016)   • INFLUENZA VACCINE  10/01/2025   • DXA SCAN  2026   • LIPID PANEL  2026   • MAMMOGRAM  2026   • TDAP/TD VACCINES (2 - Td or Tdap) 2029   • COLORECTAL CANCER SCREENING  2032   • COVID-19 Vaccine  Discontinued       No orders of the defined types were placed in this encounter.      No follow-ups on file.

## 2025-07-23 NOTE — PROGRESS NOTES
Subjective   The ABCs of the Annual Wellness Visit  Medicare Wellness Visit      Rupa Finn is a 65 y.o. patient who presents for a Medicare Wellness Visit.    The following portions of the patient's history were reviewed and   updated as appropriate: allergies, current medications, past family history, past medical history, past social history, past surgical history, and problem list.    Compared to one year ago, the patient's physical   health is the same.  Compared to one year ago, the patient's mental   health is the same.    Recent Hospitalizations:  This patient has had a [facility] admission record on file within the last 365 days.  Current Medical Providers:  Patient Care Team:  Dinorah Oates DO as PCP - General (Internal Medicine)  Aspen Plata APRN as Nurse Practitioner (Cardiology)  Mariana Martinez APRN (Inactive) as Nurse Practitioner (Obstetrics and Gynecology)    Outpatient Medications Prior to Visit   Medication Sig Dispense Refill    Alirocumab (PRALUENT) 150 MG/ML injection pen Inject 1 mL under the skin into the appropriate area as directed Every 14 (Fourteen) Days. 2.24 mL 6    amLODIPine (NORVASC) 2.5 MG tablet Take 1 tablet by mouth Daily.      Cholecalciferol (Vitamin D3) 50 MCG (2000 UT) tablet Take  by mouth Daily.      dexlansoprazole (DEXILANT) 30 MG capsule TAKE 1 CAPSULE BY MOUTH EVERY DAY 90 capsule 3    famotidine (PEPCID) 40 MG tablet TAKE 1 TABLET BY MOUTH EVERY DAY AT NIGHT AS NEEDED FOR HEARTBURN (Patient taking differently: Take 1 tablet by mouth At Night As Needed for Heartburn.) 90 tablet 3    fluocinonide (LIDEX) 0.05 % gel Apply to affected area sparingly twice a day for 7-10 days. NPO 30 min (Patient taking differently: Apply 1 Application topically to the appropriate area as directed Daily As Needed for Irritation (lips). As needed)      hydrALAZINE (APRESOLINE) 10 MG tablet Take 1 tablet by mouth As Needed (Blood pressure sustained >160mmHg).  30 tablet 0    Immune Globulin, Human, (GAMMAGARD IV) Infuse 1 dose into a venous catheter Every 14 (Fourteen) Days.      levothyroxine (Synthroid) 88 MCG tablet Take 1 tablet by mouth Every Other Day. 45 tablet 1    levothyroxine (SYNTHROID, LEVOTHROID) 75 MCG tablet Take 1 tablet by mouth Every Other Day. 45 tablet 2    mupirocin (BACTROBAN) 2 % ointment Apply  topically to the appropriate area as directed 2 (Two) Times a Day. (Patient taking differently: Apply  topically to the appropriate area as directed 2 (Two) Times a Day. As needed)      mycophenolate (CELLCEPT) 500 MG tablet TAKE 1 TABLET BY MOUTH 3 TIMES A DAY AND TAKE 1 TABLET AT NIGHT (Patient taking differently: Take 2 tablets by mouth. Takes 4 pills a day)      polyethylene glycol (MIRALAX) powder As Needed.      Roflumilast (Zoryve) 0.3 % cream Apply 1 application  topically to the appropriate area as directed Daily. (Patient taking differently: Apply 1 application  topically to the appropriate area as directed Daily. As needed)      telmisartan (MICARDIS) 80 MG tablet Take 1 tablet by mouth Daily. 90 tablet 2    timolol (TIMOPTIC) 0.5 % ophthalmic solution Administer 1 drop to both eyes Daily.      travoprost, BAK free, (TRAVATAN) 0.004 % solution ophthalmic solution 1 drop Every Evening. in affected eye(s)      Zinc 50 MG tablet Take 50 mcg by mouth Daily.       No facility-administered medications prior to visit.     No opioid medication identified on active medication list. I have reviewed chart for other potential  high risk medication/s and harmful drug interactions in the elderly.      Aspirin is not on active medication list.  Aspirin use is not indicated based on review of current medical condition/s. Risk of harm outweighs potential benefits.  .    Patient Active Problem List   Diagnosis    Palpitations    Hypertension    Glaucoma    Hypothyroidism    GERD (gastroesophageal reflux disease)    OAB (overactive bladder)    Raynaud's disease     "Polymyositis    Sjogren syndrome with myopathy    Connective tissue disease overlap syndrome    Scleroderma    Hyperlipidemia LDL goal <70    Hyponatremia    Malabsorption of iron    Iron deficiency anemia    Hypotension    Coronary artery disease involving native coronary artery of native heart    Stress-induced cardiomyopathy    Demand ischemia    Anaphylactic reaction     Advance Care Planning Advance Directive is not on file.  ACP discussion was held with the patient during this visit. Patient does not have an advance directive, information provided.            Objective   Vitals:    07/23/25 1011   BP: 132/84   Weight: 64 kg (141 lb 3.2 oz)   Height: 157.5 cm (62\")   PainSc: 0-No pain       Estimated body mass index is 25.83 kg/m² as calculated from the following:    Height as of this encounter: 157.5 cm (62\").    Weight as of this encounter: 64 kg (141 lb 3.2 oz).    BMI is >= 25 and <30. (Overweight) The following options were offered after discussion;: exercise counseling/recommendations and nutrition counseling/recommendations        Gait and Balance Evaluation:  Normal    Does the patient have evidence of cognitive impairment? No  Lab Results   Component Value Date    TRIG 98 04/25/2025    HDL 66 (H) 04/25/2025     (H) 04/25/2025    VLDL 17 04/25/2025                                                                                               Health  Risk Assessment    Smoking Status:  Social History     Tobacco Use   Smoking Status Never    Passive exposure: Never   Smokeless Tobacco Never     Alcohol Consumption:  Social History     Substance and Sexual Activity   Alcohol Use Never       Fall Risk Screen  STEADI Fall Risk Assessment was completed, and patient is at MODERATE risk for falls. Assessment completed on:7/23/2025    Depression Screening   Little interest or pleasure in doing things? Not at all   Feeling down, depressed, or hopeless? Not at all   PHQ-2 Total Score 0      Health Habits " and Functional and Cognitive Screenin/16/2025     8:10 AM   Functional & Cognitive Status   Do you have difficulty preparing food and eating? No   Do you have difficulty bathing yourself, getting dressed or grooming yourself? No   Do you have difficulty using the toilet? No   Do you have difficulty moving around from place to place? No   Do you have trouble with steps or getting out of a bed or a chair? No   Current Diet Well Balanced Diet   Dental Exam Up to date   Eye Exam Up to date   Exercise (times per week) 6 times per week   Current Exercises Include Bicycling Outdoors;House Cleaning;Walking   Do you need help using the phone?  No   Are you deaf or do you have serious difficulty hearing?  No   Do you need help to go to places out of walking distance? No   Do you need help shopping? No   Do you need help preparing meals?  No   Do you need help with housework?  No   Do you need help with laundry? No   Do you need help taking your medications? No   Do you need help managing money? No   Do you ever drive or ride in a car without wearing a seat belt? No   Have you felt unusual fatigue (could be tiredness), stress, anger or loneliness in the last month? No   Who do you live with? Spouse   If you need help, do you have trouble finding someone available to you? No   Have you been bothered in the last four weeks by sexual problems? No   Do you have difficulty concentrating, remembering or making decisions? No   Visual Acuity:  Vision Screening    Right eye Left eye Both eyes   Without correction      With correction 20/25 20/20 20/25     Age-appropriate Screening Schedule:  Refer to the list below for future screening recommendations based on patient's age, sex and/or medical conditions. Orders for these recommended tests are listed in the plan section. The patient has been provided with a written plan.    Health Maintenance List  Health Maintenance   Topic Date Due    Pneumococcal Vaccine 50+ (1 of 2 - PCV)  Never done    ZOSTER VACCINE (1 of 2) Never done    HEPATITIS C SCREENING  12/20/2025 (Originally 11/14/2016)    INFLUENZA VACCINE  10/01/2025    DXA SCAN  03/18/2026    LIPID PANEL  04/25/2026    MAMMOGRAM  05/16/2026    ANNUAL WELLNESS VISIT  07/23/2026    COLORECTAL CANCER SCREENING  07/22/2027    TDAP/TD VACCINES (2 - Td or Tdap) 09/17/2029    COVID-19 Vaccine  Discontinued                                                                                                                                                CMS Preventative Services Quick Reference  Risk Factors Identified During Encounter  Immunizations Discussed/Encouraged: Capvaxive (Pneumococcal conjugate - PCV21) and Shingrix  Dental Screening Recommended  Vision Screening Recommended    The above risks/problems have been discussed with the patient.  Pertinent information has been shared with the patient in the After Visit Summary.  An After Visit Summary and PPPS were made available to the patient.    Follow Up:   Next Medicare Wellness visit to be scheduled in 1 year.     Assessment & Plan  Medicare welcome exam         At moderate risk for fall         Immunization due         Acquired hypothyroidism         Primary hypertension           Chronic hyponatremia         Sjogren syndrome with myopathy         Polymyositis         Connective tissue disease overlap syndrome          Discussed importance of age appropriate vaccinations, cancer screening, routine labs. Discussed importance of up to date ACP. Discussed importance of fall precautions. Avoid clutter in the home and loose rugs. Always use handrail when available. Discussed the importance of medication compliance and follow up with medical providers. Discussed importance of healthy diet and active lifestyle as able. Smoking cessation not indicated. Discussed importance of up to date eye exams and dental exams.    Mammogram scheduled next week  Colonoscopy 2022-  repeat in 2027     Declined  PCV21 and shingles vaccines     Chronic hyponatremia - Her baseline is 130-134 and so she has concerns about this acutely worsening. Has been referred to nephrology     Hypothyroidism - TSH wnl 6/2025, continue synthroid alternating 75mcg and 88mcg daily     GERD - continue dexilant, following with DAYANA REYES     HTN - well controlled with telmisartan and amlodipine     Stress induced cardiomyopathy - following with Cardiology Aspen LAWRENCE last visit 7/2025     HLD - Praluent, managed by cardiology Aspen LAWRENCE. Statin intolerant.      Polymyositis, scleroderma with myopathy, sjrogren syndrome - following with [facility], Rheumatology for IVIG treatment      HERMELINDO - unable to tolerate oral iron or IV iron. Following with hematology Dr. Mosquera. Discussed/encouraged iron rich diet.        Follow Up:   No follow-ups on file.

## 2025-07-28 ENCOUNTER — HOSPITAL ENCOUNTER (OUTPATIENT)
Dept: MAMMOGRAPHY | Facility: HOSPITAL | Age: 65
Discharge: HOME | End: 2025-07-28
Admitting: STUDENT IN AN ORGANIZED HEALTH CARE EDUCATION/TRAINING PROGRAM
Payer: MEDICARE

## 2025-07-28 DIAGNOSIS — Z12.31 ENCOUNTER FOR SCREENING MAMMOGRAM FOR BREAST CANCER: ICD-10-CM

## 2025-07-28 PROCEDURE — 77067 SCR MAMMO BI INCL CAD: CPT

## 2025-07-28 PROCEDURE — 77063 BREAST TOMOSYNTHESIS BI: CPT

## 2025-07-29 ENCOUNTER — TRANSCRIBE ORDERS (OUTPATIENT)
Dept: ADMINISTRATIVE | Facility: HOSPITAL | Age: 65
End: 2025-07-29
Payer: MEDICARE

## 2025-07-29 DIAGNOSIS — R51.9 WORSENING HEADACHES: ICD-10-CM

## 2025-07-29 DIAGNOSIS — G43.109 MIGRAINE AURA WITHOUT HEADACHE: Primary | ICD-10-CM

## 2025-08-04 DIAGNOSIS — I10 ESSENTIAL HYPERTENSION: Primary | ICD-10-CM

## 2025-08-12 DIAGNOSIS — E78.2 MIXED HYPERLIPIDEMIA: ICD-10-CM

## 2025-08-13 ENCOUNTER — HOSPITAL ENCOUNTER (OUTPATIENT)
Facility: HOSPITAL | Age: 65
Discharge: HOME | End: 2025-08-13
Admitting: STUDENT IN AN ORGANIZED HEALTH CARE EDUCATION/TRAINING PROGRAM
Payer: MEDICARE

## 2025-08-13 DIAGNOSIS — G43.109 MIGRAINE AURA WITHOUT HEADACHE: ICD-10-CM

## 2025-08-13 DIAGNOSIS — R51.9 WORSENING HEADACHES: ICD-10-CM

## 2025-08-13 PROCEDURE — 25510000002 GADOBENATE DIMEGLUMINE 529 MG/ML SOLUTION: Performed by: STUDENT IN AN ORGANIZED HEALTH CARE EDUCATION/TRAINING PROGRAM

## 2025-08-13 PROCEDURE — 70553 MRI BRAIN STEM W/O & W/DYE: CPT

## 2025-08-13 PROCEDURE — A9577 INJ MULTIHANCE: HCPCS | Performed by: STUDENT IN AN ORGANIZED HEALTH CARE EDUCATION/TRAINING PROGRAM

## 2025-08-13 RX ORDER — ALIROCUMAB 150 MG/ML
INJECTION, SOLUTION SUBCUTANEOUS
Qty: 2 ML | Refills: 0 | Status: SHIPPED | OUTPATIENT
Start: 2025-08-13

## 2025-08-13 RX ADMIN — GADOBENATE DIMEGLUMINE 12 ML: 529 INJECTION, SOLUTION INTRAVENOUS at 17:46

## (undated) DEVICE — GLIDESHEATH BASIC HYDROPHILIC COATED INTRODUCER SHEATH: Brand: GLIDESHEATH

## (undated) DEVICE — TR BAND RADIAL ARTERY COMPRESSION DEVICE: Brand: TR BAND

## (undated) DEVICE — MODEL AT P65, P/N 701554-001KIT CONTENTS: HAND CONTROLLER, 3-WAY HIGH-PRESSURE STOPCOCK WITH ROTATING END AND PREMIUM HIGH-PRESSURE TUBING: Brand: ANGIOTOUCH® KIT

## (undated) DEVICE — ADULT, W/LG. BACK PAD, RADIOTRANSPARENT ELEMENT AND LEAD WIRE COMPATIBLE W/: Brand: DEFIBRILLATION ELECTRODES

## (undated) DEVICE — CATH DIAG EXPO M/ PK 6FR FL4/FR4 PIG 3PK

## (undated) DEVICE — NDL ANGIOGR ADV THN SMOTH SGLWALL 21G 1.5

## (undated) DEVICE — GW PERIPH GUIDERIGHT STD/EXCHNG/J/TIP SS 0.035IN 5X260CM

## (undated) DEVICE — CATH F6 INF TL JL 3.5 100 CM: Brand: INFINITI

## (undated) DEVICE — PK CATH CARD 10

## (undated) DEVICE — GLIDESHEATH 0.035" DILATOR HYDROPHILIC COATED INTRODUCER SHEATH: Brand: GLIDESHEATH

## (undated) DEVICE — MODEL BT2000 P/N 700287-012KIT CONTENTS: MANIFOLD WITH SALINE AND CONTRAST PORTS, SALINE TUBING WITH SPIKE AND HAND SYRINGE, TRANSDUCER: Brand: BT2000 AUTOMATED MANIFOLD KIT